# Patient Record
Sex: FEMALE | Race: WHITE | NOT HISPANIC OR LATINO | URBAN - METROPOLITAN AREA
[De-identification: names, ages, dates, MRNs, and addresses within clinical notes are randomized per-mention and may not be internally consistent; named-entity substitution may affect disease eponyms.]

---

## 2018-10-07 ENCOUNTER — INPATIENT (INPATIENT)
Facility: HOSPITAL | Age: 43
LOS: 3 days | Discharge: HOME | End: 2018-10-11
Attending: INTERNAL MEDICINE | Admitting: INTERNAL MEDICINE
Payer: COMMERCIAL

## 2018-10-08 VITALS — HEIGHT: 64 IN | WEIGHT: 207.9 LBS

## 2018-10-08 PROCEDURE — 99221 1ST HOSP IP/OBS SF/LOW 40: CPT

## 2018-10-08 RX ORDER — CEFTRIAXONE 500 MG/1
1 INJECTION, POWDER, FOR SOLUTION INTRAMUSCULAR; INTRAVENOUS EVERY 24 HOURS
Qty: 0 | Refills: 0 | Status: DISCONTINUED | OUTPATIENT
Start: 2018-10-08 | End: 2018-10-10

## 2018-10-08 RX ORDER — HYDROMORPHONE HYDROCHLORIDE 2 MG/ML
1 INJECTION INTRAMUSCULAR; INTRAVENOUS; SUBCUTANEOUS EVERY 4 HOURS
Qty: 0 | Refills: 0 | Status: DISCONTINUED | OUTPATIENT
Start: 2018-10-08 | End: 2018-10-09

## 2018-10-08 RX ORDER — ACETAMINOPHEN 500 MG
650 TABLET ORAL EVERY 6 HOURS
Qty: 0 | Refills: 0 | Status: DISCONTINUED | OUTPATIENT
Start: 2018-10-08 | End: 2018-10-09

## 2018-10-08 RX ORDER — SIMVASTATIN 20 MG/1
40 TABLET, FILM COATED ORAL AT BEDTIME
Qty: 0 | Refills: 0 | Status: DISCONTINUED | OUTPATIENT
Start: 2018-10-08 | End: 2018-10-10

## 2018-10-08 RX ORDER — SODIUM CHLORIDE 9 MG/ML
1000 INJECTION INTRAMUSCULAR; INTRAVENOUS; SUBCUTANEOUS
Qty: 0 | Refills: 0 | Status: DISCONTINUED | OUTPATIENT
Start: 2018-10-08 | End: 2018-10-10

## 2018-10-08 RX ORDER — ALPRAZOLAM 0.25 MG
0.5 TABLET ORAL EVERY 6 HOURS
Qty: 0 | Refills: 0 | Status: DISCONTINUED | OUTPATIENT
Start: 2018-10-08 | End: 2018-10-10

## 2018-10-08 RX ORDER — OXYCODONE AND ACETAMINOPHEN 5; 325 MG/1; MG/1
1 TABLET ORAL EVERY 6 HOURS
Qty: 0 | Refills: 0 | Status: DISCONTINUED | OUTPATIENT
Start: 2018-10-08 | End: 2018-10-09

## 2018-10-08 RX ORDER — ONDANSETRON 8 MG/1
4 TABLET, FILM COATED ORAL EVERY 6 HOURS
Qty: 0 | Refills: 0 | Status: DISCONTINUED | OUTPATIENT
Start: 2018-10-08 | End: 2018-10-09

## 2018-10-08 RX ORDER — HEPARIN SODIUM 5000 [USP'U]/ML
5000 INJECTION INTRAVENOUS; SUBCUTANEOUS EVERY 12 HOURS
Qty: 0 | Refills: 0 | Status: DISCONTINUED | OUTPATIENT
Start: 2018-10-08 | End: 2018-10-10

## 2018-10-08 RX ORDER — CITALOPRAM 10 MG/1
40 TABLET, FILM COATED ORAL DAILY
Qty: 0 | Refills: 0 | Status: DISCONTINUED | OUTPATIENT
Start: 2018-10-08 | End: 2018-10-10

## 2018-10-08 RX ADMIN — HYDROMORPHONE HYDROCHLORIDE 1 MILLIGRAM(S): 2 INJECTION INTRAMUSCULAR; INTRAVENOUS; SUBCUTANEOUS at 21:41

## 2018-10-08 RX ADMIN — SIMVASTATIN 40 MILLIGRAM(S): 20 TABLET, FILM COATED ORAL at 22:27

## 2018-10-09 DIAGNOSIS — N39.0 URINARY TRACT INFECTION, SITE NOT SPECIFIED: ICD-10-CM

## 2018-10-09 LAB
ALBUMIN SERPL ELPH-MCNC: 4.5 G/DL — SIGNIFICANT CHANGE UP (ref 3.5–5.2)
ALLERGY+IMMUNOLOGY DIAG STUDY NOTE: SIGNIFICANT CHANGE UP
ALP SERPL-CCNC: 79 U/L — SIGNIFICANT CHANGE UP (ref 30–115)
ALT FLD-CCNC: 20 U/L — SIGNIFICANT CHANGE UP (ref 0–41)
ANION GAP SERPL CALC-SCNC: 16 MMOL/L — HIGH (ref 7–14)
APPEARANCE UR: ABNORMAL
AST SERPL-CCNC: 14 U/L — SIGNIFICANT CHANGE UP (ref 0–41)
BACTERIA # UR AUTO: ABNORMAL /HPF
BILIRUB SERPL-MCNC: 0.2 MG/DL — SIGNIFICANT CHANGE UP (ref 0.2–1.2)
BILIRUB UR-MCNC: ABNORMAL
BUN SERPL-MCNC: 16 MG/DL — SIGNIFICANT CHANGE UP (ref 10–20)
CALCIUM SERPL-MCNC: 9.6 MG/DL — SIGNIFICANT CHANGE UP (ref 8.5–10.1)
CHLORIDE SERPL-SCNC: 102 MMOL/L — SIGNIFICANT CHANGE UP (ref 98–110)
CO2 SERPL-SCNC: 23 MMOL/L — SIGNIFICANT CHANGE UP (ref 17–32)
COLOR SPEC: ABNORMAL
CREAT SERPL-MCNC: 0.5 MG/DL — LOW (ref 0.7–1.5)
CULTURE RESULTS: NO GROWTH — SIGNIFICANT CHANGE UP
DIFF PNL FLD: ABNORMAL
GLUCOSE SERPL-MCNC: 91 MG/DL — SIGNIFICANT CHANGE UP (ref 70–99)
GLUCOSE UR QL: 100 MG/DL
HCT VFR BLD CALC: 38.1 % — SIGNIFICANT CHANGE UP (ref 37–47)
HCT VFR BLD CALC: 39.5 % — SIGNIFICANT CHANGE UP (ref 37–47)
HGB BLD-MCNC: 12.3 G/DL — SIGNIFICANT CHANGE UP (ref 12–16)
HGB BLD-MCNC: 13.2 G/DL — SIGNIFICANT CHANGE UP (ref 12–16)
KETONES UR-MCNC: 15
LACTATE SERPL-SCNC: 0.9 MMOL/L — SIGNIFICANT CHANGE UP (ref 0.5–2.2)
LEUKOCYTE ESTERASE UR-ACNC: SIGNIFICANT CHANGE UP
MCHC RBC-ENTMCNC: 30.8 PG — SIGNIFICANT CHANGE UP (ref 27–31)
MCHC RBC-ENTMCNC: 31.2 PG — HIGH (ref 27–31)
MCHC RBC-ENTMCNC: 32.3 G/DL — SIGNIFICANT CHANGE UP (ref 32–37)
MCHC RBC-ENTMCNC: 33.4 G/DL — SIGNIFICANT CHANGE UP (ref 32–37)
MCV RBC AUTO: 93.4 FL — SIGNIFICANT CHANGE UP (ref 81–99)
MCV RBC AUTO: 95.5 FL — SIGNIFICANT CHANGE UP (ref 81–99)
NITRITE UR-MCNC: POSITIVE
NRBC # BLD: 0 /100 WBCS — SIGNIFICANT CHANGE UP (ref 0–0)
NRBC # BLD: 0 /100 WBCS — SIGNIFICANT CHANGE UP (ref 0–0)
PH UR: 5 — SIGNIFICANT CHANGE UP (ref 5–8)
PLATELET # BLD AUTO: 210 K/UL — SIGNIFICANT CHANGE UP (ref 130–400)
PLATELET # BLD AUTO: 273 K/UL — SIGNIFICANT CHANGE UP (ref 130–400)
POTASSIUM SERPL-MCNC: 4.2 MMOL/L — SIGNIFICANT CHANGE UP (ref 3.5–5)
POTASSIUM SERPL-SCNC: 4.2 MMOL/L — SIGNIFICANT CHANGE UP (ref 3.5–5)
PROT SERPL-MCNC: 7.5 G/DL — SIGNIFICANT CHANGE UP (ref 6–8)
PROT UR-MCNC: >=300
RBC # BLD: 3.99 M/UL — LOW (ref 4.2–5.4)
RBC # BLD: 4.23 M/UL — SIGNIFICANT CHANGE UP (ref 4.2–5.4)
RBC # FLD: 12.1 % — SIGNIFICANT CHANGE UP (ref 11.5–14.5)
RBC # FLD: 12.2 % — SIGNIFICANT CHANGE UP (ref 11.5–14.5)
RBC CASTS # UR COMP ASSIST: SIGNIFICANT CHANGE UP /HPF
SODIUM SERPL-SCNC: 141 MMOL/L — SIGNIFICANT CHANGE UP (ref 135–146)
SP GR SPEC: 1.02 — SIGNIFICANT CHANGE UP (ref 1.01–1.03)
SPECIMEN SOURCE: SIGNIFICANT CHANGE UP
TYPE + AB SCN PNL BLD: SIGNIFICANT CHANGE UP
UROBILINOGEN FLD QL: 2 (ref 0.2–0.2)
WBC # BLD: 7.45 K/UL — SIGNIFICANT CHANGE UP (ref 4.8–10.8)
WBC # BLD: 8.77 K/UL — SIGNIFICANT CHANGE UP (ref 4.8–10.8)
WBC # FLD AUTO: 7.45 K/UL — SIGNIFICANT CHANGE UP (ref 4.8–10.8)
WBC # FLD AUTO: 8.77 K/UL — SIGNIFICANT CHANGE UP (ref 4.8–10.8)
WBC UR QL: >50 /HPF

## 2018-10-09 RX ORDER — OXYCODONE HYDROCHLORIDE 5 MG/1
10 TABLET ORAL EVERY 6 HOURS
Qty: 0 | Refills: 0 | Status: DISCONTINUED | OUTPATIENT
Start: 2018-10-09 | End: 2018-10-10

## 2018-10-09 RX ORDER — LIDOCAINE HCL 20 MG/ML
5 VIAL (ML) INJECTION ONCE
Qty: 0 | Refills: 0 | Status: COMPLETED | OUTPATIENT
Start: 2018-10-09 | End: 2018-10-10

## 2018-10-09 RX ORDER — HYDROMORPHONE HYDROCHLORIDE 2 MG/ML
1 INJECTION INTRAMUSCULAR; INTRAVENOUS; SUBCUTANEOUS EVERY 4 HOURS
Qty: 0 | Refills: 0 | Status: DISCONTINUED | OUTPATIENT
Start: 2018-10-09 | End: 2018-10-10

## 2018-10-09 RX ORDER — HYDROMORPHONE HYDROCHLORIDE 2 MG/ML
1 INJECTION INTRAMUSCULAR; INTRAVENOUS; SUBCUTANEOUS ONCE
Qty: 0 | Refills: 0 | Status: DISCONTINUED | OUTPATIENT
Start: 2018-10-09 | End: 2018-10-09

## 2018-10-09 RX ORDER — PHENAZOPYRIDINE HCL 100 MG
200 TABLET ORAL THREE TIMES A DAY
Qty: 0 | Refills: 0 | Status: DISCONTINUED | OUTPATIENT
Start: 2018-10-09 | End: 2018-10-10

## 2018-10-09 RX ORDER — LIDOCAINE HCL 20 MG/ML
10 VIAL (ML) INJECTION ONCE
Qty: 0 | Refills: 0 | Status: DISCONTINUED | OUTPATIENT
Start: 2018-10-09 | End: 2018-10-09

## 2018-10-09 RX ADMIN — HYDROMORPHONE HYDROCHLORIDE 1 MILLIGRAM(S): 2 INJECTION INTRAMUSCULAR; INTRAVENOUS; SUBCUTANEOUS at 22:05

## 2018-10-09 RX ADMIN — CEFTRIAXONE 100 GRAM(S): 500 INJECTION, POWDER, FOR SOLUTION INTRAMUSCULAR; INTRAVENOUS at 11:50

## 2018-10-09 RX ADMIN — HYDROMORPHONE HYDROCHLORIDE 1 MILLIGRAM(S): 2 INJECTION INTRAMUSCULAR; INTRAVENOUS; SUBCUTANEOUS at 09:43

## 2018-10-09 RX ADMIN — SODIUM CHLORIDE 75 MILLILITER(S): 9 INJECTION INTRAMUSCULAR; INTRAVENOUS; SUBCUTANEOUS at 23:59

## 2018-10-09 RX ADMIN — SIMVASTATIN 40 MILLIGRAM(S): 20 TABLET, FILM COATED ORAL at 21:40

## 2018-10-09 RX ADMIN — OXYCODONE HYDROCHLORIDE 10 MILLIGRAM(S): 5 TABLET ORAL at 17:26

## 2018-10-09 RX ADMIN — HYDROMORPHONE HYDROCHLORIDE 1 MILLIGRAM(S): 2 INJECTION INTRAMUSCULAR; INTRAVENOUS; SUBCUTANEOUS at 05:30

## 2018-10-09 RX ADMIN — OXYCODONE HYDROCHLORIDE 10 MILLIGRAM(S): 5 TABLET ORAL at 23:26

## 2018-10-09 RX ADMIN — HYDROMORPHONE HYDROCHLORIDE 1 MILLIGRAM(S): 2 INJECTION INTRAMUSCULAR; INTRAVENOUS; SUBCUTANEOUS at 13:26

## 2018-10-09 RX ADMIN — HYDROMORPHONE HYDROCHLORIDE 1 MILLIGRAM(S): 2 INJECTION INTRAMUSCULAR; INTRAVENOUS; SUBCUTANEOUS at 05:33

## 2018-10-09 RX ADMIN — HYDROMORPHONE HYDROCHLORIDE 1 MILLIGRAM(S): 2 INJECTION INTRAMUSCULAR; INTRAVENOUS; SUBCUTANEOUS at 22:06

## 2018-10-09 RX ADMIN — HYDROMORPHONE HYDROCHLORIDE 1 MILLIGRAM(S): 2 INJECTION INTRAMUSCULAR; INTRAVENOUS; SUBCUTANEOUS at 14:47

## 2018-10-09 RX ADMIN — OXYCODONE HYDROCHLORIDE 10 MILLIGRAM(S): 5 TABLET ORAL at 17:56

## 2018-10-09 RX ADMIN — HYDROMORPHONE HYDROCHLORIDE 1 MILLIGRAM(S): 2 INJECTION INTRAMUSCULAR; INTRAVENOUS; SUBCUTANEOUS at 01:51

## 2018-10-09 RX ADMIN — HYDROMORPHONE HYDROCHLORIDE 1 MILLIGRAM(S): 2 INJECTION INTRAMUSCULAR; INTRAVENOUS; SUBCUTANEOUS at 09:54

## 2018-10-09 RX ADMIN — OXYCODONE AND ACETAMINOPHEN 1 TABLET(S): 5; 325 TABLET ORAL at 12:48

## 2018-10-09 RX ADMIN — HYDROMORPHONE HYDROCHLORIDE 1 MILLIGRAM(S): 2 INJECTION INTRAMUSCULAR; INTRAVENOUS; SUBCUTANEOUS at 23:18

## 2018-10-09 RX ADMIN — HYDROMORPHONE HYDROCHLORIDE 1 MILLIGRAM(S): 2 INJECTION INTRAMUSCULAR; INTRAVENOUS; SUBCUTANEOUS at 20:42

## 2018-10-09 RX ADMIN — Medication 0.5 MILLIGRAM(S): at 21:42

## 2018-10-09 RX ADMIN — Medication 200 MILLIGRAM(S): at 21:39

## 2018-10-09 RX ADMIN — OXYCODONE AND ACETAMINOPHEN 1 TABLET(S): 5; 325 TABLET ORAL at 11:49

## 2018-10-09 RX ADMIN — HYDROMORPHONE HYDROCHLORIDE 1 MILLIGRAM(S): 2 INJECTION INTRAMUSCULAR; INTRAVENOUS; SUBCUTANEOUS at 01:54

## 2018-10-09 RX ADMIN — CITALOPRAM 40 MILLIGRAM(S): 10 TABLET, FILM COATED ORAL at 11:50

## 2018-10-09 NOTE — PROGRESS NOTE ADULT - SUBJECTIVE AND OBJECTIVE BOX
CC: f/u pyelonephritis      INTERVAL EVENTS INCLUDE:  earlier today call placed to [facility] care physicians who confirmed patient was seen at urgent care center and they will fax over the urine culture data if available  no fax as of yet  seen by ID who recs to keep IV abx pending culture data  discussed with urology who recommneded cystoscopy once uti resolved and urine culture negative  RN tells me hemodynamicly stable but high pain med need, reportedly due to patient beginning her menses today      ROS:      Vital Signs Last 24 Hrs  T(C): 36.2 (09 Oct 2018 14:24), Max: 37.3 (08 Oct 2018 22:00)  T(F): 97.1 (09 Oct 2018 14:24), Max: 99.1 (08 Oct 2018 22:00)  HR: 94 (09 Oct 2018 14:24) (65 - 94)  BP: 118/56 (09 Oct 2018 14:24) (118/56 - 138/81)  BP(mean): --  RR: 16 (09 Oct 2018 14:24) (16 - 16)  SpO2: --      PHYSICAL EXAM:  GENERAL: NAD, well-groomed, well-developed  HEAD = atraumatic, normoocehalic  EYES: conjunctiva and sclera clear  NECK: Supple, No JVD  NERVOUS SYSTEM:  Alert & Oriented X3, Good concentration  CHEST/LUNG: Clear to ausculatation bilaterally; No rales, rhonchi, wheezing, or rubs  HEART: Regular rate and rhythm; No murmurs, rubs, or gallops  ABDOMEN: Soft, Nontender, Nondistended; Bowel sounds present  EXTREMITIES:  BL No clubbing, cyanosis, or edema      DATA:    urine culture data n.a  called lab to see if any cultures are available from EMR downtime. they have uroine culture received from last night and is pending  they also have blood culture received from last night, results pending                          11.8   6.47  )-----------( 248      ( 09 Oct 2018 09:25 )             37.4       10-09    140  |  103  |  9<L>  ----------------------------<  91  4.5   |  21  |  0.5<L>    Ca    9.0      09 Oct 2018 09:25  Mg     2.2     10-09    TPro  6.9  /  Alb  4.3  /  TBili  <0.2  /  DBili  x   /  AST  84<H>  /  ALT  140<H>  /  AlkPhos  96  10-09 CC: f/u pyelonephritis      INTERVAL EVENTS INCLUDE:  earlier today call placed to [facility] physicians who confirmed patient was seen at urgent care center and they will fax over the urine culture data if available  no fax as of yet  seen by ID who recs to keep IV abx pending culture data  discussed with urology who recommneded cystoscopy once uti resolved and urine culture negative  RN tells me hemodynamicly stable but high pain med need, reportedly due to patient beginning her menses today      ROS:  fevers / chills / right cva pain resolved  urinary streat is better and less burning on urination  now with new pelvic pain, consistent with cramping from her usual menses  showed me a bottle of percocet 10/325, 1 tab po q8 prn, which she takes for chonic back pain and for menses related pain  reports she is allergic to nsaids  no diarrhea and no contipation  no futhr headaches    Vital Signs Last 24 Hrs  T(C): 36.2 (09 Oct 2018 14:24), Max: 37.3 (08 Oct 2018 22:00)  T(F): 97.1 (09 Oct 2018 14:24), Max: 99.1 (08 Oct 2018 22:00)  HR: 94 (09 Oct 2018 14:24) (65 - 94)  BP: 118/56 (09 Oct 2018 14:24) (118/56 - 138/81)  BP(mean): --  RR: 16 (09 Oct 2018 14:24) (16 - 16)  SpO2: --      PHYSICAL EXAM:  GENERAL: NAD, well-groomed, well-developed  HEAD = atraumatic, normoocehalic  EYES: conjunctiva and sclera clear  NERVOUS SYSTEM:  Alert & Oriented X3, Good concentration  CHEST/LUNG: Clear to ausculatation bilaterally; No rales, rhonchi, wheezing, or rubs  HEART: Regular rate and rhythm; No murmurs, rubs, or gallops  BACK: no spinal or CVA pain BL  ABDOMEN: Soft, mild tenderness in suprapibic area wiyth no gaurding or rebound. no distention and bowel sounds are present 4/4  EXTREMITIES:  BL No clubbing, cyanosis, or edema of the BLE  GAIT: stable and independent      DATA:    urine culture data n.a  called lab to see if any cultures are available from EMR downtime. they have uroine culture received from last night and is pending  they also have blood culture received from last night, results pending                          11.8   6.47  )-----------( 248      ( 09 Oct 2018 09:25 )             37.4       10-09    140  |  103  |  9<L>  ----------------------------<  91  4.5   |  21  |  0.5<L>    Ca    9.0      09 Oct 2018 09:25  Mg     2.2     10-09    TPro  6.9  /  Alb  4.3  /  TBili  <0.2  /  DBili  x   /  AST  84<H>  /  ALT  140<H>  /  AlkPhos  96  10-09

## 2018-10-09 NOTE — PROGRESS NOTE ADULT - ASSESSMENT
42 yo female with PMH that includes anxiety, hx of kidney stones, DLD, migraine headache, who presented with dysuria + suprabubic pain + CVA pain and diagnosed with cytitis and possible pyelonephritis. CT additionally revealed asymmetric bladder wall thickening at left posterolateral wall with bladder tumor being a consideration, possibly superinfected. CT additionally shows a right inferior breast 1.2 cm nodule. She is being followed by  and ID.     CT report is available in paper chart and in PACS. not in sunrise as CT was done during EMR downtime      # Pyelonephrisits: with poss bladder tumer ? superinfected  - cont IV abx ceftriaxone and followup culture data (confirmed BCx and UCx received by lab last night)  - call placed to [facility] physicians and awaiting fax with their recent urine culture data as well  - discussed with urology who recommneded cystoscopy once uti resolved and urine culture negative      # Pain managment:   - Headache is resolved after imitrex yesterday  ---- still with suprapubic pain and dysuria      # Anemia: Hgb was 13.2 on 10/7 labs and is 11.8 today  ----- ROS  --- plan      #### mild transaminitis: with elev AST and ALT  ---- RUQ  --- hx  - check viral hep panel  - check RUQ sono  - trend  - ? abx related, if rised may need to switch off cephalosporin      # right breast nodule: patient aware and will call her gynecologist to arrange for manual exam, mammogram, and sonogram  ---- ASAP    # Coordination of care:  - VTE ppx with HSQ  - plan of care reviewed with patient, RN, and  44 yo female with PMH that includes anxiety, hx of kidney stones, DLD, migraine headache, who presented with dysuria + suprabubic pain + CVA pain and diagnosed with cytitis and possible pyelonephritis. CT additionally revealed asymmetric bladder wall thickening at left posterolateral wall with bladder tumor being a consideration, possibly superinfected. CT additionally shows a right inferior breast 1.2 cm nodule. She is being followed by  and ID.     CT report is available in paper chart and in PACS. not in sunrise as CT was done during EMR downtime      # Pyelonephrisits: with poss bladder tumer ? superinfected. infection markers are improving  - cont IV abx ceftriaxone and followup culture data (confirmed BCx and UCx received by lab last night)  - call placed to [facility] physicians and awaiting fax with their recent urine culture data as well  - discussed with urology who recommneded cystoscopy once uti resolved and urine culture negative      # Pain managment: likely worsend pelvic pain today due to her menses. chroniucally needs opiates to manages menses related pain  - DC tylenol / percocet  - Rx Oxy IR 10mg po q6 prn pain  - dc dilaudid, and reserve use for pain unreleived with the above      # Anemia: Hgb was 13.2 on 10/7 labs and is 11.8 today. ROS does not suggest bleeding  - repeat hgb this evening      # mild transaminitis: with elev AST and ALT. asymptomatic  - check viral hep panel  - check RUQ sono  - trend  - ? abx related, if rised may need to switch off cephalosporin      # right breast nodule: patient aware and will call her gynecologist to arrange for manual exam, mammogram, and sonogram  ---- ASAP    # Coordination of care:  - VTE ppx with HSQ  - plan of care reviewed with patient, RN, and  42 yo female with PMH that includes anxiety, hx of kidney stones, DLD, migraine headache, who presented with dysuria + suprabubic pain + CVA pain and diagnosed with cytitis and possible pyelonephritis. CT additionally revealed asymmetric bladder wall thickening at left posterolateral wall with bladder tumor being a consideration, possibly superinfected. CT additionally shows a right inferior breast 1.2 cm nodule. She is being followed by  and ID.     CT report is available in paper chart and in PACS. not in sunrise as CT was done during EMR downtime      # Pyelonephrisits: with poss bladder tumer ? superinfected. infection markers are improving  - cont IV abx ceftriaxone and followup culture data (confirmed BCx and UCx received by lab last night)  - call placed to [facility] physicians and awaiting fax with their recent urine culture data as well  - discussed with urology who recommneded cystoscopy once uti resolved and urine culture negative      # Pain managment: likely worsend pelvic pain today due to her menses. chroniucally needs opiates to manages menses related pain  - DC tylenol / percocet  - Rx Oxy IR 10mg po q6 prn pain  - dc dilaudid, and reserve use for pain unreleived with the above      # Anemia: Hgb was 13.2 on 10/7 labs and is 11.8 today. ROS does not suggest bleeding otyher than menses, which she reprots is lighter than her usual  - repeat hgb this evening      # mild transaminitis: with elev AST and ALT. asymptomatic  - check viral hep panel  - check RUQ sono  - trend  - ? abx related, if rised may need to switch off cephalosporin      # right breast nodule: patient aware and will call her gynecologist to arrange for manual exam, mammogram, and sonogram. restressed this is to be done asap to patient and edmund      # Coordination of care:  - VTE ppx with HSQ  - plan of care reviewed with patient, RN, and

## 2018-10-09 NOTE — CONSULT NOTE ADULT - SUBJECTIVE AND OBJECTIVE BOX
RAMON WOODS 43yFemalePatient is a 43y old  Female who presents with a chief complaint of     Patient has history of:  Cipro (Unknown)  eggs (Unknown)  NSAIDs (Unknown)      PMH - hx of renal stones    FSH - not relevant     ROS - not relevant     Patient treated with:  cefTRIAXone   IVPB 1 Gram(s) IV Intermittent every 24 hours    PHYSICAL EXAM  T(F): 96.9 (10-09-18 @ 05:00), Max: 99.1 (10-08-18 @ 22:00)  HR: 65 (10-09-18 @ 05:00) (65 - 69)  BP: 121/74 (10-09-18 @ 05:00) (121/74 - 138/81)  RR: 16 (10-09-18 @ 05:00) (16 - 16)  SpO2: --  Daily Height in cm: 162.56 (08 Oct 2018 18:44)    Daily     HEENT: normal, no nuchal rigidity  Cor: RSR Nl S1 S2  Lungs: clear  Abdomen: tender Right flank to mid abdomen , Nl BS,   Ext: No phlebitis     LAB & RADIOLOGIC RESULTS:

## 2018-10-10 ENCOUNTER — RESULT REVIEW (OUTPATIENT)
Age: 43
End: 2018-10-10

## 2018-10-10 LAB
ALBUMIN SERPL ELPH-MCNC: 4.4 G/DL — SIGNIFICANT CHANGE UP (ref 3.5–5.2)
ALP SERPL-CCNC: 96 U/L — SIGNIFICANT CHANGE UP (ref 30–115)
ALT FLD-CCNC: 93 U/L — HIGH (ref 0–41)
ANION GAP SERPL CALC-SCNC: 15 MMOL/L — HIGH (ref 7–14)
AST SERPL-CCNC: 34 U/L — SIGNIFICANT CHANGE UP (ref 0–41)
BILIRUB SERPL-MCNC: <0.2 MG/DL — SIGNIFICANT CHANGE UP (ref 0.2–1.2)
BUN SERPL-MCNC: 9 MG/DL — LOW (ref 10–20)
CALCIUM SERPL-MCNC: 9.2 MG/DL — SIGNIFICANT CHANGE UP (ref 8.5–10.1)
CHLORIDE SERPL-SCNC: 103 MMOL/L — SIGNIFICANT CHANGE UP (ref 98–110)
CO2 SERPL-SCNC: 23 MMOL/L — SIGNIFICANT CHANGE UP (ref 17–32)
CREAT SERPL-MCNC: 0.5 MG/DL — LOW (ref 0.7–1.5)
GLUCOSE SERPL-MCNC: 136 MG/DL — HIGH (ref 70–99)
HAV IGM SER-ACNC: SIGNIFICANT CHANGE UP
HBV CORE IGM SER-ACNC: SIGNIFICANT CHANGE UP
HBV SURFACE AG SER-ACNC: SIGNIFICANT CHANGE UP
HCT VFR BLD CALC: 38.2 % — SIGNIFICANT CHANGE UP (ref 37–47)
HCV AB S/CO SERPL IA: 1.62 S/CO — SIGNIFICANT CHANGE UP
HCV AB SERPL-IMP: ABNORMAL
HCV RNA FLD QL NAA+PROBE: SIGNIFICANT CHANGE UP
HCV RNA SPEC QL PROBE+SIG AMP: SIGNIFICANT CHANGE UP
HGB BLD-MCNC: 12.5 G/DL — SIGNIFICANT CHANGE UP (ref 12–16)
MAGNESIUM SERPL-MCNC: 2 MG/DL — SIGNIFICANT CHANGE UP (ref 1.8–2.4)
MCHC RBC-ENTMCNC: 30.9 PG — SIGNIFICANT CHANGE UP (ref 27–31)
MCHC RBC-ENTMCNC: 32.7 G/DL — SIGNIFICANT CHANGE UP (ref 32–37)
MCV RBC AUTO: 94.6 FL — SIGNIFICANT CHANGE UP (ref 81–99)
NRBC # BLD: 0 /100 WBCS — SIGNIFICANT CHANGE UP (ref 0–0)
PLATELET # BLD AUTO: 268 K/UL — SIGNIFICANT CHANGE UP (ref 130–400)
POTASSIUM SERPL-MCNC: 4.5 MMOL/L — SIGNIFICANT CHANGE UP (ref 3.5–5)
POTASSIUM SERPL-SCNC: 4.5 MMOL/L — SIGNIFICANT CHANGE UP (ref 3.5–5)
PROT SERPL-MCNC: 7.2 G/DL — SIGNIFICANT CHANGE UP (ref 6–8)
RBC # BLD: 4.04 M/UL — LOW (ref 4.2–5.4)
RBC # FLD: 11.9 % — SIGNIFICANT CHANGE UP (ref 11.5–14.5)
SODIUM SERPL-SCNC: 141 MMOL/L — SIGNIFICANT CHANGE UP (ref 135–146)
WBC # BLD: 14.69 K/UL — HIGH (ref 4.8–10.8)
WBC # FLD AUTO: 14.69 K/UL — HIGH (ref 4.8–10.8)

## 2018-10-10 PROCEDURE — 52204 CYSTOSCOPY W/BIOPSY(S): CPT | Mod: 59

## 2018-10-10 PROCEDURE — 52214 CYSTOSCOPY AND TREATMENT: CPT

## 2018-10-10 PROCEDURE — 52318 REMOVE BLADDER STONE: CPT

## 2018-10-10 RX ORDER — SIMVASTATIN 20 MG/1
40 TABLET, FILM COATED ORAL AT BEDTIME
Qty: 0 | Refills: 0 | Status: DISCONTINUED | OUTPATIENT
Start: 2018-10-10 | End: 2018-10-11

## 2018-10-10 RX ORDER — HYDROMORPHONE HYDROCHLORIDE 2 MG/ML
0.5 INJECTION INTRAMUSCULAR; INTRAVENOUS; SUBCUTANEOUS EVERY 4 HOURS
Qty: 0 | Refills: 0 | Status: DISCONTINUED | OUTPATIENT
Start: 2018-10-10 | End: 2018-10-11

## 2018-10-10 RX ORDER — CEFTRIAXONE 500 MG/1
1 INJECTION, POWDER, FOR SOLUTION INTRAMUSCULAR; INTRAVENOUS EVERY 24 HOURS
Qty: 0 | Refills: 0 | Status: DISCONTINUED | OUTPATIENT
Start: 2018-10-10 | End: 2018-10-11

## 2018-10-10 RX ORDER — PHENAZOPYRIDINE HCL 100 MG
200 TABLET ORAL THREE TIMES A DAY
Qty: 0 | Refills: 0 | Status: DISCONTINUED | OUTPATIENT
Start: 2018-10-10 | End: 2018-10-11

## 2018-10-10 RX ORDER — OXYCODONE AND ACETAMINOPHEN 5; 325 MG/1; MG/1
2 TABLET ORAL EVERY 6 HOURS
Qty: 0 | Refills: 0 | Status: DISCONTINUED | OUTPATIENT
Start: 2018-10-10 | End: 2018-10-11

## 2018-10-10 RX ORDER — ONDANSETRON 8 MG/1
4 TABLET, FILM COATED ORAL ONCE
Qty: 0 | Refills: 0 | Status: DISCONTINUED | OUTPATIENT
Start: 2018-10-10 | End: 2018-10-10

## 2018-10-10 RX ORDER — HYDROMORPHONE HYDROCHLORIDE 2 MG/ML
1 INJECTION INTRAMUSCULAR; INTRAVENOUS; SUBCUTANEOUS ONCE
Qty: 0 | Refills: 0 | Status: DISCONTINUED | OUTPATIENT
Start: 2018-10-10 | End: 2018-10-10

## 2018-10-10 RX ORDER — HYDROMORPHONE HYDROCHLORIDE 2 MG/ML
1 INJECTION INTRAMUSCULAR; INTRAVENOUS; SUBCUTANEOUS EVERY 6 HOURS
Qty: 0 | Refills: 0 | Status: DISCONTINUED | OUTPATIENT
Start: 2018-10-10 | End: 2018-10-11

## 2018-10-10 RX ORDER — MORPHINE SULFATE 50 MG/1
2 CAPSULE, EXTENDED RELEASE ORAL
Qty: 0 | Refills: 0 | Status: DISCONTINUED | OUTPATIENT
Start: 2018-10-10 | End: 2018-10-10

## 2018-10-10 RX ORDER — HYDROMORPHONE HYDROCHLORIDE 2 MG/ML
0.5 INJECTION INTRAMUSCULAR; INTRAVENOUS; SUBCUTANEOUS
Qty: 0 | Refills: 0 | Status: DISCONTINUED | OUTPATIENT
Start: 2018-10-10 | End: 2018-10-10

## 2018-10-10 RX ORDER — HEPARIN SODIUM 5000 [USP'U]/ML
5000 INJECTION INTRAVENOUS; SUBCUTANEOUS EVERY 12 HOURS
Qty: 0 | Refills: 0 | Status: DISCONTINUED | OUTPATIENT
Start: 2018-10-10 | End: 2018-10-11

## 2018-10-10 RX ORDER — ALPRAZOLAM 0.25 MG
0.5 TABLET ORAL ONCE
Qty: 0 | Refills: 0 | Status: DISCONTINUED | OUTPATIENT
Start: 2018-10-10 | End: 2018-10-10

## 2018-10-10 RX ORDER — OXYCODONE AND ACETAMINOPHEN 5; 325 MG/1; MG/1
2 TABLET ORAL ONCE
Qty: 0 | Refills: 0 | Status: DISCONTINUED | OUTPATIENT
Start: 2018-10-10 | End: 2018-10-10

## 2018-10-10 RX ORDER — ACETAMINOPHEN 500 MG
650 TABLET ORAL EVERY 6 HOURS
Qty: 0 | Refills: 0 | Status: DISCONTINUED | OUTPATIENT
Start: 2018-10-10 | End: 2018-10-11

## 2018-10-10 RX ORDER — ONDANSETRON 8 MG/1
4 TABLET, FILM COATED ORAL EVERY 6 HOURS
Qty: 0 | Refills: 0 | Status: DISCONTINUED | OUTPATIENT
Start: 2018-10-10 | End: 2018-10-10

## 2018-10-10 RX ORDER — SODIUM CHLORIDE 9 MG/ML
1000 INJECTION INTRAMUSCULAR; INTRAVENOUS; SUBCUTANEOUS
Qty: 0 | Refills: 0 | Status: DISCONTINUED | OUTPATIENT
Start: 2018-10-10 | End: 2018-10-11

## 2018-10-10 RX ORDER — SODIUM CHLORIDE 9 MG/ML
1000 INJECTION, SOLUTION INTRAVENOUS
Qty: 0 | Refills: 0 | Status: DISCONTINUED | OUTPATIENT
Start: 2018-10-10 | End: 2018-10-10

## 2018-10-10 RX ADMIN — Medication 5 MILLILITER(S): at 00:00

## 2018-10-10 RX ADMIN — HYDROMORPHONE HYDROCHLORIDE 1 MILLIGRAM(S): 2 INJECTION INTRAMUSCULAR; INTRAVENOUS; SUBCUTANEOUS at 18:35

## 2018-10-10 RX ADMIN — HYDROMORPHONE HYDROCHLORIDE 0.5 MILLIGRAM(S): 2 INJECTION INTRAMUSCULAR; INTRAVENOUS; SUBCUTANEOUS at 10:15

## 2018-10-10 RX ADMIN — Medication 200 MILLIGRAM(S): at 22:23

## 2018-10-10 RX ADMIN — Medication 650 MILLIGRAM(S): at 12:37

## 2018-10-10 RX ADMIN — SODIUM CHLORIDE 100 MILLILITER(S): 9 INJECTION, SOLUTION INTRAVENOUS at 05:34

## 2018-10-10 RX ADMIN — Medication 1 MILLIGRAM(S): at 01:16

## 2018-10-10 RX ADMIN — SODIUM CHLORIDE 75 MILLILITER(S): 9 INJECTION INTRAMUSCULAR; INTRAVENOUS; SUBCUTANEOUS at 22:36

## 2018-10-10 RX ADMIN — HEPARIN SODIUM 5000 UNIT(S): 5000 INJECTION INTRAVENOUS; SUBCUTANEOUS at 18:35

## 2018-10-10 RX ADMIN — OXYCODONE HYDROCHLORIDE 10 MILLIGRAM(S): 5 TABLET ORAL at 00:00

## 2018-10-10 RX ADMIN — Medication 0.5 MILLIGRAM(S): at 23:41

## 2018-10-10 RX ADMIN — HYDROMORPHONE HYDROCHLORIDE 0.5 MILLIGRAM(S): 2 INJECTION INTRAMUSCULAR; INTRAVENOUS; SUBCUTANEOUS at 06:32

## 2018-10-10 RX ADMIN — HYDROMORPHONE HYDROCHLORIDE 1 MILLIGRAM(S): 2 INJECTION INTRAMUSCULAR; INTRAVENOUS; SUBCUTANEOUS at 06:08

## 2018-10-10 RX ADMIN — HYDROMORPHONE HYDROCHLORIDE 0.5 MILLIGRAM(S): 2 INJECTION INTRAMUSCULAR; INTRAVENOUS; SUBCUTANEOUS at 09:43

## 2018-10-10 RX ADMIN — HYDROMORPHONE HYDROCHLORIDE 1 MILLIGRAM(S): 2 INJECTION INTRAMUSCULAR; INTRAVENOUS; SUBCUTANEOUS at 13:10

## 2018-10-10 RX ADMIN — CEFTRIAXONE 100 GRAM(S): 500 INJECTION, POWDER, FOR SOLUTION INTRAMUSCULAR; INTRAVENOUS at 12:17

## 2018-10-10 RX ADMIN — HYDROMORPHONE HYDROCHLORIDE 1 MILLIGRAM(S): 2 INJECTION INTRAMUSCULAR; INTRAVENOUS; SUBCUTANEOUS at 02:00

## 2018-10-10 RX ADMIN — Medication 200 MILLIGRAM(S): at 06:04

## 2018-10-10 RX ADMIN — HYDROMORPHONE HYDROCHLORIDE 1 MILLIGRAM(S): 2 INJECTION INTRAMUSCULAR; INTRAVENOUS; SUBCUTANEOUS at 23:24

## 2018-10-10 RX ADMIN — HEPARIN SODIUM 5000 UNIT(S): 5000 INJECTION INTRAVENOUS; SUBCUTANEOUS at 06:04

## 2018-10-10 RX ADMIN — HYDROMORPHONE HYDROCHLORIDE 1 MILLIGRAM(S): 2 INJECTION INTRAMUSCULAR; INTRAVENOUS; SUBCUTANEOUS at 14:49

## 2018-10-10 RX ADMIN — HYDROMORPHONE HYDROCHLORIDE 1 MILLIGRAM(S): 2 INJECTION INTRAMUSCULAR; INTRAVENOUS; SUBCUTANEOUS at 22:23

## 2018-10-10 RX ADMIN — SIMVASTATIN 40 MILLIGRAM(S): 20 TABLET, FILM COATED ORAL at 22:23

## 2018-10-10 RX ADMIN — SODIUM CHLORIDE 100 MILLILITER(S): 9 INJECTION, SOLUTION INTRAVENOUS at 05:35

## 2018-10-10 RX ADMIN — Medication 650 MILLIGRAM(S): at 12:17

## 2018-10-10 RX ADMIN — Medication 200 MILLIGRAM(S): at 13:10

## 2018-10-10 RX ADMIN — HYDROMORPHONE HYDROCHLORIDE 1 MILLIGRAM(S): 2 INJECTION INTRAMUSCULAR; INTRAVENOUS; SUBCUTANEOUS at 19:15

## 2018-10-10 RX ADMIN — OXYCODONE AND ACETAMINOPHEN 2 TABLET(S): 5; 325 TABLET ORAL at 16:40

## 2018-10-10 RX ADMIN — HYDROMORPHONE HYDROCHLORIDE 0.5 MILLIGRAM(S): 2 INJECTION INTRAMUSCULAR; INTRAVENOUS; SUBCUTANEOUS at 10:52

## 2018-10-10 RX ADMIN — OXYCODONE AND ACETAMINOPHEN 2 TABLET(S): 5; 325 TABLET ORAL at 17:46

## 2018-10-10 NOTE — PROGRESS NOTE ADULT - SUBJECTIVE AND OBJECTIVE BOX
RAMON WOODS  43y  Female    Patient is a 43y old  Female who presents with a chief complaint of f/u pyelonephritis (09 Oct 2018 16:19)      INTERVAL HPI/OVERNIGHT EVENTS:      REVIEW OF SYSTEMS:  CONSTITUTIONAL: No fever, weight loss, or fatigue  EYES: No eye pain, visual disturbances, or discharge  ENMT:  No difficulty hearing, tinnitus, vertigo; No sinus or throat pain  NECK: No pain or stiffness  BREASTS: No pain, masses, or nipple discharge  RESPIRATORY: No cough, wheezing, chills or hemoptysis; No shortness of breath  CARDIOVASCULAR: No chest pain, palpitations, dizziness, or leg swelling  GASTROINTESTINAL: No abdominal or epigastric pain. No nausea, vomiting, or hematemesis; No diarrhea or constipation. No melena or hematochezia.  GENITOURINARY: No dysuria, frequency, hematuria, or incontinence  NEUROLOGICAL: No headaches, memory loss, loss of strength, numbness, or tremors  SKIN: No itching, burning, rashes, or lesions   LYMPH NODES: No enlarged glands  ENDOCRINE: No heat or cold intolerance; No hair loss  MUSCULOSKELETAL: No joint pain or swelling; No muscle, back, or extremity pain  PSYCHIATRIC: No depression, anxiety, mood swings, or difficulty sleeping  HEME/LYMPH: No easy bruising, or bleeding gums  ALLERY AND IMMUNOLOGIC: No hives or eczema    VITALS:  T(F): 98.9 (10-10-18 @ 11:40), Max: 98.9 (10-10-18 @ 11:40)  HR: 92 (10-10-18 @ 11:40) (82 - 111)  BP: 127/82 (10-10-18 @ 11:40) (127/82 - 162/94)  RR: 16 (10-10-18 @ 11:40) (15 - 24)  SpO2: 94% (10-10-18 @ 06:11) (94% - 99%)    PHYSICAL EXAM:  GENERAL: NAD, well-groomed, well-developed  HEAD:  Atraumatic, Normocephalic  EYES: EOMI, PERRLA, conjunctiva and sclera clear  ENMT: No tonsillar erythema, exudates, or enlargement; Moist mucous membranes, Good dentition, No lesions  NECK: Supple, No JVD, Normal thyroid  NERVOUS SYSTEM:  Alert & Oriented X3, Good concentration; Motor Strength 5/5 B/L upper and lower extremities; DTRs 2+ intact and symmetric  CHEST/LUNG: Clear to percussion bilaterally; No rales, rhonchi, wheezing, or rubs  HEART: Regular rate and rhythm; No murmurs, rubs, or gallops  ABDOMEN: Soft, Nontender, Nondistended; Bowel sounds present  EXTREMITIES:  2+ Peripheral Pulses, No clubbing, cyanosis, or edema  LYMPH: No lymphadenopathy noted  SKIN: No rashes or lesions    Consultant(s) Notes Reviewed:  [x ] YES  [ ] NO  Care Discussed with Consultants/Other Providers [ x] YES  [ ] NO    LABS:                        12.5   14.69 )-----------( 268      ( 10 Oct 2018 07:12 )             38.2     10-10    141  |  103  |  9<L>  ----------------------------<  136<H>  4.5   |  23  |  0.5<L>    Ca    9.2      10 Oct 2018 07:12  Mg     2.0     10-10    TPro  7.2  /  Alb  4.4  /  TBili  <0.2  /  DBili  x   /  AST  34  /  ALT  93<H>  /  AlkPhos  96  10-10    MICROBIOLOGY:      RADIOLOGY & ADDITIONAL TESTS:    Imaging Personally Reviewed:  [ ] YES  [ ] NO    MEDICATIONS  (STANDING):  cefTRIAXone   IVPB 1 Gram(s) IV Intermittent every 24 hours  heparin  Injectable 5000 Unit(s) SubCutaneous every 12 hours  phenazopyridine 200 milliGRAM(s) Oral three times a day  simvastatin 40 milliGRAM(s) Oral at bedtime  sodium chloride 0.9%. 1000 milliLiter(s) (75 mL/Hr) IV Continuous <Continuous>    MEDICATIONS  (PRN):  acetaminophen   Tablet .. 650 milliGRAM(s) Oral every 6 hours PRN Mild Pain (1 - 3)  HYDROmorphone  Injectable 0.5 milliGRAM(s) IV Push every 4 hours PRN Moderate Pain (4 - 6)    HEALTH ISSUES - PROBLEM Dx:  Urinary tract infection without hematuria, site unspecified: Urinary tract infection without hematuria, site unspecified RAMON WOODS  43y  Female    Patient is a 43y old  Female who presents with a chief complaint of abdominal pain and dysuria (09 Oct 2018 16:19)      INTERVAL HPI/OVERNIGHT EVENTS:  s/p Cystoscopy with biopsy and fulguration of bladder 10/10/2018  laser lithotripsy of urethral and bladder stones  Patient complaining of severe lower abdominal pain.      REVIEW OF SYSTEMS:  CONSTITUTIONAL: No fever, weight loss, or fatigue  EYES: No eye pain, visual disturbances, or discharge  ENMT:  No difficulty hearing, tinnitus, vertigo; No sinus or throat pain  NECK: No pain or stiffness  BREASTS: No pain, masses, or nipple discharge  RESPIRATORY: No cough, wheezing, chills or hemoptysis; No shortness of breath  CARDIOVASCULAR: No chest pain, palpitations, dizziness, or leg swelling  GASTROINTESTINAL: + lower abdominal pain. No nausea, vomiting, or hematemesis; No diarrhea or constipation. No melena or hematochezia.  GENITOURINARY: No dysuria, frequency, hematuria, or incontinence  NEUROLOGICAL: No headaches, memory loss, loss of strength, numbness, or tremors  SKIN: No itching, burning, rashes, or lesions   LYMPH NODES: No enlarged glands  ENDOCRINE: No heat or cold intolerance; No hair loss  MUSCULOSKELETAL: No joint pain or swelling; + back pain  PSYCHIATRIC: No depression, anxiety, mood swings, or difficulty sleeping  HEME/LYMPH: No easy bruising, or bleeding gums  ALLERGY AND IMMUNOLOGIC: No hives or eczema      VITALS:  T(F): 98.9 (10-10-18 @ 11:40), Max: 98.9 (10-10-18 @ 11:40)  HR: 92 (10-10-18 @ 11:40) (82 - 111)  BP: 127/82 (10-10-18 @ 11:40) (127/82 - 162/94)  RR: 16 (10-10-18 @ 11:40) (15 - 24)  SpO2: 94% (10-10-18 @ 06:11) (94% - 99%)      PHYSICAL EXAM:  GENERAL: NAD, well-groomed, well-developed  HEAD:  Atraumatic, Normocephalic  EYES: EOMI, PERRLA, conjunctiva and sclera clear  ENMT: Moist mucous membranes  NECK: Supple, Normal thyroid  NERVOUS SYSTEM:  Alert & Oriented X 3, Good concentration; Motor Strength 5/5 B/L upper and lower extremities  CHEST/LUNG: Clear to auscultation bilaterally; No rales, rhonchi, wheezing, or rubs  HEART: Regular rate and rhythm; No murmurs, rubs, or gallops  ABDOMEN: Soft, Nontender, Nondistended; Bowel sounds present  EXTREMITIES:  2+ Peripheral Pulses, No clubbing, cyanosis, or edema  LYMPH: No lymphadenopathy noted  SKIN: No rashes or lesions    Marshall+     Consultant(s) Notes Reviewed:  [x ] YES  [ ] NO  Care Discussed with Consultants/Other Providers [ x] YES  [ ] NO    LABS:                        12.5   14.69 )-----------( 268      ( 10 Oct 2018 07:12 )             38.2     10-10    141  |  103  |  9<L>  ----------------------------<  136<H>  4.5   |  23  |  0.5<L>    Ca    9.2      10 Oct 2018 07:12  Mg     2.0     10-10    TPro  7.2  /  Alb  4.4  /  TBili  <0.2  /  DBili  x   /  AST  34  /  ALT  93<H>  /  AlkPhos  96  10-10    Urine Microscopic-Add On (NC) (10.07.18 @ 19:00)    White Blood Cell - Urine: >50 /HPF    Red Blood Cell - Urine: TNTC /HPF    Bacteria: TNTC /HPF      MICROBIOLOGY:  Culture - Urine (10.08.18 @ 19:00)    Specimen Source: .Urine Clean Catch (Midstream)    Culture Results: No growth      Culture - Blood (10.07.18 @ 19:02)    Specimen Source: .Blood None    Culture Results: No growth to date.      RADIOLOGY & ADDITIONAL TESTS:  CT Abdomen and Pelvis w/ IV Cont (10.07.18 @ 23:15)   Pericystic inflammatory change around urinary bladder, with asymmetric   wall thickening at left posterolateral wall; bladder tumor is a   consideration, which may be superinfected, and direct visualization   recommended. Apparent tumor abuts the uterus.    Right inferior breast 1.2 cm nodule. Correlation with outside mammography   suggested.      US Abdomen Limited (10.09.18 @ 19:26)   LIVER: Liver measures 17 cm x 13 cm. It is normal in size and in   echogenicity. There is no mass or ductal dilatation.    GALLBLADDER/BILIARY TREE: The patient is status post cholecystectomy. The   common duct measures 4 mm.     PANCREAS:  Visualized the pancreas is within normal limits.    KIDNEY:  Right kidney measures 12 cm in length. No hydronephrosis,   calculi or solid mass.    AORTA/IVC:  Visualized proximal portions unremarkable.    ASCITES:  None.    IMPRESSION:    Status post cholecystectomy. Normal right upper quadrant ultrasound   otherwise.      Imaging Personally Reviewed:  [x] YES  [ ] NO    MEDICATIONS  (STANDING):  cefTRIAXone   IVPB 1 Gram(s) IV Intermittent every 24 hours  heparin  Injectable 5000 Unit(s) SubCutaneous every 12 hours  phenazopyridine 200 milliGRAM(s) Oral three times a day  simvastatin 40 milliGRAM(s) Oral at bedtime  sodium chloride 0.9%. 1000 milliLiter(s) (75 mL/Hr) IV Continuous <Continuous>    MEDICATIONS  (PRN):  acetaminophen   Tablet .. 650 milliGRAM(s) Oral every 6 hours PRN Mild Pain (1 - 3)  HYDROmorphone  Injectable 0.5 milliGRAM(s) IV Push every 4 hours PRN Moderate Pain (4 - 6)        HEALTH ISSUES - PROBLEM Dx:  Urinary tract infection without hematuria, site unspecified  Nephrolithiasis

## 2018-10-10 NOTE — PROGRESS NOTE ADULT - ASSESSMENT
42 yo female with PMH that includes Anxiety, hx of kidney stones, DLD, migraine headache, who presented with dysuria, suprapubic pain  & back pain. Work up done showed UTI  with CT findings of asymmetric bladder wall thickening at left posterolateral wall with bladder tumor being a consideration. Patient went into Acute urinary Retention with inability to pass a Marshall catheter. Bladder scan then showed >600cc of Urine. Urology was consulted to rule a urethral mass. She underwent a cystoscopy on 10/10/18 with findings of large obstructing urethral stone, large stone in bladder diverticulum, irregular bladder mucosa left posterior bladder. She had Lithotripsy done unevenly and is currently stable.         Assessment and Plan:    1. UTI:  Leukocytosis noted. Follow up repeat in AM.  ID following: Continue Rocephin empirically. Follow up Urine and blood cultures.          2. Nephrolithiasis: with urethral obstruction and Acute urinary retention  s/p Cystoscopy with biopsy and fulguration of bladder 10/10/2018 with laser lithotripsy of urethral and bladder stones  Pain control. Continue Marshall for today per Urology.      3. Chronic Back Pain:  Resume home medications; Percocet.   Follow up with Pain management out patient.         4. Mild transaminitis: with elevated AST and ALT.   Asymptomatic, Resolved.  Viral hep panel: negative.  RUQ sonogram unremarkable.       5. Right breast nodule:   CT showed a right inferior breast 1.2 cm nodule.   Patient aware and will schedule an appointment with her gynecologist after discharge.        DVT prophylaxis: Heparin  Disposition: Home when stable.

## 2018-10-10 NOTE — BRIEF OPERATIVE NOTE - OPERATION/FINDINGS
large obstructing urethral stone, large stone in bladder diverticulum, irregular bladder mucosa left posterior bladder

## 2018-10-10 NOTE — BRIEF OPERATIVE NOTE - PROCEDURE
<<-----Click on this checkbox to enter Procedure Cystoscopy with biopsy and fulguration of bladder  10/10/2018  laser lithotripsy of urethral and bladder stones  Active  [facility]

## 2018-10-10 NOTE — PROGRESS NOTE ADULT - ASSESSMENT
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  Repeat Urine analysis and culture in the setting of retention  2. Continue Ceftriaxone for now  3. Monitor WBC count  4. Pain management as needed    - d/w patient

## 2018-10-10 NOTE — CHART NOTE - NSCHARTNOTEFT_GEN_A_CORE
Called by nurse regarding pt in urinary retention but unable to pass byrne cath. Pt seen and examined. Attempts made to place byrne but failed. Dr. Hightower Notified and pt will be taken to OR.

## 2018-10-10 NOTE — CHART NOTE - NSCHARTNOTEFT_GEN_A_CORE
PACU ANESTHESIA ADMISSION NOTE      Procedure: Cystoscopy with biopsy and fulguration of bladder: laser lithotripsy of urethral and bladder stones    Post op diagnosis:  Urinary retention      ____  Intubated  TV:______       Rate: ______      FiO2: ______    _x___  Patent Airway    _x___  Full return of protective reflexes    _x___  Full recovery from anesthesia / back to baseline     Vitals:   T:  97.7         R:16                  BP:  145/92                Sat:96                   P: 111      Mental Status:  _x___ Awake   x_____ Alert   _____ Drowsy   _____ Sedated    Nausea/Vomiting:  _x___ NO  ______Yes, x  See Post - Op Orders          Pain Scale (0-10):  __0___    Treatment: ____ None   x ____ See Post - Op/PCA Orders    Post - Operative Fluids:   ____ Oral   ____ See Post - Op Orders    Plan: Discharge:   ____Home       _____Floor    x _____Critical Care    _____  Other:_________________    Comments:

## 2018-10-10 NOTE — PROGRESS NOTE ADULT - SUBJECTIVE AND OBJECTIVE BOX
Patient is a 43y old  Female who presents with a chief complaint of f/u pyelonephritis (09 Oct 2018 16:19)      INTERVAL HPI/OVERNIGHT EVENTS:    MEDICATIONS  (STANDING):  cefTRIAXone   IVPB 1 Gram(s) IV Intermittent every 24 hours  heparin  Injectable 5000 Unit(s) SubCutaneous every 12 hours  phenazopyridine 200 milliGRAM(s) Oral three times a day  simvastatin 40 milliGRAM(s) Oral at bedtime  sodium chloride 0.9%. 1000 milliLiter(s) (75 mL/Hr) IV Continuous <Continuous>    MEDICATIONS  (PRN):  acetaminophen   Tablet .. 650 milliGRAM(s) Oral every 6 hours PRN Mild Pain (1 - 3)  HYDROmorphone  Injectable 1 milliGRAM(s) IV Push every 6 hours PRN Breakthrough pain  HYDROmorphone  Injectable 0.5 milliGRAM(s) IV Push every 4 hours PRN Moderate Pain (4 - 6)  oxyCODONE    5 mG/acetaminophen 325 mG 2 Tablet(s) Oral every 6 hours PRN Severe Pain (7 - 10)      Allergies    Cipro (Unknown)  eggs (Unknown)  NSAIDs (Unknown)    Intolerances        REVIEW OF SYSTEMS:  CONSTITUTIONAL: No fever, weight loss, or fatigue  EYES: No eye pain, visual disturbances, or discharge  ENMT:  No difficulty hearing, tinnitus, vertigo; No sinus or throat pain  NECK: No pain or stiffness  BREASTS: No pain, masses, or nipple discharge  RESPIRATORY: No cough, wheezing, chills or hemoptysis; No shortness of breath  CARDIOVASCULAR: No chest pain, palpitations, dizziness, or leg swelling  GASTROINTESTINAL: No abdominal or epigastric pain. No nausea, vomiting, or hematemesis; No diarrhea or constipation. No melena or hematochezia.  GENITOURINARY: No dysuria, frequency, hematuria, or incontinence  NEUROLOGICAL: No headaches, memory loss, loss of strength, numbness, or tremors  SKIN: No itching, burning, rashes, or lesions   LYMPH NODES: No enlarged glands  ENDOCRINE: No heat or cold intolerance; No hair loss  MUSCULOSKELETAL: No joint pain or swelling; No muscle, back, or extremity pain  PSYCHIATRIC: No depression, anxiety, mood swings, or difficulty sleeping  HEME/LYMPH: No easy bruising, or bleeding gums  ALLERGY AND IMMUNOLOGIC: No hives or eczema    Vital Signs Last 24 Hrs  T(C): 36.4 (10 Oct 2018 21:26), Max: 37.2 (10 Oct 2018 11:40)  T(F): 97.6 (10 Oct 2018 21:26), Max: 98.9 (10 Oct 2018 11:40)  HR: 92 (10 Oct 2018 21:26) (90 - 111)  BP: 130/80 (10 Oct 2018 21:26) (127/82 - 162/94)  BP(mean): --  RR: 16 (10 Oct 2018 21:26) (15 - 24)  SpO2: 94% (10 Oct 2018 06:11) (94% - 99%)    PHYSICAL EXAM:  GENERAL: NAD, well-groomed, well-developed  HEAD:  Atraumatic, Normocephalic  EYES: EOMI, PERRLA, conjunctiva and sclera clear  ENMT: No tonsillar erythema, exudates, or enlargement; Moist mucous membranes, Good dentition, No lesions  NECK: Supple, No JVD, Normal thyroid  NERVOUS SYSTEM:  Alert & Oriented X3, Good concentration; Motor Strength 5/5 B/L upper and lower extremities; DTRs 2+ intact and symmetric  CHEST/LUNG: Clear to percussion bilaterally; No rales, rhonchi, wheezing, or rubs  HEART: Regular rate and rhythm; No murmurs, rubs, or gallops  ABDOMEN: Soft, Nontender, Nondistended; Bowel sounds present  EXTREMITIES:  2+ Peripheral Pulses, No clubbing, cyanosis, or edema  LYMPH: No lymphadenopathy noted  SKIN: No rashes or lesions    LABS:                        12.5   14.69 )-----------( 268      ( 10 Oct 2018 07:12 )             38.2     10-10    141  |  103  |  9<L>  ----------------------------<  136<H>  4.5   |  23  |  0.5<L>    Ca    9.2      10 Oct 2018 07:12  Mg     2.0     10-10    TPro  7.2  /  Alb  4.4  /  TBili  <0.2  /  DBili  x   /  AST  34  /  ALT  93<H>  /  AlkPhos  96  10-10        CAPILLARY BLOOD GLUCOSE          RADIOLOGY & ADDITIONAL TESTS:    Imaging Personally Reviewed:  [ ] YES  [ ] NO    Consultant(s) Notes Reviewed:  [ ] YES  [ ] NO    Care Discussed with Consultants/Other Providers [ ] YES  [ ] NO Patient is seen and examined at the bed side, is afebrile. She is c/o discomfort due to byrne catheter. The cultures remain negative. The WBC count is elevated today.        REVIEW OF SYSTEMS: All other review systems are negative        Vital Signs Last 24 Hrs  T(C): 36.4 (10 Oct 2018 21:26), Max: 37.2 (10 Oct 2018 11:40)  T(F): 97.6 (10 Oct 2018 21:26), Max: 98.9 (10 Oct 2018 11:40)  HR: 92 (10 Oct 2018 21:26) (90 - 111)  BP: 130/80 (10 Oct 2018 21:26) (127/82 - 162/94)  BP(mean): --  RR: 16 (10 Oct 2018 21:26) (15 - 24)  SpO2: 94% (10 Oct 2018 06:11) (94% - 99%)        PHYSICAL EXAM:  GENERAL: Not in distress  CHEST/LUNG: Air entry  bilaterally  HEART: s1 an ds2 present   ABDOMEN: Nontender and Nondistended  : Byrne catheter in placed  EXTREMITIES:  No pedal edema  CNS: AAOX3        MEDICATIONS  (STANDING):  cefTRIAXone   IVPB 1 Gram(s) IV Intermittent every 24 hours  heparin  Injectable 5000 Unit(s) SubCutaneous every 12 hours  phenazopyridine 200 milliGRAM(s) Oral three times a day  simvastatin 40 milliGRAM(s) Oral at bedtime  sodium chloride 0.9%. 1000 milliLiter(s) (75 mL/Hr) IV Continuous <Continuous>    MEDICATIONS  (PRN):  acetaminophen   Tablet .. 650 milliGRAM(s) Oral every 6 hours PRN Mild Pain (1 - 3)  HYDROmorphone  Injectable 1 milliGRAM(s) IV Push every 6 hours PRN Breakthrough pain  HYDROmorphone  Injectable 0.5 milliGRAM(s) IV Push every 4 hours PRN Moderate Pain (4 - 6)  oxyCODONE    5 mG/acetaminophen 325 mG 2 Tablet(s) Oral every 6 hours PRN Severe Pain (7 - 10)      Allergies    Cipro (Unknown)  eggs (Unknown)  NSAIDs (Unknown)        LABS:                        12.5   14.69 )-----------( 268      ( 10 Oct 2018 07:12 )             38.2       10-10    141  |  103  |  9<L>  ----------------------------<  136<H>  4.5   |  23  |  0.5<L>    Ca    9.2      10 Oct 2018 07:12  Mg     2.0     10-10    TPro  7.2  /  Alb  4.4  /  TBili  <0.2  /  DBili  x   /  AST  34  /  ALT  93<H>  /  AlkPhos  96  10-10        RADIOLOGY & ADDITIONAL TESTS:    < from: US Abdomen Limited (10.09.18 @ 19:26) >  Status post cholecystectomy. Normal right upper quadrant ultrasound   otherwise.    < end of copied text >      < from: CT Abdomen and Pelvis w/ IV Cont (10.07.18 @ 23:15) >  Pericystic inflammatory change around urinary bladder, with asymmetric   wall thickening at left posterolateral wall; bladder tumor is a   consideration, which may be superinfected, and direct visualization   recommended. Apparent tumor abuts the uterus.    Right inferior breast 1.2 cm nodule. Correlation with outside mammography   suggested.    < end of copied text >

## 2018-10-11 ENCOUNTER — TRANSCRIPTION ENCOUNTER (OUTPATIENT)
Age: 43
End: 2018-10-11

## 2018-10-11 VITALS
SYSTOLIC BLOOD PRESSURE: 135 MMHG | DIASTOLIC BLOOD PRESSURE: 96 MMHG | HEART RATE: 86 BPM | RESPIRATION RATE: 16 BRPM | TEMPERATURE: 97 F

## 2018-10-11 LAB
ALBUMIN SERPL ELPH-MCNC: 4.3 G/DL — SIGNIFICANT CHANGE UP (ref 3.5–5.2)
ALP SERPL-CCNC: 91 U/L — SIGNIFICANT CHANGE UP (ref 30–115)
ALT FLD-CCNC: 63 U/L — HIGH (ref 0–41)
ANION GAP SERPL CALC-SCNC: 17 MMOL/L — HIGH (ref 7–14)
AST SERPL-CCNC: 20 U/L — SIGNIFICANT CHANGE UP (ref 0–41)
BASOPHILS # BLD AUTO: 0.03 K/UL — SIGNIFICANT CHANGE UP (ref 0–0.2)
BASOPHILS NFR BLD AUTO: 0.3 % — SIGNIFICANT CHANGE UP (ref 0–1)
BILIRUB SERPL-MCNC: 0.2 MG/DL — SIGNIFICANT CHANGE UP (ref 0.2–1.2)
BUN SERPL-MCNC: 9 MG/DL — LOW (ref 10–20)
CALCIUM SERPL-MCNC: 8.8 MG/DL — SIGNIFICANT CHANGE UP (ref 8.5–10.1)
CHLORIDE SERPL-SCNC: 103 MMOL/L — SIGNIFICANT CHANGE UP (ref 98–110)
CO2 SERPL-SCNC: 22 MMOL/L — SIGNIFICANT CHANGE UP (ref 17–32)
CREAT SERPL-MCNC: 0.6 MG/DL — LOW (ref 0.7–1.5)
EOSINOPHIL # BLD AUTO: 0.01 K/UL — SIGNIFICANT CHANGE UP (ref 0–0.7)
EOSINOPHIL NFR BLD AUTO: 0.1 % — SIGNIFICANT CHANGE UP (ref 0–8)
GLUCOSE SERPL-MCNC: 90 MG/DL — SIGNIFICANT CHANGE UP (ref 70–99)
HCT VFR BLD CALC: 36.9 % — LOW (ref 37–47)
HGB BLD-MCNC: 11.8 G/DL — LOW (ref 12–16)
IMM GRANULOCYTES NFR BLD AUTO: 0.5 % — HIGH (ref 0.1–0.3)
LYMPHOCYTES # BLD AUTO: 2.75 K/UL — SIGNIFICANT CHANGE UP (ref 1.2–3.4)
LYMPHOCYTES # BLD AUTO: 28.5 % — SIGNIFICANT CHANGE UP (ref 20.5–51.1)
MAGNESIUM SERPL-MCNC: 2.2 MG/DL — SIGNIFICANT CHANGE UP (ref 1.8–2.4)
MCHC RBC-ENTMCNC: 31.1 PG — HIGH (ref 27–31)
MCHC RBC-ENTMCNC: 32 G/DL — SIGNIFICANT CHANGE UP (ref 32–37)
MCV RBC AUTO: 97.1 FL — SIGNIFICANT CHANGE UP (ref 81–99)
MONOCYTES # BLD AUTO: 0.73 K/UL — HIGH (ref 0.1–0.6)
MONOCYTES NFR BLD AUTO: 7.6 % — SIGNIFICANT CHANGE UP (ref 1.7–9.3)
NEUTROPHILS # BLD AUTO: 6.07 K/UL — SIGNIFICANT CHANGE UP (ref 1.4–6.5)
NEUTROPHILS NFR BLD AUTO: 63 % — SIGNIFICANT CHANGE UP (ref 42.2–75.2)
NRBC # BLD: 0 /100 WBCS — SIGNIFICANT CHANGE UP (ref 0–0)
PHOSPHATE SERPL-MCNC: 2.7 MG/DL — SIGNIFICANT CHANGE UP (ref 2.1–4.9)
PLATELET # BLD AUTO: 264 K/UL — SIGNIFICANT CHANGE UP (ref 130–400)
POTASSIUM SERPL-MCNC: 4.2 MMOL/L — SIGNIFICANT CHANGE UP (ref 3.5–5)
POTASSIUM SERPL-SCNC: 4.2 MMOL/L — SIGNIFICANT CHANGE UP (ref 3.5–5)
PROT SERPL-MCNC: 7.1 G/DL — SIGNIFICANT CHANGE UP (ref 6–8)
RBC # BLD: 3.8 M/UL — LOW (ref 4.2–5.4)
RBC # FLD: 12.2 % — SIGNIFICANT CHANGE UP (ref 11.5–14.5)
SODIUM SERPL-SCNC: 142 MMOL/L — SIGNIFICANT CHANGE UP (ref 135–146)
SURGICAL PATHOLOGY STUDY: SIGNIFICANT CHANGE UP
WBC # BLD: 9.64 K/UL — SIGNIFICANT CHANGE UP (ref 4.8–10.8)
WBC # FLD AUTO: 9.64 K/UL — SIGNIFICANT CHANGE UP (ref 4.8–10.8)

## 2018-10-11 PROCEDURE — 99232 SBSQ HOSP IP/OBS MODERATE 35: CPT

## 2018-10-11 RX ORDER — PHENAZOPYRIDINE HCL 100 MG
1 TABLET ORAL
Qty: 6 | Refills: 0 | OUTPATIENT
Start: 2018-10-11 | End: 2018-10-12

## 2018-10-11 RX ORDER — CEFUROXIME AXETIL 250 MG
1 TABLET ORAL
Qty: 14 | Refills: 0
Start: 2018-10-11 | End: 2018-10-17

## 2018-10-11 RX ORDER — ACETAMINOPHEN 500 MG
2 TABLET ORAL
Qty: 0 | Refills: 0 | DISCHARGE
Start: 2018-10-11

## 2018-10-11 RX ADMIN — OXYCODONE AND ACETAMINOPHEN 2 TABLET(S): 5; 325 TABLET ORAL at 06:56

## 2018-10-11 RX ADMIN — Medication 200 MILLIGRAM(S): at 06:07

## 2018-10-11 RX ADMIN — HYDROMORPHONE HYDROCHLORIDE 0.5 MILLIGRAM(S): 2 INJECTION INTRAMUSCULAR; INTRAVENOUS; SUBCUTANEOUS at 08:00

## 2018-10-11 RX ADMIN — OXYCODONE AND ACETAMINOPHEN 2 TABLET(S): 5; 325 TABLET ORAL at 06:39

## 2018-10-11 RX ADMIN — HYDROMORPHONE HYDROCHLORIDE 0.5 MILLIGRAM(S): 2 INJECTION INTRAMUSCULAR; INTRAVENOUS; SUBCUTANEOUS at 07:35

## 2018-10-11 RX ADMIN — HYDROMORPHONE HYDROCHLORIDE 1 MILLIGRAM(S): 2 INJECTION INTRAMUSCULAR; INTRAVENOUS; SUBCUTANEOUS at 03:42

## 2018-10-11 RX ADMIN — HEPARIN SODIUM 5000 UNIT(S): 5000 INJECTION INTRAVENOUS; SUBCUTANEOUS at 06:07

## 2018-10-11 RX ADMIN — HYDROMORPHONE HYDROCHLORIDE 1 MILLIGRAM(S): 2 INJECTION INTRAMUSCULAR; INTRAVENOUS; SUBCUTANEOUS at 02:52

## 2018-10-11 NOTE — PROGRESS NOTE ADULT - SUBJECTIVE AND OBJECTIVE BOX
RAMON WOODS  43y  Female    Patient is a 43y old  Female who presents with a chief complaint of abdominal pain and dysuria (09 Oct 2018 16:19)      INTERVAL HPI/OVERNIGHT EVENTS:  s/p Cystoscopy with biopsy and fulguration of bladder 10/10/2018  laser lithotripsy of urethral and bladder stones  Patient has no new complaints. Pain improved.   Marshall discontinued voiding freely.      REVIEW OF SYSTEMS:  CONSTITUTIONAL: No fever, weight loss, or fatigue  EYES: No eye pain, visual disturbances, or discharge  ENMT:  No difficulty hearing, tinnitus, vertigo; No sinus or throat pain  NECK: No pain or stiffness  BREASTS: No pain, masses, or nipple discharge  RESPIRATORY: No cough, wheezing, chills or hemoptysis; No shortness of breath  CARDIOVASCULAR: No chest pain, palpitations, dizziness, or leg swelling  GASTROINTESTINAL: Mild lower abdominal pain. No nausea, vomiting, or hematemesis; No diarrhea or constipation. No melena or hematochezia.  GENITOURINARY: No dysuria, frequency, hematuria, or incontinence  NEUROLOGICAL: No headaches, memory loss, loss of strength, numbness, or tremors  SKIN: No itching, burning, rashes, or lesions   LYMPH NODES: No enlarged glands  ENDOCRINE: No heat or cold intolerance; No hair loss  MUSCULOSKELETAL: No joint pain or swelling; + back pain  PSYCHIATRIC: No depression, anxiety, mood swings, or difficulty sleeping  HEME/LYMPH: No easy bruising, or bleeding gums  ALLERGY AND IMMUNOLOGIC: No hives or eczema      VITALS:  T(C): 36.2 (11 Oct 2018 05:17), Max: 37.2 (10 Oct 2018 11:40)  T(F): 97.2 (11 Oct 2018 05:17), Max: 98.9 (10 Oct 2018 11:40)  HR: 86 (11 Oct 2018 05:17) (86 - 92)  BP: 135/96 (11 Oct 2018 05:17) (127/82 - 135/96)  BP(mean): --  RR: 16 (11 Oct 2018 05:17) (16 - 16)  SpO2: --      PHYSICAL EXAM:  GENERAL: NAD, well-groomed, well-developed  HEAD:  Atraumatic, Normocephalic  EYES: EOMI, PERRLA, conjunctiva and sclera clear  ENMT: Moist mucous membranes  NECK: Supple, Normal thyroid  NERVOUS SYSTEM:  Alert & Oriented X 3, Good concentration; Motor Strength 5/5 B/L upper and lower extremities  CHEST/LUNG: Clear to auscultation bilaterally; No rales, rhonchi, wheezing, or rubs  HEART: Regular rate and rhythm; No murmurs, rubs, or gallops  ABDOMEN: Soft, Nontender, Nondistended; Bowel sounds present  EXTREMITIES:  2+ Peripheral Pulses, No clubbing, cyanosis, or edema  LYMPH: No lymphadenopathy noted  SKIN: No rashes or lesions    Marshall discontinued.     Consultant(s) Notes Reviewed:  [x ] YES  [ ] NO  Care Discussed with Consultants/Other Providers [ x] YES  [ ] NO    LABS:                           11.8   9.64  )-----------( 264      ( 11 Oct 2018 08:48 )             36.9     10-11    142  |  103  |  9<L>  ----------------------------<  90  4.2   |  22  |  0.6<L>    Ca    8.8      11 Oct 2018 08:48  Phos  2.7     10-11  Mg     2.2     10-11    TPro  7.1  /  Alb  4.3  /  TBili  0.2  /  DBili  x   /  AST  20  /  ALT  63<H>  /  AlkPhos  91  10-11            Urine Microscopic-Add On (NC) (10.07.18 @ 19:00)    White Blood Cell - Urine: >50 /HPF    Red Blood Cell - Urine: TNTC /HPF    Bacteria: TNTC /HPF      MICROBIOLOGY:  Culture - Urine (10.08.18 @ 19:00)    Specimen Source: .Urine Clean Catch (Midstream)    Culture Results: No growth      Culture - Blood (10.07.18 @ 19:02)    Specimen Source: .Blood None    Culture Results: No growth to date.        RADIOLOGY & ADDITIONAL TESTS:  CT Abdomen and Pelvis w/ IV Cont (10.07.18 @ 23:15)   Pericystic inflammatory change around urinary bladder, with asymmetric   wall thickening at left posterolateral wall; bladder tumor is a   consideration, which may be superinfected, and direct visualization   recommended. Apparent tumor abuts the uterus.    Right inferior breast 1.2 cm nodule. Correlation with outside mammography   suggested.      US Abdomen Limited (10.09.18 @ 19:26)   LIVER: Liver measures 17 cm x 13 cm. It is normal in size and in   echogenicity. There is no mass or ductal dilatation.    GALLBLADDER/BILIARY TREE: The patient is status post cholecystectomy. The   common duct measures 4 mm.     PANCREAS:  Visualized the pancreas is within normal limits.    KIDNEY:  Right kidney measures 12 cm in length. No hydronephrosis,   calculi or solid mass.    AORTA/IVC:  Visualized proximal portions unremarkable.    ASCITES:  None.    IMPRESSION:    Status post cholecystectomy. Normal right upper quadrant ultrasound   otherwise.      Imaging Personally Reviewed:  [x] YES  [ ] NO      MEDICATIONS  (STANDING):  cefTRIAXone   IVPB 1 Gram(s) IV Intermittent every 24 hours  heparin  Injectable 5000 Unit(s) SubCutaneous every 12 hours  phenazopyridine 200 milliGRAM(s) Oral three times a day  simvastatin 40 milliGRAM(s) Oral at bedtime  sodium chloride 0.9%. 1000 milliLiter(s) (75 mL/Hr) IV Continuous <Continuous>    MEDICATIONS  (PRN):  acetaminophen   Tablet .. 650 milliGRAM(s) Oral every 6 hours PRN Mild Pain (1 - 3)  HYDROmorphone  Injectable 1 milliGRAM(s) IV Push every 6 hours PRN Breakthrough pain  HYDROmorphone  Injectable 0.5 milliGRAM(s) IV Push every 4 hours PRN Moderate Pain (4 - 6)  oxyCODONE    5 mG/acetaminophen 325 mG 2 Tablet(s) Oral every 6 hours PRN Severe Pain (7 - 10)        HEALTH ISSUES - PROBLEM Dx:  Urinary tract infection without hematuria, site unspecified  Nephrolithiasis  Anxiety  Chronic Back pain

## 2018-10-11 NOTE — PROGRESS NOTE ADULT - ASSESSMENT
42 yo female with PMH that includes Anxiety, hx of kidney stones, DLD, migraine headache, who presented with dysuria, suprapubic pain  & back pain. Work up done showed UTI  with CT findings of asymmetric bladder wall thickening at left posterolateral wall with bladder tumor being a consideration. Patient went into Acute urinary Retention with inability to pass a Marshall catheter. Bladder scan then showed >600cc of Urine. Urology was consulted to rule a urethral mass. She underwent a cystoscopy on 10/10/18 with findings of large obstructing urethral stone, large stone in bladder diverticulum, irregular bladder mucosa left posterior bladder. She had Lithotripsy done unevenly and is currently stable.         Assessment and Plan:    1. UTI:  Leukocytosis now resolved.   ID following: recommended PO Ceftin 500 mg Q12 - at least 7 days.  Urine and blood cultures showed no growth. Will continue to follow.      2. Nephrolithiasis: with urethral obstruction and Acute urinary retention  s/p Cystoscopy with biopsy and fulguration of bladder 10/10/2018 with laser lithotripsy of urethral and bladder stones  Pain well controlled. Discontinued Marshall and patient is voiding.   Follow up with Campbell Ceballos in 2 weeks.      3. Chronic Back Pain:  Resume home medications; Percocet.   Follow up with Pain management out patient.         4. Mild transaminitis: with elevated AST and ALT.   Asymptomatic, Resolved.  Viral hep panel: negative.  RUQ sonogram unremarkable.       5. Right breast nodule:   CT showed a right inferior breast 1.2 cm nodule.   Patient aware and will schedule an appointment with her gynecologist after discharge.        DVT prophylaxis: Heparin  Disposition: Home Today.

## 2018-10-11 NOTE — PROGRESS NOTE ADULT - SUBJECTIVE AND OBJECTIVE BOX
infectious diseases progress note:  RAMON WOODS is a 43yFemale patient    BREAST NODULE    Urinary tract infection without hematuria, site unspecified      ROS:  not relevant     Allergies    Cipro (Unknown)  eggs (Unknown)  NSAIDs (Unknown)    Intolerances        ANTIBIOTICS/RELEVANT:  antimicrobials  cefTRIAXone   IVPB 1 Gram(s) IV Intermittent every 24 hours    immunologic:    OTHER:  acetaminophen   Tablet .. 650 milliGRAM(s) Oral every 6 hours PRN  heparin  Injectable 5000 Unit(s) SubCutaneous every 12 hours  HYDROmorphone  Injectable 1 milliGRAM(s) IV Push every 6 hours PRN  HYDROmorphone  Injectable 0.5 milliGRAM(s) IV Push every 4 hours PRN  oxyCODONE    5 mG/acetaminophen 325 mG 2 Tablet(s) Oral every 6 hours PRN  phenazopyridine 200 milliGRAM(s) Oral three times a day  simvastatin 40 milliGRAM(s) Oral at bedtime  sodium chloride 0.9%. 1000 milliLiter(s) IV Continuous <Continuous>      Objective:  T(F): 97.2 (10-11-18 @ 05:17), Max: 98.9 (10-10-18 @ 11:40)  HR: 86 (10-11-18 @ 05:17) (86 - 92)  BP: 135/96 (10-11-18 @ 05:17) (127/82 - 135/96)  RR: 16 (10-11-18 @ 05:17) (16 - 16)  SpO2: --    PHYSICAL EXAM:  Constitutional:Well-developed, well nourished  Eyes:SEVERINO, EOMI  Ear/Nose/Throat: no oral lesion, no sinus tenderness on percussion	  Neck:no JVD, no lymphadenopathy, supple  Respiratory: CTA paige  Cardiovascular: S1S2 RRR, no murmurs  Gastrointestinal:soft, (+) BS,non tender.  no HSM. byrne in situ   Extremities:no phlebitis       LABS:                        12.5   14.69 )-----------( 268      ( 10 Oct 2018 07:12 )             38.2     10-10    141  |  103  |  9<L>  ----------------------------<  136<H>  4.5   |  23  |  0.5<L>    Ca    9.2      10 Oct 2018 07:12  Mg     2.0     10-10    TPro  7.2  /  Alb  4.4  /  TBili  <0.2  /  DBili  x   /  AST  34  /  ALT  93<H>  /  AlkPhos  96  10-10            MICROBIOLOGY:    Culture - Urine (collected 10-08-18 @ 19:00)  Source: .Urine Clean Catch (Midstream)  Final Report (10-09-18 @ 22:27):    No growth    Culture - Blood (collected 10-07-18 @ 19:02)  Source: .Blood None  Preliminary Report (10-10-18 @ 02:09):    No growth to date.    Culture - Blood (collected 10-07-18 @ 19:00)  Source: .Blood None  Preliminary Report (10-10-18 @ 02:09):    No growth to date.        Culture - Urine (collected 08 Oct 2018 19:00)  Source: .Urine Clean Catch (Midstream)  Final Report (09 Oct 2018 22:27):    No growth      Culture Results:   No growth (10-08 @ 19:00)  Culture Results:   No growth to date. (10-07 @ 19:02)  Culture Results:   No growth to date. (10-07 @ 19:00)        RADIOLOGY & ADDITIONAL STUDIES:

## 2018-10-11 NOTE — PROGRESS NOTE ADULT - PROVIDER SPECIALTY LIST ADULT
Hospitalist
Infectious Disease
Infectious Disease
Urology
Urology

## 2018-10-11 NOTE — DISCHARGE NOTE ADULT - PLAN OF CARE
Resolution Complete Antibiotics as recommended. Prevent recurrence with Urinary Retention. s/p Cystoscopy with biopsy and fulguration of bladder 10/10/2018 with laser lithotripsy of urethral and bladder stones.  Follow up with Dr. Hightower in  2 weeks. Further evaluation CT showed a right inferior breast 1.2 cm nodule.   Please schedule an appointment with gynecologist ASAP after discharge.

## 2018-10-11 NOTE — DISCHARGE NOTE ADULT - PATIENT PORTAL LINK FT
You can access the [facility] Patient Portal, offered by [facility], by registering with the following website: [email or web address]

## 2018-10-11 NOTE — DISCHARGE NOTE ADULT - HOSPITAL COURSE
42 yo female with PMH that includes Anxiety, hx of kidney stones, DLD, migraine headache, who presented with dysuria, suprapubic pain  & back pain. Work up done showed UTI  with CT findings of asymmetric bladder wall thickening at left posterolateral wall with bladder tumor being a consideration. Patient went into Acute urinary Retention with inability to pass a Marshall catheter. Bladder scan then showed >600cc of Urine. Urology was consulted to rule a urethral mass. She underwent a cystoscopy on 10/10/18 with findings of large obstructing urethral stone, large stone in bladder diverticulum, irregular bladder mucosa left posterior bladder. She had Lithotripsy done unevenly and is currently stable.         Assessment and Plan:    1. UTI:  Leukocytosis now resolved.   ID following: recommended PO Ceftin 500 mg Q12 - at least 7 days.  Urine and blood cultures showed no growth. Will continue to follow.      2. Nephrolithiasis: with urethral obstruction and Acute urinary retention  s/p Cystoscopy with biopsy and fulguration of bladder 10/10/2018 with laser lithotripsy of urethral and bladder stones  Pain well controlled. Discontinued Marshall and patient is voiding.   Follow up with Campbell Ceballos in 2 weeks.      3. Chronic Back Pain:  Resume home medications; Percocet.   Follow up with Pain management out patient.         4. Mild transaminitis: with elevated AST and ALT.   Asymptomatic, Resolved.  Viral hep panel: negative.  RUQ sonogram unremarkable.       5. Right breast nodule:   CT showed a right inferior breast 1.2 cm nodule.   Patient aware and will schedule an appointment with her gynecologist after discharge.

## 2018-10-11 NOTE — PROGRESS NOTE ADULT - ASSESSMENT
POD #1; s/p cysto/laser lithotripsy of bladder & urethral stones; bladder fulgaration     - D/C byrne today; OOB & ambulate   - no further  intervention   - may F/u as outpt   - will D/W attending POD #1; s/p cysto/laser lithotripsy of bladder & urethral stones; bladder fulgaration     - D/C byrne today; OOB & ambulate   - no further  intervention   - may F/u as outpt; D/C home on pyridium and oral antibiotics   -  D/W  Dr. Hightower

## 2018-10-11 NOTE — PROGRESS NOTE ADULT - PROBLEM SELECTOR PLAN 1
s/p surgery   await repeat WBC  if reduced - dc planning   PO CEftin 500 mg Q12 - at least 7 days   recall if needed  ? dc byrne - urology

## 2018-10-11 NOTE — PROGRESS NOTE ADULT - SUBJECTIVE AND OBJECTIVE BOX
S; Pt denies abdominal pain; c/o B/L UE stiffness and pain  O: Vital Signs Last 24 Hrs  T(C): 36.2 (11 Oct 2018 05:17), Max: 37.2 (10 Oct 2018 11:40)  T(F): 97.2 (11 Oct 2018 05:17), Max: 98.9 (10 Oct 2018 11:40)  HR: 86 (11 Oct 2018 05:17) (86 - 92)  BP: 135/96 (11 Oct 2018 05:17) (127/82 - 135/96)  RR: 16 (11 Oct 2018 05:17) (16 - 16)    I&O's Detail    10 Oct 2018 07:01  -  11 Oct 2018 07:00  --------------------------------------------------------  IN:    Oral Fluid: 600 mL    sodium chloride 0.9%.: 1000 mL  Total IN: 1600 mL    OUT:    Indwelling Catheter - Urethral: 3750 mL (clear, orange tinted)  Total OUT: 3750 mL    Total NET: -2150 mL    MEDICATIONS  (STANDING):  cefTRIAXone   IVPB 1 Gram(s) IV Intermittent every 24 hours  heparin  Injectable 5000 Unit(s) SubCutaneous every 12 hours  phenazopyridine 200 milliGRAM(s) Oral three times a day  simvastatin 40 milliGRAM(s) Oral at bedtime  sodium chloride 0.9%. 1000 milliLiter(s) (75 mL/Hr) IV Continuous <Continuous>    MEDICATIONS  (PRN):  acetaminophen   Tablet .. 650 milliGRAM(s) Oral every 6 hours PRN Mild Pain (1 - 3)  HYDROmorphone  Injectable 1 milliGRAM(s) IV Push every 6 hours PRN Breakthrough pain  HYDROmorphone  Injectable 0.5 milliGRAM(s) IV Push every 4 hours PRN Moderate Pain (4 - 6)  oxyCODONE    5 mG/acetaminophen 325 mG 2 Tablet(s) Oral every 6 hours PRN Severe Pain (7 - 10)      EXAM:  lungs; cta  cvs: s1s2  abd; soft NT/ND; byrne in place - urine clear, orange tinted  ext: no edema      Labs:                          11.8   9.64  )-----------( 264      ( 11 Oct 2018 08:48 )             36.9       Auto Neutrophil %: 63.0 % (10-11-18 @ 08:48)  Auto Immature Granulocyte %: 0.5 % (10-11-18 @ 08:48)    chem: pending    Culture - Urine (collected 08 Oct 2018 19:00)  Source: .Urine Clean Catch (Midstream)  Final Report (09 Oct 2018 22:27):    No growth

## 2018-10-11 NOTE — DISCHARGE NOTE ADULT - CARE PROVIDER_API CALL
Clementina Hightower), Urology  [address] [place] [address]  Phone: [phone]  Fax: [phone]    PMD,   Phone: (   )    -  Fax: (   )    -

## 2018-10-11 NOTE — DISCHARGE NOTE ADULT - MEDICATION SUMMARY - MEDICATIONS TO TAKE
I will START or STAY ON the medications listed below when I get home from the hospital:    rizatriptan 10 mg oral tablet, disintegrating  -- 1 tab(s) by mouth once a day  -- Indication: For Migraine    Fioricet oral capsule  -- 1 cap(s) by mouth 3 times a day, As Needed  -- Indication: For Migraine    Percocet 10/325 oral tablet  -- 1 tab(s) by mouth every 8 hours, As Needed  -- Indication: For Pain    acetaminophen 325 mg oral tablet  -- 2 tab(s) by mouth every 6 hours, As needed, Mild Pain (1 - 3)  -- Indication: For Pain/fever    simvastatin 40 mg oral tablet  -- 1 tab(s) by mouth once a day (at bedtime)  -- Indication: For Hyperlipidemia    Xanax 0.5 mg oral tablet  -- 1 tab(s) by mouth 3 times a day, As Needed  -- Indication: For Anxiety    cefuroxime 500 mg oral tablet  -- 1 tab(s) by mouth 2 times a day   -- Finish all this medication unless otherwise directed by prescriber.  Medication should be taken with plenty of water.  Take with food or milk.    -- Indication: For UTI    phenazopyridine 200 mg oral tablet  -- 1 tab(s) by mouth 3 times a day  -- Indication: For Dysuria    tiZANidine 4 mg oral tablet  -- 1 tab(s) by mouth 2 times a day  -- Indication: For Back Pain

## 2018-10-11 NOTE — DISCHARGE NOTE ADULT - OTHER SIGNIFICANT FINDINGS
LABS:                           11.8   9.64  )-----------( 264      ( 11 Oct 2018 08:48 )             36.9     10-11    142  |  103  |  9<L>  ----------------------------<  90  4.2   |  22  |  0.6<L>    Ca    8.8      11 Oct 2018 08:48  Phos  2.7     10-11  Mg     2.2     10-11    TPro  7.1  /  Alb  4.3  /  TBili  0.2  /  DBili  x   /  AST  20  /  ALT  63<H>  /  AlkPhos  91  10-11            Urine Microscopic-Add On (NC) (10.07.18 @ 19:00)    White Blood Cell - Urine: >50 /HPF    Red Blood Cell - Urine: TNTC /HPF    Bacteria: TNTC /HPF      MICROBIOLOGY:  Culture - Urine (10.08.18 @ 19:00)    Specimen Source: .Urine Clean Catch (Midstream)    Culture Results: No growth      Culture - Blood (10.07.18 @ 19:02)    Specimen Source: .Blood None    Culture Results: No growth to date.        RADIOLOGY & ADDITIONAL TESTS:  CT Abdomen and Pelvis w/ IV Cont (10.07.18 @ 23:15)   Pericystic inflammatory change around urinary bladder, with asymmetric   wall thickening at left posterolateral wall; bladder tumor is a   consideration, which may be superinfected, and direct visualization   recommended. Apparent tumor abuts the uterus.    Right inferior breast 1.2 cm nodule. Correlation with outside mammography   suggested.      US Abdomen Limited (10.09.18 @ 19:26)   LIVER: Liver measures 17 cm x 13 cm. It is normal in size and in   echogenicity. There is no mass or ductal dilatation.    GALLBLADDER/BILIARY TREE: The patient is status post cholecystectomy. The   common duct measures 4 mm.     PANCREAS:  Visualized the pancreas is within normal limits.    KIDNEY:  Right kidney measures 12 cm in length. No hydronephrosis,   calculi or solid mass.    AORTA/IVC:  Visualized proximal portions unremarkable.    ASCITES:  None.    IMPRESSION:    Status post cholecystectomy. Normal right upper quadrant ultrasound   otherwise.

## 2018-10-11 NOTE — DISCHARGE NOTE ADULT - CARE PLAN
Principal Discharge DX:	Urinary tract infection without hematuria, site unspecified  Goal:	Resolution  Assessment and plan of treatment:	Complete Antibiotics as recommended.  Secondary Diagnosis:	Urethral stone  Goal:	Prevent recurrence  Assessment and plan of treatment:	with Urinary Retention. s/p Cystoscopy with biopsy and fulguration of bladder 10/10/2018 with laser lithotripsy of urethral and bladder stones.  Follow up with Dr. Hightower in  2 weeks.  Secondary Diagnosis:	Breast nodule  Goal:	Further evaluation  Assessment and plan of treatment:	CT showed a right inferior breast 1.2 cm nodule.   Please schedule an appointment with gynecologist ASAP after discharge.

## 2018-10-12 NOTE — CHART NOTE - NSCHARTNOTEFT_GEN_A_CORE
spoke with Ms Peck  tells me she feels well  advised re HCV false positive vs prior exposure   HCV RNA negative  she will f/u outpatient

## 2018-10-13 LAB — COMPN STONE: SIGNIFICANT CHANGE UP

## 2018-10-14 LAB
CULTURE RESULTS: SIGNIFICANT CHANGE UP
CULTURE RESULTS: SIGNIFICANT CHANGE UP
SPECIMEN SOURCE: SIGNIFICANT CHANGE UP
SPECIMEN SOURCE: SIGNIFICANT CHANGE UP

## 2018-10-18 DIAGNOSIS — N21.1 CALCULUS IN URETHRA: ICD-10-CM

## 2018-10-18 DIAGNOSIS — N21.0 CALCULUS IN BLADDER: ICD-10-CM

## 2018-10-18 DIAGNOSIS — E78.5 HYPERLIPIDEMIA, UNSPECIFIED: ICD-10-CM

## 2018-10-18 DIAGNOSIS — G89.29 OTHER CHRONIC PAIN: ICD-10-CM

## 2018-10-18 DIAGNOSIS — F41.9 ANXIETY DISORDER, UNSPECIFIED: ICD-10-CM

## 2018-10-18 DIAGNOSIS — M54.9 DORSALGIA, UNSPECIFIED: ICD-10-CM

## 2018-10-18 DIAGNOSIS — N63.0 UNSPECIFIED LUMP IN UNSPECIFIED BREAST: ICD-10-CM

## 2018-10-18 DIAGNOSIS — D64.9 ANEMIA, UNSPECIFIED: ICD-10-CM

## 2018-10-18 DIAGNOSIS — Z88.5 ALLERGY STATUS TO NARCOTIC AGENT: ICD-10-CM

## 2018-10-18 DIAGNOSIS — R74.0 NONSPECIFIC ELEVATION OF LEVELS OF TRANSAMINASE AND LACTIC ACID DEHYDROGENASE [LDH]: ICD-10-CM

## 2018-10-18 DIAGNOSIS — N20.0 CALCULUS OF KIDNEY: ICD-10-CM

## 2018-10-18 DIAGNOSIS — Z87.442 PERSONAL HISTORY OF URINARY CALCULI: ICD-10-CM

## 2018-10-18 DIAGNOSIS — Z90.49 ACQUIRED ABSENCE OF OTHER SPECIFIED PARTS OF DIGESTIVE TRACT: ICD-10-CM

## 2018-10-18 DIAGNOSIS — R31.9 HEMATURIA, UNSPECIFIED: ICD-10-CM

## 2018-10-18 DIAGNOSIS — Z28.21 IMMUNIZATION NOT CARRIED OUT BECAUSE OF PATIENT REFUSAL: ICD-10-CM

## 2018-10-18 DIAGNOSIS — R10.9 UNSPECIFIED ABDOMINAL PAIN: ICD-10-CM

## 2018-10-18 DIAGNOSIS — Z91.012 ALLERGY TO EGGS: ICD-10-CM

## 2018-10-18 DIAGNOSIS — R51 HEADACHE: ICD-10-CM

## 2018-10-18 DIAGNOSIS — N30.90 CYSTITIS, UNSPECIFIED WITHOUT HEMATURIA: ICD-10-CM

## 2018-10-18 DIAGNOSIS — N32.3 DIVERTICULUM OF BLADDER: ICD-10-CM

## 2018-10-25 DIAGNOSIS — R33.8 OTHER RETENTION OF URINE: ICD-10-CM

## 2018-10-25 DIAGNOSIS — G43.909 MIGRAINE, UNSPECIFIED, NOT INTRACTABLE, WITHOUT STATUS MIGRAINOSUS: ICD-10-CM

## 2018-10-25 DIAGNOSIS — N94.6 DYSMENORRHEA, UNSPECIFIED: ICD-10-CM

## 2018-10-25 DIAGNOSIS — N13.8 OTHER OBSTRUCTIVE AND REFLUX UROPATHY: ICD-10-CM

## 2018-11-06 ENCOUNTER — APPOINTMENT (OUTPATIENT)
Dept: UROLOGY | Facility: CLINIC | Age: 43
End: 2018-11-06
Payer: COMMERCIAL

## 2018-11-06 VITALS
HEIGHT: 63 IN | DIASTOLIC BLOOD PRESSURE: 91 MMHG | WEIGHT: 184 LBS | HEART RATE: 133 BPM | BODY MASS INDEX: 32.6 KG/M2 | SYSTOLIC BLOOD PRESSURE: 129 MMHG

## 2018-11-06 DIAGNOSIS — N20.0 CALCULUS OF KIDNEY: ICD-10-CM

## 2018-11-06 DIAGNOSIS — F41.9 ANXIETY DISORDER, UNSPECIFIED: ICD-10-CM

## 2018-11-06 DIAGNOSIS — G43.909 MIGRAINE, UNSPECIFIED, NOT INTRACTABLE, W/OUT STATUS MIGRAINOSUS: ICD-10-CM

## 2018-11-06 DIAGNOSIS — Z78.9 OTHER SPECIFIED HEALTH STATUS: ICD-10-CM

## 2018-11-06 DIAGNOSIS — K46.9 UNSPECIFIED ABDOMINAL HERNIA W/OUT OBSTRUCTION OR GANGRENE: ICD-10-CM

## 2018-11-06 DIAGNOSIS — Z83.3 FAMILY HISTORY OF DIABETES MELLITUS: ICD-10-CM

## 2018-11-06 DIAGNOSIS — F41.0 PANIC DISORDER [EPISODIC PAROXYSMAL ANXIETY]: ICD-10-CM

## 2018-11-06 LAB
BILIRUB UR QL STRIP: NORMAL
CLARITY UR: CLEAR
COLLECTION METHOD: NORMAL
GLUCOSE UR-MCNC: NORMAL
HCG UR QL: NORMAL EU/DL
HGB UR QL STRIP.AUTO: NORMAL
KETONES UR-MCNC: NORMAL
LEUKOCYTE ESTERASE UR QL STRIP: 25
NITRITE UR QL STRIP: NORMAL
PH UR STRIP: 5
PROT UR STRIP-MCNC: 30
SP GR UR STRIP: 1.02

## 2018-11-06 PROCEDURE — 99213 OFFICE O/P EST LOW 20 MIN: CPT

## 2018-11-06 PROCEDURE — 81003 URINALYSIS AUTO W/O SCOPE: CPT | Mod: QW

## 2018-11-06 RX ORDER — OXYCODONE AND ACETAMINOPHEN 10; 325 MG/1; MG/1
10-325 TABLET ORAL
Qty: 90 | Refills: 0 | Status: ACTIVE | COMMUNITY
Start: 2018-09-20

## 2018-11-09 ENCOUNTER — APPOINTMENT (OUTPATIENT)
Dept: UROLOGY | Facility: CLINIC | Age: 43
End: 2018-11-09
Payer: COMMERCIAL

## 2018-11-09 PROCEDURE — 51784 ANAL/URINARY MUSCLE STUDY: CPT

## 2018-11-09 PROCEDURE — 51798 US URINE CAPACITY MEASURE: CPT

## 2018-11-09 PROCEDURE — 51741 ELECTRO-UROFLOWMETRY FIRST: CPT

## 2018-11-09 PROCEDURE — 51728 CYSTOMETROGRAM W/VP: CPT

## 2018-11-09 PROCEDURE — 51797 INTRAABDOMINAL PRESSURE TEST: CPT

## 2018-11-19 ENCOUNTER — OTHER (OUTPATIENT)
Age: 43
End: 2018-11-19

## 2018-11-23 ENCOUNTER — OUTPATIENT (OUTPATIENT)
Dept: OUTPATIENT SERVICES | Facility: HOSPITAL | Age: 43
LOS: 1 days | Discharge: HOME | End: 2018-11-23

## 2018-11-23 ENCOUNTER — TRANSCRIPTION ENCOUNTER (OUTPATIENT)
Age: 43
End: 2018-11-23

## 2018-11-23 DIAGNOSIS — R39.15 URGENCY OF URINATION: ICD-10-CM

## 2018-11-23 DIAGNOSIS — N21.0 CALCULUS IN BLADDER: ICD-10-CM

## 2018-11-23 DIAGNOSIS — R35.0 FREQUENCY OF MICTURITION: ICD-10-CM

## 2018-11-23 DIAGNOSIS — R31.9 HEMATURIA, UNSPECIFIED: ICD-10-CM

## 2018-11-30 ENCOUNTER — APPOINTMENT (OUTPATIENT)
Dept: UROLOGY | Facility: CLINIC | Age: 43
End: 2018-11-30

## 2018-12-19 ENCOUNTER — APPOINTMENT (OUTPATIENT)
Dept: NEUROSURGERY | Facility: CLINIC | Age: 43
End: 2018-12-19
Payer: COMMERCIAL

## 2018-12-19 ENCOUNTER — APPOINTMENT (OUTPATIENT)
Dept: UROLOGY | Facility: CLINIC | Age: 43
End: 2018-12-19

## 2018-12-19 VITALS — WEIGHT: 178 LBS | HEIGHT: 64 IN | BODY MASS INDEX: 30.39 KG/M2

## 2018-12-19 DIAGNOSIS — Z87.39 PERSONAL HISTORY OF OTHER DISEASES OF THE MUSCULOSKELETAL SYSTEM AND CONNECTIVE TISSUE: ICD-10-CM

## 2018-12-19 DIAGNOSIS — E78.5 HYPERLIPIDEMIA, UNSPECIFIED: ICD-10-CM

## 2018-12-19 PROCEDURE — 99205 OFFICE O/P NEW HI 60 MIN: CPT

## 2018-12-20 ENCOUNTER — APPOINTMENT (OUTPATIENT)
Dept: NEUROSURGERY | Facility: CLINIC | Age: 43
End: 2018-12-20
Payer: COMMERCIAL

## 2018-12-20 PROBLEM — E78.5 HYPERLIPIDEMIA: Status: RESOLVED | Noted: 2018-12-20 | Resolved: 2018-12-20

## 2018-12-20 PROBLEM — Z87.39 HISTORY OF CHRONIC BACK PAIN: Status: RESOLVED | Noted: 2018-12-20 | Resolved: 2018-12-20

## 2018-12-20 PROCEDURE — 99212 OFFICE O/P EST SF 10 MIN: CPT

## 2018-12-20 RX ORDER — BUTALBITAL, ASPIRIN, AND CAFFEINE 325; 50; 40 MG/1; MG/1; MG/1
50-325-40 CAPSULE ORAL
Qty: 60 | Refills: 0 | Status: DISCONTINUED | COMMUNITY
Start: 2018-10-17 | End: 2018-12-20

## 2018-12-20 RX ORDER — SIMVASTATIN 40 MG/1
40 TABLET, FILM COATED ORAL
Qty: 30 | Refills: 0 | Status: DISCONTINUED | COMMUNITY
Start: 2018-02-12 | End: 2018-12-20

## 2018-12-20 RX ORDER — ONABOTULINUMTOXINA 100 [USP'U]/1
100 INJECTION, POWDER, LYOPHILIZED, FOR SOLUTION INTRADERMAL; INTRAMUSCULAR
Qty: 2 | Refills: 0 | Status: DISCONTINUED | COMMUNITY
Start: 2017-12-28 | End: 2018-12-20

## 2018-12-20 RX ORDER — CITALOPRAM 40 MG/1
40 TABLET, FILM COATED ORAL
Refills: 0 | Status: ACTIVE | COMMUNITY

## 2018-12-20 RX ORDER — CITALOPRAM HYDROBROMIDE 40 MG/1
40 TABLET, FILM COATED ORAL
Qty: 30 | Refills: 0 | Status: DISCONTINUED | COMMUNITY
Start: 2018-07-02 | End: 2018-12-20

## 2018-12-20 RX ORDER — CEFUROXIME AXETIL 250 MG/1
250 TABLET ORAL
Qty: 20 | Refills: 0 | Status: DISCONTINUED | COMMUNITY
Start: 2018-10-06 | End: 2018-12-20

## 2018-12-20 RX ORDER — BUTALBITAL, ACETAMINOPHEN AND CAFFEINE 300; 50; 40 MG/1; MG/1; MG/1
50-300-40 CAPSULE ORAL
Qty: 60 | Refills: 0 | Status: DISCONTINUED | COMMUNITY
Start: 2018-06-20 | End: 2018-12-20

## 2018-12-20 RX ORDER — TIZANIDINE 4 MG/1
4 TABLET ORAL
Qty: 60 | Refills: 0 | Status: DISCONTINUED | COMMUNITY
Start: 2018-09-20 | End: 2018-12-20

## 2018-12-20 RX ORDER — OXYBUTYNIN CHLORIDE 2.5 MG/1
TABLET ORAL
Refills: 0 | Status: ACTIVE | COMMUNITY

## 2018-12-20 RX ORDER — BUTALBITAL, ACETAMINOPHEN AND CAFFEINE 325; 50; 40 MG/1; MG/1; MG/1
50-325-40 TABLET ORAL
Qty: 90 | Refills: 0 | Status: DISCONTINUED | COMMUNITY
Start: 2018-07-02 | End: 2018-12-20

## 2018-12-20 RX ORDER — AMOXICILLIN AND CLAVULANATE POTASSIUM 875; 125 MG/1; MG/1
875-125 TABLET, COATED ORAL
Qty: 20 | Refills: 0 | Status: COMPLETED | COMMUNITY
Start: 2018-11-19

## 2018-12-20 RX ORDER — CEFUROXIME AXETIL 500 MG/1
500 TABLET ORAL
Qty: 14 | Refills: 0 | Status: DISCONTINUED | COMMUNITY
Start: 2018-10-11 | End: 2018-12-20

## 2018-12-20 RX ORDER — PHENAZOPYRIDINE HYDROCHLORIDE 200 MG/1
200 TABLET ORAL
Qty: 6 | Refills: 0 | Status: DISCONTINUED | COMMUNITY
Start: 2018-10-11 | End: 2018-12-20

## 2018-12-28 NOTE — PRE-OP CHECKLIST - SITE MARKED BY ANESTHESIOLOGIST
CRNA.                   Tj Medellin MD   12/28/2018
n/a

## 2019-01-09 ENCOUNTER — APPOINTMENT (OUTPATIENT)
Dept: UROLOGY | Facility: CLINIC | Age: 44
End: 2019-01-09

## 2019-01-14 ENCOUNTER — APPOINTMENT (OUTPATIENT)
Dept: NEUROSURGERY | Facility: CLINIC | Age: 44
End: 2019-01-14
Payer: COMMERCIAL

## 2019-01-14 DIAGNOSIS — Z86.39 PERSONAL HISTORY OF OTHER ENDOCRINE, NUTRITIONAL AND METABOLIC DISEASE: ICD-10-CM

## 2019-01-14 DIAGNOSIS — M54.16 RADICULOPATHY, LUMBAR REGION: ICD-10-CM

## 2019-01-14 DIAGNOSIS — Z87.448 PERSONAL HISTORY OF OTHER DISEASES OF URINARY SYSTEM: ICD-10-CM

## 2019-01-14 DIAGNOSIS — M51.26 OTHER INTERVERTEBRAL DISC DISPLACEMENT, LUMBAR REGION: ICD-10-CM

## 2019-01-14 DIAGNOSIS — M21.371 FOOT DROP, RIGHT FOOT: ICD-10-CM

## 2019-01-14 PROCEDURE — 99212 OFFICE O/P EST SF 10 MIN: CPT

## 2019-01-25 ENCOUNTER — MESSAGE (OUTPATIENT)
Age: 44
End: 2019-01-25

## 2019-01-30 ENCOUNTER — RX RENEWAL (OUTPATIENT)
Age: 44
End: 2019-01-30

## 2019-01-30 DIAGNOSIS — R35.0 FREQUENCY OF MICTURITION: ICD-10-CM

## 2019-02-08 ENCOUNTER — APPOINTMENT (OUTPATIENT)
Dept: UROLOGY | Facility: CLINIC | Age: 44
End: 2019-02-08
Payer: COMMERCIAL

## 2019-02-08 DIAGNOSIS — N30.90 CYSTITIS, UNSPECIFIED W/OUT HEMATURIA: ICD-10-CM

## 2019-02-08 PROCEDURE — 52000 CYSTOURETHROSCOPY: CPT

## 2019-02-11 ENCOUNTER — APPOINTMENT (OUTPATIENT)
Dept: NEUROSURGERY | Facility: CLINIC | Age: 44
End: 2019-02-11
Payer: COMMERCIAL

## 2019-02-11 DIAGNOSIS — M54.5 LOW BACK PAIN: ICD-10-CM

## 2019-02-11 PROCEDURE — 99212 OFFICE O/P EST SF 10 MIN: CPT

## 2019-02-11 NOTE — HISTORY OF PRESENT ILLNESS
[FreeTextEntry1] : Ms. Peck has a longstanding history of chronic right sided back pain dating back to her first pregnancy in 2003. She has been under the care of Dr. Wood, her pain specialist for quite some time now for chronic pain. She is medically managed on Percocet as needed. \par \par Unfortunately, on 12/17/18 she woke up around 3/4am to use the restroom. When she got up her entire right leg and foot was numb and tingling. When she started to walk she fell due to the lack of sensation in the leg. At that time she was also having weakness in the right leg and foot. Once the numbness and tingling in the leg subsided she noticed she was having continued weakness in the right foot in dorsiflexion. She also noted persistent numbness in the plantar and lateral aspect of the foot. Her left leg was intact. She denied axial back pain. She denies bowel / bladder dysfunction. She was seen by Dr. Wood and was given a lumbar injection. She was then referred to me for consultation due to the weakness in her foot, at that time an urgent MRI of the lumbar spine was ordered. \par \par This study was performed on 12/19/18. She was found to have scoliosis with a small right L4-5 extraforaminal disc herniation which is causing mild stenosis. Given these findings she was referred for physical therapy and second injection with pain management was recommended. \par \par

## 2019-02-11 NOTE — REASON FOR VISIT
[Follow-Up: _____] : a [unfilled] follow-up visit [FreeTextEntry1] : The weakness in the right foot has improved. Since her last visit she has been undergoing physical therapy. She also had a second lumbar epidural injection with pain management. She continues to describe some pain and discomfort in the right hamstring. She is scheduled for a third lumbar injection in 3-4 weeks. She denies any acute complaints today. \par \par

## 2019-02-11 NOTE — HISTORY OF PRESENT ILLNESS
[FreeTextEntry1] : Ms. Peck has a longstanding history of chronic right sided back pain dating back to her first pregnancy in 2003. She has been under the care of Dr. Wood, her pain specialist for quite some time now for chronic pain. She is medically managed on Percocet as needed. \par \par Unfortunately, on 12/17/18 she woke up around 3/4am to use the restroom. When she got up her entire right leg and foot was numb and tingling. When she started to walk she fell due to the lack of sensation in the leg. At that time she was also having weakness in the right leg and foot. Once the numbness and tingling in the leg subsided she noticed she was having continued weakness in the right foot in dorsiflexion. She also noted persistent numbness in the plantar and lateral aspect of the foot. Her left leg was intact. She denied axial back pain. She denies bowel / bladder dysfunction. She was seen by Dr. Wood and was given a lumbar injection. She was then referred to me for consultation due to the weakness in her foot, at that time an urgent MRI of the lumbar spine was ordered. \par \par This study was performed on 12/19/18. She was found to have scoliosis with a small right L4-5 extraforaminal disc herniation which is causing mild stenosis. Given these findings she was referred for physical therapy and second injection with pain management was recommended. \par \par \par

## 2019-02-11 NOTE — REASON FOR VISIT
[Follow-Up: _____] : a [unfilled] follow-up visit [FreeTextEntry1] : The weakness in her right foot has improved. She also denies radicular pain into the legs. The prior discomfort in her right hamstring has resolved. She continues to have chronic lower back pain. She has been under the care of Dr. Patton for this for many years. She continues to do physical therapy. She is taking muscle relaxants as needed. She had three lumbar epidural injections recently which has helped her right leg symptoms. \par \par \par

## 2019-02-11 NOTE — DATA REVIEWED
[de-identified] : \par - MRI of the lumbar spine from 12/19/18: Scoliosis with a small right L4-5 extraforaminal disc herniation which is causing mild stenosis.

## 2019-02-11 NOTE — ASSESSMENT
[FreeTextEntry1] : We have had a thorough discussion regarding her current condition, findings, and treatment options. The weakness in her right foot has improved with conservative treatments. She continues to have lower back pain, however denies radiculopathy. She will continue with physical therapy and will follow up with her pain specialist for additional injection treatments for her chronic lower back pain. I will see her back in the office as needed. She will call barring any issues.  \par

## 2019-02-11 NOTE — PHYSICAL EXAM
[General Appearance - Alert] : alert [General Appearance - In No Acute Distress] : in no acute distress [General Appearance - Well Nourished] : well nourished [General Appearance - Well Developed] : well developed [General Appearance - Well-Appearing] : healthy appearing [Abnormal Walk] : normal gait [Restricted] : was restricted [Intact] : all reflexes within normal limits bilaterally [Straight-Leg Raise Test - Left] : straight leg raise of the left leg was negative [Straight-Leg Raise Test - Right] : straight leg raise  of the right leg was negative

## 2019-02-11 NOTE — DATA REVIEWED
[de-identified] : \par - MRI of the lumbar spine from 12/19/18: Scoliosis with a small right L4-5 extraforaminal disc herniation which is causing mild stenosis.

## 2019-02-11 NOTE — ASSESSMENT
[FreeTextEntry1] : Overall, her condition is improving with conservative treatments. The weakness in the right foot has improved. She continues to have some radicular pain in the right hamstring. I have recommended that she continues with physical therapy and she will proceed with her third lumbar injection as scheduled. I will see her back in 4 weeks for reassessment. \par \par

## 2019-02-11 NOTE — SOCIAL HISTORY
[FreeTextEntry1] : She is currently on sabbatical for restoration of health. She will be returning to work on 4/4/2019.

## 2019-02-20 ENCOUNTER — OUTPATIENT (OUTPATIENT)
Dept: OUTPATIENT SERVICES | Facility: HOSPITAL | Age: 44
LOS: 1 days | Discharge: HOME | End: 2019-02-20

## 2019-02-20 VITALS
TEMPERATURE: 98 F | OXYGEN SATURATION: 97 % | DIASTOLIC BLOOD PRESSURE: 72 MMHG | RESPIRATION RATE: 16 BRPM | SYSTOLIC BLOOD PRESSURE: 106 MMHG | HEIGHT: 63 IN | WEIGHT: 179.9 LBS | HEART RATE: 86 BPM

## 2019-02-20 DIAGNOSIS — Z98.890 OTHER SPECIFIED POSTPROCEDURAL STATES: Chronic | ICD-10-CM

## 2019-02-20 DIAGNOSIS — N30.90 CYSTITIS, UNSPECIFIED WITHOUT HEMATURIA: ICD-10-CM

## 2019-02-20 DIAGNOSIS — Z01.818 ENCOUNTER FOR OTHER PREPROCEDURAL EXAMINATION: ICD-10-CM

## 2019-02-20 LAB
ALBUMIN SERPL ELPH-MCNC: 4.9 G/DL — SIGNIFICANT CHANGE UP (ref 3.5–5.2)
ALP SERPL-CCNC: 85 U/L — SIGNIFICANT CHANGE UP (ref 30–115)
ALT FLD-CCNC: 22 U/L — SIGNIFICANT CHANGE UP (ref 0–41)
ANION GAP SERPL CALC-SCNC: 16 MMOL/L — HIGH (ref 7–14)
APPEARANCE UR: CLEAR — SIGNIFICANT CHANGE UP
APTT BLD: 33.7 SEC — SIGNIFICANT CHANGE UP (ref 27–39.2)
AST SERPL-CCNC: 19 U/L — SIGNIFICANT CHANGE UP (ref 0–41)
BACTERIA # UR AUTO: ABNORMAL /HPF
BASOPHILS # BLD AUTO: 0.01 K/UL — SIGNIFICANT CHANGE UP (ref 0–0.2)
BASOPHILS NFR BLD AUTO: 0.2 % — SIGNIFICANT CHANGE UP (ref 0–1)
BILIRUB SERPL-MCNC: 0.2 MG/DL — SIGNIFICANT CHANGE UP (ref 0.2–1.2)
BILIRUB UR-MCNC: NEGATIVE — SIGNIFICANT CHANGE UP
BUN SERPL-MCNC: 9 MG/DL — LOW (ref 10–20)
CALCIUM SERPL-MCNC: 9.8 MG/DL — SIGNIFICANT CHANGE UP (ref 8.5–10.1)
CHLORIDE SERPL-SCNC: 96 MMOL/L — LOW (ref 98–110)
CO2 SERPL-SCNC: 24 MMOL/L — SIGNIFICANT CHANGE UP (ref 17–32)
COLOR SPEC: YELLOW — SIGNIFICANT CHANGE UP
CREAT SERPL-MCNC: 0.6 MG/DL — LOW (ref 0.7–1.5)
DIFF PNL FLD: ABNORMAL
EOSINOPHIL # BLD AUTO: 0 K/UL — SIGNIFICANT CHANGE UP (ref 0–0.7)
EOSINOPHIL NFR BLD AUTO: 0 % — SIGNIFICANT CHANGE UP (ref 0–8)
EPI CELLS # UR: ABNORMAL /HPF
GLUCOSE SERPL-MCNC: 92 MG/DL — SIGNIFICANT CHANGE UP (ref 70–99)
GLUCOSE UR QL: NEGATIVE MG/DL — SIGNIFICANT CHANGE UP
HCT VFR BLD CALC: 40 % — SIGNIFICANT CHANGE UP (ref 37–47)
HGB BLD-MCNC: 13.3 G/DL — SIGNIFICANT CHANGE UP (ref 12–16)
IMM GRANULOCYTES NFR BLD AUTO: 0.2 % — SIGNIFICANT CHANGE UP (ref 0.1–0.3)
INR BLD: 1.08 RATIO — SIGNIFICANT CHANGE UP (ref 0.65–1.3)
KETONES UR-MCNC: NEGATIVE — SIGNIFICANT CHANGE UP
LEUKOCYTE ESTERASE UR-ACNC: NEGATIVE — SIGNIFICANT CHANGE UP
LYMPHOCYTES # BLD AUTO: 1.65 K/UL — SIGNIFICANT CHANGE UP (ref 1.2–3.4)
LYMPHOCYTES # BLD AUTO: 27.1 % — SIGNIFICANT CHANGE UP (ref 20.5–51.1)
MCHC RBC-ENTMCNC: 31.4 PG — HIGH (ref 27–31)
MCHC RBC-ENTMCNC: 33.3 G/DL — SIGNIFICANT CHANGE UP (ref 32–37)
MCV RBC AUTO: 94.3 FL — SIGNIFICANT CHANGE UP (ref 81–99)
MONOCYTES # BLD AUTO: 0.48 K/UL — SIGNIFICANT CHANGE UP (ref 0.1–0.6)
MONOCYTES NFR BLD AUTO: 7.9 % — SIGNIFICANT CHANGE UP (ref 1.7–9.3)
NEUTROPHILS # BLD AUTO: 3.94 K/UL — SIGNIFICANT CHANGE UP (ref 1.4–6.5)
NEUTROPHILS NFR BLD AUTO: 64.6 % — SIGNIFICANT CHANGE UP (ref 42.2–75.2)
NITRITE UR-MCNC: NEGATIVE — SIGNIFICANT CHANGE UP
NRBC # BLD: 0 /100 WBCS — SIGNIFICANT CHANGE UP (ref 0–0)
PH UR: 6 — SIGNIFICANT CHANGE UP (ref 5–8)
PLATELET # BLD AUTO: 275 K/UL — SIGNIFICANT CHANGE UP (ref 130–400)
POTASSIUM SERPL-MCNC: 4.4 MMOL/L — SIGNIFICANT CHANGE UP (ref 3.5–5)
POTASSIUM SERPL-SCNC: 4.4 MMOL/L — SIGNIFICANT CHANGE UP (ref 3.5–5)
PROT SERPL-MCNC: 7.7 G/DL — SIGNIFICANT CHANGE UP (ref 6–8)
PROT UR-MCNC: ABNORMAL MG/DL
PROTHROM AB SERPL-ACNC: 12.4 SEC — SIGNIFICANT CHANGE UP (ref 9.95–12.87)
RBC # BLD: 4.24 M/UL — SIGNIFICANT CHANGE UP (ref 4.2–5.4)
RBC # FLD: 12.1 % — SIGNIFICANT CHANGE UP (ref 11.5–14.5)
RBC CASTS # UR COMP ASSIST: SIGNIFICANT CHANGE UP /HPF
SODIUM SERPL-SCNC: 136 MMOL/L — SIGNIFICANT CHANGE UP (ref 135–146)
SP GR SPEC: 1.02 — SIGNIFICANT CHANGE UP (ref 1.01–1.03)
UROBILINOGEN FLD QL: 0.2 MG/DL — SIGNIFICANT CHANGE UP (ref 0.2–0.2)
WBC # BLD: 6.09 K/UL — SIGNIFICANT CHANGE UP (ref 4.8–10.8)
WBC # FLD AUTO: 6.09 K/UL — SIGNIFICANT CHANGE UP (ref 4.8–10.8)
WBC UR QL: SIGNIFICANT CHANGE UP /HPF

## 2019-02-20 RX ORDER — TIZANIDINE 4 MG/1
1 TABLET ORAL
Qty: 0 | Refills: 0 | COMMUNITY

## 2019-02-20 NOTE — H&P PST ADULT - PSH
History of surgery  lithotripsy  x 2  History of surgery  cysto bladder stone removed  History of surgery  hernia repair    x2

## 2019-02-20 NOTE — H&P PST ADULT - FAMILY HISTORY
Father  Still living? Unknown  DM (diabetes mellitus), Age at diagnosis: Age Unknown  Family history of heart disease, Age at diagnosis: Age Unknown

## 2019-02-20 NOTE — H&P PST ADULT - PMH
Anxiety    Back pain    GERD (gastroesophageal reflux disease)    Kidney stones    Migraine headache

## 2019-02-20 NOTE — H&P PST ADULT - HISTORY OF PRESENT ILLNESS
43 yr old female with a history of frequency on f/u cysto has abnormality in bladder. scheduled for cysto, bladder biopsy on 2/28/19 with dr nixon.  denies cp sob cough uri palp dysuria  c/o frequency.  2 fos

## 2019-02-22 LAB
CULTURE RESULTS: NO GROWTH — SIGNIFICANT CHANGE UP
SPECIMEN SOURCE: SIGNIFICANT CHANGE UP

## 2019-02-27 NOTE — ASU PATIENT PROFILE, ADULT - PMH
Anxiety    Back pain    GERD (gastroesophageal reflux disease)    Kidney stones    Migraine headache    Murmur  since age 20     6/18  Thyroid nodule

## 2019-02-28 ENCOUNTER — RESULT REVIEW (OUTPATIENT)
Age: 44
End: 2019-02-28

## 2019-02-28 ENCOUNTER — APPOINTMENT (OUTPATIENT)
Dept: UROLOGY | Facility: HOSPITAL | Age: 44
End: 2019-02-28
Payer: COMMERCIAL

## 2019-02-28 ENCOUNTER — OUTPATIENT (OUTPATIENT)
Dept: OUTPATIENT SERVICES | Facility: HOSPITAL | Age: 44
LOS: 1 days | Discharge: HOME | End: 2019-02-28

## 2019-02-28 VITALS
HEIGHT: 63 IN | HEART RATE: 87 BPM | WEIGHT: 179.9 LBS | SYSTOLIC BLOOD PRESSURE: 100 MMHG | DIASTOLIC BLOOD PRESSURE: 63 MMHG | TEMPERATURE: 98 F | RESPIRATION RATE: 17 BRPM | OXYGEN SATURATION: 97 %

## 2019-02-28 VITALS — SYSTOLIC BLOOD PRESSURE: 118 MMHG | HEART RATE: 60 BPM | DIASTOLIC BLOOD PRESSURE: 70 MMHG | RESPIRATION RATE: 18 BRPM

## 2019-02-28 DIAGNOSIS — Z98.890 OTHER SPECIFIED POSTPROCEDURAL STATES: Chronic | ICD-10-CM

## 2019-02-28 PROCEDURE — 52204 CYSTOSCOPY W/BIOPSY(S): CPT

## 2019-02-28 RX ORDER — OXYCODONE AND ACETAMINOPHEN 5; 325 MG/1; MG/1
1 TABLET ORAL ONCE
Qty: 0 | Refills: 0 | Status: DISCONTINUED | OUTPATIENT
Start: 2019-02-28 | End: 2019-02-28

## 2019-02-28 RX ORDER — HYDROMORPHONE HYDROCHLORIDE 2 MG/ML
0.5 INJECTION INTRAMUSCULAR; INTRAVENOUS; SUBCUTANEOUS
Qty: 0 | Refills: 0 | Status: DISCONTINUED | OUTPATIENT
Start: 2019-02-28 | End: 2019-02-28

## 2019-02-28 RX ORDER — CEPHALEXIN 500 MG
1 CAPSULE ORAL
Qty: 12 | Refills: 0
Start: 2019-02-28 | End: 2019-03-02

## 2019-02-28 RX ORDER — PHENAZOPYRIDINE HCL 100 MG
100 TABLET ORAL ONCE
Qty: 0 | Refills: 0 | Status: COMPLETED | OUTPATIENT
Start: 2019-02-28 | End: 2019-02-28

## 2019-02-28 RX ORDER — ONDANSETRON 8 MG/1
4 TABLET, FILM COATED ORAL ONCE
Qty: 0 | Refills: 0 | Status: DISCONTINUED | OUTPATIENT
Start: 2019-02-28 | End: 2019-03-15

## 2019-02-28 RX ORDER — PHENAZOPYRIDINE HCL 100 MG
1 TABLET ORAL
Qty: 21 | Refills: 0
Start: 2019-02-28 | End: 2019-03-06

## 2019-02-28 RX ORDER — MORPHINE SULFATE 50 MG/1
2 CAPSULE, EXTENDED RELEASE ORAL
Qty: 0 | Refills: 0 | Status: DISCONTINUED | OUTPATIENT
Start: 2019-02-28 | End: 2019-02-28

## 2019-02-28 RX ORDER — SODIUM CHLORIDE 9 MG/ML
1000 INJECTION, SOLUTION INTRAVENOUS
Qty: 0 | Refills: 0 | Status: DISCONTINUED | OUTPATIENT
Start: 2019-02-28 | End: 2019-03-15

## 2019-02-28 RX ADMIN — Medication 100 MILLIGRAM(S): at 16:00

## 2019-02-28 RX ADMIN — HYDROMORPHONE HYDROCHLORIDE 0.5 MILLIGRAM(S): 2 INJECTION INTRAMUSCULAR; INTRAVENOUS; SUBCUTANEOUS at 16:14

## 2019-02-28 RX ADMIN — SODIUM CHLORIDE 100 MILLILITER(S): 9 INJECTION, SOLUTION INTRAVENOUS at 16:43

## 2019-02-28 RX ADMIN — HYDROMORPHONE HYDROCHLORIDE 0.5 MILLIGRAM(S): 2 INJECTION INTRAMUSCULAR; INTRAVENOUS; SUBCUTANEOUS at 16:35

## 2019-02-28 RX ADMIN — HYDROMORPHONE HYDROCHLORIDE 0.5 MILLIGRAM(S): 2 INJECTION INTRAMUSCULAR; INTRAVENOUS; SUBCUTANEOUS at 16:08

## 2019-02-28 RX ADMIN — HYDROMORPHONE HYDROCHLORIDE 0.5 MILLIGRAM(S): 2 INJECTION INTRAMUSCULAR; INTRAVENOUS; SUBCUTANEOUS at 16:00

## 2019-02-28 RX ADMIN — HYDROMORPHONE HYDROCHLORIDE 0.5 MILLIGRAM(S): 2 INJECTION INTRAMUSCULAR; INTRAVENOUS; SUBCUTANEOUS at 16:19

## 2019-02-28 RX ADMIN — OXYCODONE AND ACETAMINOPHEN 1 TABLET(S): 5; 325 TABLET ORAL at 16:35

## 2019-02-28 NOTE — BRIEF OPERATIVE NOTE - PROCEDURE
<<-----Click on this checkbox to enter Procedure Cystoscopy with biopsy and fulguration of bladder  02/28/2019    Active  PETE

## 2019-03-04 LAB — SURGICAL PATHOLOGY STUDY: SIGNIFICANT CHANGE UP

## 2019-03-07 DIAGNOSIS — Z88.1 ALLERGY STATUS TO OTHER ANTIBIOTIC AGENTS STATUS: ICD-10-CM

## 2019-03-07 DIAGNOSIS — N30.20 OTHER CHRONIC CYSTITIS WITHOUT HEMATURIA: ICD-10-CM

## 2019-03-07 DIAGNOSIS — Z91.012 ALLERGY TO EGGS: ICD-10-CM

## 2019-03-07 DIAGNOSIS — F41.9 ANXIETY DISORDER, UNSPECIFIED: ICD-10-CM

## 2019-03-14 ENCOUNTER — TRANSCRIPTION ENCOUNTER (OUTPATIENT)
Age: 44
End: 2019-03-14

## 2019-03-15 PROBLEM — N20.0 CALCULUS OF KIDNEY: Chronic | Status: ACTIVE | Noted: 2019-02-20

## 2019-03-15 PROBLEM — G43.909 MIGRAINE, UNSPECIFIED, NOT INTRACTABLE, WITHOUT STATUS MIGRAINOSUS: Chronic | Status: ACTIVE | Noted: 2019-02-20

## 2019-03-15 PROBLEM — M54.9 DORSALGIA, UNSPECIFIED: Chronic | Status: ACTIVE | Noted: 2019-02-20

## 2019-03-15 PROBLEM — K21.9 GASTRO-ESOPHAGEAL REFLUX DISEASE WITHOUT ESOPHAGITIS: Chronic | Status: ACTIVE | Noted: 2019-02-20

## 2019-03-15 PROBLEM — E04.1 NONTOXIC SINGLE THYROID NODULE: Chronic | Status: ACTIVE | Noted: 2019-02-20

## 2019-03-15 PROBLEM — F41.9 ANXIETY DISORDER, UNSPECIFIED: Chronic | Status: ACTIVE | Noted: 2019-02-20

## 2019-03-15 PROBLEM — R01.1 CARDIAC MURMUR, UNSPECIFIED: Chronic | Status: ACTIVE | Noted: 2019-02-20

## 2019-03-19 ENCOUNTER — MESSAGE (OUTPATIENT)
Age: 44
End: 2019-03-19

## 2019-03-19 ENCOUNTER — APPOINTMENT (OUTPATIENT)
Dept: UROLOGY | Facility: CLINIC | Age: 44
End: 2019-03-19
Payer: COMMERCIAL

## 2019-03-19 VITALS — BODY MASS INDEX: 30.39 KG/M2 | WEIGHT: 178 LBS | HEIGHT: 64 IN

## 2019-03-19 DIAGNOSIS — R32 UNSPECIFIED URINARY INCONTINENCE: ICD-10-CM

## 2019-03-19 PROCEDURE — 99213 OFFICE O/P EST LOW 20 MIN: CPT

## 2019-03-19 NOTE — REVIEW OF SYSTEMS
[Fever] : no fever [Chest Pain] : no chest pain [Shortness Of Breath] : no shortness of breath [Dysuria] : no dysuria [Confused] : no confusion

## 2019-03-19 NOTE — PHYSICAL EXAM
[General Appearance - In No Acute Distress] : no acute distress [Abdomen Tenderness] : non-tender [Abdomen Soft] : soft [Urinary Bladder Findings] : the bladder was normal on palpation [] : no respiratory distress [Oriented To Time, Place, And Person] : oriented to person, place, and time

## 2019-03-19 NOTE — ASSESSMENT
[FreeTextEntry1] : Stable post biopsy. For her urinary incontinence she will religiously do Kegel exercises and increased oxybutynin ER to b.i.d. If she continues to have incontinence on followup discussed with her referring to Uro-gynecologist

## 2019-03-19 NOTE — HISTORY OF PRESENT ILLNESS
[FreeTextEntry1] : Patient status post bladder biopsy which shows chronic inflammation. There were no bladder lesions seen. Patient continues on oxybutynin 5 mg daily and relates now some stress urinary incontinence which he says dates back to just after she had delivered.  Recent urodynamics have shown sensory urgency at low capacity bladder. She is starting to Kegel exercises [Urinary Retention] : no urinary retention [Dysuria] : no dysuria [Hematuria - Gross] : no gross hematuria

## 2019-07-09 ENCOUNTER — APPOINTMENT (OUTPATIENT)
Dept: UROLOGY | Facility: CLINIC | Age: 44
End: 2019-07-09

## 2019-12-10 ENCOUNTER — APPOINTMENT (OUTPATIENT)
Dept: UROLOGY | Facility: CLINIC | Age: 44
End: 2019-12-10

## 2020-04-17 ENCOUNTER — RX RENEWAL (OUTPATIENT)
Age: 45
End: 2020-04-17

## 2020-09-25 ENCOUNTER — APPOINTMENT (OUTPATIENT)
Dept: UROLOGY | Facility: CLINIC | Age: 45
End: 2020-09-25

## 2020-10-23 ENCOUNTER — RX RENEWAL (OUTPATIENT)
Age: 45
End: 2020-10-23

## 2020-10-29 ENCOUNTER — RX RENEWAL (OUTPATIENT)
Age: 45
End: 2020-10-29

## 2020-11-08 ENCOUNTER — INPATIENT (INPATIENT)
Facility: HOSPITAL | Age: 45
LOS: 1 days | Discharge: HOME | End: 2020-11-10
Attending: INTERNAL MEDICINE | Admitting: INTERNAL MEDICINE
Payer: COMMERCIAL

## 2020-11-08 VITALS
HEART RATE: 124 BPM | OXYGEN SATURATION: 99 % | TEMPERATURE: 97 F | SYSTOLIC BLOOD PRESSURE: 153 MMHG | HEIGHT: 63 IN | WEIGHT: 179.9 LBS | RESPIRATION RATE: 20 BRPM | DIASTOLIC BLOOD PRESSURE: 88 MMHG

## 2020-11-08 DIAGNOSIS — Z98.890 OTHER SPECIFIED POSTPROCEDURAL STATES: Chronic | ICD-10-CM

## 2020-11-08 LAB
ALBUMIN SERPL ELPH-MCNC: 4.7 G/DL — SIGNIFICANT CHANGE UP (ref 3.5–5.2)
ALP SERPL-CCNC: 90 U/L — SIGNIFICANT CHANGE UP (ref 30–115)
ALT FLD-CCNC: 25 U/L — SIGNIFICANT CHANGE UP (ref 0–41)
ANION GAP SERPL CALC-SCNC: 13 MMOL/L — SIGNIFICANT CHANGE UP (ref 7–14)
APPEARANCE UR: CLEAR — SIGNIFICANT CHANGE UP
AST SERPL-CCNC: 39 U/L — SIGNIFICANT CHANGE UP (ref 0–41)
BACTERIA # UR AUTO: ABNORMAL
BASOPHILS # BLD AUTO: 0.06 K/UL — SIGNIFICANT CHANGE UP (ref 0–0.2)
BASOPHILS NFR BLD AUTO: 0.5 % — SIGNIFICANT CHANGE UP (ref 0–1)
BILIRUB SERPL-MCNC: <0.2 MG/DL — SIGNIFICANT CHANGE UP (ref 0.2–1.2)
BILIRUB UR-MCNC: NEGATIVE — SIGNIFICANT CHANGE UP
BUN SERPL-MCNC: 17 MG/DL — SIGNIFICANT CHANGE UP (ref 10–20)
CALCIUM SERPL-MCNC: 9.5 MG/DL — SIGNIFICANT CHANGE UP (ref 8.5–10.1)
CHLORIDE SERPL-SCNC: 105 MMOL/L — SIGNIFICANT CHANGE UP (ref 98–110)
CO2 SERPL-SCNC: 17 MMOL/L — SIGNIFICANT CHANGE UP (ref 17–32)
COD CRY URNS QL: NEGATIVE — SIGNIFICANT CHANGE UP
COLOR SPEC: YELLOW — SIGNIFICANT CHANGE UP
CREAT SERPL-MCNC: 1 MG/DL — SIGNIFICANT CHANGE UP (ref 0.7–1.5)
DIFF PNL FLD: NEGATIVE — SIGNIFICANT CHANGE UP
EOSINOPHIL # BLD AUTO: 0 K/UL — SIGNIFICANT CHANGE UP (ref 0–0.7)
EOSINOPHIL NFR BLD AUTO: 0 % — SIGNIFICANT CHANGE UP (ref 0–8)
EPI CELLS # UR: ABNORMAL /HPF
GLUCOSE SERPL-MCNC: 115 MG/DL — HIGH (ref 70–99)
GLUCOSE UR QL: NEGATIVE MG/DL — SIGNIFICANT CHANGE UP
GRAN CASTS # UR COMP ASSIST: NEGATIVE — SIGNIFICANT CHANGE UP
HCT VFR BLD CALC: 42.9 % — SIGNIFICANT CHANGE UP (ref 37–47)
HGB BLD-MCNC: 14.2 G/DL — SIGNIFICANT CHANGE UP (ref 12–16)
HYALINE CASTS # UR AUTO: NEGATIVE — SIGNIFICANT CHANGE UP
IMM GRANULOCYTES NFR BLD AUTO: 0.5 % — HIGH (ref 0.1–0.3)
KETONES UR-MCNC: NEGATIVE — SIGNIFICANT CHANGE UP
LEUKOCYTE ESTERASE UR-ACNC: NEGATIVE — SIGNIFICANT CHANGE UP
LYMPHOCYTES # BLD AUTO: 2.56 K/UL — SIGNIFICANT CHANGE UP (ref 1.2–3.4)
LYMPHOCYTES # BLD AUTO: 22 % — SIGNIFICANT CHANGE UP (ref 20.5–51.1)
MCHC RBC-ENTMCNC: 30.5 PG — SIGNIFICANT CHANGE UP (ref 27–31)
MCHC RBC-ENTMCNC: 33.1 G/DL — SIGNIFICANT CHANGE UP (ref 32–37)
MCV RBC AUTO: 92.1 FL — SIGNIFICANT CHANGE UP (ref 81–99)
MONOCYTES # BLD AUTO: 0.68 K/UL — HIGH (ref 0.1–0.6)
MONOCYTES NFR BLD AUTO: 5.9 % — SIGNIFICANT CHANGE UP (ref 1.7–9.3)
NEUTROPHILS # BLD AUTO: 8.25 K/UL — HIGH (ref 1.4–6.5)
NEUTROPHILS NFR BLD AUTO: 71.1 % — SIGNIFICANT CHANGE UP (ref 42.2–75.2)
NITRITE UR-MCNC: NEGATIVE — SIGNIFICANT CHANGE UP
NRBC # BLD: 0 /100 WBCS — SIGNIFICANT CHANGE UP (ref 0–0)
PH UR: 7 — SIGNIFICANT CHANGE UP (ref 5–8)
PLATELET # BLD AUTO: 349 K/UL — SIGNIFICANT CHANGE UP (ref 130–400)
POTASSIUM SERPL-MCNC: 5.2 MMOL/L — HIGH (ref 3.5–5)
POTASSIUM SERPL-SCNC: 5.2 MMOL/L — HIGH (ref 3.5–5)
PROT SERPL-MCNC: 8.1 G/DL — HIGH (ref 6–8)
PROT UR-MCNC: 30 MG/DL
RBC # BLD: 4.66 M/UL — SIGNIFICANT CHANGE UP (ref 4.2–5.4)
RBC # FLD: 12 % — SIGNIFICANT CHANGE UP (ref 11.5–14.5)
RBC CASTS # UR COMP ASSIST: NEGATIVE — SIGNIFICANT CHANGE UP
SODIUM SERPL-SCNC: 135 MMOL/L — SIGNIFICANT CHANGE UP (ref 135–146)
SP GR SPEC: 1.02 — SIGNIFICANT CHANGE UP (ref 1.01–1.03)
TRI-PHOS CRY UR QL COMP ASSIST: NEGATIVE — SIGNIFICANT CHANGE UP
URATE CRY FLD QL MICRO: NEGATIVE — SIGNIFICANT CHANGE UP
UROBILINOGEN FLD QL: 0.2 MG/DL — SIGNIFICANT CHANGE UP (ref 0.2–0.2)
WBC # BLD: 11.61 K/UL — HIGH (ref 4.8–10.8)
WBC # FLD AUTO: 11.61 K/UL — HIGH (ref 4.8–10.8)
WBC UR QL: SIGNIFICANT CHANGE UP /HPF

## 2020-11-08 PROCEDURE — 71275 CT ANGIOGRAPHY CHEST: CPT | Mod: 26

## 2020-11-08 PROCEDURE — 99285 EMERGENCY DEPT VISIT HI MDM: CPT

## 2020-11-08 PROCEDURE — 74177 CT ABD & PELVIS W/CONTRAST: CPT | Mod: 26

## 2020-11-08 RX ORDER — MORPHINE SULFATE 50 MG/1
4 CAPSULE, EXTENDED RELEASE ORAL ONCE
Refills: 0 | Status: DISCONTINUED | OUTPATIENT
Start: 2020-11-08 | End: 2020-11-08

## 2020-11-08 RX ORDER — MORPHINE SULFATE 50 MG/1
6 CAPSULE, EXTENDED RELEASE ORAL ONCE
Refills: 0 | Status: DISCONTINUED | OUTPATIENT
Start: 2020-11-08 | End: 2020-11-08

## 2020-11-08 RX ORDER — ONDANSETRON 8 MG/1
4 TABLET, FILM COATED ORAL ONCE
Refills: 0 | Status: COMPLETED | OUTPATIENT
Start: 2020-11-08 | End: 2020-11-08

## 2020-11-08 RX ORDER — SODIUM CHLORIDE 9 MG/ML
1000 INJECTION INTRAMUSCULAR; INTRAVENOUS; SUBCUTANEOUS ONCE
Refills: 0 | Status: COMPLETED | OUTPATIENT
Start: 2020-11-08 | End: 2020-11-08

## 2020-11-08 RX ADMIN — SODIUM CHLORIDE 1000 MILLILITER(S): 9 INJECTION INTRAMUSCULAR; INTRAVENOUS; SUBCUTANEOUS at 20:31

## 2020-11-08 RX ADMIN — MORPHINE SULFATE 6 MILLIGRAM(S): 50 CAPSULE, EXTENDED RELEASE ORAL at 23:38

## 2020-11-08 RX ADMIN — ONDANSETRON 4 MILLIGRAM(S): 8 TABLET, FILM COATED ORAL at 20:31

## 2020-11-08 RX ADMIN — MORPHINE SULFATE 4 MILLIGRAM(S): 50 CAPSULE, EXTENDED RELEASE ORAL at 20:30

## 2020-11-08 RX ADMIN — MORPHINE SULFATE 4 MILLIGRAM(S): 50 CAPSULE, EXTENDED RELEASE ORAL at 21:32

## 2020-11-08 RX ADMIN — Medication 0.5 MILLIGRAM(S): at 22:46

## 2020-11-08 NOTE — ED PROVIDER NOTE - ATTENDING CONTRIBUTION TO CARE
44 yo F PMHx noted presents with c/o right sided flank pain x 2 days.  Initially pain on and off now more constant,   Pt with h/o renal stones, spoke to urologist who advised to come to ED, + loose stool, + frequency,  no CP, no n/v. On exam pt in NAD AAo x 3, Lungs cta b/l, MMM, abd soft nt nd, no rash, + right CVA tenderness.

## 2020-11-08 NOTE — ED PROVIDER NOTE - OBJECTIVE STATEMENT
46 y/o female presents c/o intermittent right flank pain which started on friday. patient describes pain as sharp and shooting. patient denies any vomiting or fever. patient c/o loose stool. no recent antibx or sick contacts. patient denies any gross hematuria. patient c/o dysuria and frequency. patient with hx of renal colic in past with ureteral stents and ESWL

## 2020-11-08 NOTE — ED PROVIDER NOTE - PHYSICAL EXAMINATION
Vital Signs: I have reviewed the initial vital signs.  Constitutional: well-nourished, no acute distress, normocephalic  Eyes: PERRLA, EOMI, clear conjunctiva  ENT: MMM, TM b/l clear , no nasal congestion  Cardiovascular: regular rate, regular rhythm, no murmur appreciated  Respiratory: unlabored respiratory effort, clear to auscultation bilaterally  Gastrointestinal: soft, non-tender, non-distended  abdomen, +right CVA tenderness  Musculoskeletal: supple neck, no lower extremity edema, no bony tenderness  Integumentary: warm, dry, no rash  Neurologic: awake, alert, cranial nerves II-XII grossly intact, extremities’ motor and sensory functions grossly intact, no focal deficits

## 2020-11-08 NOTE — ED PROVIDER NOTE - CLINICAL SUMMARY MEDICAL DECISION MAKING FREE TEXT BOX
Chart reviewed. Labs unremarkable. EKG no acute changes. CT abdo no acute pathology possible RLL PE. CTA chest no central PE, suboptimal study. Will start Lovenox and admit.

## 2020-11-08 NOTE — ED PROVIDER NOTE - NS ED ROS FT
Review of Systems    Constitutional: (-) fever/ chills (-)loss of appetite or  weight loss  Eyes (-) visual changes  ENT: (-) epistaxis (-) sore throat (-) ear pain  Cardiovascular: (-) chest pain, (-) syncope (-) palpitations  Respiratory: (-) cough, (-) shortness of breath  Gastrointestinal: (-) vomiting, (-) diarrhea (+) abdominal pain  : (-) dysuria , hematuria   neck: (-) neck pain or stiffness  Musculoskeletal:  (+) right back pain,  Integumentary: (-) rash, (-) swelling  Neurological: (-) headache, (-) altered mental status  Psychiatric: (-) hallucinations or depression

## 2020-11-09 DIAGNOSIS — R09.89 OTHER SPECIFIED SYMPTOMS AND SIGNS INVOLVING THE CIRCULATORY AND RESPIRATORY SYSTEMS: ICD-10-CM

## 2020-11-09 LAB
ANION GAP SERPL CALC-SCNC: 10 MMOL/L — SIGNIFICANT CHANGE UP (ref 7–14)
APTT BLD: 39.8 SEC — HIGH (ref 27–39.2)
BASOPHILS # BLD AUTO: 0.03 K/UL — SIGNIFICANT CHANGE UP (ref 0–0.2)
BASOPHILS NFR BLD AUTO: 0.4 % — SIGNIFICANT CHANGE UP (ref 0–1)
BUN SERPL-MCNC: 12 MG/DL — SIGNIFICANT CHANGE UP (ref 10–20)
CALCIUM SERPL-MCNC: 8.9 MG/DL — SIGNIFICANT CHANGE UP (ref 8.5–10.1)
CHLORIDE SERPL-SCNC: 106 MMOL/L — SIGNIFICANT CHANGE UP (ref 98–110)
CO2 SERPL-SCNC: 21 MMOL/L — SIGNIFICANT CHANGE UP (ref 17–32)
CREAT SERPL-MCNC: 0.6 MG/DL — LOW (ref 0.7–1.5)
D DIMER BLD IA.RAPID-MCNC: 422 NG/ML DDU — HIGH (ref 0–230)
EOSINOPHIL # BLD AUTO: 0 K/UL — SIGNIFICANT CHANGE UP (ref 0–0.7)
EOSINOPHIL NFR BLD AUTO: 0 % — SIGNIFICANT CHANGE UP (ref 0–8)
GLUCOSE SERPL-MCNC: 96 MG/DL — SIGNIFICANT CHANGE UP (ref 70–99)
HCT VFR BLD CALC: 35.6 % — LOW (ref 37–47)
HGB BLD-MCNC: 11.5 G/DL — LOW (ref 12–16)
IMM GRANULOCYTES NFR BLD AUTO: 0.4 % — HIGH (ref 0.1–0.3)
INR BLD: 1.11 RATIO — SIGNIFICANT CHANGE UP (ref 0.65–1.3)
LYMPHOCYTES # BLD AUTO: 2.66 K/UL — SIGNIFICANT CHANGE UP (ref 1.2–3.4)
LYMPHOCYTES # BLD AUTO: 31.4 % — SIGNIFICANT CHANGE UP (ref 20.5–51.1)
MAGNESIUM SERPL-MCNC: 1.9 MG/DL — SIGNIFICANT CHANGE UP (ref 1.8–2.4)
MCHC RBC-ENTMCNC: 29.7 PG — SIGNIFICANT CHANGE UP (ref 27–31)
MCHC RBC-ENTMCNC: 32.3 G/DL — SIGNIFICANT CHANGE UP (ref 32–37)
MCV RBC AUTO: 92 FL — SIGNIFICANT CHANGE UP (ref 81–99)
MONOCYTES # BLD AUTO: 0.53 K/UL — SIGNIFICANT CHANGE UP (ref 0.1–0.6)
MONOCYTES NFR BLD AUTO: 6.3 % — SIGNIFICANT CHANGE UP (ref 1.7–9.3)
NEUTROPHILS # BLD AUTO: 5.22 K/UL — SIGNIFICANT CHANGE UP (ref 1.4–6.5)
NEUTROPHILS NFR BLD AUTO: 61.5 % — SIGNIFICANT CHANGE UP (ref 42.2–75.2)
NRBC # BLD: 0 /100 WBCS — SIGNIFICANT CHANGE UP (ref 0–0)
NT-PROBNP SERPL-SCNC: 35 PG/ML — SIGNIFICANT CHANGE UP (ref 0–300)
PLATELET # BLD AUTO: 244 K/UL — SIGNIFICANT CHANGE UP (ref 130–400)
POTASSIUM SERPL-MCNC: 4.1 MMOL/L — SIGNIFICANT CHANGE UP (ref 3.5–5)
POTASSIUM SERPL-SCNC: 4.1 MMOL/L — SIGNIFICANT CHANGE UP (ref 3.5–5)
PROTHROM AB SERPL-ACNC: 12.8 SEC — SIGNIFICANT CHANGE UP (ref 9.95–12.87)
RAPID RVP RESULT: SIGNIFICANT CHANGE UP
RBC # BLD: 3.87 M/UL — LOW (ref 4.2–5.4)
RBC # FLD: 12.2 % — SIGNIFICANT CHANGE UP (ref 11.5–14.5)
SARS-COV-2 RNA SPEC QL NAA+PROBE: SIGNIFICANT CHANGE UP
SODIUM SERPL-SCNC: 137 MMOL/L — SIGNIFICANT CHANGE UP (ref 135–146)
TROPONIN T SERPL-MCNC: <0.01 NG/ML — SIGNIFICANT CHANGE UP
WBC # BLD: 8.47 K/UL — SIGNIFICANT CHANGE UP (ref 4.8–10.8)
WBC # FLD AUTO: 8.47 K/UL — SIGNIFICANT CHANGE UP (ref 4.8–10.8)

## 2020-11-09 PROCEDURE — 93970 EXTREMITY STUDY: CPT | Mod: 26

## 2020-11-09 PROCEDURE — 78582 LUNG VENTILAT&PERFUS IMAGING: CPT | Mod: 26

## 2020-11-09 PROCEDURE — 99223 1ST HOSP IP/OBS HIGH 75: CPT

## 2020-11-09 RX ORDER — SODIUM CHLORIDE 9 MG/ML
1000 INJECTION INTRAMUSCULAR; INTRAVENOUS; SUBCUTANEOUS
Refills: 0 | Status: DISCONTINUED | OUTPATIENT
Start: 2020-11-09 | End: 2020-11-10

## 2020-11-09 RX ORDER — HEPARIN SODIUM 5000 [USP'U]/ML
5000 INJECTION INTRAVENOUS; SUBCUTANEOUS EVERY 12 HOURS
Refills: 0 | Status: DISCONTINUED | OUTPATIENT
Start: 2020-11-09 | End: 2020-11-10

## 2020-11-09 RX ORDER — OXYBUTYNIN CHLORIDE 5 MG
1 TABLET ORAL
Qty: 0 | Refills: 0 | DISCHARGE

## 2020-11-09 RX ORDER — MORPHINE SULFATE 50 MG/1
2 CAPSULE, EXTENDED RELEASE ORAL EVERY 4 HOURS
Refills: 0 | Status: DISCONTINUED | OUTPATIENT
Start: 2020-11-09 | End: 2020-11-10

## 2020-11-09 RX ORDER — ACETAMINOPHEN 500 MG
650 TABLET ORAL EVERY 8 HOURS
Refills: 0 | Status: DISCONTINUED | OUTPATIENT
Start: 2020-11-09 | End: 2020-11-10

## 2020-11-09 RX ORDER — PANTOPRAZOLE SODIUM 20 MG/1
40 TABLET, DELAYED RELEASE ORAL
Refills: 0 | Status: DISCONTINUED | OUTPATIENT
Start: 2020-11-09 | End: 2020-11-10

## 2020-11-09 RX ORDER — MORPHINE SULFATE 50 MG/1
2 CAPSULE, EXTENDED RELEASE ORAL ONCE
Refills: 0 | Status: DISCONTINUED | OUTPATIENT
Start: 2020-11-09 | End: 2020-11-09

## 2020-11-09 RX ORDER — SUMATRIPTAN SUCCINATE 4 MG/.5ML
6 INJECTION, SOLUTION SUBCUTANEOUS ONCE
Refills: 0 | Status: COMPLETED | OUTPATIENT
Start: 2020-11-09 | End: 2020-11-09

## 2020-11-09 RX ORDER — ONDANSETRON 8 MG/1
4 TABLET, FILM COATED ORAL ONCE
Refills: 0 | Status: COMPLETED | OUTPATIENT
Start: 2020-11-09 | End: 2020-11-09

## 2020-11-09 RX ORDER — ALPRAZOLAM 0.25 MG
0.5 TABLET ORAL THREE TIMES A DAY
Refills: 0 | Status: DISCONTINUED | OUTPATIENT
Start: 2020-11-09 | End: 2020-11-10

## 2020-11-09 RX ORDER — ACETAMINOPHEN 500 MG
500 TABLET ORAL EVERY 8 HOURS
Refills: 0 | Status: DISCONTINUED | OUTPATIENT
Start: 2020-11-09 | End: 2020-11-09

## 2020-11-09 RX ORDER — ALPRAZOLAM 0.25 MG
0.5 TABLET ORAL ONCE
Refills: 0 | Status: DISCONTINUED | OUTPATIENT
Start: 2020-11-09 | End: 2020-11-09

## 2020-11-09 RX ORDER — MORPHINE SULFATE 50 MG/1
4 CAPSULE, EXTENDED RELEASE ORAL ONCE
Refills: 0 | Status: DISCONTINUED | OUTPATIENT
Start: 2020-11-09 | End: 2020-11-09

## 2020-11-09 RX ORDER — CEFTRIAXONE 500 MG/1
1000 INJECTION, POWDER, FOR SOLUTION INTRAMUSCULAR; INTRAVENOUS EVERY 24 HOURS
Refills: 0 | Status: DISCONTINUED | OUTPATIENT
Start: 2020-11-09 | End: 2020-11-10

## 2020-11-09 RX ORDER — OXYBUTYNIN CHLORIDE 5 MG
5 TABLET ORAL
Refills: 0 | Status: DISCONTINUED | OUTPATIENT
Start: 2020-11-09 | End: 2020-11-10

## 2020-11-09 RX ORDER — ENOXAPARIN SODIUM 100 MG/ML
80 INJECTION SUBCUTANEOUS ONCE
Refills: 0 | Status: COMPLETED | OUTPATIENT
Start: 2020-11-09 | End: 2020-11-09

## 2020-11-09 RX ORDER — CITALOPRAM 10 MG/1
40 TABLET, FILM COATED ORAL DAILY
Refills: 0 | Status: DISCONTINUED | OUTPATIENT
Start: 2020-11-09 | End: 2020-11-10

## 2020-11-09 RX ORDER — SIMVASTATIN 20 MG/1
40 TABLET, FILM COATED ORAL AT BEDTIME
Refills: 0 | Status: DISCONTINUED | OUTPATIENT
Start: 2020-11-09 | End: 2020-11-10

## 2020-11-09 RX ORDER — SUMATRIPTAN SUCCINATE 4 MG/.5ML
25 INJECTION, SOLUTION SUBCUTANEOUS ONCE
Refills: 0 | Status: DISCONTINUED | OUTPATIENT
Start: 2020-11-09 | End: 2020-11-09

## 2020-11-09 RX ADMIN — MORPHINE SULFATE 2 MILLIGRAM(S): 50 CAPSULE, EXTENDED RELEASE ORAL at 03:53

## 2020-11-09 RX ADMIN — MORPHINE SULFATE 2 MILLIGRAM(S): 50 CAPSULE, EXTENDED RELEASE ORAL at 16:16

## 2020-11-09 RX ADMIN — MORPHINE SULFATE 2 MILLIGRAM(S): 50 CAPSULE, EXTENDED RELEASE ORAL at 08:41

## 2020-11-09 RX ADMIN — MORPHINE SULFATE 2 MILLIGRAM(S): 50 CAPSULE, EXTENDED RELEASE ORAL at 05:01

## 2020-11-09 RX ADMIN — CEFTRIAXONE 100 MILLIGRAM(S): 500 INJECTION, POWDER, FOR SOLUTION INTRAMUSCULAR; INTRAVENOUS at 06:24

## 2020-11-09 RX ADMIN — Medication 650 MILLIGRAM(S): at 22:00

## 2020-11-09 RX ADMIN — Medication 650 MILLIGRAM(S): at 21:59

## 2020-11-09 RX ADMIN — SIMVASTATIN 40 MILLIGRAM(S): 20 TABLET, FILM COATED ORAL at 21:58

## 2020-11-09 RX ADMIN — MORPHINE SULFATE 4 MILLIGRAM(S): 50 CAPSULE, EXTENDED RELEASE ORAL at 02:04

## 2020-11-09 RX ADMIN — MORPHINE SULFATE 4 MILLIGRAM(S): 50 CAPSULE, EXTENDED RELEASE ORAL at 00:43

## 2020-11-09 RX ADMIN — PANTOPRAZOLE SODIUM 40 MILLIGRAM(S): 20 TABLET, DELAYED RELEASE ORAL at 06:24

## 2020-11-09 RX ADMIN — ENOXAPARIN SODIUM 80 MILLIGRAM(S): 100 INJECTION SUBCUTANEOUS at 01:34

## 2020-11-09 RX ADMIN — Medication 0.5 MILLIGRAM(S): at 22:16

## 2020-11-09 RX ADMIN — Medication 0.5 MILLIGRAM(S): at 03:53

## 2020-11-09 RX ADMIN — SODIUM CHLORIDE 75 MILLILITER(S): 9 INJECTION INTRAMUSCULAR; INTRAVENOUS; SUBCUTANEOUS at 06:24

## 2020-11-09 RX ADMIN — Medication 0.5 MILLIGRAM(S): at 00:34

## 2020-11-09 RX ADMIN — SUMATRIPTAN SUCCINATE 6 MILLIGRAM(S): 4 INJECTION, SOLUTION SUBCUTANEOUS at 16:58

## 2020-11-09 RX ADMIN — MORPHINE SULFATE 2 MILLIGRAM(S): 50 CAPSULE, EXTENDED RELEASE ORAL at 11:50

## 2020-11-09 RX ADMIN — CITALOPRAM 40 MILLIGRAM(S): 10 TABLET, FILM COATED ORAL at 13:02

## 2020-11-09 RX ADMIN — ONDANSETRON 4 MILLIGRAM(S): 8 TABLET, FILM COATED ORAL at 17:06

## 2020-11-09 RX ADMIN — Medication 5 MILLIGRAM(S): at 06:24

## 2020-11-09 RX ADMIN — MORPHINE SULFATE 2 MILLIGRAM(S): 50 CAPSULE, EXTENDED RELEASE ORAL at 06:24

## 2020-11-09 RX ADMIN — MORPHINE SULFATE 6 MILLIGRAM(S): 50 CAPSULE, EXTENDED RELEASE ORAL at 00:43

## 2020-11-09 RX ADMIN — MORPHINE SULFATE 2 MILLIGRAM(S): 50 CAPSULE, EXTENDED RELEASE ORAL at 20:50

## 2020-11-09 RX ADMIN — Medication 650 MILLIGRAM(S): at 10:46

## 2020-11-09 RX ADMIN — Medication 650 MILLIGRAM(S): at 11:40

## 2020-11-09 NOTE — H&P ADULT - ATTENDING COMMENTS
45 yr old female with PMHx of Anxiety, MVP, hx of kidney stones, DLD, migraine headache, overactive bladder, chronic back pain, GERD presented to ER for right flank pain and diarrhoea.    # Acute Right pyelonephritis   - hemodynamically stable, no signs of sepsis   - UA: positive for bacteria   - CT Abdomen and Pelvis w/ IV Cont (11.08.20 @ 21:21): No CT evidence for acute intra-abdominal or pelvic pathology.  - start ceftriaxone 1gm q24  - f/u urine culture     # Diarrhoea  - check GI pcr  - c/w IVF  - monitor for now     # Suspected distal PE   - satting 99% on RA, no sob or chest pain  - CT Angio Chest PE Protocol w/ IV Cont (11.08.20): No evidence for central pulmonary embolus. Limited evaluation of many of the distal segmental and subsegmental branches secondary to poor opacification, specifically limited evaluation of the suspected pulmonary embolus to a right lower lobe distal segmental/subsegmental branch on recent abdominal CT.  - EKG: NSR  - start therapeutic lovenox until PE ruled out  - f/u duplex B/L LE  - f/u am trop, D dimer   - tele monitoring    # Overactive bladder  - c/w oxybutynin     # Anxiety  - c/w Xanax prn     # Depression   - c/w Celexa     # DLD  - c/w statin     # Chronic GERD  - c/w protonix    # DVT ppx  - on therapeutic Lovenox    # DASH diet    # Ambulate as tolerated     # Full code

## 2020-11-09 NOTE — H&P ADULT - NSICDXPASTSURGICALHX_GEN_ALL_CORE_FT
PAST SURGICAL HISTORY:  History of surgery hernia repair    x2    History of surgery cysto bladder stone removed    History of surgery lithotripsy  x 2

## 2020-11-09 NOTE — H&P ADULT - NSICDXFAMILYHX_GEN_ALL_CORE_FT
FAMILY HISTORY:  Father  Still living? Unknown  DM (diabetes mellitus), Age at diagnosis: Age Unknown  Family history of heart disease, Age at diagnosis: Age Unknown

## 2020-11-09 NOTE — H&P ADULT - NSHPPHYSICALEXAM_GEN_ALL_CORE
T(C): 36.4 (11-09-20 @ 00:05), Max: 36.4 (11-09-20 @ 00:05)  HR: 111 (11-09-20 @ 00:05) (111 - 124)  BP: 122/71 (11-09-20 @ 00:05) (122/71 - 153/88)  RR: 19 (11-09-20 @ 00:05) (19 - 20)  SpO2: 96% (11-09-20 @ 00:05) (96% - 99%)        GENERAL: NAD, well-developed  HEAD:  Atraumatic, Normocephalic  EYES: EOMI, PERRLA, conjunctiva and sclera clear  ENT: Normal tympanic membrane. No nasal obstruction or discharge. No tonsillar exudate, swelling or erythema.  NECK: Supple, No JVD  CHEST/LUNG: Clear to auscultation bilaterally; No wheeze  HEART: Regular rate and rhythm; No murmurs, rubs, or gallops  ABDOMEN: Soft, Nontender, Nondistended; Bowel sounds present  EXTREMITIES:  2+ Peripheral Pulses, No clubbing, cyanosis, or edema  PSYCH: AAOx3  NEUROLOGY: non-focal  SKIN: No rashes or lesions T(C): 36.4 (11-09-20 @ 00:05), Max: 36.4 (11-09-20 @ 00:05)  HR: 111 (11-09-20 @ 00:05) (111 - 124)  BP: 122/71 (11-09-20 @ 00:05) (122/71 - 153/88)  RR: 19 (11-09-20 @ 00:05) (19 - 20)  SpO2: 96% (11-09-20 @ 00:05) (96% - 99%)      GENERAL: NAD, well-developed  HEAD:  Atraumatic, Normocephalic  EYES: EOMI, PERRLA, conjunctiva and sclera clear  ENT: Normal tympanic membrane. No nasal obstruction or discharge. No tonsillar exudate, swelling or erythema.  NECK: Supple, No JVD  CHEST/LUNG: Clear to auscultation bilaterally; No wheeze  HEART: Regular rate and rhythm; No murmurs, rubs, or gallops  ABDOMEN: Soft, right flank tenderness, Nondistended; Bowel sounds present  EXTREMITIES:  2+ Peripheral Pulses, No clubbing, cyanosis, or edema  PSYCH: AAOx3  NEUROLOGY: non-focal  SKIN: No rashes or lesions

## 2020-11-09 NOTE — H&P ADULT - HISTORY OF PRESENT ILLNESS
45 yr old female with PMHx of Anxiety, MVP, hx of kidney stones, DLD, migraine headache, overactive bladder, chronic back pain, GERD presented to ER for right flank pain and diarrhoea. As per pt she developed right flank pain 4 days ago, radiating to the groin, sharp, 7/10 continuous with no aggravating or relieving factors. She took percocet with mild relief. She also has increased urinary frequency. Patient also complains of loose stools 4-5 times per day for the past 4 days associated with crampy abdominal pain. She denies any recent illness, fever, chills, wt loss, cough, chest pain, palpitation. No recent travel or sick contact. At baseline, patient ambulates well, lives home with family, social negx3.

## 2020-11-09 NOTE — H&P ADULT - NSHPLABSRESULTS_GEN_ALL_CORE
CT Angio Chest PE Protocol w/ IV Cont (11.08.20)    No evidence for central pulmonary embolus.    Limited evaluation of many of the distal segmental and subsegmental branches secondary to poor opacification, specifically limited evaluation of the suspected pulmonary embolus to a right lower lobe distal segmental/subsegmental branch on recent abdominal CT. CT Angio Chest PE Protocol w/ IV Cont (11.08.20)    No evidence for central pulmonary embolus.  Limited evaluation of many of the distal segmental and subsegmental branches secondary to poor opacification, specifically limited evaluation of the suspected pulmonary embolus to a right lower lobe distal segmental/subsegmental branch on recent abdominal CT.        CT Abdomen and Pelvis w/ IV Cont (11.08.20 @ 21:21):  No CT evidence for acute intra-abdominal or pelvic pathology.

## 2020-11-09 NOTE — H&P ADULT - NSHPSOCIALHISTORY_GEN_ALL_CORE
Marital Status:  (   )    (   ) Single    (   )    (  )   Lives with: (  ) alone  (  ) children   (  ) spouse   (  ) parents  (  ) other  Recent Travel: No recent travel  Occupation:    Substance Use (street drugs): ( x ) never used  (  ) other:  Tobacco Usage:  ( x  ) never smoked   (   ) former smoker   (   ) current smoker  (     ) pack year  Alcohol Usage: None Marital Status:  (   )    ( x  ) Single    (   )    (  )   Lives with: family  Recent Travel: No recent travel    Substance Use (street drugs): ( x ) never used  (  ) other:  Tobacco Usage:  ( x  ) never smoked   (   ) former smoker   (   ) current smoker  (     ) pack year  Alcohol Usage: None

## 2020-11-09 NOTE — CHART NOTE - NSCHARTNOTEFT_GEN_A_CORE
Patient admitted overnight by my colleague and assigned to me by hospitalist service in AM:    45 yr old female with PMHx of Anxiety, MVP, hx of kidney stones, DLD, migraine headache, overactive bladder, chronic back pain, GERD presented to ER for right flank pain and diarrhea.    # Acute Right pyelonephritis   - hemodynamically stable, no signs of sepsis   - UA: positive for bacteria   - start ceftriaxone 1gm q24  - f/u urine culture   Morphine PRN for pain    # Diarrhea  - check GI pcr  - c/w IVF  - monitor for now     # Suspected PE/DVT   -Work up was pursued because of CT ab/plv read of lower lungs (see imaging)  - satting 99% on RA, no sob, leg pain, or chest pain  - CT Angio Chest PE Protocol w/ IV Cont (11.08.20): No evidence for central pulmonary embolus. Limited evaluation of many of the distal segmental and subsegmental branches secondary to poor opacification, specifically limited evaluation of the suspected pulmonary embolus to a right lower lobe distal segmental/subsegmental branch on recent abdominal CT.  - D-dimer 422  - f/u duplex B/L LE  -Risk of full AC outweighs benefit at this time  -VQ scan- low likelihood of PE    #Migraine-  triptan SC; we do not have orals here.    # Overactive bladder  - c/w oxybutynin     # Anxiety  - c/w Xanax prn     # Depression   - c/w Celexa     # DLD  - c/w statin     # Chronic GERD  - c/w protonix    # DVT ppx  - SC heparin for now    # DASH diet    # Ambulate as tolerated     # Full code .

## 2020-11-09 NOTE — H&P ADULT - NSICDXPASTMEDICALHX_GEN_ALL_CORE_FT
PAST MEDICAL HISTORY:  Anxiety     Back pain     GERD (gastroesophageal reflux disease)     Kidney stones     Migraine headache     Murmur since age 20     6/18    Thyroid nodule

## 2020-11-10 ENCOUNTER — TRANSCRIPTION ENCOUNTER (OUTPATIENT)
Age: 45
End: 2020-11-10

## 2020-11-10 VITALS
RESPIRATION RATE: 17 BRPM | HEART RATE: 69 BPM | DIASTOLIC BLOOD PRESSURE: 49 MMHG | SYSTOLIC BLOOD PRESSURE: 125 MMHG | TEMPERATURE: 98 F

## 2020-11-10 LAB
CULTURE RESULTS: SIGNIFICANT CHANGE UP
CULTURE RESULTS: SIGNIFICANT CHANGE UP
SARS-COV-2 IGG SERPL QL IA: NEGATIVE — SIGNIFICANT CHANGE UP
SARS-COV-2 IGM SERPL IA-ACNC: 0.09 INDEX — SIGNIFICANT CHANGE UP
SPECIMEN SOURCE: SIGNIFICANT CHANGE UP
SPECIMEN SOURCE: SIGNIFICANT CHANGE UP

## 2020-11-10 PROCEDURE — 99239 HOSP IP/OBS DSCHRG MGMT >30: CPT

## 2020-11-10 RX ORDER — SUMATRIPTAN SUCCINATE 4 MG/.5ML
6 INJECTION, SOLUTION SUBCUTANEOUS DAILY
Refills: 0 | Status: DISCONTINUED | OUTPATIENT
Start: 2020-11-10 | End: 2020-11-10

## 2020-11-10 RX ORDER — SUMATRIPTAN SUCCINATE 4 MG/.5ML
6 INJECTION, SOLUTION SUBCUTANEOUS ONCE
Refills: 0 | Status: COMPLETED | OUTPATIENT
Start: 2020-11-10 | End: 2020-11-10

## 2020-11-10 RX ORDER — SUMATRIPTAN SUCCINATE 4 MG/.5ML
50 INJECTION, SOLUTION SUBCUTANEOUS ONCE
Refills: 0 | Status: DISCONTINUED | OUTPATIENT
Start: 2020-11-10 | End: 2020-11-10

## 2020-11-10 RX ORDER — PANTOPRAZOLE SODIUM 20 MG/1
40 TABLET, DELAYED RELEASE ORAL EVERY 12 HOURS
Refills: 0 | Status: DISCONTINUED | OUTPATIENT
Start: 2020-11-10 | End: 2020-11-10

## 2020-11-10 RX ORDER — OXYCODONE AND ACETAMINOPHEN 5; 325 MG/1; MG/1
1 TABLET ORAL ONCE
Refills: 0 | Status: DISCONTINUED | OUTPATIENT
Start: 2020-11-10 | End: 2020-11-10

## 2020-11-10 RX ORDER — ONDANSETRON 8 MG/1
4 TABLET, FILM COATED ORAL THREE TIMES A DAY
Refills: 0 | Status: DISCONTINUED | OUTPATIENT
Start: 2020-11-10 | End: 2020-11-10

## 2020-11-10 RX ORDER — AZTREONAM 2 G
1 VIAL (EA) INJECTION
Qty: 24 | Refills: 0
Start: 2020-11-10 | End: 2020-11-21

## 2020-11-10 RX ADMIN — MORPHINE SULFATE 2 MILLIGRAM(S): 50 CAPSULE, EXTENDED RELEASE ORAL at 02:53

## 2020-11-10 RX ADMIN — CITALOPRAM 40 MILLIGRAM(S): 10 TABLET, FILM COATED ORAL at 11:52

## 2020-11-10 RX ADMIN — SUMATRIPTAN SUCCINATE 6 MILLIGRAM(S): 4 INJECTION, SOLUTION SUBCUTANEOUS at 08:17

## 2020-11-10 RX ADMIN — Medication 650 MILLIGRAM(S): at 09:18

## 2020-11-10 RX ADMIN — Medication 650 MILLIGRAM(S): at 10:18

## 2020-11-10 RX ADMIN — OXYCODONE AND ACETAMINOPHEN 1 TABLET(S): 5; 325 TABLET ORAL at 10:41

## 2020-11-10 RX ADMIN — MORPHINE SULFATE 2 MILLIGRAM(S): 50 CAPSULE, EXTENDED RELEASE ORAL at 03:32

## 2020-11-10 RX ADMIN — OXYCODONE AND ACETAMINOPHEN 1 TABLET(S): 5; 325 TABLET ORAL at 11:40

## 2020-11-10 RX ADMIN — CEFTRIAXONE 100 MILLIGRAM(S): 500 INJECTION, POWDER, FOR SOLUTION INTRAMUSCULAR; INTRAVENOUS at 05:26

## 2020-11-10 RX ADMIN — SODIUM CHLORIDE 75 MILLILITER(S): 9 INJECTION INTRAMUSCULAR; INTRAVENOUS; SUBCUTANEOUS at 05:48

## 2020-11-10 RX ADMIN — Medication 5 MILLIGRAM(S): at 05:14

## 2020-11-10 RX ADMIN — ONDANSETRON 4 MILLIGRAM(S): 8 TABLET, FILM COATED ORAL at 05:46

## 2020-11-10 RX ADMIN — SUMATRIPTAN SUCCINATE 6 MILLIGRAM(S): 4 INJECTION, SOLUTION SUBCUTANEOUS at 07:53

## 2020-11-10 RX ADMIN — PANTOPRAZOLE SODIUM 40 MILLIGRAM(S): 20 TABLET, DELAYED RELEASE ORAL at 05:13

## 2020-11-10 NOTE — DISCHARGE NOTE PROVIDER - NSDCCPCAREPLAN_GEN_ALL_CORE_FT
PRINCIPAL DISCHARGE DIAGNOSIS  Diagnosis: Flank pain  Assessment and Plan of Treatment: -Kidney infection from UTI.  Take Bactrim for 2 weeks and see your PMD.

## 2020-11-10 NOTE — DISCHARGE NOTE PROVIDER - NSDCMRMEDTOKEN_GEN_ALL_CORE_FT
acetaminophen 325 mg oral tablet: 2 tab(s) orally every 6 hours, As needed, Mild Pain (1 - 3)  Bactrim  mg-160 mg oral tablet: 1 milligram(s) orally 2 times a day   CeleBREX 200 mg oral capsule: 1 cap(s) orally once a day  citalopram 40 mg oral tablet: 1 tab(s) orally once a day (in the morning)  oxybutynin 5 mg oral tablet: 1 tab(s) orally 2 times a day  pantoprazole 40 mg oral delayed release tablet: 1 tab(s) orally once a day  Percocet 10/325 oral tablet: 1 tab(s) orally every 8 hours, As Needed  rizatriptan 10 mg oral tablet, disintegratin tab(s) orally once a day, As Needed  simvastatin 40 mg oral tablet: 1 tab(s) orally once a day (at bedtime)  Xanax 0.5 mg oral tablet: 1 tab(s) orally 3 times a day, As Needed

## 2020-11-10 NOTE — DISCHARGE NOTE NURSING/CASE MANAGEMENT/SOCIAL WORK - PATIENT PORTAL LINK FT
You can access the FollowMyHealth Patient Portal offered by [facility] by registering at the following website: [email or web address]. By joining [employer]’s FollowMyHealth portal, you will also be able to view your health information using other applications (apps) compatible with our system.

## 2020-11-10 NOTE — DISCHARGE NOTE PROVIDER - HOSPITAL COURSE
45 yr old female with PMHx of Anxiety, MVP, hx of kidney stones, DLD, migraine headache, overactive bladder, chronic back pain, GERD presented to ER for right flank pain and diarrhea.    # Acute Right pyelonephritis   - hemodynamically stable, no signs of sepsis   - UA: positive for bacteria   - ceftriaxone 1gm q24, will switch to Bactrim on DC (cipro allergy)  - urine culture shows no growth (possibly timing of Abx?)     # Diarrhea  - check GI pcr  - c/w IVF  - monitor for now     # Suspected PE/DVT   -Work up was pursued because of CT ab/plv read of lower lungs (see imaging)  - satting 99% on RA, no sob, leg pain, or chest pain  - CT Angio Chest PE Protocol w/ IV Cont (11.08.20): No evidence for central pulmonary embolus. Limited evaluation of many of the distal segmental and subsegmental branches secondary to poor opacification, specifically limited evaluation of the suspected pulmonary embolus to a right lower lobe distal segmental/subsegmental branch on recent abdominal CT.  - D-dimer 422  - Duplex neg for DVT  -VQ scan- low likelihood of PE    -Last night was nauseated due to migraine, no Hematemesis.    Patient stable for DC to home.

## 2020-11-13 DIAGNOSIS — I34.1 NONRHEUMATIC MITRAL (VALVE) PROLAPSE: ICD-10-CM

## 2020-11-13 DIAGNOSIS — F41.9 ANXIETY DISORDER, UNSPECIFIED: ICD-10-CM

## 2020-11-13 DIAGNOSIS — R10.9 UNSPECIFIED ABDOMINAL PAIN: ICD-10-CM

## 2020-11-13 DIAGNOSIS — M54.9 DORSALGIA, UNSPECIFIED: ICD-10-CM

## 2020-11-13 DIAGNOSIS — G43.909 MIGRAINE, UNSPECIFIED, NOT INTRACTABLE, WITHOUT STATUS MIGRAINOSUS: ICD-10-CM

## 2020-11-13 DIAGNOSIS — N32.81 OVERACTIVE BLADDER: ICD-10-CM

## 2020-11-13 DIAGNOSIS — R19.7 DIARRHEA, UNSPECIFIED: ICD-10-CM

## 2020-11-13 DIAGNOSIS — N10 ACUTE PYELONEPHRITIS: ICD-10-CM

## 2020-11-13 DIAGNOSIS — F32.9 MAJOR DEPRESSIVE DISORDER, SINGLE EPISODE, UNSPECIFIED: ICD-10-CM

## 2020-11-13 DIAGNOSIS — K21.9 GASTRO-ESOPHAGEAL REFLUX DISEASE WITHOUT ESOPHAGITIS: ICD-10-CM

## 2020-11-13 DIAGNOSIS — Z87.442 PERSONAL HISTORY OF URINARY CALCULI: ICD-10-CM

## 2020-11-13 DIAGNOSIS — E04.1 NONTOXIC SINGLE THYROID NODULE: ICD-10-CM

## 2020-11-13 DIAGNOSIS — G89.29 OTHER CHRONIC PAIN: ICD-10-CM

## 2021-02-02 ENCOUNTER — RX RENEWAL (OUTPATIENT)
Age: 46
End: 2021-02-02

## 2021-06-11 ENCOUNTER — RX RENEWAL (OUTPATIENT)
Age: 46
End: 2021-06-11

## 2021-06-20 ENCOUNTER — EMERGENCY (EMERGENCY)
Facility: HOSPITAL | Age: 46
LOS: 0 days | Discharge: HOME | End: 2021-06-21
Attending: EMERGENCY MEDICINE | Admitting: EMERGENCY MEDICINE
Payer: COMMERCIAL

## 2021-06-20 VITALS
HEIGHT: 64 IN | SYSTOLIC BLOOD PRESSURE: 169 MMHG | TEMPERATURE: 97 F | OXYGEN SATURATION: 98 % | DIASTOLIC BLOOD PRESSURE: 102 MMHG | WEIGHT: 212.08 LBS | RESPIRATION RATE: 18 BRPM | HEART RATE: 117 BPM

## 2021-06-20 DIAGNOSIS — Z98.890 OTHER SPECIFIED POSTPROCEDURAL STATES: Chronic | ICD-10-CM

## 2021-06-20 DIAGNOSIS — Z90.49 ACQUIRED ABSENCE OF OTHER SPECIFIED PARTS OF DIGESTIVE TRACT: ICD-10-CM

## 2021-06-20 DIAGNOSIS — R10.31 RIGHT LOWER QUADRANT PAIN: ICD-10-CM

## 2021-06-20 DIAGNOSIS — R11.0 NAUSEA: ICD-10-CM

## 2021-06-20 DIAGNOSIS — K21.9 GASTRO-ESOPHAGEAL REFLUX DISEASE WITHOUT ESOPHAGITIS: ICD-10-CM

## 2021-06-20 DIAGNOSIS — Z87.442 PERSONAL HISTORY OF URINARY CALCULI: ICD-10-CM

## 2021-06-20 DIAGNOSIS — Z87.19 PERSONAL HISTORY OF OTHER DISEASES OF THE DIGESTIVE SYSTEM: ICD-10-CM

## 2021-06-20 DIAGNOSIS — F41.9 ANXIETY DISORDER, UNSPECIFIED: ICD-10-CM

## 2021-06-20 DIAGNOSIS — Z88.1 ALLERGY STATUS TO OTHER ANTIBIOTIC AGENTS STATUS: ICD-10-CM

## 2021-06-20 DIAGNOSIS — Z86.39 PERSONAL HISTORY OF OTHER ENDOCRINE, NUTRITIONAL AND METABOLIC DISEASE: ICD-10-CM

## 2021-06-20 DIAGNOSIS — N83.201 UNSPECIFIED OVARIAN CYST, RIGHT SIDE: ICD-10-CM

## 2021-06-20 DIAGNOSIS — Z79.899 OTHER LONG TERM (CURRENT) DRUG THERAPY: ICD-10-CM

## 2021-06-20 DIAGNOSIS — Z79.02 LONG TERM (CURRENT) USE OF ANTITHROMBOTICS/ANTIPLATELETS: ICD-10-CM

## 2021-06-20 DIAGNOSIS — Z86.69 PERSONAL HISTORY OF OTHER DISEASES OF THE NERVOUS SYSTEM AND SENSE ORGANS: ICD-10-CM

## 2021-06-20 DIAGNOSIS — Z88.8 ALLERGY STATUS TO OTHER DRUGS, MEDICAMENTS AND BIOLOGICAL SUBSTANCES: ICD-10-CM

## 2021-06-20 DIAGNOSIS — Z91.012 ALLERGY TO EGGS: ICD-10-CM

## 2021-06-20 LAB
APPEARANCE UR: CLEAR — SIGNIFICANT CHANGE UP
BASOPHILS # BLD AUTO: 0.01 K/UL — SIGNIFICANT CHANGE UP (ref 0–0.2)
BASOPHILS NFR BLD AUTO: 0.1 % — SIGNIFICANT CHANGE UP (ref 0–1)
BILIRUB UR-MCNC: NEGATIVE — SIGNIFICANT CHANGE UP
COLOR SPEC: YELLOW — SIGNIFICANT CHANGE UP
DIFF PNL FLD: NEGATIVE — SIGNIFICANT CHANGE UP
EOSINOPHIL # BLD AUTO: 0 K/UL — SIGNIFICANT CHANGE UP (ref 0–0.7)
EOSINOPHIL NFR BLD AUTO: 0 % — SIGNIFICANT CHANGE UP (ref 0–8)
GLUCOSE UR QL: NEGATIVE MG/DL — SIGNIFICANT CHANGE UP
HCG SERPL QL: NEGATIVE — SIGNIFICANT CHANGE UP
HCT VFR BLD CALC: 37.5 % — SIGNIFICANT CHANGE UP (ref 37–47)
HGB BLD-MCNC: 12.6 G/DL — SIGNIFICANT CHANGE UP (ref 12–16)
IMM GRANULOCYTES NFR BLD AUTO: 0.6 % — HIGH (ref 0.1–0.3)
KETONES UR-MCNC: NEGATIVE — SIGNIFICANT CHANGE UP
LEUKOCYTE ESTERASE UR-ACNC: NEGATIVE — SIGNIFICANT CHANGE UP
LYMPHOCYTES # BLD AUTO: 1.91 K/UL — SIGNIFICANT CHANGE UP (ref 1.2–3.4)
LYMPHOCYTES # BLD AUTO: 23.7 % — SIGNIFICANT CHANGE UP (ref 20.5–51.1)
MCHC RBC-ENTMCNC: 30.4 PG — SIGNIFICANT CHANGE UP (ref 27–31)
MCHC RBC-ENTMCNC: 33.6 G/DL — SIGNIFICANT CHANGE UP (ref 32–37)
MCV RBC AUTO: 90.6 FL — SIGNIFICANT CHANGE UP (ref 81–99)
MONOCYTES # BLD AUTO: 0.71 K/UL — HIGH (ref 0.1–0.6)
MONOCYTES NFR BLD AUTO: 8.8 % — SIGNIFICANT CHANGE UP (ref 1.7–9.3)
NEUTROPHILS # BLD AUTO: 5.37 K/UL — SIGNIFICANT CHANGE UP (ref 1.4–6.5)
NEUTROPHILS NFR BLD AUTO: 66.8 % — SIGNIFICANT CHANGE UP (ref 42.2–75.2)
NITRITE UR-MCNC: NEGATIVE — SIGNIFICANT CHANGE UP
NRBC # BLD: 0 /100 WBCS — SIGNIFICANT CHANGE UP (ref 0–0)
PH UR: 6.5 — SIGNIFICANT CHANGE UP (ref 5–8)
PLATELET # BLD AUTO: 244 K/UL — SIGNIFICANT CHANGE UP (ref 130–400)
PROT UR-MCNC: NEGATIVE MG/DL — SIGNIFICANT CHANGE UP
RBC # BLD: 4.14 M/UL — LOW (ref 4.2–5.4)
RBC # FLD: 12.8 % — SIGNIFICANT CHANGE UP (ref 11.5–14.5)
SP GR SPEC: 1.02 — SIGNIFICANT CHANGE UP (ref 1.01–1.03)
UROBILINOGEN FLD QL: 0.2 MG/DL — SIGNIFICANT CHANGE UP (ref 0.2–0.2)
WBC # BLD: 8.05 K/UL — SIGNIFICANT CHANGE UP (ref 4.8–10.8)
WBC # FLD AUTO: 8.05 K/UL — SIGNIFICANT CHANGE UP (ref 4.8–10.8)

## 2021-06-20 PROCEDURE — 99284 EMERGENCY DEPT VISIT MOD MDM: CPT

## 2021-06-20 RX ORDER — MORPHINE SULFATE 50 MG/1
8 CAPSULE, EXTENDED RELEASE ORAL ONCE
Refills: 0 | Status: DISCONTINUED | OUTPATIENT
Start: 2021-06-20 | End: 2021-06-20

## 2021-06-20 RX ORDER — MORPHINE SULFATE 50 MG/1
4 CAPSULE, EXTENDED RELEASE ORAL ONCE
Refills: 0 | Status: DISCONTINUED | OUTPATIENT
Start: 2021-06-20 | End: 2021-06-20

## 2021-06-20 RX ORDER — ONDANSETRON 8 MG/1
4 TABLET, FILM COATED ORAL ONCE
Refills: 0 | Status: COMPLETED | OUTPATIENT
Start: 2021-06-20 | End: 2021-06-20

## 2021-06-20 RX ORDER — SODIUM CHLORIDE 9 MG/ML
1000 INJECTION INTRAMUSCULAR; INTRAVENOUS; SUBCUTANEOUS ONCE
Refills: 0 | Status: COMPLETED | OUTPATIENT
Start: 2021-06-20 | End: 2021-06-20

## 2021-06-20 RX ADMIN — ONDANSETRON 4 MILLIGRAM(S): 8 TABLET, FILM COATED ORAL at 23:26

## 2021-06-20 RX ADMIN — MORPHINE SULFATE 8 MILLIGRAM(S): 50 CAPSULE, EXTENDED RELEASE ORAL at 23:52

## 2021-06-20 RX ADMIN — MORPHINE SULFATE 4 MILLIGRAM(S): 50 CAPSULE, EXTENDED RELEASE ORAL at 23:24

## 2021-06-20 RX ADMIN — SODIUM CHLORIDE 1000 MILLILITER(S): 9 INJECTION INTRAMUSCULAR; INTRAVENOUS; SUBCUTANEOUS at 23:28

## 2021-06-20 NOTE — ED PROVIDER NOTE - PATIENT PORTAL LINK FT
You can access the FollowMyHealth Patient Portal offered by [facility] by registering at the following website: [email or web address]. By joining [employer]’s FollowMyHealth portal, you will also be able to view your health information using other applications (apps) compatible with our system.

## 2021-06-20 NOTE — ED ADULT NURSE NOTE - NSSEPSISSUSPECTED_ED_A_ED
"        Janeen Ritter   2017 3:20 PM   Office Visit   MRN: [id number]    Department:  [address] [place] [address]   Dept Phone:  [phone]    Description:  Female : 1949   Provider:  Nico Garcia M.D.           Reason for Visit     Follow-Up Change meds      Allergies as of 2017     Allergen Noted Reactions    Other Misc 2010   Shortness of Breath    \"chemicals such as cleaning agents and fragrance\"    Soap 2010   Rash      You were diagnosed with     Hoarse voice quality   [2016]       Dyslipidemia   [004456]       IGT (impaired glucose tolerance)   [387807]       HTN (hypertension), benign   [109849]       Anxiety   [172407]       Need for pneumococcal vaccine   [785237]       Visit for screening mammogram   [693176]       Chronic left-sided low back pain with left-sided sciatica   [1945545]         Vital Signs     Blood Pressure Pulse Temperature Respirations Height Weight    136/78 mmHg 82 36.3 °C (97.3 °F) 16 1.549 m (5' 1\") 56.7 kg (125 lb)    Body Mass Index Oxygen Saturation Smoking Status             23.63 kg/m2 92% Never Smoker          Basic Information     Date Of Birth Sex Race Ethnicity Preferred Language    1949 Female White Non- English      Your appointments     2017  4:00 PM   Established Patient with Nico Garcia M.D.   [facility] [address]    [address] [place] [address]  [facility] [address]   [phone]           You will be receiving a confirmation call a few days before your appointment from our automated call confirmation system.              Problem List              ICD-10-CM Priority Class Noted - Resolved    GERD (gastroesophageal reflux disease) (Chronic) K21.9   2010 - Present    Dyslipidemia (Chronic) E78.5   2010 - Present    HTN (hypertension), benign (Chronic) I10   2010 - Present    [place] chorea (CMS-HCC) (Chronic) G10 High  2010 - Present    Glaucoma H40.9   2010 - Present "    IGT (impaired glucose tolerance) R73.02   2/22/2013 - Present    Chronic kidney disease, stage III (moderate) N18.3 Medium  4/16/2015 - Present    Recurrent major depressive disorder, in partial remission (CMS-[facility]) F33.41   12/30/2016 - Present    Asthma in adult J45.909   12/30/2016 - Present    Hot flashes R23.2   3/2/2017 - Present    Mixed incontinence N39.46   3/2/2017 - Present      Health Maintenance        Date Due Completion Dates    IMM ZOSTER VACCINE 7/18/2011 ---    IMM PNEUMOCOCCAL 65+ (ADULT) LOW/MEDIUM RISK SERIES (1 of 2 - PCV13) 4/6/2017 4/6/2016    MAMMOGRAM 4/15/2017 4/15/2016, 1/3/2014, 11/8/2011, 11/8/2011 (Done), 1/18/2010, 1/18/2010    Override on 11/8/2011: Done    COLONOSCOPY 10/1/2017 10/1/2012, 10/1/2012    BONE DENSITY 1/11/2022 1/11/2017, 11/8/2011    IMM DTaP/Tdap/Td Vaccine (3 - Td) 11/2/2026 11/2/2016, 8/27/2012            Current Immunizations     13-VALENT PCV PREVNAR  Incomplete    INFLUENZA VACCINE H1N1 10/27/2009    Influenza TIV (IM) 10/27/2011, 10/22/2007, 12/5/2006, 10/24/2005    Influenza Vaccine Adult HD 11/2/2016    Influenza Vaccine Quad Inj (Pf) 10/3/2014, 10/7/2013, 9/26/2011, 10/6/2010    Influenza Vaccine Quad Inj (Preserved) 10/7/2015  4:00 PM, 10/3/2014    Pneumococcal polysaccharide vaccine (PPSV-23) 4/6/2016    Tdap Vaccine 11/2/2016, 8/27/2012  1:55 PM      Below and/or attached are the medications your provider expects you to take. Review all of your home medications and newly ordered medications with your provider and/or pharmacist. Follow medication instructions as directed by your provider and/or pharmacist. Please keep your medication list with you and share with your provider. Update the information when medications are discontinued, doses are changed, or new medications (including over-the-counter products) are added; and carry medication information at all times in the event of emergency situations     Allergies:  OTHER MISC - Shortness of Breath      SOAP - Rash               Medications  Valid as of: June 02, 2017 -  3:46 PM    Generic Name Brand Name Tablet Size Instructions for use    Albuterol Sulfate (Aero Soln) PROAIR  (90 BASE) MCG/ACT Inhale 2 Puffs by mouth every 6 hours as needed for Shortness of Breath.        Aspirin (Tab) aspirin 81 MG Take 81 mg by mouth every day.        Atorvastatin Calcium (Tab) LIPITOR 20 MG TAKE ONE TABLET BY MOUTH IN THE EVENING        Atorvastatin Calcium (Tab) LIPITOR 80 MG Take 1 Tab by mouth every evening.        Calcium Carbonate-Vitamin D   Take  by mouth every day.        Cyclobenzaprine HCl (Tab) FLEXERIL 10 MG Take 1 Tab by mouth 3 times a day as needed.        Ergocalciferol (Cap) DRISDOL 92483 UNITS Take 1 Cap by mouth every 7 days.        Estradiol (Tab) ESTRACE 0.5 MG Take 1 Tab by mouth every day.        Fluticasone Propionate (Suspension) FLONASE 50 MCG/ACT Spray 1 Spray in nose every day.        Gabapentin (Cap) NEURONTIN 300 MG Take 1 Cap by mouth 3 times a day.        HydrOXYzine HCl (Tab) ATARAX 50 MG Take 1 Tab by mouth 3 times a day as needed for Itching.        Latanoprost   by Ophthalmic route.        Omega-3 Fatty Acids (Cap) OMEGA 3 FA 1000 MG Take 1,000 mg by mouth 3 times a day, with meals.        Omeprazole (CAPSULE DELAYED RELEASE) PRILOSEC 40 MG Take 1 Cap by mouth every day.        Oxybutynin Chloride (Tab) DITROPAN 5 MG Take 1 Tab by mouth 2 Times a Day. TWICE DAILY        Oxybutynin Chloride (Tab) DITROPAN 5 MG Take 1 Tab by mouth 3 times a day.        Sertraline HCl (Tab) ZOLOFT 25 MG Take 1 Tab by mouth every day.        TraZODone HCl (Tab) DESYREL 50 MG Take 1 Tab by mouth every bedtime.        .                 Medicines prescribed today were sent to:     [facility] [address] [place] [address] [place] - [address] [place] [address]    Phone: [phone] Fax: [phone]    Open 24 Hours?: No      Medication refill instructions:       If your prescription bottle indicates you  have medication refills left, it is not necessary to call your provider’s office. Please contact your pharmacy and they will refill your medication.    If your prescription bottle indicates you do not have any refills left, you may request refills at any time through one of the following ways: The online Care IT system (except Urgent Care), by calling your provider’s office, or by asking your pharmacy to contact your provider’s office with a refill request. Medication refills are processed only during regular business hours and may not be available until the next business day. Your provider may request additional information or to have a follow-up visit with you prior to refilling your medication.   *Please Note: Medication refills are assigned a new Rx number when refilled electronically. Your pharmacy may indicate that no refills were authorized even though a new prescription for the same medication is available at the pharmacy. Please request the medicine by name with the pharmacy before contacting your provider for a refill.        Your To Do List     Future Labs/Procedures Complete By Expires    COMP METABOLIC PANEL  As directed 6/3/2018    HEMOGLOBIN A1C  As directed 6/3/2018    LIPID PROFILE  As directed 6/3/2018    MA-SCREEN MAMMO W/CAD-BILAT  As directed 7/4/2018      Referral     A referral request has been sent to our patient care coordination department. Please allow 3-5 business days for us to process this request and contact you either by phone or mail. If you do not hear from us by the 5th business day, please call us at [phone].        Other Notes About Your Plan     Patient Enrolled in [facility] with Dr.John Garcia.            Care IT Access Code: [email or web address]  Expires: 7/2/2017  3:46 PM    Care IT  A secure, online tool to manage your health information     [employer]’s Care IT® is a secure, online tool that connects you to your personalized health information from the privacy  of your home -- day or night - making it very easy for you to manage your healthcare. Once the activation process is completed, you can even access your medical information using the Flowdock aidee, which is available for free in the [employer] Aidee store or Google Play store.     Flowdock provides the following levels of access (as shown below):   My Chart Features   [facility] Doctor [facility]  Specialists [facility]  Urgent  Care Non-[facility]  Primary Care  Doctor   Email your healthcare team securely and privately 24/7 X X X    Manage appointments: schedule your next appointment; view details of past/upcoming appointments X      Request prescription refills. X      View recent personal medical records, including lab and immunizations X X X X   View health record, including health history, allergies, medications X X X X   Read reports about your outpatient visits, procedures, consult and ER notes X X X X   See your discharge summary, which is a recap of your hospital and/or ER visit that includes your diagnosis, lab results, and care plan. X X       How to register for Flowdock:  1. Go to  [email or web address].  2. Click on the Sign Up Now box, which takes you to the New Member Sign Up page. You will need to provide the following information:  a. Enter your Flowdock Access Code exactly as it appears at the top of this page. (You will not need to use this code after you’ve completed the sign-up process. If you do not sign up before the expiration date, you must request a new code.)   b. Enter your date of birth.   c. Enter your home email address.   d. Click Submit, and follow the next screen’s instructions.  3. Create a Flowdock ID. This will be your Flowdock login ID and cannot be changed, so think of one that is secure and easy to remember.  4. Create a Flowdock password. You can change your password at any time.  5. Enter your Password Reset Question and Answer. This can be used at a later time if you forget your  password.   6. Enter your e-mail address. This allows you to receive e-mail notifications when new information is available in [employer].  7. Click Sign Up. You can now view your health information.    For assistance activating your [employer] account, call [phone]         No

## 2021-06-20 NOTE — ED PROVIDER NOTE - OBJECTIVE STATEMENT
46 y/o WF H/O cholecystectomy, hernia repair, kidney stones, presents with RLQ pain and nausea for 1 day.  No diarrhea or dysuria.

## 2021-06-20 NOTE — ED PROVIDER NOTE - CLINICAL SUMMARY MEDICAL DECISION MAKING FREE TEXT BOX
Labs unremarkable.  UA negative.  CT abdo 3.3 cm right ovarian cyst. Given IVF, morphine, Dilaudid.  Will D/C to follow up with pvt gyne.

## 2021-06-20 NOTE — ED PROVIDER NOTE - PHYSICAL EXAMINATION
VITAL SIGNS: I have reviewed nursing notes and confirm.  CONSTITUTIONAL: Well-developed; well-nourished; in no acute distress.  SKIN: Skin exam is warm and dry, no acute rash.  HEAD: Normocephalic; atraumatic.  EYES: PERRL, EOM intact; conjunctiva and sclera clear.  ENT: No nasal discharge; airway clear. Throat clear.  NECK: Supple; non tender.  No lymphadenopathy.  CARD: S1, S2 normal; no murmurs, gallops, or rubs. Regular rate and rhythm.  RESP: No wheezes, rales or rhonchi.  ABD: Normal bowel sounds; soft; non-distended; RLQ tenderness; no hepatosplenomegaly.  EXT: Normal ROM. No clubbing, cyanosis or edema.  NEURO: Alert, oriented. Grossly unremarkable. No focal deficits.  PSYCH: Cooperative, appropriate.

## 2021-06-20 NOTE — ED PROVIDER NOTE - NSFOLLOWUPINSTRUCTIONS_ED_ALL_ED_FT
Ovarian Cyst    An ovarian cyst is a fluid-filled sac that forms on an ovary. The ovaries are small organs that produce eggs in women. Various types of cysts can form on the ovaries. Most are not cancerous. Many do not cause problems, and they often go away on their own. Some may cause symptoms and require treatment. Common types of ovarian cysts include:     Functional cysts—These cysts may occur every month during the menstrual cycle. This is normal. The cysts usually go away with the next menstrual cycle if the woman does not get pregnant. Usually, there are no symptoms with a functional cyst.  Endometrioma cysts—These cysts form from the tissue that lines the uterus. They are also called "chocolate cysts" because they become filled with blood that turns brown. This type of cyst can cause pain in the lower abdomen during intercourse and with your menstrual period.  Cystadenoma cysts—This type develops from the cells on the outside of the ovary. These cysts can get very big and cause lower abdomen pain and pain with intercourse. This type of cyst can twist on itself, cut off its blood supply, and cause severe pain. It can also easily rupture and cause a lot of pain.  Dermoid cysts—This type of cyst is sometimes found in both ovaries. These cysts may contain different kinds of body tissue, such as skin, teeth, hair, or cartilage. They usually do not cause symptoms unless they get very big.   Theca lutein cysts—These cysts occur when too much of a certain hormone (human chorionic gonadotropin) is produced and overstimulates the ovaries to produce an egg. This is most common after procedures used to assist with the conception of a baby (in vitro fertilization).    CAUSES  Fertility drugs can cause a condition in which multiple large cysts are formed on the ovaries. This is called ovarian hyperstimulation syndrome.   A condition called polycystic ovary syndrome can cause hormonal imbalances that can lead to nonfunctional ovarian cysts.     SIGNS AND SYMPTOMS  Many ovarian cysts do not cause symptoms. If symptoms are present, they may include:    Pelvic pain or pressure.  Pain in the lower abdomen.  Pain during sexual intercourse.  Increasing girth (swelling) of the abdomen.  Abnormal menstrual periods.  Increasing pain with menstrual periods.  Stopping having menstrual periods without being pregnant.    DIAGNOSIS  These cysts are commonly found during a routine or annual pelvic exam. Tests may be ordered to find out more about the cyst. These tests may include:    Ultrasound.  X-ray of the pelvis.  CT scan.  MRI.  Blood tests.    TREATMENT  Many ovarian cysts go away on their own without treatment. Your health care provider may want to check your cyst regularly for 2–3 months to see if it changes. For women in menopause, it is particularly important to monitor a cyst closely because of the higher rate of ovarian cancer in menopausal women. When treatment is needed, it may include any of the following:    A procedure to drain the cyst (aspiration). This may be done using a long needle and ultrasound. It can also be done through a laparoscopic procedure. This involves using a thin, lighted tube with a tiny camera on the end (laparoscope) inserted through a small incision.   Surgery to remove the whole cyst. This may be done using laparoscopic surgery or an open surgery involving a larger incision in the lower abdomen.   Hormone treatment or birth control pills. These methods are sometimes used to help dissolve a cyst.    HOME CARE INSTRUCTIONS  Only take over-the-counter or prescription medicines as directed by your health care provider.   Follow up with your health care provider as directed.   Get regular pelvic exams and Pap tests.    SEEK MEDICAL CARE IF:  Your periods are late, irregular, or painful, or they stop.  Your pelvic pain or abdominal pain does not go away.  Your abdomen becomes larger or swollen.  You have pressure on your bladder or trouble emptying your bladder completely.  You have pain during sexual intercourse.  You have feelings of fullness, pressure, or discomfort in your stomach.  You lose weight for no apparent reason.  You feel generally ill.  You become constipated.  You lose your appetite.  You develop acne.  You have an increase in body and facial hair.  You are gaining weight, without changing your exercise and eating habits.  You think you are pregnant.    SEEK IMMEDIATE MEDICAL CARE IF:  You have increasing abdominal pain.  You feel sick to your stomach (nauseous), and you throw up (vomit).  You develop a fever that comes on suddenly.  You have abdominal pain during a bowel movement.  Your menstrual periods become heavier than usual.    Acute Abdominal Pain    WHAT YOU NEED TO KNOW:    The cause of your abdominal pain may not be found. If a cause is found, treatment will depend on what the cause is.     DISCHARGE INSTRUCTIONS:    Return to the emergency department if:     You vomit blood or cannot stop vomiting.      You have blood in your bowel movement or it looks like tar.       You have bleeding from your rectum.       Your abdomen is larger than usual, more painful, and hard.       You have severe pain in your abdomen.       You stop passing gas and having bowel movements.       You feel weak, dizzy, or faint.    Contact your healthcare provider if:     You have a fever.      You have new signs and symptoms.      Your symptoms do not get better with treatment.       You have questions or concerns about your condition or care.    Medicines may be given to decrease pain, treat an infection, and manage your symptoms. Take your medicine as directed. Call your healthcare provider if you think your medicine is not helping or if you have side effects. Tell him if you are allergic to any medicine. Keep a list of the medicines, vitamins, and herbs you take. Include the amounts, and when and why you take them. Bring the list or the pill bottles to follow-up visits. Carry your medicine list with you in case of an emergency.    Manage your symptoms:     Apply heat on your abdomen for 20 to 30 minutes every 2 hours for as many days as directed. Heat helps decrease pain and muscle spasms.       Manage your stress. Stress may cause abdominal pain. Your healthcare provider may recommend relaxation techniques and deep breathing exercises to help decrease your stress. Your healthcare provider may recommend you talk to someone about your stress or anxiety, such as a counselor or a trusted friend. Get plenty of sleep and exercise regularly.       Limit or do not drink alcohol. Alcohol can make your abdominal pain worse. Ask your healthcare provider if it is safe for you to drink alcohol. Also ask how much is safe for you to drink.       Do not smoke. Nicotine and other chemicals in cigarettes can damage your esophagus and stomach. Ask your healthcare provider for information if you currently smoke and need help to quit. E-cigarettes or smokeless tobacco still contain nicotine. Talk to your healthcare provider before you use these products.     Make changes to the food you eat as directed: Do not eat foods that cause abdominal pain or other symptoms. Eat small meals more often.     Eat more high-fiber foods if you are constipated. High-fiber foods include fruits, vegetables, whole-grain foods, and legumes.       Do not eat foods that cause gas if you have bloating. Examples include broccoli, cabbage, and cauliflower. Do not drink soda or carbonated drinks, because these may also cause gas.       Do not eat foods or drinks that contain sorbitol or fructose if you have diarrhea and bloating. Some examples are fruit juices, candy, jelly, and sugar-free gum.       Do not eat high-fat foods, such as fried foods, cheeseburgers, hot dogs, and desserts.      Limit or do not drink caffeine. Caffeine may make symptoms, such as heart burn or nausea, worse.       Drink plenty of liquids to prevent dehydration from diarrhea or vomiting. Ask your healthcare provider how much liquid to drink each day and which liquids are best for you.     Follow up with your healthcare provider as directed: Write down your questions so you remember to ask them during your visits.       © Copyright [employer] 2019 All illustrations and images included in CareNotes are the copyrighted property of [employer] Inc[employer] or [employer]     Follow up with your pvt gyne in 1-2 days.

## 2021-06-20 NOTE — ED PROVIDER NOTE - NS ED ROS FT
Review of Systems:  	•	CONSTITUTIONAL - no fever, no diaphoresis, no weight change  	•	SKIN - no rash  	•	HEMATOLOGIC - no bleeding, no bruising  	•	EYES - no eye pain, no blurred vision  	•	ENT - no change in hearing, no pain  	•	RESPIRATORY - no shortness of breath, no cough  	•	CARDIAC - no chest pain, no palpitations  	•	GI - + abd pain, + nausea, no vomiting, no diarrhea, no constipation, no bleeding  	•	GENITO-URINARY - no discharge, no dysuria; no hematuria, LMP-   	•	ENDO - no polydypsia, no polyurea, no heat/no cold intolerance  	•	MUSCULOSKELETAL - no joint paint, no swelling, no redness  	•	NEUROLOGIC - no weakness, no headache, no anesthesia, no paresthesias  	•	PSYCH - no anxiety, non suicidal, non homicidal, no hallucination, no depression  	•	ALLERGY - no rhinitis

## 2021-06-21 VITALS
RESPIRATION RATE: 18 BRPM | OXYGEN SATURATION: 96 % | HEART RATE: 82 BPM | DIASTOLIC BLOOD PRESSURE: 86 MMHG | SYSTOLIC BLOOD PRESSURE: 139 MMHG | TEMPERATURE: 97 F

## 2021-06-21 LAB
ALBUMIN SERPL ELPH-MCNC: 4.4 G/DL — SIGNIFICANT CHANGE UP (ref 3.5–5.2)
ALP SERPL-CCNC: 81 U/L — SIGNIFICANT CHANGE UP (ref 30–115)
ALT FLD-CCNC: 14 U/L — SIGNIFICANT CHANGE UP (ref 0–41)
ANION GAP SERPL CALC-SCNC: 12 MMOL/L — SIGNIFICANT CHANGE UP (ref 7–14)
AST SERPL-CCNC: 21 U/L — SIGNIFICANT CHANGE UP (ref 0–41)
BILIRUB SERPL-MCNC: 0.2 MG/DL — SIGNIFICANT CHANGE UP (ref 0.2–1.2)
BUN SERPL-MCNC: 10 MG/DL — SIGNIFICANT CHANGE UP (ref 10–20)
CALCIUM SERPL-MCNC: 9.7 MG/DL — SIGNIFICANT CHANGE UP (ref 8.5–10.1)
CHLORIDE SERPL-SCNC: 101 MMOL/L — SIGNIFICANT CHANGE UP (ref 98–110)
CO2 SERPL-SCNC: 23 MMOL/L — SIGNIFICANT CHANGE UP (ref 17–32)
CREAT SERPL-MCNC: 0.6 MG/DL — LOW (ref 0.7–1.5)
GLUCOSE SERPL-MCNC: 102 MG/DL — HIGH (ref 70–99)
LIDOCAIN IGE QN: 34 U/L — SIGNIFICANT CHANGE UP (ref 7–60)
POTASSIUM SERPL-MCNC: 4.6 MMOL/L — SIGNIFICANT CHANGE UP (ref 3.5–5)
POTASSIUM SERPL-SCNC: 4.6 MMOL/L — SIGNIFICANT CHANGE UP (ref 3.5–5)
PROT SERPL-MCNC: 7.5 G/DL — SIGNIFICANT CHANGE UP (ref 6–8)
SODIUM SERPL-SCNC: 136 MMOL/L — SIGNIFICANT CHANGE UP (ref 135–146)

## 2021-06-21 PROCEDURE — 74177 CT ABD & PELVIS W/CONTRAST: CPT | Mod: 26,MA

## 2021-06-21 RX ORDER — HYDROMORPHONE HYDROCHLORIDE 2 MG/ML
1 INJECTION INTRAMUSCULAR; INTRAVENOUS; SUBCUTANEOUS ONCE
Refills: 0 | Status: DISCONTINUED | OUTPATIENT
Start: 2021-06-21 | End: 2021-06-21

## 2021-06-21 RX ADMIN — HYDROMORPHONE HYDROCHLORIDE 1 MILLIGRAM(S): 2 INJECTION INTRAMUSCULAR; INTRAVENOUS; SUBCUTANEOUS at 02:27

## 2021-07-12 ENCOUNTER — RX RENEWAL (OUTPATIENT)
Age: 46
End: 2021-07-12

## 2021-08-04 NOTE — H&P PST ADULT - ANESTHESIA, PREVIOUS REACTION, PROFILE
Ace wrap on for comfort        
none

## 2021-10-07 ENCOUNTER — EMERGENCY (EMERGENCY)
Facility: HOSPITAL | Age: 46
LOS: 0 days | Discharge: HOME | End: 2021-10-07
Attending: EMERGENCY MEDICINE | Admitting: EMERGENCY MEDICINE
Payer: SELF-PAY

## 2021-10-07 VITALS
OXYGEN SATURATION: 97 % | RESPIRATION RATE: 18 BRPM | HEART RATE: 86 BPM | SYSTOLIC BLOOD PRESSURE: 135 MMHG | DIASTOLIC BLOOD PRESSURE: 90 MMHG

## 2021-10-07 VITALS
OXYGEN SATURATION: 99 % | WEIGHT: 250 LBS | RESPIRATION RATE: 20 BRPM | TEMPERATURE: 98 F | HEIGHT: 64 IN | HEART RATE: 87 BPM | DIASTOLIC BLOOD PRESSURE: 84 MMHG | SYSTOLIC BLOOD PRESSURE: 120 MMHG

## 2021-10-07 DIAGNOSIS — Z87.442 PERSONAL HISTORY OF URINARY CALCULI: ICD-10-CM

## 2021-10-07 DIAGNOSIS — Z88.8 ALLERGY STATUS TO OTHER DRUGS, MEDICAMENTS AND BIOLOGICAL SUBSTANCES: ICD-10-CM

## 2021-10-07 DIAGNOSIS — R06.00 DYSPNEA, UNSPECIFIED: ICD-10-CM

## 2021-10-07 DIAGNOSIS — T38.0X1A POISONING BY GLUCOCORTICOIDS AND SYNTHETIC ANALOGUES, ACCIDENTAL (UNINTENTIONAL), INITIAL ENCOUNTER: ICD-10-CM

## 2021-10-07 DIAGNOSIS — Z98.890 OTHER SPECIFIED POSTPROCEDURAL STATES: Chronic | ICD-10-CM
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
## 2021-10-07 DIAGNOSIS — G43.909 MIGRAINE, UNSPECIFIED, NOT INTRACTABLE, WITHOUT STATUS MIGRAINOSUS: ICD-10-CM

## 2021-10-07 DIAGNOSIS — F41.9 ANXIETY DISORDER, UNSPECIFIED: ICD-10-CM

## 2021-10-07 DIAGNOSIS — R45.1 RESTLESSNESS AND AGITATION: ICD-10-CM

## 2021-10-07 DIAGNOSIS — K21.9 GASTRO-ESOPHAGEAL REFLUX DISEASE WITHOUT ESOPHAGITIS: ICD-10-CM

## 2021-10-07 DIAGNOSIS — T78.40XA ALLERGY, UNSPECIFIED, INITIAL ENCOUNTER: ICD-10-CM

## 2021-10-07 DIAGNOSIS — Z88.1 ALLERGY STATUS TO OTHER ANTIBIOTIC AGENTS STATUS: ICD-10-CM

## 2021-10-07 DIAGNOSIS — T45.0X1A POISONING BY ANTIALLERGIC AND ANTIEMETIC DRUGS, ACCIDENTAL (UNINTENTIONAL), INITIAL ENCOUNTER: ICD-10-CM
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
## 2021-10-07 DIAGNOSIS — Z91.012 ALLERGY TO EGGS: ICD-10-CM

## 2021-10-07 LAB
ALBUMIN SERPL ELPH-MCNC: 4.5 G/DL — SIGNIFICANT CHANGE UP (ref 3.5–5.2)
ALP SERPL-CCNC: 105 U/L — SIGNIFICANT CHANGE UP (ref 30–115)
ALT FLD-CCNC: 49 U/L — HIGH (ref 0–41)
ANION GAP SERPL CALC-SCNC: 14 MMOL/L — SIGNIFICANT CHANGE UP (ref 7–14)
APAP SERPL-MCNC: <5 UG/ML — LOW (ref 10–30)
AST SERPL-CCNC: 36 U/L — SIGNIFICANT CHANGE UP (ref 0–41)
BASOPHILS # BLD AUTO: 0.02 K/UL — SIGNIFICANT CHANGE UP (ref 0–0.2)
BASOPHILS NFR BLD AUTO: 0.2 % — SIGNIFICANT CHANGE UP (ref 0–1)
BILIRUB SERPL-MCNC: 0.3 MG/DL — SIGNIFICANT CHANGE UP (ref 0.2–1.2)
BUN SERPL-MCNC: 12 MG/DL — SIGNIFICANT CHANGE UP (ref 10–20)
CALCIUM SERPL-MCNC: 9.9 MG/DL — SIGNIFICANT CHANGE UP (ref 8.5–10.1)
CHLORIDE SERPL-SCNC: 99 MMOL/L — SIGNIFICANT CHANGE UP (ref 98–110)
CO2 SERPL-SCNC: 24 MMOL/L — SIGNIFICANT CHANGE UP (ref 17–32)
CREAT SERPL-MCNC: 0.8 MG/DL — SIGNIFICANT CHANGE UP (ref 0.7–1.5)
EOSINOPHIL # BLD AUTO: 0 K/UL — SIGNIFICANT CHANGE UP (ref 0–0.7)
EOSINOPHIL NFR BLD AUTO: 0 % — SIGNIFICANT CHANGE UP (ref 0–8)
ETHANOL SERPL-MCNC: <10 MG/DL — SIGNIFICANT CHANGE UP
GLUCOSE SERPL-MCNC: 97 MG/DL — SIGNIFICANT CHANGE UP (ref 70–99)
HCT VFR BLD CALC: 40.6 % — SIGNIFICANT CHANGE UP (ref 37–47)
HGB BLD-MCNC: 13.3 G/DL — SIGNIFICANT CHANGE UP (ref 12–16)
IMM GRANULOCYTES NFR BLD AUTO: 1.5 % — HIGH (ref 0.1–0.3)
LYMPHOCYTES # BLD AUTO: 2.78 K/UL — SIGNIFICANT CHANGE UP (ref 1.2–3.4)
LYMPHOCYTES # BLD AUTO: 20.9 % — SIGNIFICANT CHANGE UP (ref 20.5–51.1)
MCHC RBC-ENTMCNC: 30.1 PG — SIGNIFICANT CHANGE UP (ref 27–31)
MCHC RBC-ENTMCNC: 32.8 G/DL — SIGNIFICANT CHANGE UP (ref 32–37)
MCV RBC AUTO: 91.9 FL — SIGNIFICANT CHANGE UP (ref 81–99)
MONOCYTES # BLD AUTO: 0.89 K/UL — HIGH (ref 0.1–0.6)
MONOCYTES NFR BLD AUTO: 6.7 % — SIGNIFICANT CHANGE UP (ref 1.7–9.3)
NEUTROPHILS # BLD AUTO: 9.39 K/UL — HIGH (ref 1.4–6.5)
NEUTROPHILS NFR BLD AUTO: 70.7 % — SIGNIFICANT CHANGE UP (ref 42.2–75.2)
NRBC # BLD: 0 /100 WBCS — SIGNIFICANT CHANGE UP (ref 0–0)
PLATELET # BLD AUTO: 285 K/UL — SIGNIFICANT CHANGE UP (ref 130–400)
POTASSIUM SERPL-MCNC: 4.1 MMOL/L — SIGNIFICANT CHANGE UP (ref 3.5–5)
POTASSIUM SERPL-SCNC: 4.1 MMOL/L — SIGNIFICANT CHANGE UP (ref 3.5–5)
PROT SERPL-MCNC: 7.1 G/DL — SIGNIFICANT CHANGE UP (ref 6–8)
RBC # BLD: 4.42 M/UL — SIGNIFICANT CHANGE UP (ref 4.2–5.4)
RBC # FLD: 12.3 % — SIGNIFICANT CHANGE UP (ref 11.5–14.5)
SALICYLATES SERPL-MCNC: <0.3 MG/DL — LOW (ref 4–30)
SODIUM SERPL-SCNC: 137 MMOL/L — SIGNIFICANT CHANGE UP (ref 135–146)
WBC # BLD: 13.28 K/UL — HIGH (ref 4.8–10.8)
WBC # FLD AUTO: 13.28 K/UL — HIGH (ref 4.8–10.8)

## 2021-10-07 PROCEDURE — 93010 ELECTROCARDIOGRAM REPORT: CPT

## 2021-10-07 PROCEDURE — 99285 EMERGENCY DEPT VISIT HI MDM: CPT

## 2021-10-07 PROCEDURE — 99451 NTRPROF PH1/NTRNET/EHR 5/>: CPT

## 2021-10-07 RX ORDER — SODIUM CHLORIDE 9 MG/ML
3 INJECTION INTRAMUSCULAR; INTRAVENOUS; SUBCUTANEOUS ONCE
Refills: 0 | Status: COMPLETED | OUTPATIENT
Start: 2021-10-07 | End: 2021-10-07

## 2021-10-07 RX ADMIN — SODIUM CHLORIDE 3 MILLILITER(S): 9 INJECTION INTRAMUSCULAR; INTRAVENOUS; SUBCUTANEOUS at 08:38

## 2021-10-07 NOTE — ED PROVIDER NOTE - PROGRESS NOTE DETAILS
pt interviewed.  he has no suicidal ideation or psych hx.  in my opinion, this behavior is not reflective of a psychiatric illness.  1:1 d/c'ed. d/w tox. ATTENDING NOTE: 45 y/o F, who has been under an allergist’s care over the past 3 weeks for persistent pruritic urticarial eruption which has been treated with 1 course of prednisone and antihistamines as well as several different treatments presents after becoming frustrated with pruritic rash and taking medications. Pt states that she felt overwhelmed with these symptoms and took x25 Benadryl (25mg) around 0500. In addition Pt has took Pepcid x2 (20mg), Allegra 2 tablets and singulair 1 tablet. Vital signs noted. Pt seems agitated but alert and oriented x3. (+) Dilated pupils. Neck supple. Chest clear, heart RR no murmur. No muscular rigidity. Discussed with toxicology. Will observe for 6 hours. Diagnostic testing reviewed. Following this observation period, Pt felt much improved and was stable for d.c. pt gave consent for tox consult via phone

## 2021-10-07 NOTE — ED PROVIDER NOTE - OBJECTIVE STATEMENT
ATTENDING NOTE: 47 y/o F, who has been under an allergist’s care over the past 3 weeks for persistent pruritic urticarial eruption which has been treated with 1 course of prednisone and antihistamines as well as several different treatments presents after becoming frustrated with pruritic rash and taking medications. Pt states that she felt overwhelmed with these symptoms and took x25 Benadryl (25mg) around 0500. In addition Pt has took Pepcid x2 (20mg), Allegra 2 tablets and singulair 1 tablet. Vital signs noted. Pt seems agitated but alert and oriented x3. (+) Dilated pupils. Neck supple. Chest clear, heart RR no murmur. No muscular rigidity. Discussed with toxicology. Will observe for 6 hours. Diagnostic testing reviewed. Following this observation period, Pt felt much improved and was stable for d.c.

## 2021-10-07 NOTE — CONSULT NOTE ADULT - ASSESSMENT
·	Monitor the patient for 4 hours in ED, If remains vitally stable, labs are within normal limits, and has no new active complaints then can be discharged.   ·	Give BZD x PO for anxiety.     d/w Fermín Reynaga (Toxicology Attending on call)     Thank you for the consult.

## 2021-10-07 NOTE — ED PROVIDER NOTE - PATIENT PORTAL LINK FT
You can access the FollowMyHealth Patient Portal offered by [facility] by registering at the following website: [email or web address]. By joining [employer]’s FollowMyHealth portal, you will also be able to view your health information using other applications (apps) compatible with our system.

## 2021-10-07 NOTE — ED PROVIDER NOTE - PHYSICAL EXAMINATION
VITAL SIGNS: I have reviewed nursing notes and confirm.  CONSTITUTIONAL: Well-developed; well-nourished; anxious.  SKIN: Skin exam is warm and dry, no acute rash.  HEAD: Normocephalic; atraumatic.  EYES: PERRL, EOM intact; conjunctiva and sclera clear.  Pupils dilated.  ENT: No nasal discharge; airway clear. Throat clear.  NECK: Supple; non tender.  No lymphadenopathy.  CARD: S1, S2 normal; no murmurs, gallops, or rubs. Regular rate and rhythm.  RESP: No wheezes, rales or rhonchi.  ABD: Normal bowel sounds; soft; non-distended; non-tender; no hepatosplenomegaly.  EXT: Normal ROM. No clubbing, cyanosis or edema.  NEURO: Alert, oriented. Grossly unremarkable. No focal deficits.  PSYCH: Cooperative,

## 2021-10-07 NOTE — ED PROVIDER NOTE - NS ED ROS FT
Constitutional: No fever, chills, malaise.  Eyes: No visual changes, eye pain or discharge. No Photophobia  ENMT: No hearing changes, pain, discharge or infections. No neck pain or stiffness.   GI: No nausea, vomiting, diarrhea or abdominal pain.  : No dysuria, frequency or burning. No Discharge  MS: No myalgia, muscle weakness, joint pain or back pain.  Neuro: No headache or weakness. No LOC.  Except as documented in the HPI, all other systems are negative.

## 2021-10-07 NOTE — CONSULT NOTE ADULT - SUBJECTIVE AND OBJECTIVE BOX
MEDICAL TOXICOLOGY CONSULT    HPI: 46 Yr female no PMH suffering from diffuse urticaria for past 4 to 5 weeks and has used multiple courses of steroids and Antihistamines  At 5 AM took 25 pills of Benadryl 25 mg each,, pepsid x 2, Allegra Allegra 2 and Singulair x 2.        ONSET / TIME of exposure(s):  25 pills of Benadryl 25 mg each,, pepsid x 2, Allegra Allegra 2 and Singulair x 2.        QUANTITY of exposure(s): 5 AM    ROUTE of exposure: Ingestion     CONTEXT of exposure: Home    ASSOCIATED symptoms: None    PAST MEDICAL & SURGICAL HISTORY:  GERD (gastroesophageal reflux disease)  Migraine headache  Back pain  Anxiety  Kidney stones  Murmur  since age 20     6/18  Thyroid nodule  History of surgery  hernia repair    x2  History of surgery  cysto bladder stone removed  History of surgery  lithotripsy  x 2    REVIEW OF SYSTEMS:    Negative except HPI above    Vital Signs Last 24 Hrs  T(C): 36.6 (07 Oct 2021 06:39), Max: 36.6 (07 Oct 2021 06:39)  T(F): 97.9 (07 Oct 2021 06:39), Max: 97.9 (07 Oct 2021 06:39)  HR: 90 (07 Oct 2021 07:24) (87 - 90)  BP: 120/84 (07 Oct 2021 06:39) (120/84 - 120/84)  RR: 20 (07 Oct 2021 07:24) (20 - 20)  SpO2: 100% (07 Oct 2021 07:24) (99% - 100%)

## 2021-10-07 NOTE — ED ADULT TRIAGE NOTE - CHIEF COMPLAINT QUOTE
nyasia from home, ems states patient indicated she took 25 separate po doses of benadryl 25mg since yesterday to get rid of her hives that she has for 5 weeks

## 2022-06-15 NOTE — ASU PREOP CHECKLIST - TO WHOM
YANDY SORENSON RN Normal vision: sees adequately in most situations; can see medication labels, newsprint

## 2022-08-11 NOTE — BRIEF OPERATIVE NOTE - NSEVIDENCEINFORABS_GEN_ALL_CORE
OLIVERIO MANSFIELD      Patient is needing: PATIENT HAS NOT HEARD BACK FROM ANYONE OR HER DOCTOR SINCE 8.8, NEEDSS TO KNOW WHAT TO DO ABOUT HER SYMPTOMS, THEY HAVE NOT SUBSIDED AND THEY ARE STILL AS BAD AS WHEN PATIENT SAW DR MANSFIELD. PASSING BLOOD STILL.   
No

## 2022-10-10 ENCOUNTER — EMERGENCY (EMERGENCY)
Facility: HOSPITAL | Age: 47
LOS: 0 days | Discharge: HOME | End: 2022-10-11
Attending: EMERGENCY MEDICINE | Admitting: EMERGENCY MEDICINE

## 2022-10-10 VITALS
DIASTOLIC BLOOD PRESSURE: 80 MMHG | SYSTOLIC BLOOD PRESSURE: 146 MMHG | HEIGHT: 64 IN | OXYGEN SATURATION: 100 % | TEMPERATURE: 98 F | WEIGHT: 179.9 LBS | HEART RATE: 85 BPM | RESPIRATION RATE: 18 BRPM

## 2022-10-10 DIAGNOSIS — R10.9 UNSPECIFIED ABDOMINAL PAIN: ICD-10-CM

## 2022-10-10 DIAGNOSIS — Z98.890 OTHER SPECIFIED POSTPROCEDURAL STATES: Chronic | ICD-10-CM

## 2022-10-10 DIAGNOSIS — K21.9 GASTRO-ESOPHAGEAL REFLUX DISEASE WITHOUT ESOPHAGITIS: ICD-10-CM

## 2022-10-10 DIAGNOSIS — Z88.1 ALLERGY STATUS TO OTHER ANTIBIOTIC AGENTS STATUS: ICD-10-CM

## 2022-10-10 DIAGNOSIS — Z20.822 CONTACT WITH AND (SUSPECTED) EXPOSURE TO COVID-19: ICD-10-CM

## 2022-10-10 DIAGNOSIS — Z88.6 ALLERGY STATUS TO ANALGESIC AGENT: ICD-10-CM

## 2022-10-10 DIAGNOSIS — G43.909 MIGRAINE, UNSPECIFIED, NOT INTRACTABLE, WITHOUT STATUS MIGRAINOSUS: ICD-10-CM

## 2022-10-10 DIAGNOSIS — Z87.442 PERSONAL HISTORY OF URINARY CALCULI: ICD-10-CM

## 2022-10-10 DIAGNOSIS — F41.9 ANXIETY DISORDER, UNSPECIFIED: ICD-10-CM

## 2022-10-10 DIAGNOSIS — Z91.012 ALLERGY TO EGGS: ICD-10-CM

## 2022-10-10 LAB
ALBUMIN SERPL ELPH-MCNC: 5.4 G/DL — HIGH (ref 3.5–5.2)
ALP SERPL-CCNC: 128 U/L — HIGH (ref 30–115)
ALT FLD-CCNC: 20 U/L — SIGNIFICANT CHANGE UP (ref 0–41)
ANION GAP SERPL CALC-SCNC: 24 MMOL/L — HIGH (ref 7–14)
APPEARANCE UR: CLEAR — SIGNIFICANT CHANGE UP
AST SERPL-CCNC: 24 U/L — SIGNIFICANT CHANGE UP (ref 0–41)
BACTERIA # UR AUTO: SIGNIFICANT CHANGE UP /HPF
BASOPHILS # BLD AUTO: 0.01 K/UL — SIGNIFICANT CHANGE UP (ref 0–0.2)
BASOPHILS NFR BLD AUTO: 0.1 % — SIGNIFICANT CHANGE UP (ref 0–1)
BILIRUB SERPL-MCNC: 0.4 MG/DL — SIGNIFICANT CHANGE UP (ref 0.2–1.2)
BILIRUB UR-MCNC: NEGATIVE — SIGNIFICANT CHANGE UP
BUN SERPL-MCNC: 13 MG/DL — SIGNIFICANT CHANGE UP (ref 10–20)
CALCIUM SERPL-MCNC: 10.7 MG/DL — HIGH (ref 8.4–10.5)
CHLORIDE SERPL-SCNC: 96 MMOL/L — LOW (ref 98–110)
CO2 SERPL-SCNC: 16 MMOL/L — LOW (ref 17–32)
COLOR SPEC: YELLOW — SIGNIFICANT CHANGE UP
CREAT SERPL-MCNC: 1.2 MG/DL — SIGNIFICANT CHANGE UP (ref 0.7–1.5)
DIFF PNL FLD: ABNORMAL
EGFR: 57 ML/MIN/1.73M2 — LOW
EOSINOPHIL # BLD AUTO: 0.03 K/UL — SIGNIFICANT CHANGE UP (ref 0–0.7)
EOSINOPHIL NFR BLD AUTO: 0.2 % — SIGNIFICANT CHANGE UP (ref 0–8)
EPI CELLS # UR: ABNORMAL /HPF
GLUCOSE SERPL-MCNC: 231 MG/DL — HIGH (ref 70–99)
GLUCOSE UR QL: NEGATIVE MG/DL — SIGNIFICANT CHANGE UP
HCG SERPL QL: NEGATIVE — SIGNIFICANT CHANGE UP
HCT VFR BLD CALC: 43.9 % — SIGNIFICANT CHANGE UP (ref 37–47)
HGB BLD-MCNC: 14.4 G/DL — SIGNIFICANT CHANGE UP (ref 12–16)
IMM GRANULOCYTES NFR BLD AUTO: 0.6 % — HIGH (ref 0.1–0.3)
INR BLD: 0.92 RATIO — SIGNIFICANT CHANGE UP (ref 0.65–1.3)
KETONES UR-MCNC: NEGATIVE — SIGNIFICANT CHANGE UP
LEUKOCYTE ESTERASE UR-ACNC: NEGATIVE — SIGNIFICANT CHANGE UP
LIDOCAIN IGE QN: 84 U/L — HIGH (ref 7–60)
LYMPHOCYTES # BLD AUTO: 1.98 K/UL — SIGNIFICANT CHANGE UP (ref 1.2–3.4)
LYMPHOCYTES # BLD AUTO: 14 % — LOW (ref 20.5–51.1)
MCHC RBC-ENTMCNC: 27.1 PG — SIGNIFICANT CHANGE UP (ref 27–31)
MCHC RBC-ENTMCNC: 32.8 G/DL — SIGNIFICANT CHANGE UP (ref 32–37)
MCV RBC AUTO: 82.7 FL — SIGNIFICANT CHANGE UP (ref 81–99)
MONOCYTES # BLD AUTO: 0.69 K/UL — HIGH (ref 0.1–0.6)
MONOCYTES NFR BLD AUTO: 4.9 % — SIGNIFICANT CHANGE UP (ref 1.7–9.3)
NEUTROPHILS # BLD AUTO: 11.32 K/UL — HIGH (ref 1.4–6.5)
NEUTROPHILS NFR BLD AUTO: 80.2 % — HIGH (ref 42.2–75.2)
NITRITE UR-MCNC: NEGATIVE — SIGNIFICANT CHANGE UP
NRBC # BLD: 0 /100 WBCS — SIGNIFICANT CHANGE UP (ref 0–0)
PH UR: 6.5 — SIGNIFICANT CHANGE UP (ref 5–8)
PLATELET # BLD AUTO: 417 K/UL — HIGH (ref 130–400)
POTASSIUM SERPL-MCNC: 4.2 MMOL/L — SIGNIFICANT CHANGE UP (ref 3.5–5)
POTASSIUM SERPL-SCNC: 4.2 MMOL/L — SIGNIFICANT CHANGE UP (ref 3.5–5)
PROT SERPL-MCNC: 8.9 G/DL — HIGH (ref 6–8)
PROT UR-MCNC: 100 MG/DL
PROTHROM AB SERPL-ACNC: 10.5 SEC — SIGNIFICANT CHANGE UP (ref 9.95–12.87)
RBC # BLD: 5.31 M/UL — SIGNIFICANT CHANGE UP (ref 4.2–5.4)
RBC # FLD: 14.6 % — HIGH (ref 11.5–14.5)
RBC CASTS # UR COMP ASSIST: SIGNIFICANT CHANGE UP /HPF
SARS-COV-2 RNA SPEC QL NAA+PROBE: SIGNIFICANT CHANGE UP
SODIUM SERPL-SCNC: 136 MMOL/L — SIGNIFICANT CHANGE UP (ref 135–146)
SP GR SPEC: 1.01 — SIGNIFICANT CHANGE UP (ref 1.01–1.03)
UROBILINOGEN FLD QL: 0.2 MG/DL — SIGNIFICANT CHANGE UP
WBC # BLD: 14.11 K/UL — HIGH (ref 4.8–10.8)
WBC # FLD AUTO: 14.11 K/UL — HIGH (ref 4.8–10.8)
WBC UR QL: SIGNIFICANT CHANGE UP /HPF

## 2022-10-10 PROCEDURE — 74177 CT ABD & PELVIS W/CONTRAST: CPT | Mod: 26,MA

## 2022-10-10 PROCEDURE — 99285 EMERGENCY DEPT VISIT HI MDM: CPT

## 2022-10-10 RX ORDER — CELECOXIB 200 MG/1
1 CAPSULE ORAL
Qty: 0 | Refills: 0 | DISCHARGE

## 2022-10-10 RX ORDER — ALPRAZOLAM 0.25 MG
1 TABLET ORAL
Qty: 0 | Refills: 0 | DISCHARGE

## 2022-10-10 RX ORDER — MORPHINE SULFATE 50 MG/1
4 CAPSULE, EXTENDED RELEASE ORAL ONCE
Refills: 0 | Status: DISCONTINUED | OUTPATIENT
Start: 2022-10-10 | End: 2022-10-10

## 2022-10-10 RX ORDER — DIPHENHYDRAMINE HCL 50 MG
25 CAPSULE ORAL ONCE
Refills: 0 | Status: COMPLETED | OUTPATIENT
Start: 2022-10-10 | End: 2022-10-10

## 2022-10-10 RX ORDER — SODIUM CHLORIDE 9 MG/ML
1000 INJECTION INTRAMUSCULAR; INTRAVENOUS; SUBCUTANEOUS ONCE
Refills: 0 | Status: COMPLETED | OUTPATIENT
Start: 2022-10-10 | End: 2022-10-10

## 2022-10-10 RX ORDER — RIZATRIPTAN BENZOATE 5 MG/1
1 TABLET ORAL
Qty: 0 | Refills: 0 | DISCHARGE

## 2022-10-10 RX ORDER — ONDANSETRON 8 MG/1
4 TABLET, FILM COATED ORAL ONCE
Refills: 0 | Status: COMPLETED | OUTPATIENT
Start: 2022-10-10 | End: 2022-10-10

## 2022-10-10 RX ADMIN — SODIUM CHLORIDE 1000 MILLILITER(S): 9 INJECTION INTRAMUSCULAR; INTRAVENOUS; SUBCUTANEOUS at 18:59

## 2022-10-10 RX ADMIN — ONDANSETRON 4 MILLIGRAM(S): 8 TABLET, FILM COATED ORAL at 20:51

## 2022-10-10 RX ADMIN — MORPHINE SULFATE 4 MILLIGRAM(S): 50 CAPSULE, EXTENDED RELEASE ORAL at 18:57

## 2022-10-10 RX ADMIN — ONDANSETRON 4 MILLIGRAM(S): 8 TABLET, FILM COATED ORAL at 18:56

## 2022-10-10 RX ADMIN — ONDANSETRON 4 MILLIGRAM(S): 8 TABLET, FILM COATED ORAL at 23:03

## 2022-10-10 RX ADMIN — MORPHINE SULFATE 4 MILLIGRAM(S): 50 CAPSULE, EXTENDED RELEASE ORAL at 23:04

## 2022-10-10 RX ADMIN — MORPHINE SULFATE 4 MILLIGRAM(S): 50 CAPSULE, EXTENDED RELEASE ORAL at 20:52

## 2022-10-10 NOTE — ED PROVIDER NOTE - PROGRESS NOTE DETAILS
Spoke to patient patient is requesting more pain medicine and states she is still nauseous.  Meds ordered.  Patient awaiting results of CT.

## 2022-10-10 NOTE — ED PROVIDER NOTE - ATTENDING APP SHARED VISIT CONTRIBUTION OF CARE
I was present for and supervised the key and critical aspects of the procedures performed during the care of the patient. Patient is a 47-year-old female presents emergency department with history of the right moderate and throbbing worse over the past several days she has a history of kidney stones and notes that this is similar patient states that she has had several episodes of vomiting as well as diarrhea with decreased p.o. intake secondary to the above patient denies any current fevers chills denies any dysuria hesitancy or frequency    On physical exam normocephalic atraumatic pupils equal round react to light commendation extraocular muscles intact oropharynx clear chest clear to auscultation bilaterally abdomen soft nontender nondistended no guarding no rebound bowel sounds are extremities full range of motion no edema noted    Assessment plan patient given IV fluids IV Zofran labs as well as CT which is negative patient's lipase is 80 urine reveals no signs of infection patient has made marked improvement here in the emergency department.  IV fluids pain medicine and antinausea medicine I informed her of her lab results patient has follow-up with her endocrinologist as well as GI in the next 2 to 3 days I think at this point the patient stable for discharge given improvement in symptoms and the fact that she is tolerating p.o. she is afebrile with a negative CT

## 2022-10-10 NOTE — ED PROVIDER NOTE - PATIENT PORTAL LINK FT
You can access the FollowMyHealth Patient Portal offered by [facility] by registering at the following website: [email or web address]. By joining [employer]’s FollowMyHealth portal, you will also be able to view your health information using other applications (apps) compatible with our system. You can access the FollowMyHealth Patient Portal offered by [facility] by registering at the following website: [email or web address]. By joining [employer]’s FollowMyHealth portal, you will also be able to view your health information using other applications (apps) compatible with our system.

## 2022-10-10 NOTE — ED PROVIDER NOTE - NSICDXFAMILYHX_GEN_ALL_CORE_FT
FAMILY HISTORY:  Father  Still living? Unknown  DM (diabetes mellitus), Age at diagnosis: Age Unknown  Family history of heart disease, Age at diagnosis: Age Unknown    
confused

## 2022-10-10 NOTE — ED PROVIDER NOTE - NS ED ATTENDING STATEMENT MOD
This was a shared visit with the DONNIE. I reviewed and verified the documentation and independently performed the documented:

## 2022-10-10 NOTE — ED PROVIDER NOTE - NS ED ROS FT
Review of Systems:  	•	CONSTITUTIONAL - no fever, no diaphoresis, no chills  	•	SKIN - no rash  	•	HEMATOLOGIC - no bleeding, no bruising  	•	EYES - no eye pain, no blurry vision  	•	ENT - no change in hearing, no sore throat, no ear pain or tinnitus  	•	RESPIRATORY - no shortness of breath, no cough  	•	CARDIAC - no chest pain, no palpitations  	•	GI - flank pain, no nausea, no vomiting, no diarrhea, no constipation  	•	GENITO-URINARY - no discharge, no dysuria; no hematuria,  increased urinary frequency  	•	MUSCULOSKELETAL - no joint paint, no swelling, no redness  	•	NEUROLOGIC - no weakness, no headache, no paresthesias, no LOC  	•	PSYCH - no anxiety, non suicidal, non homicidal, no hallucination, no depression

## 2022-10-10 NOTE — ED PROVIDER NOTE - CLINICAL SUMMARY MEDICAL DECISION MAKING FREE TEXT BOX
patient given IV fluids IV Zofran labs as well as CT which is negative patient's lipase is 80 urine reveals no signs of infection patient has made marked improvement here in the emergency department.  IV fluids pain medicine and antinausea medicine I informed her of her lab results patient has follow-up with her endocrinologist as well as GI in the next 2 to 3 days I think at this point the patient stable for discharge given improvement in symptoms and the fact that she is tolerating p.o. she is afebrile with a negative CT

## 2022-10-10 NOTE — ED PROVIDER NOTE - NSFOLLOWUPINSTRUCTIONS_ED_ALL_ED_FT
Follow up your primary care doctor in 1-2 days    Flank Pain, Adult  Flank pain is pain that is located on the side of the body between the upper abdomen and the back. This area is called the flank. The pain may occur over a short period of time (acute), or it may be long-term or recurring (chronic). It may be mild or severe. Flank pain can be caused by many things, including:    Muscle soreness or injury.  Kidney stones or kidney disease.  Stress.  A disease of the spine (vertebral disk disease).  A lung infection (pneumonia).  Fluid around the lungs (pulmonary edema).  A skin rash caused by the chickenpox virus (shingles).  Tumors that affect the back of the abdomen.  Gallbladder disease.    Follow these instructions at home:  Drink enough fluid to keep your urine clear or pale yellow.  Rest as told by your health care provider.  Take over-the-counter and prescription medicines only as told by your health care provider.  Keep a journal to track what has caused your flank pain and what has made it feel better.  ImageKeep all follow-up visits as told by your health care provider. This is important.  Contact a health care provider if:  Your pain is not controlled with medicine.  You have new symptoms.  Your pain gets worse.  You have a fever.  Your symptoms last longer than 2–3 days.  You have trouble urinating or you are urinating very frequently.  Get help right away if:  You have trouble breathing or you are short of breath.  Your abdomen hurts or it is swollen or red.  You have nausea or vomiting.  You feel faint or you pass out.  You have blood in your urine.  Summary  Flank pain is pain that is located on the side of the body between the upper abdomen and the back.  The pain may occur over a short period of time (acute), or it may be long-term or recurring (chronic). It may be mild or severe.  Flank pain can be caused by many things.  Contact your health care provider if your symptoms get worse or they last longer than 2–3 days.  This information is not intended to replace advice given to you by your health care provider. Make sure you discuss any questions you have with your health care provider.      Hyperglycemia  Hyperglycemia occurs when the level of sugar (glucose) in the blood is too high. Glucose is a type of sugar that provides the body's main source of energy. Certain hormones (insulin and glucagon) control the level of glucose in the blood. Insulin lowers blood glucose, and glucagon increases blood glucose. Hyperglycemia can result from having too little insulin in the bloodstream, or from the body not responding normally to insulin.    Hyperglycemia occurs most often in people who have diabetes (diabetes mellitus), but it can happen in people who do not have diabetes. It can develop quickly, and it can be life-threatening if it causes you to become severely dehydrated (diabetic ketoacidosis or hyperglycemic hyperosmolar state). Severe hyperglycemia is a medical emergency.    What are the causes?  If you have diabetes, hyperglycemia may be caused by:    Diabetes medicine.  Medicines that increase blood glucose or affect your diabetes control.  Not eating enough, or not eating often enough.  Changes in physical activity level.  Being sick or having an infection.    If you have prediabetes or undiagnosed diabetes:    Hyperglycemia may be caused by those conditions.    If you do not have diabetes, hyperglycemia may be caused by:    Certain medicines, including steroid medicines, beta-blockers, epinephrine, and thiazide diuretics.  Stress.  Serious illness.  Surgery.  Diseases of the pancreas.  Infection.    What increases the risk?  Hyperglycemia is more likely to develop in people who have risk factors for diabetes, such as:    Having a family member with diabetes.  Having a gene for type 1 diabetes that is passed from parent to child (inherited).  Living in an area with cold weather conditions.  Exposure to certain viruses.  Certain conditions in which the body's disease-fighting (immune) system attacks itself (autoimmune disorders).  Being overweight or obese.  Having an inactive (sedentary) lifestyle.  Having been diagnosed with insulin resistance.  Having a history of prediabetes, gestational diabetes, or polycystic ovarian syndrome (PCOS).  Being of American-, -American, /, or / descent.    What are the signs or symptoms?  Hyperglycemia may not cause any symptoms. If you do have symptoms, they may include early warning signs, such as:    Increased thirst.  Hunger.  Feeling very tired.  Needing to urinate more often than usual.  Blurry vision.    Other symptoms may develop if hyperglycemia gets worse, such as:    Dry mouth.  Loss of appetite.  Fruity-smelling breath.  Weakness.  Unexpected or rapid weight gain or weight loss.  Tingling or numbness in the hands or feet.  Headache.  Skin that does not quickly return to normal after being lightly pinched and released (poor skin turgor).  Abdominal pain.  Cuts or bruises that are slow to heal.    How is this diagnosed?  Hyperglycemia is diagnosed with a blood test to measure your blood glucose level. This blood test is usually done while you are having symptoms. Your health care provider may also do a physical exam and review your medical history.    You may have more tests to determine the cause of your hyperglycemia, such as:    A fasting blood glucose (FBG) test. You will not be allowed to eat (you will fast) for at least 8 hours before a blood sample is taken.  An A1c (hemoglobin A1c) blood test. This provides information about blood glucose control over the previous 2–3 months.  An oral glucose tolerance test (OGTT). This measures your blood glucose at two times:    After fasting. This is your baseline blood glucose level.  Two hours after drinking a beverage that contains glucose.      How is this treated?  Treatment depends on the cause of your hyperglycemia. Treatment may include:    Taking medicine to regulate your blood glucose levels. If you take insulin or other diabetes medicines, your medicine or dosage may be adjusted.  Lifestyle changes, such as exercising more, eating healthier foods, or losing weight.  Treating an illness or infection, if this caused your hyperglycemia.  Checking your blood glucose more often.  Stopping or reducing steroid medicines, if these caused your hyperglycemia.    If your hyperglycemia becomes severe and it results in hyperglycemic hyperosmolar state, you must be hospitalized and given IV fluids.    Follow these instructions at home:  General instructions     Take over-the-counter and prescription medicines only as told by your health care provider.  Do not use any products that contain nicotine or tobacco, such as cigarettes and e-cigarettes. If you need help quitting, ask your health care provider.  Limit alcohol intake to no more than 1 drink per day for nonpregnant women and 2 drinks per day for men. One drink equals 12 oz of beer, 5 oz of wine, or 1½ oz of hard liquor.  Learn to manage stress. If you need help with this, ask your health care provider.  Keep all follow-up visits as told by your health care provider. This is important.  Eating and drinking     Maintain a healthy weight.  Exercise regularly, as directed by your health care provider.  Stay hydrated, especially when you exercise, get sick, or spend time in hot temperatures.  Eat healthy foods, such as:    Lean proteins.  Complex carbohydrates.  Fresh fruits and vegetables.  Low-fat dairy products.  Healthy fats.    ImageDrink enough fluid to keep your urine clear or pale yellow.  If you have diabetes:     Image   Make sure you know the symptoms of hyperglycemia.  Follow your diabetes management plan, as told by your health care provider. Make sure you:    Take your insulin and medicines as directed.  Follow your exercise plan.  Follow your meal plan. Eat on time, and do not skip meals.  Check your blood glucose as often as directed. Make sure to check your blood glucose before and after exercise. If you exercise longer or in a different way than usual, check your blood glucose more often.  Follow your sick day plan whenever you cannot eat or drink normally. Make this plan in advance with your health care provider.    Share your diabetes management plan with people in your workplace, school, and household.  Check your urine for ketones when you are ill and as told by your health care provider.  Carry a medical alert card or wear medical alert jewelry.  Contact a health care provider if:  Your blood glucose is at or above 240 mg/dL (13.3 mmol/L) for 2 days in a row.  You have problems keeping your blood glucose in your target range.  You have frequent episodes of hyperglycemia.  Get help right away if:  You have difficulty breathing.  You have a change in how you think, feel, or act (mental status).  You have nausea or vomiting that does not go away.  These symptoms may represent a serious problem that is an emergency. Do not wait to see if the symptoms will go away. Get medical help right away. Call your local emergency services (911 in the [place] Do not drive yourself to the hospital.     Summary  Hyperglycemia occurs when the level of sugar (glucose) in the blood is too high.  Hyperglycemia is diagnosed with a blood test to measure your blood glucose level. This blood test is usually done while you are having symptoms. Your health care provider may also do a physical exam and review your medical history.  If you have diabetes, follow your diabetes management plan as told by your health care provider.  Contact your health care provider if you have problems keeping your blood glucose in your target range.  This information is not intended to replace advice given to you by your health care provider. Make sure you discuss any questions you have with your health care provider.

## 2022-10-10 NOTE — ED PROVIDER NOTE - OBJECTIVE STATEMENT
47-year-old female presenting to the ED with right flank pain.  Patient has a history of stones patient states this pain feels similar.

## 2022-10-11 VITALS — TEMPERATURE: 98 F

## 2022-10-11 LAB
ALBUMIN SERPL ELPH-MCNC: 4.9 G/DL — SIGNIFICANT CHANGE UP (ref 3.5–5.2)
ALP SERPL-CCNC: 104 U/L — SIGNIFICANT CHANGE UP (ref 30–115)
ALT FLD-CCNC: 16 U/L — SIGNIFICANT CHANGE UP (ref 0–41)
ANION GAP SERPL CALC-SCNC: 16 MMOL/L — HIGH (ref 7–14)
AST SERPL-CCNC: 17 U/L — SIGNIFICANT CHANGE UP (ref 0–41)
BASE EXCESS BLDV CALC-SCNC: -3.4 MMOL/L — LOW (ref -2–3)
BILIRUB SERPL-MCNC: 0.3 MG/DL — SIGNIFICANT CHANGE UP (ref 0.2–1.2)
BUN SERPL-MCNC: 13 MG/DL — SIGNIFICANT CHANGE UP (ref 10–20)
CA-I SERPL-SCNC: 1.13 MMOL/L — LOW (ref 1.15–1.33)
CALCIUM SERPL-MCNC: 9.8 MG/DL — SIGNIFICANT CHANGE UP (ref 8.4–10.5)
CHLORIDE SERPL-SCNC: 102 MMOL/L — SIGNIFICANT CHANGE UP (ref 98–110)
CO2 SERPL-SCNC: 20 MMOL/L — SIGNIFICANT CHANGE UP (ref 17–32)
CREAT SERPL-MCNC: 1 MG/DL — SIGNIFICANT CHANGE UP (ref 0.7–1.5)
EGFR: 71 ML/MIN/1.73M2 — SIGNIFICANT CHANGE UP
GAS PNL BLDV: 138 MMOL/L — SIGNIFICANT CHANGE UP (ref 136–145)
GAS PNL BLDV: SIGNIFICANT CHANGE UP
GLUCOSE SERPL-MCNC: 123 MG/DL — HIGH (ref 70–99)
HCO3 BLDV-SCNC: 20 MMOL/L — LOW (ref 22–29)
HCT VFR BLDA CALC: 39 % — SIGNIFICANT CHANGE UP (ref 39–51)
HGB BLD CALC-MCNC: 13.1 G/DL — SIGNIFICANT CHANGE UP (ref 12.6–17.4)
LACTATE BLDV-MCNC: 1.6 MMOL/L — SIGNIFICANT CHANGE UP (ref 0.5–2)
PCO2 BLDV: 30 MMHG — LOW (ref 39–42)
PH BLDV: 7.43 — SIGNIFICANT CHANGE UP (ref 7.32–7.43)
PO2 BLDV: 93 MMHG — SIGNIFICANT CHANGE UP
POTASSIUM BLDV-SCNC: 3.8 MMOL/L — SIGNIFICANT CHANGE UP (ref 3.5–5.1)
POTASSIUM SERPL-MCNC: 3.9 MMOL/L — SIGNIFICANT CHANGE UP (ref 3.5–5)
POTASSIUM SERPL-SCNC: 3.9 MMOL/L — SIGNIFICANT CHANGE UP (ref 3.5–5)
PROT SERPL-MCNC: 7.9 G/DL — SIGNIFICANT CHANGE UP (ref 6–8)
SAO2 % BLDV: 98.7 % — SIGNIFICANT CHANGE UP
SODIUM SERPL-SCNC: 138 MMOL/L — SIGNIFICANT CHANGE UP (ref 135–146)

## 2022-10-11 RX ORDER — ONDANSETRON 8 MG/1
4 TABLET, FILM COATED ORAL ONCE
Refills: 0 | Status: DISCONTINUED | OUTPATIENT
Start: 2022-10-11 | End: 2022-10-11

## 2022-10-11 RX ADMIN — MORPHINE SULFATE 4 MILLIGRAM(S): 50 CAPSULE, EXTENDED RELEASE ORAL at 00:30

## 2022-10-11 RX ADMIN — SODIUM CHLORIDE 1000 MILLILITER(S): 9 INJECTION INTRAMUSCULAR; INTRAVENOUS; SUBCUTANEOUS at 00:29

## 2022-10-11 RX ADMIN — Medication 25 MILLIGRAM(S): at 00:28

## 2022-10-12 LAB
CULTURE RESULTS: SIGNIFICANT CHANGE UP
SPECIMEN SOURCE: SIGNIFICANT CHANGE UP

## 2023-01-23 ENCOUNTER — INPATIENT (INPATIENT)
Facility: HOSPITAL | Age: 48
LOS: 0 days | Discharge: HOME | End: 2023-01-24
Attending: INTERNAL MEDICINE | Admitting: INTERNAL MEDICINE
Payer: MEDICAID

## 2023-01-23 ENCOUNTER — EMERGENCY (EMERGENCY)
Facility: HOSPITAL | Age: 48
LOS: 0 days | Discharge: HOME | End: 2023-01-23
Attending: EMERGENCY MEDICINE | Admitting: EMERGENCY MEDICINE
Payer: MEDICAID

## 2023-01-23 VITALS
SYSTOLIC BLOOD PRESSURE: 180 MMHG | OXYGEN SATURATION: 97 % | TEMPERATURE: 98 F | RESPIRATION RATE: 20 BRPM | HEART RATE: 129 BPM | DIASTOLIC BLOOD PRESSURE: 114 MMHG | WEIGHT: 199.96 LBS

## 2023-01-23 VITALS — SYSTOLIC BLOOD PRESSURE: 147 MMHG | DIASTOLIC BLOOD PRESSURE: 67 MMHG | HEART RATE: 77 BPM

## 2023-01-23 DIAGNOSIS — R10.13 EPIGASTRIC PAIN: ICD-10-CM

## 2023-01-23 DIAGNOSIS — R19.7 DIARRHEA, UNSPECIFIED: ICD-10-CM

## 2023-01-23 DIAGNOSIS — Z98.890 OTHER SPECIFIED POSTPROCEDURAL STATES: Chronic | ICD-10-CM

## 2023-01-23 DIAGNOSIS — Z88.1 ALLERGY STATUS TO OTHER ANTIBIOTIC AGENTS STATUS: ICD-10-CM

## 2023-01-23 DIAGNOSIS — K21.9 GASTRO-ESOPHAGEAL REFLUX DISEASE WITHOUT ESOPHAGITIS: ICD-10-CM

## 2023-01-23 DIAGNOSIS — R11.2 NAUSEA WITH VOMITING, UNSPECIFIED: ICD-10-CM

## 2023-01-23 DIAGNOSIS — Z88.8 ALLERGY STATUS TO OTHER DRUGS, MEDICAMENTS AND BIOLOGICAL SUBSTANCES: ICD-10-CM

## 2023-01-23 DIAGNOSIS — Z91.012 ALLERGY TO EGGS: ICD-10-CM

## 2023-01-23 DIAGNOSIS — Z87.442 PERSONAL HISTORY OF URINARY CALCULI: ICD-10-CM

## 2023-01-23 DIAGNOSIS — F41.9 ANXIETY DISORDER, UNSPECIFIED: ICD-10-CM

## 2023-01-23 DIAGNOSIS — G43.909 MIGRAINE, UNSPECIFIED, NOT INTRACTABLE, WITHOUT STATUS MIGRAINOSUS: ICD-10-CM

## 2023-01-23 LAB
ALBUMIN SERPL ELPH-MCNC: 4.7 G/DL — SIGNIFICANT CHANGE UP (ref 3.5–5.2)
ALP SERPL-CCNC: 112 U/L — SIGNIFICANT CHANGE UP (ref 30–115)
ALT FLD-CCNC: 17 U/L — SIGNIFICANT CHANGE UP (ref 0–41)
ANION GAP SERPL CALC-SCNC: 18 MMOL/L — HIGH (ref 7–14)
APPEARANCE UR: CLEAR — SIGNIFICANT CHANGE UP
AST SERPL-CCNC: 16 U/L — SIGNIFICANT CHANGE UP (ref 0–41)
BACTERIA # UR AUTO: ABNORMAL
BASOPHILS # BLD AUTO: 0.01 K/UL — SIGNIFICANT CHANGE UP (ref 0–0.2)
BASOPHILS NFR BLD AUTO: 0.1 % — SIGNIFICANT CHANGE UP (ref 0–1)
BILIRUB DIRECT SERPL-MCNC: <0.2 MG/DL — SIGNIFICANT CHANGE UP (ref 0–0.3)
BILIRUB INDIRECT FLD-MCNC: >0 MG/DL — LOW (ref 0.2–1.2)
BILIRUB SERPL-MCNC: 0.2 MG/DL — SIGNIFICANT CHANGE UP (ref 0.2–1.2)
BILIRUB UR-MCNC: ABNORMAL
BUN SERPL-MCNC: 8 MG/DL — LOW (ref 10–20)
CALCIUM SERPL-MCNC: 10 MG/DL — SIGNIFICANT CHANGE UP (ref 8.4–10.5)
CHLORIDE SERPL-SCNC: 99 MMOL/L — SIGNIFICANT CHANGE UP (ref 98–110)
CO2 SERPL-SCNC: 22 MMOL/L — SIGNIFICANT CHANGE UP (ref 17–32)
COLOR SPEC: YELLOW — SIGNIFICANT CHANGE UP
CREAT SERPL-MCNC: 0.9 MG/DL — SIGNIFICANT CHANGE UP (ref 0.7–1.5)
DIFF PNL FLD: ABNORMAL
EGFR: 80 ML/MIN/1.73M2 — SIGNIFICANT CHANGE UP
EOSINOPHIL # BLD AUTO: 0 K/UL — SIGNIFICANT CHANGE UP (ref 0–0.7)
EOSINOPHIL NFR BLD AUTO: 0 % — SIGNIFICANT CHANGE UP (ref 0–8)
EPI CELLS # UR: ABNORMAL /HPF
GLUCOSE SERPL-MCNC: 157 MG/DL — HIGH (ref 70–99)
GLUCOSE UR QL: NEGATIVE MG/DL — SIGNIFICANT CHANGE UP
HCT VFR BLD CALC: 38.6 % — SIGNIFICANT CHANGE UP (ref 37–47)
HGB BLD-MCNC: 12.7 G/DL — SIGNIFICANT CHANGE UP (ref 12–16)
IMM GRANULOCYTES NFR BLD AUTO: 0.4 % — HIGH (ref 0.1–0.3)
KETONES UR-MCNC: 160
LEUKOCYTE ESTERASE UR-ACNC: NEGATIVE — SIGNIFICANT CHANGE UP
LIDOCAIN IGE QN: 25 U/L — SIGNIFICANT CHANGE UP (ref 7–60)
LYMPHOCYTES # BLD AUTO: 1.21 K/UL — SIGNIFICANT CHANGE UP (ref 1.2–3.4)
LYMPHOCYTES # BLD AUTO: 10.8 % — LOW (ref 20.5–51.1)
MCHC RBC-ENTMCNC: 27.1 PG — SIGNIFICANT CHANGE UP (ref 27–31)
MCHC RBC-ENTMCNC: 32.9 G/DL — SIGNIFICANT CHANGE UP (ref 32–37)
MCV RBC AUTO: 82.5 FL — SIGNIFICANT CHANGE UP (ref 81–99)
MONOCYTES # BLD AUTO: 0.49 K/UL — SIGNIFICANT CHANGE UP (ref 0.1–0.6)
MONOCYTES NFR BLD AUTO: 4.4 % — SIGNIFICANT CHANGE UP (ref 1.7–9.3)
NEUTROPHILS # BLD AUTO: 9.44 K/UL — HIGH (ref 1.4–6.5)
NEUTROPHILS NFR BLD AUTO: 84.3 % — HIGH (ref 42.2–75.2)
NITRITE UR-MCNC: NEGATIVE — SIGNIFICANT CHANGE UP
NRBC # BLD: 0 /100 WBCS — SIGNIFICANT CHANGE UP (ref 0–0)
PH UR: 7 — SIGNIFICANT CHANGE UP (ref 5–8)
PLATELET # BLD AUTO: 428 K/UL — HIGH (ref 130–400)
POTASSIUM SERPL-MCNC: 3.8 MMOL/L — SIGNIFICANT CHANGE UP (ref 3.5–5)
POTASSIUM SERPL-SCNC: 3.8 MMOL/L — SIGNIFICANT CHANGE UP (ref 3.5–5)
PROT SERPL-MCNC: 7.7 G/DL — SIGNIFICANT CHANGE UP (ref 6–8)
PROT UR-MCNC: 100 MG/DL
RBC # BLD: 4.68 M/UL — SIGNIFICANT CHANGE UP (ref 4.2–5.4)
RBC # FLD: 13.3 % — SIGNIFICANT CHANGE UP (ref 11.5–14.5)
RBC CASTS # UR COMP ASSIST: ABNORMAL /HPF
SODIUM SERPL-SCNC: 139 MMOL/L — SIGNIFICANT CHANGE UP (ref 135–146)
SP GR SPEC: 1.02 — SIGNIFICANT CHANGE UP (ref 1.01–1.03)
UROBILINOGEN FLD QL: 0.2 MG/DL — SIGNIFICANT CHANGE UP
WBC # BLD: 11.2 K/UL — HIGH (ref 4.8–10.8)
WBC # FLD AUTO: 11.2 K/UL — HIGH (ref 4.8–10.8)
WBC UR QL: SIGNIFICANT CHANGE UP /HPF

## 2023-01-23 PROCEDURE — 99285 EMERGENCY DEPT VISIT HI MDM: CPT

## 2023-01-23 RX ORDER — ONDANSETRON 8 MG/1
4 TABLET, FILM COATED ORAL ONCE
Refills: 0 | Status: COMPLETED | OUTPATIENT
Start: 2023-01-23 | End: 2023-01-23

## 2023-01-23 RX ORDER — PANTOPRAZOLE SODIUM 20 MG/1
20 TABLET, DELAYED RELEASE ORAL ONCE
Refills: 0 | Status: DISCONTINUED | OUTPATIENT
Start: 2023-01-23 | End: 2023-01-23

## 2023-01-23 RX ORDER — SODIUM CHLORIDE 9 MG/ML
1000 INJECTION, SOLUTION INTRAVENOUS ONCE
Refills: 0 | Status: COMPLETED | OUTPATIENT
Start: 2023-01-23 | End: 2023-01-23

## 2023-01-23 RX ORDER — FAMOTIDINE 10 MG/ML
20 INJECTION INTRAVENOUS ONCE
Refills: 0 | Status: COMPLETED | OUTPATIENT
Start: 2023-01-23 | End: 2023-01-23

## 2023-01-23 RX ORDER — MORPHINE SULFATE 50 MG/1
6 CAPSULE, EXTENDED RELEASE ORAL ONCE
Refills: 0 | Status: DISCONTINUED | OUTPATIENT
Start: 2023-01-23 | End: 2023-01-23

## 2023-01-23 RX ADMIN — ONDANSETRON 4 MILLIGRAM(S): 8 TABLET, FILM COATED ORAL at 12:17

## 2023-01-23 RX ADMIN — SODIUM CHLORIDE 1000 MILLILITER(S): 9 INJECTION, SOLUTION INTRAVENOUS at 10:04

## 2023-01-23 RX ADMIN — FAMOTIDINE 20 MILLIGRAM(S): 10 INJECTION INTRAVENOUS at 10:04

## 2023-01-23 RX ADMIN — ONDANSETRON 4 MILLIGRAM(S): 8 TABLET, FILM COATED ORAL at 10:05

## 2023-01-23 RX ADMIN — Medication 20 MILLIGRAM(S): at 12:01

## 2023-01-23 RX ADMIN — MORPHINE SULFATE 6 MILLIGRAM(S): 50 CAPSULE, EXTENDED RELEASE ORAL at 12:04

## 2023-01-23 NOTE — ED PROVIDER NOTE - DIFFERENTIAL DIAGNOSIS
Gastroenteritis due to norovirus versus colitis versus electrolyte abnormalities versus prerenal as it prerenal acidemia. Differential Diagnosis

## 2023-01-23 NOTE — ED PROVIDER NOTE - PROGRESS NOTE DETAILS
98.2 KA: Pt's nausea has resolved. She has not been able to give urine sample yet. Pt informed we will wait for urine sample. KA: Pt's symptoms all improved. Agreeable with dc. I have personally evaluated the patient myself and agree with fellow's plan. I have personally evaluated the patient myself and agree with fellow's plan. Pt feeling much better - eager to be discharged

## 2023-01-23 NOTE — ED PROVIDER NOTE - ATTENDING APP SHARED VISIT CONTRIBUTION OF CARE
Patient complains of nausea, vomiting, diarrhea.  Vital signs noted.  Patient appears uncomfortable.  Abdomen shows epigastric tenderness likely due to vomiting.  IV morphine, IV Zofran, IV fluid ordered.  This resulted in reduction of the patient's symptoms.  Her initial tachycardia also resolved.  Diagnostic testing reviewed.  There is a mild increase in lactate likely due to dehydration.  I do not suspect a serious bacterial infection is present.  In view of the result of improved symptoms after ED treatment, in my opinion, outpatient follow-up and treatment is medically appropriate. Copies of all diagnostic testing provided to patient to aid in follow-up.

## 2023-01-23 NOTE — ED PROVIDER NOTE - NSFOLLOWUPINSTRUCTIONS_ED_ALL_ED_FT
Please follow up with your Primary Care Provider as soon as possible for follow up of your symptoms.    Acute Nausea and Vomiting    WHAT YOU NEED TO KNOW:  Acute nausea and vomiting start suddenly, worsen quickly, and last a short time.    DISCHARGE INSTRUCTIONS:    Return to the emergency department if:   You see blood in your vomit or your bowel movements.  You have sudden, severe pain in your chest and upper abdomen after hard vomiting or retching.  You have swelling in your neck and chest.   You are dizzy, cold, and thirsty and your eyes and mouth are dry.  You are urinating very little or not at all.  You have muscle weakness, leg cramps, and trouble breathing.   Your heart is beating much faster than normal.   You continue to vomit for more than 48 hours.     Contact your healthcare provider if:   You have frequent dry heaves (vomiting but nothing comes out).  Your nausea and vomiting does not get better or go away after you use medicine.  You have questions or concerns about your condition or treatment.    You may need any of the following:   Medicines may be given to calm your stomach and stop your vomiting. You may also need medicines to help you feel more relaxed or to stop nausea and vomiting caused by motion sickness.  Gastrointestinal stimulants are used to help empty your stomach and bowels. This may help decrease nausea and vomiting.  Take your medicine as directed. Contact your healthcare provider if you think your medicine is not helping or if you have side effects. Tell him or her if you are allergic to any medicine. Keep a list of the medicines, vitamins, and herbs you take. Include the amounts, and when and why you take them. Bring the list or the pill bottles to follow-up visits. Carry your medicine list with you in case of an emergency.    Prevent or manage acute nausea and vomiting:   Do not drink alcohol. Alcohol may upset or irritate your stomach. Too much alcohol can also cause acute nausea and vomiting.  Control stress. Headaches due to stress may cause nausea and vomiting. Find ways to relax and manage your stress. Get more rest and sleep  Drink more liquids as directed. Vomiting can lead to dehydration. It is important to drink more liquids to help replace lost body fluids. Ask your healthcare provider how much liquid to drink each day and which liquids are best for you. Your provider may recommend that you drink an oral rehydration solution (ORS). ORS contains water, salts, and sugar that are needed to replace the lost body fluids. Ask what kind of ORS to use, how much to drink, and where to get it.  Eat smaller meals, more often. Eat small amounts of food every 2 to 3 hours, even if you are not hungry. Food in your stomach may decrease your nausea.  Talk to your healthcare provider before you take over-the-counter (OTC) medicines. These medicines can cause serious problems if you use certain other medicines, or you have a medical condition. You may have problems if you use too much or use them for longer than the label says. Follow directions on the label carefully.     Follow up with your healthcare provider as directed: Write down your questions so you remember to ask them during your follow-up visits.

## 2023-01-23 NOTE — ED PROVIDER NOTE - PHYSICAL EXAMINATION
As Follows:  CONST: Well appearing in NAD  EYES: EOMI, Sclera and conjunctiva clear.  CARD: Normal S1 S2; Normal rate and rhythm  RESP: Equal BS B/L, No wheezes, rhonchi or rales. No distress  GI: Epigastric tenderness. Soft, non-distended.   SKIN: Warm, dry, no acute rashes. Good turgor

## 2023-01-23 NOTE — ED PROVIDER NOTE - PATIENT PORTAL LINK FT
You can access the FollowMyHealth Patient Portal offered by [facility] by registering at the following website: [email or web address]. By joining [email or web address]’s FollowMyHealth portal, you will also be able to view your health information using other applications (apps) compatible with our system.

## 2023-01-23 NOTE — ED PROVIDER NOTE - OBJECTIVE STATEMENT
Pt is a 46 y/o female with PMHx GERD, chronic migraines, and chronic back pain which she takes percocet for presenting for epigastric abdominal pain radiating to the right flank with worsening nausea, vomiting, and diarrhea x 3 days ago. Pt states she has not been able to take her PO medications due to vomiting. She admits to multiple episodes of D/V but did not count how many. She states eating food will sometimes aggravate her nausea but has had a loss of her normal appetite since symptoms began. She is able to tolerate PO fluids. She denies any other symptoms of fever, chills, cough, sore throat, Cp, SoB, or urinary complaints.

## 2023-01-24 ENCOUNTER — TRANSCRIPTION ENCOUNTER (OUTPATIENT)
Age: 48
End: 2023-01-24

## 2023-01-24 ENCOUNTER — INPATIENT (INPATIENT)
Facility: HOSPITAL | Age: 48
LOS: 1 days | Discharge: HOME | End: 2023-01-26
Attending: INTERNAL MEDICINE | Admitting: INTERNAL MEDICINE
Payer: MEDICAID

## 2023-01-24 VITALS
DIASTOLIC BLOOD PRESSURE: 99 MMHG | OXYGEN SATURATION: 100 % | TEMPERATURE: 97 F | SYSTOLIC BLOOD PRESSURE: 157 MMHG | WEIGHT: 149.91 LBS | RESPIRATION RATE: 18 BRPM | HEART RATE: 94 BPM

## 2023-01-24 VITALS
HEART RATE: 66 BPM | OXYGEN SATURATION: 98 % | WEIGHT: 197.98 LBS | HEIGHT: 65 IN | TEMPERATURE: 98 F | SYSTOLIC BLOOD PRESSURE: 185 MMHG | DIASTOLIC BLOOD PRESSURE: 107 MMHG | RESPIRATION RATE: 17 BRPM

## 2023-01-24 VITALS — HEIGHT: 65 IN

## 2023-01-24 DIAGNOSIS — Z98.890 OTHER SPECIFIED POSTPROCEDURAL STATES: Chronic | ICD-10-CM

## 2023-01-24 LAB
A1C WITH ESTIMATED AVERAGE GLUCOSE RESULT: 5.6 % — SIGNIFICANT CHANGE UP (ref 4–5.6)
ALBUMIN SERPL ELPH-MCNC: 4.7 G/DL — SIGNIFICANT CHANGE UP (ref 3.5–5.2)
ALBUMIN SERPL ELPH-MCNC: 4.7 G/DL — SIGNIFICANT CHANGE UP (ref 3.5–5.2)
ALP SERPL-CCNC: 101 U/L — SIGNIFICANT CHANGE UP (ref 30–115)
ALP SERPL-CCNC: 90 U/L — SIGNIFICANT CHANGE UP (ref 30–115)
ALT FLD-CCNC: 15 U/L — SIGNIFICANT CHANGE UP (ref 0–41)
ALT FLD-CCNC: 25 U/L — SIGNIFICANT CHANGE UP (ref 0–41)
ANION GAP SERPL CALC-SCNC: 14 MMOL/L — SIGNIFICANT CHANGE UP (ref 7–14)
ANION GAP SERPL CALC-SCNC: 15 MMOL/L — HIGH (ref 7–14)
ANION GAP SERPL CALC-SCNC: 18 MMOL/L — HIGH (ref 7–14)
AST SERPL-CCNC: 17 U/L — SIGNIFICANT CHANGE UP (ref 0–41)
AST SERPL-CCNC: 40 U/L — SIGNIFICANT CHANGE UP (ref 0–41)
BASOPHILS # BLD AUTO: 0.01 K/UL — SIGNIFICANT CHANGE UP (ref 0–0.2)
BASOPHILS # BLD AUTO: 0.01 K/UL — SIGNIFICANT CHANGE UP (ref 0–0.2)
BASOPHILS NFR BLD AUTO: 0.1 % — SIGNIFICANT CHANGE UP (ref 0–1)
BASOPHILS NFR BLD AUTO: 0.1 % — SIGNIFICANT CHANGE UP (ref 0–1)
BILIRUB DIRECT SERPL-MCNC: <0.2 MG/DL — SIGNIFICANT CHANGE UP (ref 0–0.3)
BILIRUB INDIRECT FLD-MCNC: >0.1 MG/DL — LOW (ref 0.2–1.2)
BILIRUB SERPL-MCNC: 0.3 MG/DL — SIGNIFICANT CHANGE UP (ref 0.2–1.2)
BILIRUB SERPL-MCNC: 0.4 MG/DL — SIGNIFICANT CHANGE UP (ref 0.2–1.2)
BUN SERPL-MCNC: 4 MG/DL — LOW (ref 10–20)
BUN SERPL-MCNC: 5 MG/DL — LOW (ref 10–20)
BUN SERPL-MCNC: 8 MG/DL — LOW (ref 10–20)
CALCIUM SERPL-MCNC: 8.8 MG/DL — SIGNIFICANT CHANGE UP (ref 8.4–10.5)
CALCIUM SERPL-MCNC: 9.6 MG/DL — SIGNIFICANT CHANGE UP (ref 8.4–10.5)
CALCIUM SERPL-MCNC: 9.9 MG/DL — SIGNIFICANT CHANGE UP (ref 8.4–10.5)
CHLORIDE SERPL-SCNC: 100 MMOL/L — SIGNIFICANT CHANGE UP (ref 98–110)
CHLORIDE SERPL-SCNC: 102 MMOL/L — SIGNIFICANT CHANGE UP (ref 98–110)
CHLORIDE SERPL-SCNC: 96 MMOL/L — LOW (ref 98–110)
CO2 SERPL-SCNC: 22 MMOL/L — SIGNIFICANT CHANGE UP (ref 17–32)
CO2 SERPL-SCNC: 23 MMOL/L — SIGNIFICANT CHANGE UP (ref 17–32)
CO2 SERPL-SCNC: 25 MMOL/L — SIGNIFICANT CHANGE UP (ref 17–32)
CREAT SERPL-MCNC: 0.6 MG/DL — LOW (ref 0.7–1.5)
CREAT SERPL-MCNC: 0.7 MG/DL — SIGNIFICANT CHANGE UP (ref 0.7–1.5)
CREAT SERPL-MCNC: 0.8 MG/DL — SIGNIFICANT CHANGE UP (ref 0.7–1.5)
EGFR: 109 ML/MIN/1.73M2 — SIGNIFICANT CHANGE UP
EGFR: 113 ML/MIN/1.73M2 — SIGNIFICANT CHANGE UP
EGFR: 93 ML/MIN/1.73M2 — SIGNIFICANT CHANGE UP
EOSINOPHIL # BLD AUTO: 0 K/UL — SIGNIFICANT CHANGE UP (ref 0–0.7)
EOSINOPHIL # BLD AUTO: 0 K/UL — SIGNIFICANT CHANGE UP (ref 0–0.7)
EOSINOPHIL NFR BLD AUTO: 0 % — SIGNIFICANT CHANGE UP (ref 0–8)
EOSINOPHIL NFR BLD AUTO: 0 % — SIGNIFICANT CHANGE UP (ref 0–8)
ESTIMATED AVERAGE GLUCOSE: 114 MG/DL — SIGNIFICANT CHANGE UP (ref 68–114)
FLUAV AG NPH QL: SIGNIFICANT CHANGE UP
FLUBV AG NPH QL: SIGNIFICANT CHANGE UP
GLUCOSE SERPL-MCNC: 112 MG/DL — HIGH (ref 70–99)
GLUCOSE SERPL-MCNC: 120 MG/DL — HIGH (ref 70–99)
GLUCOSE SERPL-MCNC: 140 MG/DL — HIGH (ref 70–99)
HCG SERPL QL: NEGATIVE — SIGNIFICANT CHANGE UP
HCG SERPL QL: NEGATIVE — SIGNIFICANT CHANGE UP
HCT VFR BLD CALC: 32.4 % — LOW (ref 37–47)
HCT VFR BLD CALC: 33.8 % — LOW (ref 37–47)
HCT VFR BLD CALC: 35.8 % — LOW (ref 37–47)
HGB BLD-MCNC: 10.6 G/DL — LOW (ref 12–16)
HGB BLD-MCNC: 10.9 G/DL — LOW (ref 12–16)
HGB BLD-MCNC: 11.7 G/DL — LOW (ref 12–16)
IMM GRANULOCYTES NFR BLD AUTO: 0.2 % — SIGNIFICANT CHANGE UP (ref 0.1–0.3)
IMM GRANULOCYTES NFR BLD AUTO: 0.4 % — HIGH (ref 0.1–0.3)
LACTATE SERPL-SCNC: 2.6 MMOL/L — HIGH (ref 0.7–2)
LIDOCAIN IGE QN: 30 U/L — SIGNIFICANT CHANGE UP (ref 7–60)
LIDOCAIN IGE QN: 41 U/L — SIGNIFICANT CHANGE UP (ref 7–60)
LYMPHOCYTES # BLD AUTO: 1.6 K/UL — SIGNIFICANT CHANGE UP (ref 1.2–3.4)
LYMPHOCYTES # BLD AUTO: 1.75 K/UL — SIGNIFICANT CHANGE UP (ref 1.2–3.4)
LYMPHOCYTES # BLD AUTO: 13.3 % — LOW (ref 20.5–51.1)
LYMPHOCYTES # BLD AUTO: 17.4 % — LOW (ref 20.5–51.1)
MAGNESIUM SERPL-MCNC: 1.7 MG/DL — LOW (ref 1.8–2.4)
MCHC RBC-ENTMCNC: 26.6 PG — LOW (ref 27–31)
MCHC RBC-ENTMCNC: 27 PG — SIGNIFICANT CHANGE UP (ref 27–31)
MCHC RBC-ENTMCNC: 27.5 PG — SIGNIFICANT CHANGE UP (ref 27–31)
MCHC RBC-ENTMCNC: 32.2 G/DL — SIGNIFICANT CHANGE UP (ref 32–37)
MCHC RBC-ENTMCNC: 32.7 G/DL — SIGNIFICANT CHANGE UP (ref 32–37)
MCHC RBC-ENTMCNC: 32.7 G/DL — SIGNIFICANT CHANGE UP (ref 32–37)
MCV RBC AUTO: 82.4 FL — SIGNIFICANT CHANGE UP (ref 81–99)
MCV RBC AUTO: 82.5 FL — SIGNIFICANT CHANGE UP (ref 81–99)
MCV RBC AUTO: 84.2 FL — SIGNIFICANT CHANGE UP (ref 81–99)
MONOCYTES # BLD AUTO: 0.64 K/UL — HIGH (ref 0.1–0.6)
MONOCYTES # BLD AUTO: 0.86 K/UL — HIGH (ref 0.1–0.6)
MONOCYTES NFR BLD AUTO: 6.6 % — SIGNIFICANT CHANGE UP (ref 1.7–9.3)
MONOCYTES NFR BLD AUTO: 7 % — SIGNIFICANT CHANGE UP (ref 1.7–9.3)
NEUTROPHILS # BLD AUTO: 10.44 K/UL — HIGH (ref 1.4–6.5)
NEUTROPHILS # BLD AUTO: 6.93 K/UL — HIGH (ref 1.4–6.5)
NEUTROPHILS NFR BLD AUTO: 75.3 % — HIGH (ref 42.2–75.2)
NEUTROPHILS NFR BLD AUTO: 79.6 % — HIGH (ref 42.2–75.2)
NRBC # BLD: 0 /100 WBCS — SIGNIFICANT CHANGE UP (ref 0–0)
PLATELET # BLD AUTO: 337 K/UL — SIGNIFICANT CHANGE UP (ref 130–400)
PLATELET # BLD AUTO: 372 K/UL — SIGNIFICANT CHANGE UP (ref 130–400)
PLATELET # BLD AUTO: 424 K/UL — HIGH (ref 130–400)
POTASSIUM SERPL-MCNC: 3.5 MMOL/L — SIGNIFICANT CHANGE UP (ref 3.5–5)
POTASSIUM SERPL-MCNC: 3.6 MMOL/L — SIGNIFICANT CHANGE UP (ref 3.5–5)
POTASSIUM SERPL-MCNC: 3.6 MMOL/L — SIGNIFICANT CHANGE UP (ref 3.5–5)
POTASSIUM SERPL-SCNC: 3.5 MMOL/L — SIGNIFICANT CHANGE UP (ref 3.5–5)
POTASSIUM SERPL-SCNC: 3.6 MMOL/L — SIGNIFICANT CHANGE UP (ref 3.5–5)
POTASSIUM SERPL-SCNC: 3.6 MMOL/L — SIGNIFICANT CHANGE UP (ref 3.5–5)
PROT SERPL-MCNC: 7.2 G/DL — SIGNIFICANT CHANGE UP (ref 6–8)
PROT SERPL-MCNC: 7.5 G/DL — SIGNIFICANT CHANGE UP (ref 6–8)
RBC # BLD: 3.85 M/UL — LOW (ref 4.2–5.4)
RBC # BLD: 4.1 M/UL — LOW (ref 4.2–5.4)
RBC # BLD: 4.34 M/UL — SIGNIFICANT CHANGE UP (ref 4.2–5.4)
RBC # FLD: 13.6 % — SIGNIFICANT CHANGE UP (ref 11.5–14.5)
RBC # FLD: 13.6 % — SIGNIFICANT CHANGE UP (ref 11.5–14.5)
RBC # FLD: 13.7 % — SIGNIFICANT CHANGE UP (ref 11.5–14.5)
RSV RNA NPH QL NAA+NON-PROBE: SIGNIFICANT CHANGE UP
SARS-COV-2 RNA SPEC QL NAA+PROBE: SIGNIFICANT CHANGE UP
SODIUM SERPL-SCNC: 135 MMOL/L — SIGNIFICANT CHANGE UP (ref 135–146)
SODIUM SERPL-SCNC: 140 MMOL/L — SIGNIFICANT CHANGE UP (ref 135–146)
SODIUM SERPL-SCNC: 140 MMOL/L — SIGNIFICANT CHANGE UP (ref 135–146)
TSH SERPL-MCNC: 3 UIU/ML — SIGNIFICANT CHANGE UP (ref 0.27–4.2)
WBC # BLD: 10.81 K/UL — HIGH (ref 4.8–10.8)
WBC # BLD: 13.11 K/UL — HIGH (ref 4.8–10.8)
WBC # BLD: 9.2 K/UL — SIGNIFICANT CHANGE UP (ref 4.8–10.8)
WBC # FLD AUTO: 10.81 K/UL — HIGH (ref 4.8–10.8)
WBC # FLD AUTO: 13.11 K/UL — HIGH (ref 4.8–10.8)
WBC # FLD AUTO: 9.2 K/UL — SIGNIFICANT CHANGE UP (ref 4.8–10.8)

## 2023-01-24 PROCEDURE — 99239 HOSP IP/OBS DSCHRG MGMT >30: CPT

## 2023-01-24 PROCEDURE — 99285 EMERGENCY DEPT VISIT HI MDM: CPT

## 2023-01-24 PROCEDURE — 93010 ELECTROCARDIOGRAM REPORT: CPT

## 2023-01-24 PROCEDURE — 74177 CT ABD & PELVIS W/CONTRAST: CPT | Mod: 26,MA

## 2023-01-24 PROCEDURE — 71045 X-RAY EXAM CHEST 1 VIEW: CPT | Mod: 26

## 2023-01-24 RX ORDER — METOCLOPRAMIDE HCL 10 MG
10 TABLET ORAL ONCE
Refills: 0 | Status: COMPLETED | OUTPATIENT
Start: 2023-01-24 | End: 2023-01-24

## 2023-01-24 RX ORDER — LEVOTHYROXINE SODIUM 125 MCG
75 TABLET ORAL DAILY
Refills: 0 | Status: DISCONTINUED | OUTPATIENT
Start: 2023-01-24 | End: 2023-01-24

## 2023-01-24 RX ORDER — ATORVASTATIN CALCIUM 80 MG/1
20 TABLET, FILM COATED ORAL DAILY
Refills: 0 | Status: DISCONTINUED | OUTPATIENT
Start: 2023-01-24 | End: 2023-01-24

## 2023-01-24 RX ORDER — MORPHINE SULFATE 50 MG/1
4 CAPSULE, EXTENDED RELEASE ORAL ONCE
Refills: 0 | Status: DISCONTINUED | OUTPATIENT
Start: 2023-01-24 | End: 2023-01-24

## 2023-01-24 RX ORDER — SODIUM CHLORIDE 9 MG/ML
2000 INJECTION INTRAMUSCULAR; INTRAVENOUS; SUBCUTANEOUS ONCE
Refills: 0 | Status: COMPLETED | OUTPATIENT
Start: 2023-01-24 | End: 2023-01-24

## 2023-01-24 RX ORDER — FEXOFENADINE HCL 30 MG
2 TABLET ORAL
Qty: 0 | Refills: 0 | DISCHARGE

## 2023-01-24 RX ORDER — SIMVASTATIN 20 MG/1
40 TABLET, FILM COATED ORAL AT BEDTIME
Refills: 0 | Status: DISCONTINUED | OUTPATIENT
Start: 2023-01-24 | End: 2023-01-24

## 2023-01-24 RX ORDER — LANOLIN ALCOHOL/MO/W.PET/CERES
3 CREAM (GRAM) TOPICAL AT BEDTIME
Refills: 0 | Status: DISCONTINUED | OUTPATIENT
Start: 2023-01-24 | End: 2023-01-24

## 2023-01-24 RX ORDER — ACETAMINOPHEN 500 MG
650 TABLET ORAL EVERY 6 HOURS
Refills: 0 | Status: DISCONTINUED | OUTPATIENT
Start: 2023-01-24 | End: 2023-01-24

## 2023-01-24 RX ORDER — SODIUM CHLORIDE 9 MG/ML
1000 INJECTION, SOLUTION INTRAVENOUS
Refills: 0 | Status: DISCONTINUED | OUTPATIENT
Start: 2023-01-24 | End: 2023-01-24

## 2023-01-24 RX ORDER — ONDANSETRON 8 MG/1
4 TABLET, FILM COATED ORAL ONCE
Refills: 0 | Status: COMPLETED | OUTPATIENT
Start: 2023-01-24 | End: 2023-01-24

## 2023-01-24 RX ORDER — ACETAMINOPHEN 500 MG
1000 TABLET ORAL ONCE
Refills: 0 | Status: COMPLETED | OUTPATIENT
Start: 2023-01-24 | End: 2023-01-24

## 2023-01-24 RX ORDER — ALPRAZOLAM 0.25 MG
0.5 TABLET ORAL
Refills: 0 | Status: DISCONTINUED | OUTPATIENT
Start: 2023-01-24 | End: 2023-01-24

## 2023-01-24 RX ORDER — METOCLOPRAMIDE HCL 10 MG
10 TABLET ORAL EVERY 8 HOURS
Refills: 0 | Status: DISCONTINUED | OUTPATIENT
Start: 2023-01-24 | End: 2023-01-24

## 2023-01-24 RX ORDER — ONDANSETRON 8 MG/1
4 TABLET, FILM COATED ORAL EVERY 8 HOURS
Refills: 0 | Status: DISCONTINUED | OUTPATIENT
Start: 2023-01-24 | End: 2023-01-24

## 2023-01-24 RX ORDER — SODIUM CHLORIDE 9 MG/ML
1000 INJECTION INTRAMUSCULAR; INTRAVENOUS; SUBCUTANEOUS ONCE
Refills: 0 | Status: COMPLETED | OUTPATIENT
Start: 2023-01-24 | End: 2023-01-24

## 2023-01-24 RX ORDER — MAGNESIUM SULFATE 500 MG/ML
2 VIAL (ML) INJECTION ONCE
Refills: 0 | Status: COMPLETED | OUTPATIENT
Start: 2023-01-24 | End: 2023-01-24

## 2023-01-24 RX ORDER — PANTOPRAZOLE SODIUM 20 MG/1
40 TABLET, DELAYED RELEASE ORAL
Refills: 0 | Status: DISCONTINUED | OUTPATIENT
Start: 2023-01-24 | End: 2023-01-24

## 2023-01-24 RX ORDER — MORPHINE SULFATE 50 MG/1
2 CAPSULE, EXTENDED RELEASE ORAL EVERY 4 HOURS
Refills: 0 | Status: DISCONTINUED | OUTPATIENT
Start: 2023-01-24 | End: 2023-01-24

## 2023-01-24 RX ORDER — OXYBUTYNIN CHLORIDE 5 MG
5 TABLET ORAL
Refills: 0 | Status: DISCONTINUED | OUTPATIENT
Start: 2023-01-24 | End: 2023-01-24

## 2023-01-24 RX ORDER — DIPHENHYDRAMINE HCL 50 MG
25 CAPSULE ORAL ONCE
Refills: 0 | Status: DISCONTINUED | OUTPATIENT
Start: 2023-01-24 | End: 2023-01-24

## 2023-01-24 RX ORDER — CITALOPRAM 10 MG/1
40 TABLET, FILM COATED ORAL DAILY
Refills: 0 | Status: DISCONTINUED | OUTPATIENT
Start: 2023-01-24 | End: 2023-01-24

## 2023-01-24 RX ORDER — FAMOTIDINE 10 MG/ML
20 INJECTION INTRAVENOUS ONCE
Refills: 0 | Status: COMPLETED | OUTPATIENT
Start: 2023-01-24 | End: 2023-01-24

## 2023-01-24 RX ORDER — OXYBUTYNIN CHLORIDE 5 MG
1 TABLET ORAL
Qty: 0 | Refills: 0 | DISCHARGE

## 2023-01-24 RX ORDER — CETIRIZINE HYDROCHLORIDE 10 MG/1
2 TABLET ORAL
Qty: 0 | Refills: 0 | DISCHARGE

## 2023-01-24 RX ORDER — METOCLOPRAMIDE HCL 10 MG
10 TABLET ORAL ONCE
Refills: 0 | Status: DISCONTINUED | OUTPATIENT
Start: 2023-01-24 | End: 2023-01-24

## 2023-01-24 RX ORDER — SIMVASTATIN 20 MG/1
1 TABLET, FILM COATED ORAL
Qty: 0 | Refills: 0 | DISCHARGE

## 2023-01-24 RX ADMIN — PANTOPRAZOLE SODIUM 40 MILLIGRAM(S): 20 TABLET, DELAYED RELEASE ORAL at 06:40

## 2023-01-24 RX ADMIN — Medication 0.5 MILLIGRAM(S): at 06:41

## 2023-01-24 RX ADMIN — MORPHINE SULFATE 2 MILLIGRAM(S): 50 CAPSULE, EXTENDED RELEASE ORAL at 06:40

## 2023-01-24 RX ADMIN — Medication 10 MILLIGRAM(S): at 22:31

## 2023-01-24 RX ADMIN — Medication 5 MILLIGRAM(S): at 11:53

## 2023-01-24 RX ADMIN — ONDANSETRON 4 MILLIGRAM(S): 8 TABLET, FILM COATED ORAL at 00:41

## 2023-01-24 RX ADMIN — Medication 75 MICROGRAM(S): at 06:41

## 2023-01-24 RX ADMIN — Medication 104 MILLIGRAM(S): at 01:50

## 2023-01-24 RX ADMIN — SODIUM CHLORIDE 1000 MILLILITER(S): 9 INJECTION INTRAMUSCULAR; INTRAVENOUS; SUBCUTANEOUS at 22:30

## 2023-01-24 RX ADMIN — MORPHINE SULFATE 4 MILLIGRAM(S): 50 CAPSULE, EXTENDED RELEASE ORAL at 00:41

## 2023-01-24 RX ADMIN — ONDANSETRON 4 MILLIGRAM(S): 8 TABLET, FILM COATED ORAL at 04:08

## 2023-01-24 RX ADMIN — MORPHINE SULFATE 2 MILLIGRAM(S): 50 CAPSULE, EXTENDED RELEASE ORAL at 06:52

## 2023-01-24 RX ADMIN — SODIUM CHLORIDE 2000 MILLILITER(S): 9 INJECTION INTRAMUSCULAR; INTRAVENOUS; SUBCUTANEOUS at 00:41

## 2023-01-24 RX ADMIN — FAMOTIDINE 100 MILLIGRAM(S): 10 INJECTION INTRAVENOUS at 00:41

## 2023-01-24 RX ADMIN — CITALOPRAM 40 MILLIGRAM(S): 10 TABLET, FILM COATED ORAL at 12:05

## 2023-01-24 RX ADMIN — MORPHINE SULFATE 4 MILLIGRAM(S): 50 CAPSULE, EXTENDED RELEASE ORAL at 04:08

## 2023-01-24 RX ADMIN — Medication 400 MILLIGRAM(S): at 22:31

## 2023-01-24 NOTE — DISCHARGE NOTE PROVIDER - NSDCMRMEDTOKEN_GEN_ALL_CORE_FT
Allegra 180 mg oral tablet: 1 tab(s) orally once a day  citalopram 40 mg oral tablet: 1 tab(s) orally once a day (in the morning)  Crestor 5 mg oral tablet: 1 tab(s) orally once a day  Fioricet oral capsule: 1 cap(s) orally every 4 hours, As Needed for migraine  levothyroxine 75 mcg (0.075 mg) oral capsule: 1 cap(s) orally once a day  morphine 15 mg oral tablet: 1 tab(s) orally once a day, As Needed for pain  oxybutynin 5 mg oral tablet: 2 tab(s) orally once a day  pantoprazole 40 mg oral delayed release tablet: 1 tab(s) orally once a day  Percocet 10/325 oral tablet: 1 tab(s) orally every 6 hours, As Needed for pain  Xanax 0.5 mg oral tablet: 1 tab(s) orally 4 times a day, As Needed

## 2023-01-24 NOTE — PATIENT PROFILE ADULT - FALL HARM RISK - FALL HARM RISK
cc: Establish care, BMI 26, history of hepatic with low, history of subclinical hypothyroidism, planning pregnancy.    Subjective:     Veronica Reyes is a 28 y.o. female presenting  Establish care, BMI 26, history of hepatic with low, history of subclinical hypothyroidism, planning pregnancy.  She reports she has history for about 3 years of getting herpetic karin in her right third digit finger and has been taking acyclovir 1 tablet 3 times a day for 7 days during her outbreaks.  She denies any lesions of around her mouth or genital region.  Her last outbreak was 5 months ago but she does report sometimes she gets more than 3 or 4 a year.  She is never been on suppression therapy with any antiviral.  She has had history of hepatitis after taking Bactrim which resolved as per patient.  She does not take too many over-the-counter pain meds Tylenol or Advil.  She is trying to get pregnant with her boyfriend.  She is never had any pregnancy or miscarriage.  She had mild abnormal thyroid test before but no symptoms and was not treated for that.  She is not currently smoking.  She is only had chlamydia once and she was treated for this and no other concern for STD.  We will do STD screening for annual testing.  For her [employer] health insurance from work there is some lab work they require as well such as cholesterol.  She did start the HPV vaccine but has not finished this series and would recommend to get this.  She did get seizure after Tdap vaccine and so cannot get this 1.  She reports she has been maintaining similar weight and has been trying to exercise more and eats healthy.  She denies any other seasonal allergies or problems breathing.  She denies any vision issues or hearing issues.  Lives with her boyfriend. Boyfriend son lives week on and week off. Planning pregnancy. Works full time . Loves her job. No social or domestic concerns and has supportive partner.  Review of systems:     Constitutional:  "Negative for fever, chills and positive fatigue.   HENT: Negative for sinus pressure, negative for ear pain or hearing loss  Eyes: Negative for blurriness, negative for double vision  Respiratory: Negative for cough and shortness of breath, negative for exertional shortness of breath  Cardiovascular: Negative for leg swelling, negative for palpitations, negative for chest pain  Gastrointestinal: Negative for nausea, vomiting, abdominal pain, constipation and diarrhea.  Genitourinary: Negative for dysuria and hematuria.   Skin: Negative for rash.   Neurological: Negative for dizziness, focal weakness and headaches.   Endo/Heme/Allergies: Denies bleeding, bruising, and recurrent allergies.  Psychiatric/Behavioral: Negative for depression and anxiety.        Current Outpatient Prescriptions:   •  acyclovir (ZOVIRAX) 400 MG tablet, TAKE 1 TAB BY MOUTH 3 TIMES A DAY FOR 7 DAYS. (Patient not taking: Reported on 1/11/2019), Disp: 21 Tab, Rfl: 0    Allergies, past medical history, past surgical history, family history, social history reviewed and updated    Objective:     Vitals: /64 (BP Location: Right arm, Patient Position: Sitting, BP Cuff Size: Adult)   Pulse 67   Temp 36.9 °C (98.5 °F) (Temporal)   Resp 12   Ht 1.657 m (5' 5.25\")   Wt 72 kg (158 lb 12.8 oz)   LMP 03/17/2019 (Exact Date)   SpO2 100%   Breastfeeding? No   BMI 26.22 kg/m²   General: Alert, pleasant, NAD  HEENT: Normocephalic.  Nontraumatic. EOMI, no icterus or pallor.  Conjunctivae and lids normal. External ears normal. Oropharynx non-erythematous, mucous membranes moist.  Neck supple.  No thyromegaly or masses palpated. No cervical or supraclavicular lymphadenopathy.  Heart: Regular rate and rhythm.  S1 and S2 normal.  No murmurs appreciated.  Respiratory: Normal respiratory effort.  Clear to auscultation bilaterally.  Abdomen: Non-distended, soft, non tender in all 4 quadrants.  Skin: Warm, dry, no rashes.  Musculoskeletal: Gait is " normal.  Moves all extremities well.  Extremities: No leg edema.  Pedal pulses 2+ symmetric.   Psych:  Affect/mood is normal, judgement is good, memory is intact, grooming is appropriate.    Assessment/Plan:     Diagnoses and all orders for this visit:    Herpetic karin    Subclinical hypothyroidism    BMI 26.0-26.9,adult    Routine screening for STI (sexually transmitted infection)  -     HEPATITIS PANEL ACUTE(4 COMPONENTS); Future  -     HIV AG/AB COMBO ASSAY SCREENING; Future  -     MISCELLANEOUS TEST; Future  -     Chlamydia/GC PCR Urine Or Swab; Future    History of hepatitis    Fatigue, unspecified type  -     CBC WITH DIFFERENTIAL; Future  -     TSH WITH REFLEX TO FT4; Future    Vitamin D deficiency  -     VITAMIN D,25 HYDROXY; Future    Vitamin B12 deficiency  -     VITAMIN B12; Future    Dyslipidemia  -     Lipid Profile; Future    Medication monitoring encounter  -     Comp Metabolic Panel; Future    -Please get fasting lab work 8 hours of no eating but drink plenty of water.  STD screening will also be done.  Will wait to refill acyclovir as patient will think about it as she is try to get pregnant and will consider either suppression daily low-dose acyclovir therapy for her many outbreaks of herpetic karin but has been 5 months since her last outbreak or will just take as needed when she has an outbreak and she has been counseled how this works during pregnancy and periods of stress.  No oral or genital regions of herpes per patient.  History of hepatitis after taking Bactrim which resolved and no ongoing liver issues and will get hepatitis screen.  She cannot take Tdap vaccine due to seizures in the past.  She has gotten the first dose of HPV but will check with her insurance and give her the other ones as indicated.  If you are pregnant then wait until after pregnancy to do your HPV series or can do before you get pregnant unless you are planning to get pregnant.  Patient instruction information  given about the HPV vaccine for her to read.  On her follow-up will fill out her [employer] insurance form with lab results and other information that she will bring at her next visit for me to fill out for her insurance wellness report.  Will be doing her Pap test next week which she scheduled and bring her menstrual calendar.  This not need a pregnancy test today and will let me know at the next visit if she does.  Discussed and will continue and healthy diet and exercise and work on continued health and wellness for weight and possible planned pregnancy.  Right third digit of the finger where hepatic with flow occurs quite often with no rash right now but some tenderness and disfiguration at the joint and may be some postherpetic neuropathy.    Return in about 6 days (around 4/16/2019), or lab FU, for Well Women Check with Pap.         No indicators present

## 2023-01-24 NOTE — ED PROVIDER NOTE - PHYSICAL EXAMINATION
Vital Signs: I have reviewed the initial vital signs.  Constitutional: well-nourished, no acute distress, normocephalic  Eyes: PERRLA, EOMI,  clear conjunctiva  ENT: MMM  Cardiovascular: regular rate, regular rhythm, no murmur appreciated  Respiratory: unlabored respiratory effort, clear to auscultation bilaterally  Gastrointestinal: soft, non-tender, non-distended  abdomen, no pulsatile mass  Musculoskeletal: supple neck, no bony tenderness  Integumentary: warm, dry, no rash  Neurologic: awake, alert, cranial nerves II-XII grossly intact, extremities’ motor and sensory functions grossly intact, no focal deficits

## 2023-01-24 NOTE — DISCHARGE NOTE PROVIDER - NSDCCPCAREPLAN_GEN_ALL_CORE_FT
PRINCIPAL DISCHARGE DIAGNOSIS  Diagnosis: Intractable vomiting with nausea  Assessment and Plan of Treatment: likely due to Ozempic  discontinue Ozempic  continue all home meds  follow up with primary care doctor      SECONDARY DISCHARGE DIAGNOSES  Diagnosis: Abdominal pain  Assessment and Plan of Treatment:

## 2023-01-24 NOTE — ED PROVIDER NOTE - PHYSICAL EXAMINATION
Physical Exam    Vital Signs: I have reviewed the initial vital signs.  Constitutional: well-nourished, appears stated age, no acute distress  Eyes: Conjunctiva pink, Sclera clear, PERRLA, EOMI.  Cardiovascular: S1 and S2, regular rate, regular rhythm, well-perfused extremities, radial pulses equal and 2+  Respiratory: unlabored respiratory effort, clear to auscultation bilaterally no wheezing, rales and rhonchi  Gastrointestinal: soft, Epigastric tendenress, no pulsatile mass, normal bowl sounds  Musculoskeletal: supple neck, no lower extremity edema, no midline tenderness  Integumentary: warm, dry, no rash  Neurologic: awake, alert, cranial nerves II-XII grossly intact, extremities’ motor and sensory functions grossly intact  Psychiatric: appropriate mood, appropriate affect

## 2023-01-24 NOTE — DISCHARGE NOTE PROVIDER - CARE PROVIDER_API CALL
Nestor Franklin  Gastroenterology  [address] [place] [id number]  Phone: [phone]  Fax: [phone]  Follow Up Time: 1 week

## 2023-01-24 NOTE — ED PROVIDER NOTE - CLINICAL SUMMARY MEDICAL DECISION MAKING FREE TEXT BOX
46 y/o female, PMHx of GERD, chronic migraines, chronic back pain which she takes percocet for presenting for nausea and vomiting which started 4 day ago. 3rd visit to ED for same. Was admitted yesterday for same but discharged earlier today. States unable to tolerate anything PO, including her meds. Denies any other symptoms of fever, chills, cough, sore throat, CP/SOB, urinary complaints. Pt states she was started on Ozempic for weight loss and was on her third injection when the N/V started. On exam, pt in NAD, AAOx3, head NC/AT, CN II-XII intact, PEERL, EOMi, neck (-) midline tenderness, lungs CTA B/L, CV S1S2 regular, abdomen soft/NT/ND/(+)BS, ext (-) edema, motor 5/5x4, sensation intact, ambulating with steady gait. Labs reviewed. Pt received antiemetics with initial improvement but when did PO trial vomited again. Will admit for inability to tolerate PO.

## 2023-01-24 NOTE — ED PROVIDER NOTE - NS ED MD TWO NIGHTS YN
"Requested Prescriptions   Pending Prescriptions Disp Refills     hydrOXYzine (VISTARIL) 50 MG capsule 60 capsule 3     Sig: Take 1-2 capsules ( mg) at bedtime as needed for sleep.    Antihistamines Protocol Passed    10/24/2018  8:01 AM       Passed - Recent (12 mo) or future (30 days) visit within the authorizing provider's specialty    Patient had office visit in the last 12 months or has a visit in the next 30 days with authorizing provider or within the authorizing provider's specialty.  See \"Patient Info\" tab in inbasket, or \"Choose Columns\" in Meds & Orders section of the refill encounter.             Passed - Patient is age 3 or older    Apply age and/or weight-based dosing for peds patients age 3 and older.    Forward request to provider for patients under the age of 3.        Last Written Prescription Date:  1/22/18  Last Fill Quantity: 60,  # refills: 3   Last office visit: 7/18/2018 with prescribing provider:  Has not ever seen Dr Guerrero who the refill request came to.      Future Office Visit:   Next 5 appointments (look out 90 days)     Oct 25, 2018  9:30 AM CDT   Nurse Only with Replaced by [facility] C[facility]   [facility]    [address] [address] [phone]                   Patient hasn't seen Dr Guerrero - he had last refilled this for another provider.   Will route to her PCP to consider the refill.     Cary Espinosa RNC    " Yes

## 2023-01-24 NOTE — H&P ADULT - HISTORY OF PRESENT ILLNESS
48 y/o female with PMHx GERD, chronic migraines, and chronic back pain which she takes percocet for presenting for epigastric abdominal pain radiating to the right flank with worsening nausea, vomiting, and diarrhea x 3 days ago. Pt states she has not been able to take her PO medications due to vomiting. She admits to multiple episodes of D/V but did not count how many. She states eating food will sometimes aggravate her nausea but has had a loss of her normal appetite since symptoms began. She is able to tolerate PO fluids. She denies any other symptoms of fever, chills, cough, sore throat, Cp, SoB, or urinary complaints.  Pt returns with continued N/V within 24 hours.   46 y/o female with PMHx GERD, chronic migraines, and chronic back pain which she takes percocet for presenting for epigastric abdominal pain radiating to the right flank with worsening nausea, vomiting, and diarrhea x 3 days ago. Pt states she has not been able to take her PO medications due to vomiting. She admits to multiple episodes of D/V but did not count how many. She states eating food will sometimes aggravate her nausea but has had a loss of her normal appetite since symptoms began. She is able to tolerate PO fluids. She denies any other symptoms of fever, chills, cough, sore throat, Cp, SoB, or urinary complaints.  Pt returns with continued N/V within 24 hours.  Pt states she was started on Ozempic for weight loss and was on her third injection when the N/V started.  Pt feels her N/V is related to the Ozempic.  Per Pt will not continue with the medication.      Reviewed home meds with pt.

## 2023-01-24 NOTE — ED PROVIDER NOTE - OBJECTIVE STATEMENT
47 year old female hx of GERD, chronic back pain which she takes percocet for presenting for epigastric abdominal pain with nausea, vomiting today. patient discharged from inpatient this am where she was able to tolerate PO.  Pt states she has not been able to take her PO medications due to vomiting. She admits to multiple episodes of nausea and vomiting. patient without any relief of symptoms with po zofran

## 2023-01-24 NOTE — DISCHARGE NOTE PROVIDER - HOSPITAL COURSE
HPI:  48 y/o female with PMHx GERD, chronic migraines, and chronic back pain which she takes percocet for presenting for epigastric abdominal pain radiating to the right flank with worsening nausea, vomiting, and diarrhea x 3 days ago. Pt states she has not been able to take her PO medications due to vomiting. She admits to multiple episodes of D/V but did not count how many. She states eating food will sometimes aggravate her nausea but has had a loss of her normal appetite since symptoms began. She is able to tolerate PO fluids. She denies any other symptoms of fever, chills, cough, sore throat, Cp, SoB, or urinary complaints.  Pt returns with continued N/V within 24 hours.  Pt states she was started on Ozempic for weight loss and was on her third injection when the N/V started.  Pt feels her N/V is related to the Ozempic.  Per Pt will not continue with the medication.      Reviewed home meds with pt.  (24 Jan 2023 04:24)    Patient admitted with nausea and vomiting after starting new medication Ozempic for weight loss. Patient did not require antiemetics and is medically stable for dc. Can stop Ozempic and follow up with PMD.

## 2023-01-24 NOTE — DISCHARGE NOTE NURSING/CASE MANAGEMENT/SOCIAL WORK - NSDCPEFALRISK_GEN_ALL_CORE
For information on Fall & Injury Prevention, visit: [email or web address] OR  [email or web address] OR  https://www.cdc.gov/steadi/patient.html

## 2023-01-24 NOTE — ED ADULT TRIAGE NOTE - BP NONINVASIVE SYSTOLIC (MM HG)
Arrived to the [facility]. Lio completed. Patient tolerated well. Any issues or concerns during appointment: no.  Patient aware of next lab and [facility] office visit on 10/11/2021 (date) at 10:40 am (time). Discharged ambulatory with self.
185

## 2023-01-24 NOTE — DISCHARGE NOTE NURSING/CASE MANAGEMENT/SOCIAL WORK - NSDCPEPTCAREGIVEDUMATLIST _GEN_ALL_CORE
Orthopedics   Warner Vallecillo 70 y o  female MRN: [id number]  Unit/Bed#: 2 [facility] 215-01      Subjective:  70 y  o female post operative day #1 right total hip arthroplasty  Pt doing well  Pain controlled  She is lying comfortably in bed  Denies chest pain, SOB, nausea, vomiting, fever or chills  She has had 2 liters bolus so far since surgery yesterday due to hypotension      Labs:  0   Lab Value Date/Time    HCT 24 5 (L) 07/09/2019 0543    HCT 25 0 (L) 07/08/2019 2314    HCT 36 3 06/21/2019 1213    HCT 35 3 05/20/2019 1046    HCT 37 0 05/06/2019 0845    HCT 37 1 03/23/2017 1016    HCT 37 1 02/12/2016 1349    HCT 36 4 02/01/2014 1605    HGB 7 9 (L) 07/09/2019 0543    HGB 8 4 (L) 07/08/2019 2314    HGB 12 1 06/21/2019 1213    HGB 11 9 05/20/2019 1046    HGB 12 2 05/06/2019 0845    HGB 12 3 03/23/2017 1016    HGB 12 2 02/12/2016 1349    HGB 12 6 02/01/2014 1605    INR 1 1 06/21/2019 1213    INR 1 24 (H) 05/06/2019 1010    WBC 7 90 07/09/2019 0543    WBC 9 97 07/08/2019 2314    WBC 5 4 06/21/2019 1213    WBC 5 6 05/20/2019 1046    WBC 5 57 05/06/2019 0845    WBC 6 6 03/23/2017 1016    WBC 5 9 02/12/2016 1349    WBC 5 62 02/01/2014 1605    ESR 33 (H) 06/21/2019 1213    CRP 2 6 06/21/2019 1213       Meds:    Current Facility-Administered Medications:     acetaminophen (TYLENOL) tablet 650 mg, 650 mg, Oral, Q6H PRN, Dennie Charnley, PA-C, 650 mg at 07/08/19 1546    aluminum-magnesium hydroxide-simethicone (MYLANTA) 200-200-20 mg/5 mL oral suspension 30 mL, 30 mL, Oral, Q6H PRN, Dennie Charnley, PA-C    ascorbic acid (VITAMIN C) tablet 500 mg, 500 mg, Oral, BID, Dennie Charnley, PA-C, 500 mg at 07/08/19 1452    bisacodyl (DULCOLAX) rectal suppository 10 mg, 10 mg, Rectal, Daily PRN, Dennie Charnley, PA-C    calcium carbonate (TUMS) chewable tablet 1,000 mg, 1,000 mg, Oral, Daily PRN, Dennie Charnley, PA-C    citalopram (CeleXA) tablet 20 mg, 20 mg, Oral, Daily, Dennie Charnley, PA-C    docusate sodium (COLACE) capsule 100 mg, 100 mg, Oral, BID, Sinda Brine, PA-C    ferrous sulfate tablet 325 mg, 325 mg, Oral, BID With Meals, Sinda Brine, PA-C, 325 mg at 30/71/26 6059    folic acid (FOLVITE) tablet 1 mg, 1 mg, Oral, Daily, Sinda Brine, PA-C, 1 mg at 07/08/19 1452    gabapentin (NEURONTIN) capsule 100 mg, 100 mg, Oral, Q8H [address], Sinda Brine, PA-C, 100 mg at 07/09/19 0534    heparin (porcine) subcutaneous injection 5,000 Units, 5,000 Units, Subcutaneous, Q8H [address], 5,000 Units at 07/09/19 0536 **AND** [CANCELED] Platelet count, , , Once, Sinda Brine, PA-C    lactated ringers bolus 1,000 mL, 1,000 mL, Intravenous, Once PRN **AND** lactated ringers bolus 1,000 mL, 1,000 mL, Intravenous, Once PRN, Sinda Brine, PA-C    lactated ringers infusion, 75 mL/hr, Intravenous, Continuous, Sinda Brine, PA-C, Last Rate: 75 mL/hr at 07/08/19 1520, 75 mL/hr at 07/08/19 1520    morphine injection 2 mg, 2 mg, Intravenous, Q3H PRN, Sinda Brine, PA-C    nystatin (MYCOSTATIN) powder, , Topical, BID, Sinda Brine, PA-C    ondansetron [facility]) injection 4 mg, 4 mg, Intravenous, Q6H PRN, Sinda Brine, PA-C, 4 mg at 07/08/19 1304    oxyCODONE (ROXICODONE) IR tablet 2 5 mg, 2 5 mg, Oral, Q4H PRN, Sinda Brine, PA-C    oxyCODONE (ROXICODONE) IR tablet 5 mg, 5 mg, Oral, Q4H PRN, Sinda Brine, PA-C    pantoprazole (PROTONIX) EC tablet 40 mg, 40 mg, Oral, Daily, Sinda Brine, PA-C    senna (SENOKOT) tablet 8 6 mg, 1 tablet, Oral, Daily, Sinda Brine, PA-C    simethicone (MYLICON) chewable tablet 80 mg, 80 mg, Oral, 4x Daily PRN, Sinda Brine, PA-C    sodium chloride 0 9 % bolus 1,000 mL, 1,000 mL, Intravenous, Once PRN **AND** sodium chloride 0 9 % bolus 1,000 mL, 1,000 mL, Intravenous, Once PRN, Sinda Brine, PA-C    Blood Culture:   No results found for: BLOODCX    Wound Culture:   No results found for: WOUNDCULT    Ins and Outs:  I/O last 24 hours:   In: 2800 [I V :1800; IV Piggyback:1000]  Out: 450 [Urine:150; Blood:300]          Physical Exam:  Vitals:    07/09/19 0300   BP: 107/53   Pulse: 63 Resp: 16   Temp: 97 5 °F (36 4 °C)   SpO2: 99%     right lower extremity:  · Dressings C/D/I  · Sensation intact to light touch  · Moving ankle and toes  · 2+ dorsalis pedis   · Hip abduction pillow in place  · Thigh and calf compartments soft, nontender    X-ray right hip:  Hip prosthesis intact with no evidence of loosening    _*_*_*_*_*_*_*_*_*_*_*_*_*_*_*_*_*_*_*_*_*_*_*_*_*_*_*_*_*_*_*_*_*_*_*_*_*_*_*_*_*    Assessment: 70 y  o female post operative day #1 right total hip arthroplasty  Plan:  · Weight Bearing as tolerated to RLE  · Up and out of bed  · Posterior total hip precautions  · Abduction pillow while in bed  · DVT prophylaxis- Heparin and mechanical  · Analgesics  · PT/OT  · ABLA- Patient has had 2 liters bolus already   Hbg 7 9 this AM  Will transfuse one unit PRBC this AM   · DC planning to rehab    Lauren Nichols PA-C Influenza Vaccination

## 2023-01-24 NOTE — ED PROVIDER NOTE - CLINICAL SUMMARY MEDICAL DECISION MAKING FREE TEXT BOX
47Yf History of GERD, chronic back pain and migraines presents presents  Since yesterday vomiting abdominal pain.  Patient states that on Friday with no p.o. tolerance worse yesterday morning came to ED labs at that time WNL symptomatic treatment given patient felt better went home but continued to vomit have abdominal pain patient is been evaluated by GI as an outpatient for pancreatitis in the past due to possible sphincter of Oddi dysfunction plans for outpatient MRI location of mycotic diarrhea in ED patient retching abdomen soft epigastric tenderness labs repeated WNL elevated lactate 2.6 AG 18 IV fluids and symptomatic treatment given CT abdomen pelvis no acute pathology patient reassessed still complaining of abdominal pain and unable to tolerate p.o.  Patient admitted

## 2023-01-24 NOTE — ED PROVIDER NOTE - OBJECTIVE STATEMENT
47 year old female with past medical history GERD, chronic migraines, and chronic back pain which she takes percocet for presenting for epigastric abdominal pain with nausea, vomiting, and diarrhea x 3 days ago. Pt states she has not been able to take her PO medications due to vomiting. She admits to multiple episodes of nausea and vomiting but did not count how many. Pt seen in emergency room earlier today and had normal labs and sent home. patient since being home states ODT zofran not helping still having pain and cannot stop vomiting.

## 2023-01-24 NOTE — ED PROVIDER NOTE - NS ED ROS FT
Review of Systems    Constitutional: (-) fever/ chills (-)loss of appetite or  weight loss  Eyes (-) visual changes  ENT: (-) epistaxis (-) sore throat (-) ear pain  Cardiovascular: (-) chest pain, (-) syncope (-) palpitations  Respiratory: (-) cough, (-) shortness of breath  Gastrointestinal: (+) vomiting, (-) diarrhea (-) abdominal pain  : (-) dysuria , hematuria   neck: (-) neck pain or stiffness  Musculoskeletal:  (-) back pain, (-) joint pain   Integumentary: (-) rash, (-) swelling  Neurological: (-) headache, (-) altered mental status

## 2023-01-24 NOTE — ED PROVIDER NOTE - ATTENDING APP SHARED VISIT CONTRIBUTION OF CARE
46 y/o female, PMHx of GERD, chronic migraines, chronic back pain which she takes percocet for presenting for nausea and vomiting which started 4 day ago. 3rd visit to ED for same. Was admitted yesterday for same but discharged earlier today. States unable to tolerate anything PO, including her meds. Denies any other symptoms of fever, chills, cough, sore throat, CP/SOB, urinary complaints. Pt states she was started on Ozempic for weight loss and was on her third injection when the N/V started. On exam, pt in NAD, AAOx3, head NC/AT, CN II-XII intact, PEERL, EOMi, neck (-) midline tenderness, lungs CTA B/L, CV S1S2 regular, abdomen soft/NT/ND/(+)BS, ext (-) edema, motor 5/5x4, sensation intact, ambulating with steady gait. Labs reviewed. Pt received antiemetics with initial improvement but when did PO trial vomited again. Will admit.

## 2023-01-24 NOTE — DISCHARGE NOTE NURSING/CASE MANAGEMENT/SOCIAL WORK - PATIENT PORTAL LINK FT
You can access the FollowMyHealth Patient Portal offered by [facility] by registering at the following website: [email or web address]. By joining [employer]’s FollowMyHealth portal, you will also be able to view your health information using other applications (apps) compatible with our system.

## 2023-01-24 NOTE — PATIENT PROFILE ADULT - FALL HARM RISK - UNIVERSAL INTERVENTIONS
Bed in lowest position, wheels locked, appropriate side rails in place/Call bell, personal items and telephone in reach/Instruct patient to call for assistance before getting out of bed or chair/Non-slip footwear when patient is out of bed/[place] to call system/Physically safe environment - no spills, clutter or unnecessary equipment/Purposeful Proactive Rounding/Room/bathroom lighting operational, light cord in reach

## 2023-01-24 NOTE — H&P ADULT - ASSESSMENT
46 y/o female with PMHx GERD, chronic migraines, and chronic back pain which she takes percocet for presenting for epigastric abdominal pain radiating to the right flank with worsening nausea, vomiting, and diarrhea x 3 days ago. Pt states she has not been able to take her PO medications due to vomiting. She admits to multiple episodes of D/V but did not count how many. She states eating food will sometimes aggravate her nausea but has had a loss of her normal appetite since symptoms began. She is able to tolerate PO fluids. She denies any other symptoms of fever, chills, cough, sore throat, Cp, SoB, or urinary complaints.  Pt returns with continued N/V within 24 hours 48 y/o female with PMHx GERD, chronic migraines, and chronic back pain which she takes percocet for presenting for epigastric abdominal pain radiating to the right flank with worsening nausea, vomiting, and diarrhea x 3 days ago. Pt states she has not been able to take her PO medications due to vomiting. She admits to multiple episodes of D/V but did not count how many. She states eating food will sometimes aggravate her nausea but has had a loss of her normal appetite since symptoms began. She is able to tolerate PO fluids. She denies any other symptoms of fever, chills, cough, sore throat, Cp, SoB, or urinary complaints.  Pt returns with continued N/V within 24 hours.      # N/V  - zofran prn  - reglan prn  - IVF    # GERD  - c/w home med    # Hypothyroid  - c/w home med    # HLD  - c/w home med 48 y/o female with PMHx GERD, chronic migraines, and chronic back pain which she takes percocet for presenting for epigastric abdominal pain radiating to the right flank with worsening nausea, vomiting, and diarrhea x 3 days ago. Pt states she has not been able to take her PO medications due to vomiting. She admits to multiple episodes of D/V but did not count how many. She states eating food will sometimes aggravate her nausea but has had a loss of her normal appetite since symptoms began. She is able to tolerate PO fluids. She denies any other symptoms of fever, chills, cough, sore throat, Cp, SoB, or urinary complaints.  Pt returns with continued N/V within 24 hours.  Pt states she was started on Ozempic for weight loss and was on her third injection when the N/V started.  Pt feels her N/V is related to the Ozempic.  Per Pt will not continue with the medication.      # N/V  - zofran prn  - reglan prn  - IVF  - no GI consult per pt.    - advance diet as tolerated.    # Anxiety/Depression  - c/w home meds    # GERD  - c/w home med    # Hypothyroid  - c/w home med    # HLD  - c/w home med

## 2023-01-25 LAB
ALBUMIN SERPL ELPH-MCNC: 4.1 G/DL — SIGNIFICANT CHANGE UP (ref 3.5–5.2)
ALP SERPL-CCNC: 86 U/L — SIGNIFICANT CHANGE UP (ref 30–115)
ALT FLD-CCNC: 30 U/L — SIGNIFICANT CHANGE UP (ref 0–41)
ANION GAP SERPL CALC-SCNC: 12 MMOL/L — SIGNIFICANT CHANGE UP (ref 7–14)
AST SERPL-CCNC: 44 U/L — HIGH (ref 0–41)
BILIRUB SERPL-MCNC: 0.3 MG/DL — SIGNIFICANT CHANGE UP (ref 0.2–1.2)
BUN SERPL-MCNC: 3 MG/DL — LOW (ref 10–20)
CALCIUM SERPL-MCNC: 8.8 MG/DL — SIGNIFICANT CHANGE UP (ref 8.4–10.5)
CHLORIDE SERPL-SCNC: 102 MMOL/L — SIGNIFICANT CHANGE UP (ref 98–110)
CO2 SERPL-SCNC: 26 MMOL/L — SIGNIFICANT CHANGE UP (ref 17–32)
CREAT SERPL-MCNC: 0.6 MG/DL — LOW (ref 0.7–1.5)
CULTURE RESULTS: SIGNIFICANT CHANGE UP
EGFR: 113 ML/MIN/1.73M2 — SIGNIFICANT CHANGE UP
GLUCOSE SERPL-MCNC: 93 MG/DL — SIGNIFICANT CHANGE UP (ref 70–99)
HCT VFR BLD CALC: 31.3 % — LOW (ref 37–47)
HGB BLD-MCNC: 10.2 G/DL — LOW (ref 12–16)
MAGNESIUM SERPL-MCNC: 2.3 MG/DL — SIGNIFICANT CHANGE UP (ref 1.8–2.4)
MCHC RBC-ENTMCNC: 27.1 PG — SIGNIFICANT CHANGE UP (ref 27–31)
MCHC RBC-ENTMCNC: 32.6 G/DL — SIGNIFICANT CHANGE UP (ref 32–37)
MCV RBC AUTO: 83 FL — SIGNIFICANT CHANGE UP (ref 81–99)
NRBC # BLD: 0 /100 WBCS — SIGNIFICANT CHANGE UP (ref 0–0)
PLATELET # BLD AUTO: 314 K/UL — SIGNIFICANT CHANGE UP (ref 130–400)
POTASSIUM SERPL-MCNC: 3.1 MMOL/L — LOW (ref 3.5–5)
POTASSIUM SERPL-SCNC: 3.1 MMOL/L — LOW (ref 3.5–5)
PROT SERPL-MCNC: 6.4 G/DL — SIGNIFICANT CHANGE UP (ref 6–8)
RBC # BLD: 3.77 M/UL — LOW (ref 4.2–5.4)
RBC # FLD: 13.4 % — SIGNIFICANT CHANGE UP (ref 11.5–14.5)
SODIUM SERPL-SCNC: 140 MMOL/L — SIGNIFICANT CHANGE UP (ref 135–146)
SPECIMEN SOURCE: SIGNIFICANT CHANGE UP
WBC # BLD: 8.9 K/UL — SIGNIFICANT CHANGE UP (ref 4.8–10.8)
WBC # FLD AUTO: 8.9 K/UL — SIGNIFICANT CHANGE UP (ref 4.8–10.8)

## 2023-01-25 PROCEDURE — 99223 1ST HOSP IP/OBS HIGH 75: CPT

## 2023-01-25 PROCEDURE — 76700 US EXAM ABDOM COMPLETE: CPT | Mod: 26

## 2023-01-25 PROCEDURE — 99222 1ST HOSP IP/OBS MODERATE 55: CPT

## 2023-01-25 RX ORDER — ATORVASTATIN CALCIUM 80 MG/1
20 TABLET, FILM COATED ORAL AT BEDTIME
Refills: 0 | Status: DISCONTINUED | OUTPATIENT
Start: 2023-01-25 | End: 2023-01-26

## 2023-01-25 RX ORDER — MORPHINE SULFATE 50 MG/1
2 CAPSULE, EXTENDED RELEASE ORAL ONCE
Refills: 0 | Status: DISCONTINUED | OUTPATIENT
Start: 2023-01-25 | End: 2023-01-25

## 2023-01-25 RX ORDER — LANOLIN ALCOHOL/MO/W.PET/CERES
5 CREAM (GRAM) TOPICAL AT BEDTIME
Refills: 0 | Status: DISCONTINUED | OUTPATIENT
Start: 2023-01-25 | End: 2023-01-26

## 2023-01-25 RX ORDER — ONDANSETRON 8 MG/1
8 TABLET, FILM COATED ORAL EVERY 8 HOURS
Refills: 0 | Status: DISCONTINUED | OUTPATIENT
Start: 2023-01-25 | End: 2023-01-26

## 2023-01-25 RX ORDER — MORPHINE SULFATE 50 MG/1
2 CAPSULE, EXTENDED RELEASE ORAL EVERY 4 HOURS
Refills: 0 | Status: DISCONTINUED | OUTPATIENT
Start: 2023-01-25 | End: 2023-01-26

## 2023-01-25 RX ORDER — PANTOPRAZOLE SODIUM 20 MG/1
40 TABLET, DELAYED RELEASE ORAL
Refills: 0 | Status: DISCONTINUED | OUTPATIENT
Start: 2023-01-25 | End: 2023-01-26

## 2023-01-25 RX ORDER — PANTOPRAZOLE SODIUM 20 MG/1
40 TABLET, DELAYED RELEASE ORAL
Refills: 0 | Status: DISCONTINUED | OUTPATIENT
Start: 2023-01-25 | End: 2023-01-25

## 2023-01-25 RX ORDER — CITALOPRAM 10 MG/1
40 TABLET, FILM COATED ORAL DAILY
Refills: 0 | Status: DISCONTINUED | OUTPATIENT
Start: 2023-01-25 | End: 2023-01-26

## 2023-01-25 RX ORDER — MORPHINE SULFATE 50 MG/1
4 CAPSULE, EXTENDED RELEASE ORAL ONCE
Refills: 0 | Status: DISCONTINUED | OUTPATIENT
Start: 2023-01-25 | End: 2023-01-25

## 2023-01-25 RX ORDER — POTASSIUM CHLORIDE 20 MEQ
20 PACKET (EA) ORAL EVERY 4 HOURS
Refills: 0 | Status: COMPLETED | OUTPATIENT
Start: 2023-01-25 | End: 2023-01-25

## 2023-01-25 RX ORDER — SODIUM CHLORIDE 9 MG/ML
1000 INJECTION, SOLUTION INTRAVENOUS
Refills: 0 | Status: DISCONTINUED | OUTPATIENT
Start: 2023-01-25 | End: 2023-01-26

## 2023-01-25 RX ORDER — LEVOTHYROXINE SODIUM 125 MCG
75 TABLET ORAL DAILY
Refills: 0 | Status: DISCONTINUED | OUTPATIENT
Start: 2023-01-25 | End: 2023-01-26

## 2023-01-25 RX ORDER — ONDANSETRON 8 MG/1
4 TABLET, FILM COATED ORAL EVERY 8 HOURS
Refills: 0 | Status: DISCONTINUED | OUTPATIENT
Start: 2023-01-25 | End: 2023-01-25

## 2023-01-25 RX ORDER — ALPRAZOLAM 0.25 MG
0.5 TABLET ORAL
Refills: 0 | Status: DISCONTINUED | OUTPATIENT
Start: 2023-01-25 | End: 2023-01-26

## 2023-01-25 RX ORDER — OXYBUTYNIN CHLORIDE 5 MG
10 TABLET ORAL DAILY
Refills: 0 | Status: DISCONTINUED | OUTPATIENT
Start: 2023-01-25 | End: 2023-01-26

## 2023-01-25 RX ORDER — METOCLOPRAMIDE HCL 10 MG
10 TABLET ORAL EVERY 6 HOURS
Refills: 0 | Status: DISCONTINUED | OUTPATIENT
Start: 2023-01-25 | End: 2023-01-25

## 2023-01-25 RX ORDER — ACETAMINOPHEN 500 MG
650 TABLET ORAL EVERY 6 HOURS
Refills: 0 | Status: DISCONTINUED | OUTPATIENT
Start: 2023-01-25 | End: 2023-01-26

## 2023-01-25 RX ADMIN — Medication 0.5 MILLIGRAM(S): at 14:52

## 2023-01-25 RX ADMIN — SODIUM CHLORIDE 100 MILLILITER(S): 9 INJECTION, SOLUTION INTRAVENOUS at 14:52

## 2023-01-25 RX ADMIN — MORPHINE SULFATE 2 MILLIGRAM(S): 50 CAPSULE, EXTENDED RELEASE ORAL at 12:43

## 2023-01-25 RX ADMIN — PANTOPRAZOLE SODIUM 40 MILLIGRAM(S): 20 TABLET, DELAYED RELEASE ORAL at 05:23

## 2023-01-25 RX ADMIN — MORPHINE SULFATE 4 MILLIGRAM(S): 50 CAPSULE, EXTENDED RELEASE ORAL at 01:07

## 2023-01-25 RX ADMIN — Medication 10 MILLIGRAM(S): at 10:41

## 2023-01-25 RX ADMIN — ONDANSETRON 4 MILLIGRAM(S): 8 TABLET, FILM COATED ORAL at 12:42

## 2023-01-25 RX ADMIN — MORPHINE SULFATE 2 MILLIGRAM(S): 50 CAPSULE, EXTENDED RELEASE ORAL at 01:55

## 2023-01-25 RX ADMIN — Medication 50 MILLIEQUIVALENT(S): at 14:35

## 2023-01-25 RX ADMIN — Medication 10 MILLIGRAM(S): at 05:52

## 2023-01-25 RX ADMIN — PANTOPRAZOLE SODIUM 40 MILLIGRAM(S): 20 TABLET, DELAYED RELEASE ORAL at 17:35

## 2023-01-25 RX ADMIN — Medication 25 GRAM(S): at 00:21

## 2023-01-25 RX ADMIN — ATORVASTATIN CALCIUM 20 MILLIGRAM(S): 80 TABLET, FILM COATED ORAL at 21:15

## 2023-01-25 RX ADMIN — MORPHINE SULFATE 2 MILLIGRAM(S): 50 CAPSULE, EXTENDED RELEASE ORAL at 01:14

## 2023-01-25 RX ADMIN — MORPHINE SULFATE 2 MILLIGRAM(S): 50 CAPSULE, EXTENDED RELEASE ORAL at 17:51

## 2023-01-25 RX ADMIN — Medication 0.5 MILLIGRAM(S): at 22:02

## 2023-01-25 RX ADMIN — ONDANSETRON 8 MILLIGRAM(S): 8 TABLET, FILM COATED ORAL at 21:15

## 2023-01-25 RX ADMIN — MORPHINE SULFATE 2 MILLIGRAM(S): 50 CAPSULE, EXTENDED RELEASE ORAL at 13:00

## 2023-01-25 RX ADMIN — MORPHINE SULFATE 2 MILLIGRAM(S): 50 CAPSULE, EXTENDED RELEASE ORAL at 08:56

## 2023-01-25 RX ADMIN — ONDANSETRON 4 MILLIGRAM(S): 8 TABLET, FILM COATED ORAL at 01:58

## 2023-01-25 RX ADMIN — Medication 50 MILLIEQUIVALENT(S): at 10:46

## 2023-01-25 RX ADMIN — MORPHINE SULFATE 2 MILLIGRAM(S): 50 CAPSULE, EXTENDED RELEASE ORAL at 09:05

## 2023-01-25 RX ADMIN — MORPHINE SULFATE 2 MILLIGRAM(S): 50 CAPSULE, EXTENDED RELEASE ORAL at 22:02

## 2023-01-25 RX ADMIN — MORPHINE SULFATE 4 MILLIGRAM(S): 50 CAPSULE, EXTENDED RELEASE ORAL at 00:22

## 2023-01-25 RX ADMIN — CITALOPRAM 40 MILLIGRAM(S): 10 TABLET, FILM COATED ORAL at 10:42

## 2023-01-25 NOTE — H&P ADULT - ASSESSMENT
Pt has now returned for the 3rd time for same S/S.  Pt was admitted last night and was d/c home this morning .  46 y/o female with PMHx GERD, chronic migraines, and chronic back pain which she takes percocet for presenting for epigastric abdominal pain radiating to the right flank with worsening nausea, vomiting, and diarrhea x 3 days ago. Pt states she has not been able to take her PO medications due to vomiting. She admits to multiple episodes of D/V but did not count how many. She states eating food will sometimes aggravate her nausea but has had a loss of her normal appetite since symptoms began. She is able to tolerate PO fluids. She denies any other symptoms of fever, chills, cough, sore throat, Cp, SoB, or urinary complaints.  Pt returns with continued N/V within 24 hours.  Pt states she was started on Ozempic for weight loss and was on her third injection when the N/V started.  Pt feels her N/V is related to the Ozempic.  Per Pt will not continue with the medication.  Pt has now returned for the 3rd time for same S/S.  Pt was admitted last night and was d/c home this morning .  48 y/o female with PMHx GERD, chronic migraines, and chronic back pain which she takes percocet for presenting for epigastric abdominal pain radiating to the right flank with worsening nausea, vomiting, and diarrhea x 3 days ago. Pt states she has not been able to take her PO medications due to vomiting. She admits to multiple episodes of D/V but did not count how many. She states eating food will sometimes aggravate her nausea but has had a loss of her normal appetite since symptoms began. She is able to tolerate PO fluids. She denies any other symptoms of fever, chills, cough, sore throat, Cp, SoB, or urinary complaints.  Pt returns with continued N/V within 24 hours.  Pt states she was started on Ozempic for weight loss and was on her third injection when the N/V started.  Pt feels her N/V is related to the Ozempic.  Per Pt will not continue with the medication.         # N/V  - GI consult  - zofran prn  - reglan prn  - IVF  - diet clears     # Anxiety/Depression  - c/w home meds    # GERD  - c/w home med    # Hypothyroid  - c/w home med    # HLD  - c/w home med   Pt has now returned for the 3rd time for same S/S.  Pt was admitted last night and was d/c home this morning .  48 y/o female with PMHx GERD, chronic migraines, and chronic back pain which she takes percocet for presenting for epigastric abdominal pain radiating to the right flank with worsening nausea, vomiting, and diarrhea x 3 days ago. Pt states she has not been able to take her PO medications due to vomiting. She admits to multiple episodes of D/V but did not count how many. She states eating food will sometimes aggravate her nausea but has had a loss of her normal appetite since symptoms began. She is able to tolerate PO fluids. She denies any other symptoms of fever, chills, cough, sore throat, Cp, SoB, or urinary complaints.  Pt returns with continued N/V within 24 hours.  Pt states she was started on Ozempic for weight loss and was on her third injection when the N/V started.  Pt feels her N/V is related to the Ozempic.  Per Pt will not continue with the medication.       #Abdominal / epigastric pain - possibly related to GERD, r/o PUD  # N/V  - GI consult  - zofran prn  - reglan prn  - IVF  - diet clears   - PPI    #hypomagnesemia  s/p mag in ED  f/u AM mag    #normocytic anemia - no overt source of bleeding  monitor hb    # Anxiety/Depression  - c/w home meds    # GERD  - c/w home med    # Hypothyroid  - c/w home med    # HLD  - c/w home med   Pt has now returned for the 3rd time for same S/S.  Pt was admitted last night and was d/c home this morning .  46 y/o female with PMHx GERD, chronic migraines, and chronic back pain which she takes percocet for presenting for epigastric abdominal pain radiating to the right flank with worsening nausea, vomiting, and diarrhea x 3 days ago. Pt states she has not been able to take her PO medications due to vomiting. She admits to multiple episodes of D/V but did not count how many. She states eating food will sometimes aggravate her nausea but has had a loss of her normal appetite since symptoms began. She is able to tolerate PO fluids. She denies any other symptoms of fever, chills, cough, sore throat, Cp, SoB, or urinary complaints.  Pt returns with continued N/V within 24 hours. Pt states she was not on ozempic and it had not been prescribed to her.     #Abdominal / epigastric pain - possibly related to GERD, r/o PUD  # N/V  - GI consult  - zofran prn  - reglan prn  - IVF  - diet clears   - PPI    #hypomagnesemia  s/p mag in ED  f/u AM mag    #normocytic anemia - no overt source of bleeding  monitor hb    # Anxiety/Depression  - c/w home meds    # GERD  - c/w home med    # Hypothyroid  - c/w home med    # HLD  - c/w home med

## 2023-01-25 NOTE — H&P ADULT - HISTORY OF PRESENT ILLNESS
Pt has now returned for the 3rd time for same S/S.  Pt was admitted last night and was d/c home this morning .  48 y/o female with PMHx GERD, chronic migraines, and chronic back pain which she takes percocet for presenting for epigastric abdominal pain radiating to the right flank with worsening nausea, vomiting, and diarrhea x 3 days ago. Pt states she has not been able to take her PO medications due to vomiting. She admits to multiple episodes of D/V but did not count how many. She states eating food will sometimes aggravate her nausea but has had a loss of her normal appetite since symptoms began. She is able to tolerate PO fluids. She denies any other symptoms of fever, chills, cough, sore throat, Cp, SoB, or urinary complaints.  Pt returns with continued N/V within 24 hours.  Pt states she was started on Ozempic for weight loss and was on her third injection when the N/V started.  Pt feels her N/V is related to the Ozempic.  Per Pt will not continue with the medication.    Pt has now returned for the 3rd time for same S/S.  Pt was admitted last night and was d/c home this morning .  48 y/o female with PMHx GERD, chronic migraines, and chronic back pain which she takes percocet for presenting for epigastric abdominal pain radiating to the right flank with worsening nausea, vomiting, and diarrhea x 3 days ago. Pt states she has not been able to take her PO medications due to vomiting. She admits to multiple episodes of D/V but did not count how many. She states eating food will sometimes aggravate her nausea but has had a loss of her normal appetite since symptoms began. She is able to tolerate PO fluids. She denies any other symptoms of fever, chills, cough, sore throat, Cp, SoB, or urinary complaints.  Pt returns with continued N/V within 24 hours. Pt now denies taking ozempic or having it prescribed to her as out patient.

## 2023-01-25 NOTE — CHART NOTE - NSCHARTNOTEFT_GEN_A_CORE
Pt has now returned for the 3rd time for same S/S.  Pt was admitted last night and was d/c home.  48 y/o female with PMHx GERD, chronic migraines, and chronic back pain which she takes percocet for presenting for epigastric abdominal pain radiating to the right flank with worsening nausea, vomiting, and diarrhea x 3 days ago. Pt states she has not been able to take her PO medications due to vomiting. She admits to multiple episodes of D/V but did not count how many. She states eating food will sometimes aggravate her nausea but has had a loss of her normal appetite since symptoms began. She is able to tolerate PO fluids. She denies any other symptoms of fever, chills, cough, sore throat, Cp, SoB, or urinary complaints.  Pt returns with continued N/V within 24 hours.  Of note, patient claims she was NEVER on Ozempic though this has been documented previously      #Abdominal / epigastric pain - possibly related to GERD, r/o PUD  # N/V  - GI consult appreciated- EGD tomorrow  - zofran standing  - IVF  - diet clears   - PPI  -Patient states she was never on Ozempic    #hypomagnesemia  s/p mag  Monitor    #normocytic anemia - no overt source of bleeding  monitor hb    # Anxiety/Depression  - c/w home meds    # GERD  - c/w home med    # Hypothyroid  - c/w home med    # HLD  - c/w home med

## 2023-01-25 NOTE — CONSULT NOTE ADULT - SUBJECTIVE AND OBJECTIVE BOX
Chief complaint/Reason for consult: n/v    HPI:  Pt has now returned for the 3rd time for same S/S.  Pt was admitted last night and was d/c home this morning .  46 y/o female with PMHx GERD, chronic migraines, and chronic back pain which she takes percocet for presenting for epigastric abdominal pain radiating to the right flank with worsening nausea, vomiting, and diarrhea x 3 days ago. Pt states she has not been able to take her PO medications due to vomiting. She admits to multiple episodes of D/V but did not count how many. She states eating food will sometimes aggravate her nausea but has had a loss of her normal appetite since symptoms began. She is able to tolerate PO fluids. She denies any other symptoms of fever, chills, cough, sore throat, Cp, SoB, or urinary complaints.  Pt returns with continued N/V within 24 hours. Pt now denies taking ozempic or having it prescribed to her as out patient.    (25 Jan 2023 00:23)    GI Updates: 47yFemale pmh GERD, chronic back pain, history of lap choley 2014 presents for n/v and diarrhea for 3 days. Patient reports diarrhea has resolved but she was still vomiting anything PO as of last night. Currently Patient denies nausea, vomiting, hematemesis, melena, blood in stool, diarrhea, constipation, abdominal pain. patient reports LUQ pain.      PAST MEDICAL & SURGICAL HISTORY:   GERD (gastroesophageal reflux disease)      Migraine headache      Back pain      Anxiety      Kidney stones      Murmur  since age 20     6/18      Thyroid nodule      History of surgery  hernia repair    x2      History of surgery  cysto bladder stone removed      History of surgery  lithotripsy  x 2            Family history:  FAMILY HISTORY:  DM (diabetes mellitus) (Father)    Family history of heart disease (Father)      No GI cancers in first or second degree relatives    Social History: No smoking. No alcohol. No illegal drug use.    Allergies:  Bananas (Unknown)  Cipro (Unknown)  citrus (Unknown)  eggs (Unknown)  NSAIDs (Unknown)    MEDICATIONS: Home Medications:  Allegra 180 mg oral tablet: 1 tab(s) orally once a day (24 Jan 2023 14:17)  citalopram 40 mg oral tablet: 1 tab(s) orally once a day (in the morning) (10 Oct 2022 20:27)  Crestor 5 mg oral tablet: 1 tab(s) orally once a day (24 Jan 2023 05:03)  Fioricet oral capsule: 1 cap(s) orally every 4 hours, As Needed for migraine (24 Jan 2023 14:17)  levothyroxine 75 mcg (0.075 mg) oral capsule: 1 cap(s) orally once a day (10 Oct 2022 20:27)  morphine 15 mg oral tablet: 1 tab(s) orally once a day, As Needed for pain (24 Jan 2023 14:17)  oxybutynin 5 mg oral tablet: 2 tab(s) orally once a day (24 Jan 2023 14:17)  pantoprazole 40 mg oral delayed release tablet: 1 tab(s) orally once a day (10 Oct 2022 20:27)  Percocet 10/325 oral tablet: 1 tab(s) orally every 6 hours, As Needed for pain (24 Jan 2023 14:17)  Xanax 0.5 mg oral tablet: 1 tab(s) orally 4 times a day, As Needed (24 Jan 2023 05:01)    MEDICATIONS  (STANDING):  atorvastatin 20 milliGRAM(s) Oral at bedtime  citalopram 40 milliGRAM(s) Oral daily  lactated ringers. 1000 milliLiter(s) (100 mL/Hr) IV Continuous <Continuous>  levothyroxine 75 MICROGram(s) Oral daily  oxybutynin 10 milliGRAM(s) Oral daily  pantoprazole  Injectable 40 milliGRAM(s) IV Push two times a day  potassium chloride  20 mEq/100 mL IVPB 20 milliEquivalent(s) IV Intermittent every 4 hours    MEDICATIONS  (PRN):  acetaminophen     Tablet .. 650 milliGRAM(s) Oral every 6 hours PRN Temp greater or equal to 38C (100.4F), Mild Pain (1 - 3)  ALPRAZolam 0.5 milliGRAM(s) Oral four times a day PRN anxiety  aluminum hydroxide/magnesium hydroxide/simethicone Suspension 30 milliLiter(s) Oral every 4 hours PRN Dyspepsia  melatonin 5 milliGRAM(s) Oral at bedtime PRN Insomnia  metoclopramide Injectable 10 milliGRAM(s) IV Push every 6 hours PRN nausea  morphine  - Injectable 2 milliGRAM(s) IV Push every 4 hours PRN Mild Pain (1 - 3)  ondansetron Injectable 4 milliGRAM(s) IV Push every 8 hours PRN Nausea and/or Vomiting  oxycodone    5 mG/acetaminophen 325 mG 2 Tablet(s) Oral every 4 hours PRN Moderate Pain (4 - 6)        REVIEW OF SYSTEMS  General:  No weight loss, fevers, or chills.  Eyes:  No reported pain or visual changes  ENT:  No sore throat or runny nose.  NECK: No stiffness or lymphadenopathy  CV:  No chest pain or palpitations.  Resp:  No shortness of breath, cough, wheezing or hemoptysis  GI:  +LUQ abdominal pain, +nausea, +vomiting, No dysphagia, diarrhea or constipation. No rectal bleeding, melena, or hematemesis.  Muscle:  No aches or weakness  Neuro:  No tingling, numbness       VITALS:   T(F): 96 (01-25-23 @ 04:49), Max: 97.8 (01-24-23 @ 21:30)  HR: 70 (01-25-23 @ 04:49) (66 - 82)  BP: 143/82 (01-25-23 @ 04:49) (135/88 - 185/107)  RR: 18 (01-25-23 @ 04:49) (17 - 18)  SpO2: 98% (01-24-23 @ 21:30) (97% - 98%)    PHYSICAL EXAM:  GENERAL: AAOx3, no acute distress.  HEAD:  Atraumatic, Normocephalic  EYES: conjunctiva and sclera clear  NECK: Supple, No thyromegaly   CHEST/LUNG: Clear to auscultation bilaterally; No wheeze, rhonchi, or rales  HEART: Regular rate and rhythm; normal S1, S2, No murmurs.  ABDOMEN: Soft, nontender, nondistended; Bowel sounds present  NEUROLOGY: No asterixis or tremor  SKIN: Intact, no jaundice          LABS:  01-25    140  |  102  |  3<L>  ----------------------------<  93  3.1<L>   |  26  |  0.6<L>    Ca    8.8      25 Jan 2023 08:09  Mg     2.3     01-25    TPro  6.4  /  Alb  4.1  /  TBili  0.3  /  DBili  x   /  AST  44<H>  /  ALT  30  /  AlkPhos  86  01-25                          10.2   8.90  )-----------( 314      ( 25 Jan 2023 08:09 )             31.3     LIVER FUNCTIONS - ( 25 Jan 2023 08:09 )  Alb: 4.1 g/dL / Pro: 6.4 g/dL / ALK PHOS: 86 U/L / ALT: 30 U/L / AST: 44 U/L / GGT: x               IMAGING:    < from: CT Abdomen and Pelvis w/ IV Cont (01.24.23 @ 03:10) >    ACC: [id number] EXAM:  CT ABDOMEN AND PELVIS IC   ORDERED BY: HALIMA BECKER     PROCEDURE DATE:  01/24/2023          INTERPRETATION:  CLINICAL HISTORY / REASON FOR EXAM: Abdominal pain    Pt is a 46 y/o female with PMHx GERD, chronic migraines, and chronic back   pain which she takes percocet for presenting for epigastric abdominal   pain radiating to the right flank with worsening nausea, vomiting, and   diarrhea x 3 days ago.    TECHNIQUE: Contiguous axial CT images were obtained from the lower chest   to the pubic symphysis following administration of 95 mL Omnipaque 350   intravenous contrast, 5 mL discarded. Oral contrast was not administered.   Reformatted images in the coronal and sagittal planes were acquired.    COMPARISON: 10/10/2022.      FINDINGS:    LOWER CHEST: Unremarkable.    HEPATOBILIARY: Postcholecystectomy, with stable hepatic cyst adjacent to   gallbladder fossa.    SPLEEN: Unremarkable.    PANCREAS: Unremarkable.    ADRENAL GLANDS: Unremarkable.    KIDNEYS: No obstructing calculus or hydronephrosis. Symmetric renal   enhancement. Redemonstrated subcentimeter right renal hypodensity too   small to characterize.    ABDOMINOPELVIC NODES: Unremarkable.    PELVIC ORGANS: The urinary bladder is partially distended and appears   similar to the prior exam.    PERITONEUM/MESENTERY/BOWEL: No bowel obstruction or wall thickening. No   pneumoperitoneum or ascites.    BONES/SOFT TISSUES: No acute osseous abnormality. Degenerative changes of   both hips and lower lumbar spine. Bilateral fat-containing inguinal   hernias. Small fat-containing umbilical hernia. Anterior abdominal wall   mesh is noted. Small nodule with a metallic clip is noted in the right   inferior breast, unchanged    VASCULAR: Abdominal aorta is normal in caliber.      IMPRESSION:      No acute intra-abdominal or pelvic pathology.      --- End of Report ---            KATALINA WALLACE MD; Attending Radiologist  This document has been electronically signed. Jan 24 2023  3:21AM    < end of copied text >

## 2023-01-25 NOTE — CONSULT NOTE ADULT - ASSESSMENT
47yFemale pmh GERD, chronic back pain, history of lap choley 2014 presents for n/v and diarrhea for 3 days. Patient reports diarrhea has resolved but she was still vomiting anything PO as of last night.     Problem 1-Persisent n/v  Patient being worked up for sphincter of oddi dysfunction by Dr. Franklin  last EGD prior to pandemic showed gastritis by Dr. Franklin  ddx esophagitis, gastritis, PUD, sphincter of ODDi dysfunction   Rec  -zofran 8mg IV TID  -clear liquids today  -Will try for EGD tomorrow, booked case  -The benefits of endoscopy as well as the risks, such as GI bleeding, aspiration pneumonia, perforation, damage or loss of teeth, reaction to medication, or death  were reviewed with the patient  -PPI BID  - Follow up with our GI office located on [address] [place] [address], Phone number [phone] with Dr. German     Problem 2-CRC screening   Rec  -Follow up with patient's private GI Dr. Franklin to schedule CRC screening Colonoscopy  47yFemale pmh GERD, chronic back pain, history of lap choley 2014 presents for n/v and diarrhea for 3 days. Patient reports diarrhea has resolved but she was still vomiting anything PO as of last night. Patient passing flatus.    Problem 1-Persisent n/v  Patient being worked up for sphincter of oddi dysfunction by Dr. Franklin  last EGD prior to pandemic showed gastritis by Dr. Franklin  ddx esophagitis, gastritis, PUD, sphincter of ODDi dysfunction, gastroenteritis   Rec  -can check RUQ ultrasound   -zofran 8mg IV TID  -clear liquids today  -Will try for EGD tomorrow, booked case  -The benefits of endoscopy as well as the risks, such as GI bleeding, aspiration pneumonia, perforation, damage or loss of teeth, reaction to medication, or death  were reviewed with the patient  -PPI BID  - Follow up with our GI office located on [address] [place] [address], Phone number [phone] with Dr. German     Problem 2-CRC screening   Rec  -Follow up with patient's private GI Dr. Franklin to schedule CRC screening Colonoscopy  47yFemale pmh GERD, chronic back pain, history of lap choley 2014 presents for n/v and diarrhea for 3 days. Patient reports diarrhea has resolved but she was still vomiting anything PO as of last night. Patient passing flatus.    Problem 1-Persisent n/v  Patient being worked up for sphincter of oddi dysfunction by Dr. Franklin  last EGD prior to pandemic showed gastritis by Dr. Franklin  ddx esophagitis, gastritis, PUD, sphincter of ODDi dysfunction, gastroenteritis   Rec  -can check RUQ ultrasound   -zofran 8mg IV TID  -clear liquids today  -EGD Friday, if patient unable to tolerate diet by tomorrow   -The benefits of endoscopy as well as the risks, such as GI bleeding, aspiration pneumonia, perforation, damage or loss of teeth, reaction to medication, or death  were reviewed with the patient  -PPI BID  - Follow up with our GI office located on [address] [place] [address], Phone number [phone] with Dr. German     Problem 2-CRC screening   Rec  -Follow up with patient's private GI Dr. Franklin to schedule CRC screening Colonoscopy

## 2023-01-26 ENCOUNTER — TRANSCRIPTION ENCOUNTER (OUTPATIENT)
Age: 48
End: 2023-01-26

## 2023-01-26 VITALS
HEART RATE: 80 BPM | TEMPERATURE: 97 F | RESPIRATION RATE: 16 BRPM | DIASTOLIC BLOOD PRESSURE: 92 MMHG | SYSTOLIC BLOOD PRESSURE: 142 MMHG

## 2023-01-26 DIAGNOSIS — K21.9 GASTRO-ESOPHAGEAL REFLUX DISEASE WITHOUT ESOPHAGITIS: ICD-10-CM

## 2023-01-26 DIAGNOSIS — R10.9 UNSPECIFIED ABDOMINAL PAIN: ICD-10-CM

## 2023-01-26 DIAGNOSIS — E03.9 HYPOTHYROIDISM, UNSPECIFIED: ICD-10-CM

## 2023-01-26 DIAGNOSIS — G89.29 OTHER CHRONIC PAIN: ICD-10-CM

## 2023-01-26 DIAGNOSIS — Z88.6 ALLERGY STATUS TO ANALGESIC AGENT: ICD-10-CM

## 2023-01-26 DIAGNOSIS — M54.9 DORSALGIA, UNSPECIFIED: ICD-10-CM

## 2023-01-26 DIAGNOSIS — F32.A DEPRESSION, UNSPECIFIED: ICD-10-CM

## 2023-01-26 DIAGNOSIS — G43.909 MIGRAINE, UNSPECIFIED, NOT INTRACTABLE, WITHOUT STATUS MIGRAINOSUS: ICD-10-CM

## 2023-01-26 DIAGNOSIS — E78.5 HYPERLIPIDEMIA, UNSPECIFIED: ICD-10-CM

## 2023-01-26 DIAGNOSIS — R11.2 NAUSEA WITH VOMITING, UNSPECIFIED: ICD-10-CM

## 2023-01-26 DIAGNOSIS — Z91.012 ALLERGY TO EGGS: ICD-10-CM

## 2023-01-26 DIAGNOSIS — T50.995A ADVERSE EFFECT OF OTHER DRUGS, MEDICAMENTS AND BIOLOGICAL SUBSTANCES, INITIAL ENCOUNTER: ICD-10-CM

## 2023-01-26 DIAGNOSIS — Z88.9 ALLERGY STATUS TO UNSPECIFIED DRUGS, MEDICAMENTS AND BIOLOGICAL SUBSTANCES: ICD-10-CM
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
## 2023-01-26 LAB
ALBUMIN SERPL ELPH-MCNC: 4.4 G/DL — SIGNIFICANT CHANGE UP (ref 3.5–5.2)
ALP SERPL-CCNC: 88 U/L — SIGNIFICANT CHANGE UP (ref 30–115)
ALT FLD-CCNC: 31 U/L — SIGNIFICANT CHANGE UP (ref 0–41)
ANION GAP SERPL CALC-SCNC: 11 MMOL/L — SIGNIFICANT CHANGE UP (ref 7–14)
AST SERPL-CCNC: 36 U/L — SIGNIFICANT CHANGE UP (ref 0–41)
BASOPHILS # BLD AUTO: 0 K/UL — SIGNIFICANT CHANGE UP (ref 0–0.2)
BASOPHILS NFR BLD AUTO: 0 % — SIGNIFICANT CHANGE UP (ref 0–1)
BILIRUB SERPL-MCNC: 0.5 MG/DL — SIGNIFICANT CHANGE UP (ref 0.2–1.2)
BUN SERPL-MCNC: 4 MG/DL — LOW (ref 10–20)
CALCIUM SERPL-MCNC: 9.3 MG/DL — SIGNIFICANT CHANGE UP (ref 8.4–10.5)
CHLORIDE SERPL-SCNC: 104 MMOL/L — SIGNIFICANT CHANGE UP (ref 98–110)
CO2 SERPL-SCNC: 25 MMOL/L — SIGNIFICANT CHANGE UP (ref 17–32)
CREAT SERPL-MCNC: 0.6 MG/DL — LOW (ref 0.7–1.5)
EGFR: 113 ML/MIN/1.73M2 — SIGNIFICANT CHANGE UP
EOSINOPHIL # BLD AUTO: 0 K/UL — SIGNIFICANT CHANGE UP (ref 0–0.7)
EOSINOPHIL NFR BLD AUTO: 0 % — SIGNIFICANT CHANGE UP (ref 0–8)
GLUCOSE SERPL-MCNC: 99 MG/DL — SIGNIFICANT CHANGE UP (ref 70–99)
HCT VFR BLD CALC: 35.3 % — LOW (ref 37–47)
HGB BLD-MCNC: 11.5 G/DL — LOW (ref 12–16)
IMM GRANULOCYTES NFR BLD AUTO: 0.3 % — SIGNIFICANT CHANGE UP (ref 0.1–0.3)
LYMPHOCYTES # BLD AUTO: 1.59 K/UL — SIGNIFICANT CHANGE UP (ref 1.2–3.4)
LYMPHOCYTES # BLD AUTO: 23.4 % — SIGNIFICANT CHANGE UP (ref 20.5–51.1)
MAGNESIUM SERPL-MCNC: 2.1 MG/DL — SIGNIFICANT CHANGE UP (ref 1.8–2.4)
MCHC RBC-ENTMCNC: 27.3 PG — SIGNIFICANT CHANGE UP (ref 27–31)
MCHC RBC-ENTMCNC: 32.6 G/DL — SIGNIFICANT CHANGE UP (ref 32–37)
MCV RBC AUTO: 83.8 FL — SIGNIFICANT CHANGE UP (ref 81–99)
MONOCYTES # BLD AUTO: 0.47 K/UL — SIGNIFICANT CHANGE UP (ref 0.1–0.6)
MONOCYTES NFR BLD AUTO: 6.9 % — SIGNIFICANT CHANGE UP (ref 1.7–9.3)
NEUTROPHILS # BLD AUTO: 4.72 K/UL — SIGNIFICANT CHANGE UP (ref 1.4–6.5)
NEUTROPHILS NFR BLD AUTO: 69.4 % — SIGNIFICANT CHANGE UP (ref 42.2–75.2)
NRBC # BLD: 0 /100 WBCS — SIGNIFICANT CHANGE UP (ref 0–0)
PHOSPHATE SERPL-MCNC: 4 MG/DL — SIGNIFICANT CHANGE UP (ref 2.1–4.9)
PLATELET # BLD AUTO: 267 K/UL — SIGNIFICANT CHANGE UP (ref 130–400)
POTASSIUM SERPL-MCNC: 3.4 MMOL/L — LOW (ref 3.5–5)
POTASSIUM SERPL-SCNC: 3.4 MMOL/L — LOW (ref 3.5–5)
PROT SERPL-MCNC: 6.4 G/DL — SIGNIFICANT CHANGE UP (ref 6–8)
RBC # BLD: 4.21 M/UL — SIGNIFICANT CHANGE UP (ref 4.2–5.4)
RBC # FLD: 13.2 % — SIGNIFICANT CHANGE UP (ref 11.5–14.5)
SARS-COV-2 RNA SPEC QL NAA+PROBE: SIGNIFICANT CHANGE UP
SODIUM SERPL-SCNC: 140 MMOL/L — SIGNIFICANT CHANGE UP (ref 135–146)
WBC # BLD: 6.8 K/UL — SIGNIFICANT CHANGE UP (ref 4.8–10.8)
WBC # FLD AUTO: 6.8 K/UL — SIGNIFICANT CHANGE UP (ref 4.8–10.8)

## 2023-01-26 PROCEDURE — 99232 SBSQ HOSP IP/OBS MODERATE 35: CPT

## 2023-01-26 PROCEDURE — 99238 HOSP IP/OBS DSCHRG MGMT 30/<: CPT

## 2023-01-26 RX ORDER — POTASSIUM CHLORIDE 20 MEQ
20 PACKET (EA) ORAL EVERY 4 HOURS
Refills: 0 | Status: COMPLETED | OUTPATIENT
Start: 2023-01-26 | End: 2023-01-26

## 2023-01-26 RX ORDER — DIPHENHYDRAMINE HCL 50 MG
25 CAPSULE ORAL EVERY 6 HOURS
Refills: 0 | Status: DISCONTINUED | OUTPATIENT
Start: 2023-01-26 | End: 2023-01-26

## 2023-01-26 RX ORDER — ONDANSETRON 8 MG/1
1 TABLET, FILM COATED ORAL
Qty: 0 | Refills: 0 | DISCHARGE
Start: 2023-01-26

## 2023-01-26 RX ORDER — POTASSIUM CHLORIDE 20 MEQ
20 PACKET (EA) ORAL
Refills: 0 | Status: DISCONTINUED | OUTPATIENT
Start: 2023-01-26 | End: 2023-01-26

## 2023-01-26 RX ORDER — HYDROXYZINE HCL 10 MG
25 TABLET ORAL EVERY 6 HOURS
Refills: 0 | Status: DISCONTINUED | OUTPATIENT
Start: 2023-01-26 | End: 2023-01-26

## 2023-01-26 RX ADMIN — Medication 50 MILLIEQUIVALENT(S): at 11:18

## 2023-01-26 RX ADMIN — ONDANSETRON 8 MILLIGRAM(S): 8 TABLET, FILM COATED ORAL at 06:04

## 2023-01-26 RX ADMIN — MORPHINE SULFATE 2 MILLIGRAM(S): 50 CAPSULE, EXTENDED RELEASE ORAL at 06:47

## 2023-01-26 RX ADMIN — PANTOPRAZOLE SODIUM 40 MILLIGRAM(S): 20 TABLET, DELAYED RELEASE ORAL at 05:58

## 2023-01-26 RX ADMIN — MORPHINE SULFATE 2 MILLIGRAM(S): 50 CAPSULE, EXTENDED RELEASE ORAL at 11:22

## 2023-01-26 RX ADMIN — MORPHINE SULFATE 2 MILLIGRAM(S): 50 CAPSULE, EXTENDED RELEASE ORAL at 11:46

## 2023-01-26 RX ADMIN — ONDANSETRON 8 MILLIGRAM(S): 8 TABLET, FILM COATED ORAL at 13:29

## 2023-01-26 RX ADMIN — Medication 75 MICROGRAM(S): at 05:58

## 2023-01-26 RX ADMIN — Medication 0.5 MILLIGRAM(S): at 06:02

## 2023-01-26 RX ADMIN — MORPHINE SULFATE 2 MILLIGRAM(S): 50 CAPSULE, EXTENDED RELEASE ORAL at 06:02

## 2023-01-26 RX ADMIN — CITALOPRAM 40 MILLIGRAM(S): 10 TABLET, FILM COATED ORAL at 11:19

## 2023-01-26 RX ADMIN — Medication 25 MILLIGRAM(S): at 09:06

## 2023-01-26 RX ADMIN — Medication 25 MILLIGRAM(S): at 06:42

## 2023-01-26 RX ADMIN — Medication 10 MILLIGRAM(S): at 11:18

## 2023-01-26 NOTE — DISCHARGE NOTE PROVIDER - NSDCCPCAREPLAN_GEN_ALL_CORE_FT
PRINCIPAL DISCHARGE DIAGNOSIS  Diagnosis: Nausea & vomiting  Assessment and Plan of Treatment: You have been scheduled for an EGD this Tuesday as per GI.

## 2023-01-26 NOTE — DISCHARGE NOTE PROVIDER - HOSPITAL COURSE
48 y/o female with PMHx GERD, chronic migraines, and chronic back pain which she takes percocet for presenting for epigastric abdominal pain radiating to the right flank with worsening nausea, vomiting, and diarrhea x 3 days ago. Pt states she has not been able to take her PO medications due to vomiting. She admits to multiple episodes of D/V but did not count how many. She states eating food will sometimes aggravate her nausea but has had a loss of her normal appetite since symptoms began. She is able to tolerate PO fluids. She denies any other symptoms of fever, chills, cough, sore throat, Cp, SoB, or urinary complaints.  Pt returns with continued N/V within 24 hours.  Of note, patient claims she was NEVER on Ozempic though this has been documented previously.    Problem 1-Persisent n/v  Patient being worked up for sphincter of oddi dysfunction by Dr. Franklin  last EGD prior to pandemic showed gastritis by Dr. Franklin  ddx esophagitis, gastritis, PUD, sphincter of ODDi dysfunction, gastroenteritis   GI consult appreciated.  Patient has tolerated regular diet and is ready for discharge  Plan is for outpatient EGD this Tuesday with Dr. Varner as per GI    Pt has Hx of chronic urticaria and had an episode of lip swelling this AM which responded to antihistamines    Patient stable for DC home with outpatient follow up

## 2023-01-26 NOTE — DISCHARGE NOTE PROVIDER - NSDCMRMEDTOKEN_GEN_ALL_CORE_FT
Allegra 180 mg oral tablet: 1 tab(s) orally once a day  citalopram 40 mg oral tablet: 1 tab(s) orally once a day (in the morning)  Crestor 5 mg oral tablet: 1 tab(s) orally once a day  Fioricet oral capsule: 1 cap(s) orally every 4 hours, As Needed for migraine  levothyroxine 75 mcg (0.075 mg) oral capsule: 1 cap(s) orally once a day  morphine 15 mg oral tablet: 1 tab(s) orally once a day, As Needed for pain  ondansetron 8 mg oral tablet: 1 tab(s) orally 3 times a day  oxybutynin 5 mg oral tablet: 2 tab(s) orally once a day  pantoprazole 40 mg oral delayed release tablet: 1 tab(s) orally once a day  Percocet 10/325 oral tablet: 1 tab(s) orally every 6 hours, As Needed for pain  Xanax 0.5 mg oral tablet: 1 tab(s) orally 4 times a day, As Needed

## 2023-01-26 NOTE — PROGRESS NOTE ADULT - NS ATTEND AMEND GEN_ALL_CORE FT
Pt reporting recent worsening n/v and epigastric discomfort, currently has improved with conservative measures. She notes urticaria developing after eating citrus yesterday also improving today. If continued improvement can plan for outpt EGD next week, otherwise if remains inpatient will plan for EGD next week. Pt agreeable.

## 2023-01-26 NOTE — PROGRESS NOTE ADULT - ASSESSMENT
47yFemale pmh GERD, chronic back pain, history of lap choley 2014 presents for n/v and diarrhea for 3 days. Patient reports diarrhea has resolved but she was still vomiting anything PO as of last night. Patient passing flatus.    Problem 1-Persisent n/v  Patient being worked up for sphincter of oddi dysfunction by Dr. Franklin  last EGD prior to pandemic showed gastritis by Dr. Franklin  ddx esophagitis, gastritis, PUD, sphincter of ODDi dysfunction, gastroenteritis   Rec  -RUQ ultrasound appreciated   -zofran prn  -diet as tolerated   -will see patient with Dr. Varner today and work to schedule outpatient EGD next week with Dr. Varner  -PPI BID      Problem 2-CRC screening   Rec  -Follow up with patient's private GI Dr. Franklin to schedule CRC screening Colonoscopy    47yFemale pmh GERD, chronic back pain, history of lap choley 2014 presents for n/v and diarrhea for 3 days. Patient reports diarrhea has resolved but she was still vomiting anything PO as of last night. Patient passing flatus.    Problem 1-Persisent n/v  Patient being worked up for sphincter of oddi dysfunction by Dr. Franklin  last EGD prior to pandemic showed gastritis by Dr. Franklin  ddx esophagitis, gastritis, PUD, sphincter of ODDi dysfunction, gastroenteritis   Rec  -RUQ ultrasound appreciated   -zofran prn  -diet as tolerated   -EGD Tuesday as an outpatient with Dr. Varner, case booked with office, patient swabbed for COVID-19 to avoid patient needing to go on weekend for COVID-19 swab  -I confirmed with [facility] that swabbing patient today is okay for patient's procedure Tuesday  -PPI BID    Problem 2-CRC screening   Rec  -Follow up with patient's private GI Dr. Franklin to schedule CRC screening Colonoscopy    47yFemale pmh GERD, chronic back pain, history of lap choley 2014 presents for n/v and diarrhea for 3 days. Patient reports diarrhea has resolved but she was still vomiting anything PO as of last night. Patient passing flatus.    Problem 1-Persisent n/v, epigastric discomfort  Patient being worked up for sphincter of oddi dysfunction by Dr. Franklin  last EGD prior to pandemic showed gastritis by Dr. Franklin  ddx esophagitis, gastritis, PUD, sphincter of ODDi dysfunction, gastroenteritis   Rec  -RUQ ultrasound appreciated   -zofran prn  -diet as tolerated   -EGD Tuesday as an outpatient with Dr. Varner, case booked with office, patient swabbed for COVID-19 to avoid patient needing to go on weekend for COVID-19 swab  -I confirmed with [facility] that swabbing patient today is okay for patient's procedure Tuesday  -PPI BID  -Stool studies if loose stool  -If remains inpatient will plan for EGD early next week    Problem 2-CRC screening   Rec  -Follow up with patient's private GI Dr. Franklin to schedule CRC screening Colonoscopy

## 2023-01-26 NOTE — PROGRESS NOTE ADULT - SUBJECTIVE AND OBJECTIVE BOX
47yFemale  Being followed for n/v  Interval history: Patient denies nausea, vomiting, hematemesis, melena, blood in stool, diarrhea, constipation, abdominal pain. Patient feeling much better, now developing appetite again.      PAST MEDICAL & SURGICAL HISTORY:  GERD (gastroesophageal reflux disease)      Migraine headache      Back pain      Anxiety      Kidney stones      Murmur  since age 20     6/18      Thyroid nodule      History of surgery  hernia repair    x2      History of surgery  cysto bladder stone removed      History of surgery  lithotripsy  x 2                Social History: No smoking. No alcohol. No illegal drug use.            MEDICATIONS  (STANDING):  atorvastatin 20 milliGRAM(s) Oral at bedtime  citalopram 40 milliGRAM(s) Oral daily  lactated ringers. 1000 milliLiter(s) (100 mL/Hr) IV Continuous <Continuous>  levothyroxine 75 MICROGram(s) Oral daily  ondansetron Injectable 8 milliGRAM(s) IV Push every 8 hours  oxybutynin 10 milliGRAM(s) Oral daily  pantoprazole  Injectable 40 milliGRAM(s) IV Push two times a day  potassium chloride   Powder 20 milliEquivalent(s) Oral every 4 hours    MEDICATIONS  (PRN):  acetaminophen     Tablet .. 650 milliGRAM(s) Oral every 6 hours PRN Temp greater or equal to 38C (100.4F), Mild Pain (1 - 3)  ALPRAZolam 0.5 milliGRAM(s) Oral four times a day PRN anxiety  aluminum hydroxide/magnesium hydroxide/simethicone Suspension 30 milliLiter(s) Oral every 4 hours PRN Dyspepsia  diphenhydrAMINE 25 milliGRAM(s) Oral every 6 hours PRN Rash and/or Itching  hydrOXYzine hydrochloride 25 milliGRAM(s) Oral every 6 hours PRN Itching  melatonin 5 milliGRAM(s) Oral at bedtime PRN Insomnia  morphine  - Injectable 2 milliGRAM(s) IV Push every 4 hours PRN Mild Pain (1 - 3)  oxycodone    5 mG/acetaminophen 325 mG 2 Tablet(s) Oral every 4 hours PRN Moderate Pain (4 - 6)      Allergies:   Bananas (Unknown)  Cipro (Unknown)  citrus (Unknown)  eggs (Unknown)  NSAIDs (Unknown)            REVIEW OF SYSTEMS:  General:  No weight loss, fevers, or chills.  Eyes:  No reported pain or visual changes  ENT:  No sore throat or runny nose.  NECK: No stiffness   CV:  No chest pain or palpitations.  Resp:  No shortness of breath, cough  GI:  No abdominal pain, nausea, vomiting, dysphagia, diarrhea or constipation. No rectal bleeding, melena, or hematemesis.  Muscle:  No aches or weakness  Neuro:  No tingling, numbness         VITAL SIGNS:   T(F): 97.3 (01-26-23 @ 06:05), Max: 97.3 (01-26-23 @ 06:05)  HR: 61 (01-26-23 @ 06:05) (57 - 65)  BP: 153/81 (01-26-23 @ 06:05) (153/81 - 160/85)  RR: 18 (01-25-23 @ 21:36) (18 - 18)  SpO2: --    PHYSICAL EXAM:  GENERAL: AAOx3, no acute distress.  HEAD:  Atraumatic, Normocephalic  EYES: conjunctiva and sclera clear  NECK: Supple, no JVD or thyromegaly  CHEST/LUNG: Clear to auscultation bilaterally; No wheeze, rhonchi, or rales  HEART: Regular rate and rhythm; normal S1, S2, No murmurs.  ABDOMEN: Soft, nontender, nondistended; Bowel sounds present  NEUROLOGY: No asterixis or tremor.   SKIN: Intact, no jaundice            LABS:                        11.5   6.80  )-----------( 267      ( 26 Jan 2023 07:05 )             35.3     01-26    140  |  104  |  4<L>  ----------------------------<  99  3.4<L>   |  25  |  0.6<L>    Ca    9.3      26 Jan 2023 07:05  Phos  4.0     01-26  Mg     2.1     01-26    TPro  6.4  /  Alb  4.4  /  TBili  0.5  /  DBili  x   /  AST  36  /  ALT  31  /  AlkPhos  88  01-26    LIVER FUNCTIONS - ( 26 Jan 2023 07:05 )  Alb: 4.4 g/dL / Pro: 6.4 g/dL / ALK PHOS: 88 U/L / ALT: 31 U/L / AST: 36 U/L / GGT: x               IMAGING:    < from: Xray Chest 1 View- PORTABLE-Urgent (01.24.23 @ 02:04) >  ACC: [id number] EXAM:  XR CHEST PORTABLE URGENT 1V   ORDERED BY: HALIMA BECKER     PROCEDURE DATE:  01/24/2023          INTERPRETATION:  Clinical History / Reason for exam: Pain    Comparison : Chest radiograph None.    Technique/Positioning: Single AP chest radiograph.    Findings:    Support devices: None.    Cardiac/mediastinum/hilum: Unremarkable.    Lung parenchyma/Pleura: No focal consolidation, effusion or pneumothorax.    Skeleton/soft tissues: No acute osseous abnormality    Impression:    No radiographic evidence of acute cardiopulmonary disease.        --- End of Report ---            KOBY ROLON MD; Attending Radiologist  This document has been electronically signed. Jan 24 2023  8:14AM    < end of copied text >    < from: US Abdomen Complete (US Abdomen Complete .) (01.25.23 @ 11:50) >  ACC: [id number] EXAM:  US ABDOMEN COMPLETE   ORDERED BY: ANTIONETTE MCADAMS     PROCEDURE DATE:  01/25/2023          INTERPRETATION:  CLINICAL STATEMENT: Vague abnormal pain requiring   opioids.    COMPARISON: October 9, 2018 and CT abdomen pelvis January 24, 2023.    PROCEDURE: Ultrasound of the abdomen was performed.    FINDINGS:    LIVER:  The liver is normal in contour and echogenicity measuring 14.8 cm   in length. No suspicious masses. Reidentified is a left hepatic lobe cyst   measuring 1.8 cm.    GALLBLADDER/BILIARY TREE:  Post cholecystectomy.  No biliary ductal   dilation. Common bile duct measures 8 mm, which can be normal finding   after cholecystectomy.    PANCREAS:  Visualized head and body are unremarkable. Remainder obscured   by overlying bowel gas.    SPLEEN:  Unremarkable.    KIDNEYS:  Right kidney measures 12.2 cm and left kidney measures 10.8 cm   in length.  No hydronephrosis, stone or solid mass.    AORTA/IVC:  Visualized proximal portions unremarkable.    ASCITES:None.    IMPRESSION:    Post cholecystectomy.    Left hepatic lobe simple cyst.    --- End of Report ---            RAKESH KABA MD; Attending Radiologist  This document has been electronically signed. Jan 25 2023  1:50PM    < end of copied text >         47yFemale  Being followed for n/v  Interval history: Patient denies nausea, vomiting, hematemesis, melena, blood in stool, diarrhea, constipation, abdominal pain. Patient feeling much better, now developing appetite again. Tolerating diet.       PAST MEDICAL & SURGICAL HISTORY:  GERD (gastroesophageal reflux disease)      Migraine headache      Back pain      Anxiety      Kidney stones      Murmur  since age 20     6/18      Thyroid nodule      History of surgery  hernia repair    x2      History of surgery  cysto bladder stone removed      History of surgery  lithotripsy  x 2                Social History: No smoking. No alcohol. No illegal drug use.            MEDICATIONS  (STANDING):  atorvastatin 20 milliGRAM(s) Oral at bedtime  citalopram 40 milliGRAM(s) Oral daily  lactated ringers. 1000 milliLiter(s) (100 mL/Hr) IV Continuous <Continuous>  levothyroxine 75 MICROGram(s) Oral daily  ondansetron Injectable 8 milliGRAM(s) IV Push every 8 hours  oxybutynin 10 milliGRAM(s) Oral daily  pantoprazole  Injectable 40 milliGRAM(s) IV Push two times a day  potassium chloride   Powder 20 milliEquivalent(s) Oral every 4 hours    MEDICATIONS  (PRN):  acetaminophen     Tablet .. 650 milliGRAM(s) Oral every 6 hours PRN Temp greater or equal to 38C (100.4F), Mild Pain (1 - 3)  ALPRAZolam 0.5 milliGRAM(s) Oral four times a day PRN anxiety  aluminum hydroxide/magnesium hydroxide/simethicone Suspension 30 milliLiter(s) Oral every 4 hours PRN Dyspepsia  diphenhydrAMINE 25 milliGRAM(s) Oral every 6 hours PRN Rash and/or Itching  hydrOXYzine hydrochloride 25 milliGRAM(s) Oral every 6 hours PRN Itching  melatonin 5 milliGRAM(s) Oral at bedtime PRN Insomnia  morphine  - Injectable 2 milliGRAM(s) IV Push every 4 hours PRN Mild Pain (1 - 3)  oxycodone    5 mG/acetaminophen 325 mG 2 Tablet(s) Oral every 4 hours PRN Moderate Pain (4 - 6)      Allergies:   Bananas (Unknown)  Cipro (Unknown)  citrus (Unknown)  eggs (Unknown)  NSAIDs (Unknown)            REVIEW OF SYSTEMS:  General:  No weight loss, fevers, or chills.  Eyes:  No reported pain or visual changes  ENT:  No sore throat or runny nose.  NECK: No stiffness   CV:  No chest pain or palpitations.  Resp:  No shortness of breath, cough  GI:  No abdominal pain, nausea, vomiting, dysphagia, diarrhea or constipation. No rectal bleeding, melena, or hematemesis.  Muscle:  No aches or weakness  Neuro:  No tingling, numbness         VITAL SIGNS:   T(F): 97.3 (01-26-23 @ 06:05), Max: 97.3 (01-26-23 @ 06:05)  HR: 61 (01-26-23 @ 06:05) (57 - 65)  BP: 153/81 (01-26-23 @ 06:05) (153/81 - 160/85)  RR: 18 (01-25-23 @ 21:36) (18 - 18)  SpO2: --    PHYSICAL EXAM:  GENERAL: AAOx3, no acute distress.  HEAD:  Atraumatic, Normocephalic  EYES: conjunctiva and sclera clear  NECK: Supple, no JVD or thyromegaly  CHEST/LUNG: Clear to auscultation bilaterally; No wheeze, rhonchi, or rales  HEART: Regular rate and rhythm; normal S1, S2, No murmurs.  ABDOMEN: Soft, nontender, nondistended; Bowel sounds present  NEUROLOGY: No asterixis or tremor.   SKIN: Intact, no jaundice            LABS:                        11.5   6.80  )-----------( 267      ( 26 Jan 2023 07:05 )             35.3     01-26    140  |  104  |  4<L>  ----------------------------<  99  3.4<L>   |  25  |  0.6<L>    Ca    9.3      26 Jan 2023 07:05  Phos  4.0     01-26  Mg     2.1     01-26    TPro  6.4  /  Alb  4.4  /  TBili  0.5  /  DBili  x   /  AST  36  /  ALT  31  /  AlkPhos  88  01-26    LIVER FUNCTIONS - ( 26 Jan 2023 07:05 )  Alb: 4.4 g/dL / Pro: 6.4 g/dL / ALK PHOS: 88 U/L / ALT: 31 U/L / AST: 36 U/L / GGT: x               IMAGING:    < from: Xray Chest 1 View- PORTABLE-Urgent (01.24.23 @ 02:04) >  ACC: [id number] EXAM:  XR CHEST PORTABLE URGENT 1V   ORDERED BY: HALIMA BECKER     PROCEDURE DATE:  01/24/2023          INTERPRETATION:  Clinical History / Reason for exam: Pain    Comparison : Chest radiograph None.    Technique/Positioning: Single AP chest radiograph.    Findings:    Support devices: None.    Cardiac/mediastinum/hilum: Unremarkable.    Lung parenchyma/Pleura: No focal consolidation, effusion or pneumothorax.    Skeleton/soft tissues: No acute osseous abnormality    Impression:    No radiographic evidence of acute cardiopulmonary disease.        --- End of Report ---            KOBY ROLON MD; Attending Radiologist  This document has been electronically signed. Jan 24 2023  8:14AM    < end of copied text >    < from: US Abdomen Complete (US Abdomen Complete .) (01.25.23 @ 11:50) >  ACC: [id number] EXAM:  US ABDOMEN COMPLETE   ORDERED BY: ANTIONETTE MCADAMS     PROCEDURE DATE:  01/25/2023          INTERPRETATION:  CLINICAL STATEMENT: Vague abnormal pain requiring   opioids.    COMPARISON: October 9, 2018 and CT abdomen pelvis January 24, 2023.    PROCEDURE: Ultrasound of the abdomen was performed.    FINDINGS:    LIVER:  The liver is normal in contour and echogenicity measuring 14.8 cm   in length. No suspicious masses. Reidentified is a left hepatic lobe cyst   measuring 1.8 cm.    GALLBLADDER/BILIARY TREE:  Post cholecystectomy.  No biliary ductal   dilation. Common bile duct measures 8 mm, which can be normal finding   after cholecystectomy.    PANCREAS:  Visualized head and body are unremarkable. Remainder obscured   by overlying bowel gas.    SPLEEN:  Unremarkable.    KIDNEYS:  Right kidney measures 12.2 cm and left kidney measures 10.8 cm   in length.  No hydronephrosis, stone or solid mass.    AORTA/IVC:  Visualized proximal portions unremarkable.    ASCITES:None.    IMPRESSION:    Post cholecystectomy.    Left hepatic lobe simple cyst.    --- End of Report ---            RAKESH KABA MD; Attending Radiologist  This document has been electronically signed. Jan 25 2023  1:50PM    < end of copied text >

## 2023-01-26 NOTE — DISCHARGE NOTE NURSING/CASE MANAGEMENT/SOCIAL WORK - PATIENT PORTAL LINK FT
You can access the FollowMyHealth Patient Portal offered by [facility] by registering at the following website: [email or web address]. By joining [employer]’s FollowMyHealth portal, you will also be able to view your health information using other applications (apps) compatible with our system.

## 2023-01-26 NOTE — DISCHARGE NOTE PROVIDER - CARE PROVIDER_API CALL
Sarah Varner)  Gastroenterology; Internal Medicine  [address] [place] [address]  Phone: [phone]  Fax: [phone]  Follow Up Time:

## 2023-01-31 ENCOUNTER — TRANSCRIPTION ENCOUNTER (OUTPATIENT)
Age: 48
End: 2023-01-31

## 2023-01-31 ENCOUNTER — RESULT REVIEW (OUTPATIENT)
Age: 48
End: 2023-01-31

## 2023-01-31 ENCOUNTER — OUTPATIENT (OUTPATIENT)
Dept: OUTPATIENT SERVICES | Facility: HOSPITAL | Age: 48
LOS: 1 days | Discharge: HOME | End: 2023-01-31
Payer: MEDICAID

## 2023-01-31 VITALS
RESPIRATION RATE: 20 BRPM | WEIGHT: 210.1 LBS | DIASTOLIC BLOOD PRESSURE: 102 MMHG | TEMPERATURE: 98 F | HEART RATE: 91 BPM | SYSTOLIC BLOOD PRESSURE: 136 MMHG | HEIGHT: 64 IN

## 2023-01-31 VITALS
SYSTOLIC BLOOD PRESSURE: 109 MMHG | HEART RATE: 73 BPM | RESPIRATION RATE: 14 BRPM | DIASTOLIC BLOOD PRESSURE: 68 MMHG | OXYGEN SATURATION: 98 %

## 2023-01-31 DIAGNOSIS — Z88.1 ALLERGY STATUS TO OTHER ANTIBIOTIC AGENTS STATUS: ICD-10-CM

## 2023-01-31 DIAGNOSIS — E78.5 HYPERLIPIDEMIA, UNSPECIFIED: ICD-10-CM

## 2023-01-31 DIAGNOSIS — E03.9 HYPOTHYROIDISM, UNSPECIFIED: ICD-10-CM

## 2023-01-31 DIAGNOSIS — Z79.890 HORMONE REPLACEMENT THERAPY: ICD-10-CM

## 2023-01-31 DIAGNOSIS — F32.A DEPRESSION, UNSPECIFIED: ICD-10-CM

## 2023-01-31 DIAGNOSIS — R10.13 EPIGASTRIC PAIN: ICD-10-CM

## 2023-01-31 DIAGNOSIS — R11.2 NAUSEA WITH VOMITING, UNSPECIFIED: ICD-10-CM

## 2023-01-31 DIAGNOSIS — Z88.6 ALLERGY STATUS TO ANALGESIC AGENT: ICD-10-CM

## 2023-01-31 DIAGNOSIS — Z98.890 OTHER SPECIFIED POSTPROCEDURAL STATES: Chronic | ICD-10-CM

## 2023-01-31 DIAGNOSIS — E83.42 HYPOMAGNESEMIA: ICD-10-CM

## 2023-01-31 DIAGNOSIS — K21.9 GASTRO-ESOPHAGEAL REFLUX DISEASE WITHOUT ESOPHAGITIS: ICD-10-CM

## 2023-01-31 DIAGNOSIS — G43.709 CHRONIC MIGRAINE WITHOUT AURA, NOT INTRACTABLE, WITHOUT STATUS MIGRAINOSUS: ICD-10-CM

## 2023-01-31 DIAGNOSIS — F41.9 ANXIETY DISORDER, UNSPECIFIED: ICD-10-CM

## 2023-01-31 DIAGNOSIS — D64.9 ANEMIA, UNSPECIFIED: ICD-10-CM

## 2023-01-31 DIAGNOSIS — Z91.012 ALLERGY TO EGGS: ICD-10-CM

## 2023-01-31 PROCEDURE — 88305 TISSUE EXAM BY PATHOLOGIST: CPT | Mod: 26

## 2023-01-31 PROCEDURE — 88312 SPECIAL STAINS GROUP 1: CPT | Mod: 26

## 2023-01-31 PROCEDURE — 43239 EGD BIOPSY SINGLE/MULTIPLE: CPT

## 2023-01-31 NOTE — ASU PATIENT PROFILE, ADULT - FALL HARM RISK - UNIVERSAL INTERVENTIONS
Bed in lowest position, wheels locked, appropriate side rails in place/Call bell, personal items and telephone in reach/Instruct patient to call for assistance before getting out of bed or chair/Non-slip footwear when patient is out of bed/[place] to call system/Physically safe environment - no spills, clutter or unnecessary equipment/Purposeful Proactive Rounding/Room/bathroom lighting operational, light cord in reach

## 2023-01-31 NOTE — H&P PST ADULT - HISTORY OF PRESENT ILLNESS
47yFemale pmh GERD, chronic back pain, history of lap nataly 2014 recently admitted to the hospital for n/v and diarrhea for 3 days here for EGD for persistent nausea, vomiting and epigastric discomfort

## 2023-01-31 NOTE — ASU DISCHARGE PLAN (ADULT/PEDIATRIC) - "IF YOU OR YOUR GUARDIAN/FAMILY IS A SMOKER, IT IS IMPORTANT FOR YOUR HEALTH TO STOP SMOKING. PLEASE BE AWARE THAT SECOND HAND SMOKE IS ALSO HARMFUL."
Lipids look good. CV risk of 1.4%. No indication for statin therapy at this time. Continue low cholesterol diet and regular exercise.
Statement Selected

## 2023-01-31 NOTE — CHART NOTE - NSCHARTNOTEFT_GEN_A_CORE
PACU ANESTHESIA ADMISSION NOTE      Procedure: EGD with Bx  Post op diagnosis:      ____  Intubated  TV:______       Rate: ______      FiO2: ______    _x___  Patent Airway    _x___  Full return of protective reflexes    ____  Full recovery from anesthesia / back to baseline status    Vitals:            T:   97.7             BP : 104/74               R:  16            Sat:  97%             P: 84      Mental Status:  _x___ Awake   _____ Alert   _____ Drowsy   _____ Sedated    Nausea/Vomiting:  _x___  NO       ______Yes,   See Post - Op Orders         Pain Scale (0-10):  __0___    Treatment: _x___ None    ____ See Post - Op/PCA Orders    Post - Operative Fluids:   __x__ Oral   ____ See Post - Op Orders    Plan: Discharge:   _x___Home       _____Floor     _____Critical Care    _____  Other:_________________    Comments:  No anesthesia issues or complications noted.  Discharge when criteria met.

## 2023-01-31 NOTE — ASU DISCHARGE PLAN (ADULT/PEDIATRIC) - ASU DC SPECIAL INSTRUCTIONSFT
- Follow up with our [facility] located at [address] [place] [address]. Phone Number: [phone]

## 2023-02-02 LAB — SURGICAL PATHOLOGY STUDY: SIGNIFICANT CHANGE UP

## 2023-02-06 DIAGNOSIS — K29.80 DUODENITIS WITHOUT BLEEDING: ICD-10-CM

## 2023-02-06 DIAGNOSIS — M54.9 DORSALGIA, UNSPECIFIED: ICD-10-CM

## 2023-02-06 DIAGNOSIS — R10.13 EPIGASTRIC PAIN: ICD-10-CM

## 2023-02-06 DIAGNOSIS — F41.9 ANXIETY DISORDER, UNSPECIFIED: ICD-10-CM

## 2023-02-06 DIAGNOSIS — K29.50 UNSPECIFIED CHRONIC GASTRITIS WITHOUT BLEEDING: ICD-10-CM

## 2023-02-06 DIAGNOSIS — G43.909 MIGRAINE, UNSPECIFIED, NOT INTRACTABLE, WITHOUT STATUS MIGRAINOSUS: ICD-10-CM

## 2023-02-06 DIAGNOSIS — Z87.442 PERSONAL HISTORY OF URINARY CALCULI: ICD-10-CM

## 2023-02-06 DIAGNOSIS — K21.00 GASTRO-ESOPHAGEAL REFLUX DISEASE WITH ESOPHAGITIS, WITHOUT BLEEDING: ICD-10-CM

## 2023-02-06 DIAGNOSIS — R11.2 NAUSEA WITH VOMITING, UNSPECIFIED: ICD-10-CM

## 2023-02-06 DIAGNOSIS — R01.1 CARDIAC MURMUR, UNSPECIFIED: ICD-10-CM

## 2023-02-06 DIAGNOSIS — Z88.6 ALLERGY STATUS TO ANALGESIC AGENT: ICD-10-CM

## 2023-02-06 DIAGNOSIS — Z88.1 ALLERGY STATUS TO OTHER ANTIBIOTIC AGENTS STATUS: ICD-10-CM

## 2023-02-06 DIAGNOSIS — G89.29 OTHER CHRONIC PAIN: ICD-10-CM

## 2023-02-28 RX ORDER — OXYCODONE HYDROCHLORIDE AND ACETAMINOPHEN 10; 325 MG/1; MG/1
10-325 TABLET ORAL
Refills: 0 | Status: DISCONTINUED | COMMUNITY
End: 2023-02-28

## 2023-02-28 RX ORDER — ALPRAZOLAM 0.5 MG/1
0.5 TABLET ORAL
Qty: 120 | Refills: 0 | Status: DISCONTINUED | COMMUNITY
Start: 2018-07-02 | End: 2023-02-28

## 2023-02-28 RX ORDER — METOCLOPRAMIDE 10 MG/1
10 TABLET ORAL
Qty: 10 | Refills: 0 | Status: DISCONTINUED | COMMUNITY
Start: 2018-11-07 | End: 2023-02-28

## 2023-02-28 RX ORDER — PANTOPRAZOLE 40 MG/1
40 TABLET, DELAYED RELEASE ORAL
Qty: 30 | Refills: 0 | Status: DISCONTINUED | COMMUNITY
Start: 2018-11-08 | End: 2023-02-28

## 2023-02-28 RX ORDER — RIZATRIPTAN BENZOATE 10 MG/1
10 TABLET, ORALLY DISINTEGRATING ORAL
Qty: 12 | Refills: 0 | Status: DISCONTINUED | COMMUNITY
Start: 2017-11-29 | End: 2023-02-28

## 2023-02-28 RX ORDER — OXYBUTYNIN CHLORIDE 5 MG/1
5 TABLET, EXTENDED RELEASE ORAL
Qty: 180 | Refills: 0 | Status: DISCONTINUED | COMMUNITY
Start: 2019-03-26 | End: 2023-02-28

## 2023-02-28 RX ORDER — SIMVASTATIN 40 MG/1
40 TABLET, FILM COATED ORAL
Refills: 0 | Status: DISCONTINUED | COMMUNITY
End: 2023-02-28

## 2023-03-02 ENCOUNTER — APPOINTMENT (OUTPATIENT)
Dept: GASTROENTEROLOGY | Facility: CLINIC | Age: 48
End: 2023-03-02
Payer: MEDICAID

## 2023-03-02 PROCEDURE — 99214 OFFICE O/P EST MOD 30 MIN: CPT | Mod: 95

## 2023-03-03 ENCOUNTER — APPOINTMENT (OUTPATIENT)
Dept: GASTROENTEROLOGY | Facility: CLINIC | Age: 48
End: 2023-03-03

## 2023-03-03 NOTE — HISTORY OF PRESENT ILLNESS
[Home] : at home, [unfilled] , at the time of the visit. [Medical Office: ([facility])___] : at the medical office located in  [Verbal consent obtained from patient] : the patient, [unfilled] [FreeTextEntry4] : VICKIE FOWLER [FreeTextEntry1] : 47-year-old woman with history of GERD, chronic LBP, migraines, overactive bladder, anxiety, chronic spontaneous urticaria with angioedema, and history of symptomatic gallstones status post cholecystectomy in 2013, being evaluated for right upper quadrant abdominal pain.\par \par Patient was referred by Dr. Franklin for further evaluation for concern of SOD.\par Patient reports  having intermittent right upper quadrant pain that is similar to her prior episode of biliary colic.\par She states that the pain is sharp, radiating to her back sometimes, lasts several hours and could be disabling, exacerbated by fatty meals sometimes, yet happens independent of meals at other times. Pain is not related to BMs. She denies any prior episodes of pancreatitis.\par Otherwise, patient denies any other GI complaints and has no n/v/d, no dysphagia or odynophagia, no heartburn, no weight loss or appetite changes, no hematochezia, no melena, no coffee ground emesis or hematemesis. \par \par ROS otherwise negative.\par \par \par 1/25/2023 RUQ US: Postcholecystectomy, left hepatic lobe simple cyst measuring 1.8 cm, CBD 8 mm\par 1/24/2023 CT abdomen and pelvis with IV contrast: No acute intra-abdominal pelvic pathology.\par Labs from prior admission in January 2023 were reviewed and revealed normal LFTs.\par Hep viral serologies from 2018 showed a weakly positive HCV ab but negative RNA.\par \par PMHx: GERD, LBP, migraines, overactive bladder, anxiety, chronic spontaneous urticaria w/ angioedema\par PSHx: CCY 2013, nephrolithiasis removal\par Fam hx: no fam hx of GI diseases or malignancy\par Social hx: neg x 3\par

## 2023-03-03 NOTE — ASSESSMENT
[FreeTextEntry1] : 47-year-old woman with history of GERD, chronic LBP, migraines, overactive bladder, anxiety, chronic spontaneous urticaria with angioedema, and history of symptomatic gallstones status post cholecystectomy in 2013, being evaluated for right upper quadrant abdominal pain and concern for SOD.\par \par #RUQ abd pain\par pt has a CBD of 8 mm on imaging which is at the cut-off of what would be considered dilated for someone w/ hx of CCY. she has typical biliary type pain that fits the Angel IV criteria. she had transaminitis on prior labs but that is not evident on her most recent labs. picture suggestive of SOD type 2 but cannot r/o choledocholithiasis.\par recent labs from 1/2023 reviewed and grossly unremarkable. \par \par -EUS to r/o CBD stones and other biliary / pancreatic pathology\par -ERCP with empiric biliary sphincterotomy (risks including pancreatitis, and benefits were d.w pt who is agreeable)\par -avoid narcotics as much as possible\par -low fat diet for now\par \par #HCM\par CRC screening w/ primary GI Dr. Franklin

## 2023-03-03 NOTE — PHYSICAL EXAM
[Normal] : alert, normal voice/communication, healthy appearing, no acute distress [Sclera] : the sclera and conjunctiva were normal [Hearing Threshold Finger Rub Not [place]] : hearing was normal [Normal Lips/Gums] : the lips and gums were normal [Normal Appearance] : the appearance of the neck was normal [No Respiratory Distress] : no respiratory distress [No Acc Muscle Use] : no accessory muscle use [Normal Color / Pigmentation] : normal skin color and pigmentation [No Focal Deficits] : no focal deficits [Oriented To Time, Place, And Person] : oriented to person, place, and time

## 2023-03-03 NOTE — END OF VISIT
Social Work Note  10/12/2021      Date of referral: 10/08/2021  Referral received from: KAROLINE Clayton RN  Reason for referral: Assist the patient with the process and completion of ACP. MSW contacted the patient and introduced self, role and reason for call. The patient verified his name and  as identifiers. MSW provided the patient with education on ACP and informed the patient that an information will be mailed to his home address. Home address was confirmed. The patient informed MSW that he had to be transported back to the hospital today. MSW informed the patient that a follow up call will be done to ensure receipt of packet and to schedule a meeting to complete Advanced Medical Directive.
[Time Spent: ___ minutes] : I have spent [unfilled] minutes of time on the encounter.

## 2023-04-11 ENCOUNTER — TRANSCRIPTION ENCOUNTER (OUTPATIENT)
Age: 48
End: 2023-04-11

## 2023-04-11 ENCOUNTER — OUTPATIENT (OUTPATIENT)
Dept: INPATIENT UNIT | Facility: HOSPITAL | Age: 48
LOS: 1 days | Discharge: HOME | End: 2023-04-11
Payer: MEDICAID

## 2023-04-11 ENCOUNTER — RESULT REVIEW (OUTPATIENT)
Age: 48
End: 2023-04-11

## 2023-04-11 VITALS
RESPIRATION RATE: 16 BRPM | DIASTOLIC BLOOD PRESSURE: 63 MMHG | HEART RATE: 73 BPM | SYSTOLIC BLOOD PRESSURE: 124 MMHG | OXYGEN SATURATION: 95 %

## 2023-04-11 VITALS
WEIGHT: 214.95 LBS | SYSTOLIC BLOOD PRESSURE: 120 MMHG | HEART RATE: 94 BPM | DIASTOLIC BLOOD PRESSURE: 100 MMHG | HEIGHT: 63 IN | RESPIRATION RATE: 18 BRPM | TEMPERATURE: 98 F

## 2023-04-11 DIAGNOSIS — Z98.890 OTHER SPECIFIED POSTPROCEDURAL STATES: Chronic | ICD-10-CM

## 2023-04-11 DIAGNOSIS — Z90.49 ACQUIRED ABSENCE OF OTHER SPECIFIED PARTS OF DIGESTIVE TRACT: Chronic | ICD-10-CM

## 2023-04-11 DIAGNOSIS — R10.11 RIGHT UPPER QUADRANT PAIN: ICD-10-CM

## 2023-04-11 PROCEDURE — 88305 TISSUE EXAM BY PATHOLOGIST: CPT | Mod: 26

## 2023-04-11 PROCEDURE — 88312 SPECIAL STAINS GROUP 1: CPT

## 2023-04-11 PROCEDURE — 88305 TISSUE EXAM BY PATHOLOGIST: CPT

## 2023-04-11 PROCEDURE — 43237 ENDOSCOPIC US EXAM ESOPH: CPT

## 2023-04-11 PROCEDURE — 88312 SPECIAL STAINS GROUP 1: CPT | Mod: 26

## 2023-04-11 PROCEDURE — 43239 EGD BIOPSY SINGLE/MULTIPLE: CPT | Mod: XU

## 2023-04-11 PROCEDURE — 81025 URINE PREGNANCY TEST: CPT

## 2023-04-11 RX ORDER — OXYBUTYNIN CHLORIDE 5 MG
2 TABLET ORAL
Qty: 0 | Refills: 0 | DISCHARGE

## 2023-04-11 RX ORDER — PANTOPRAZOLE SODIUM 20 MG/1
1 TABLET, DELAYED RELEASE ORAL
Qty: 0 | Refills: 0 | DISCHARGE

## 2023-04-11 RX ORDER — LEVOTHYROXINE SODIUM 125 MCG
1 TABLET ORAL
Qty: 0 | Refills: 0 | DISCHARGE

## 2023-04-11 RX ORDER — HYDROMORPHONE HYDROCHLORIDE 2 MG/ML
0.5 INJECTION INTRAMUSCULAR; INTRAVENOUS; SUBCUTANEOUS ONCE
Refills: 0 | Status: DISCONTINUED | OUTPATIENT
Start: 2023-04-11 | End: 2023-04-11

## 2023-04-11 RX ORDER — ROSUVASTATIN CALCIUM 5 MG/1
1 TABLET ORAL
Qty: 0 | Refills: 0 | DISCHARGE

## 2023-04-11 RX ORDER — ALPRAZOLAM 0.25 MG
1 TABLET ORAL
Qty: 0 | Refills: 0 | DISCHARGE

## 2023-04-11 RX ORDER — DICYCLOMINE HYDROCHLORIDE 10 MG/1
10 CAPSULE ORAL
Qty: 270 | Refills: 0 | Status: ACTIVE | COMMUNITY
Start: 2023-04-11 | End: 1900-01-01

## 2023-04-11 RX ORDER — OXYCODONE AND ACETAMINOPHEN 5; 325 MG/1; MG/1
1 TABLET ORAL
Qty: 0 | Refills: 0 | DISCHARGE

## 2023-04-11 RX ORDER — MORPHINE SULFATE 50 MG/1
1 CAPSULE, EXTENDED RELEASE ORAL
Qty: 0 | Refills: 0 | DISCHARGE

## 2023-04-11 RX ORDER — CITALOPRAM 10 MG/1
1 TABLET, FILM COATED ORAL
Qty: 0 | Refills: 0 | DISCHARGE

## 2023-04-11 RX ORDER — SODIUM CHLORIDE 9 MG/ML
1000 INJECTION, SOLUTION INTRAVENOUS
Refills: 0 | Status: DISCONTINUED | OUTPATIENT
Start: 2023-04-11 | End: 2023-04-11

## 2023-04-11 RX ORDER — FEXOFENADINE HCL 30 MG
1 TABLET ORAL
Qty: 0 | Refills: 0 | DISCHARGE

## 2023-04-11 RX ADMIN — HYDROMORPHONE HYDROCHLORIDE 0.5 MILLIGRAM(S): 2 INJECTION INTRAMUSCULAR; INTRAVENOUS; SUBCUTANEOUS at 13:43

## 2023-04-11 NOTE — H&P PST ADULT - ASSESSMENT
47 y.o female patient is here today for endoscopic evaluation by EGD/EUS and possible ERCP for bilary colic and possible SOD    47 y.o female patient is here today for endoscopic evaluation by EGD/EUS and possible ERCP for bilary colic and possible SOD   Patient is currently have pain, RUQ, constant about 8/10, abdominal exam no guarding, mild RUQ tenderness

## 2023-04-11 NOTE — ASU DISCHARGE PLAN (ADULT/PEDIATRIC) - ***IN THE EVENT THAT YOU DEVELOP A COMPLICATION AND YOU ARE UNABLE TO REACH YOUR OWN PHYSICIAN, YOU MAY CONTACT:
"DAILY PROGRESS NOTE  [facility]    Patient Identification:  Name: Francisco Arana Sr.  Age: 80 y.o.  Sex: male  :  1936  MRN: [id number]         Primary Care Physician: Kevin Villatoro MD      Subjective  No c/o but minimal communication from pt.     Objective:  General Appearance:  In no acute distress and not in pain (Thin, frail.   Chronically ill appearing.   Pt appears older than stated age. ).    Vital signs: (most recent): Blood pressure 115/85, pulse 67, temperature 97.3 °F (36.3 °C), temperature source Oral, resp. rate 20, height 65\" (165.1 cm), weight 98 lb 11.2 oz (44.8 kg), SpO2 100 %.    HEENT: (MM dry but pt is mouth breathing. )    Lungs:  Normal respiratory rate and normal effort.  Breath sounds clear to auscultation.    Heart: Normal rate.  Regular rhythm.    Abdomen: Abdomen is soft and non-distended.  Bowel sounds are normal.   There is no abdominal tenderness.     Extremities: There is no dependent edema.    Neurological: (Lethargic.  Brief eye contact.  ).    Skin:  Warm, dry and pale.  (Buttocks, groin area w maceration. )              Vital signs in last 24 hours:  Temp:  [97.3 °F (36.3 °C)-97.6 °F (36.4 °C)] 97.3 °F (36.3 °C)  Heart Rate:  [67-92] 67  Resp:  [18-20] 20  BP: (105-144)/(62-85) 115/85    Intake/Output:    Intake/Output Summary (Last 24 hours) at 17 1140  Last data filed at 17 0745   Gross per 24 hour   Intake             1764 ml   Output              200 ml   Net             1564 ml           Results from last 7 days  Lab Units 17  0602 17  2351 17  1911 17  1550 17  1148 17  0354   WBC 10*3/mm3 4.49*  --  4.04*  --   --  4.66   HEMOGLOBIN g/dL 10.9* 10.9* 11.4* 11.2* 11.2* 12.1*   PLATELETS 10*3/mm3 162  --  157  --   --  149     Results from last 7 days  Lab Units 17  1911 17  0354   SODIUM mmol/L 143 140  141   POTASSIUM mmol/L 4.6 4.5  4.4   CHLORIDE mmol/L 104 101  102   CO2 mmol/L 28.4 " 28.6  29.8*   BUN mg/dL 28* 22  23   CREATININE mg/dL 0.54* 0.54*  0.54*   GLUCOSE mg/dL 73 98  98   Estimated Creatinine Clearance: 48.2 mL/min (by C-G formula based on Cr of 0.54).  Results from last 7 days  Lab Units 08/17/17  1911 08/17/17  0354   CALCIUM mg/dL 8.7 8.6  8.7   ALBUMIN g/dL 3.20* 3.20*     Results from last 7 days  Lab Units 08/17/17  1911 08/17/17  0354   ALBUMIN g/dL 3.20* 3.20*   BILIRUBIN mg/dL 0.7 0.7   ALK PHOS U/L 141* 143*   AST (SGOT) U/L 77* 78*   ALT (SGPT) U/L 110* 116*       Assessment:  Principal Problem:    Lower GI bleed:  Does not appear to be active at present.   Active Problems:    Chronic respiratory failure with hypoxia and hypercapnia    Bilateral deafness    COPD (chronic obstructive pulmonary disease)    Dysphagia    S/P percutaneous endoscopic gastrostomy (PEG) tube placement    Malnutrition        Plan:  Sched for C scope.      Lb Galloway MD  8/18/2017  11:40 AM     Statement Selected

## 2023-04-11 NOTE — ASU DISCHARGE PLAN (ADULT/PEDIATRIC) - NS MD DC FALL RISK RISK
For information on Fall & Injury Prevention, visit: [email or web address] OR  [email or web address] OR  https://www.cdc.gov/steadi/patient.html

## 2023-04-11 NOTE — ASU PATIENT PROFILE, ADULT - FALL HARM RISK - CONCLUSION
Activity:  Resume as tolerated.     Diet:  May resume regular diet as tolerated. Avoid spicy and greasy foods for 24 hours.  It is very important to drink lots of fluids today.    Medications:  Take medications as prescribed for pain.  Take narcotic pain medications with food to prevent nausea. It is recommended that you take an over the counter stool softener daily to help prevent constipation while you are taking narcotic pain medications.    Do not drive while taking narcotics or having significant pain.    Dressings/Incisions/Showering:  Remove packing to nose tomorrow morning. Use \"drip pad\" (gauze taped to underside of nose to catch any drainage) as needed.     Other:  May use ice as needed to help with pain and swelling.    Signs and symptoms of incisional infection:  Observe your daily for the following signs and symptoms:    1.)   Redness & Swelling: Increase in redness and swelling  (some         redness and swelling is to be expected)  2.)   Pain: Pain unrelieved by medication  3.)   Drainage:  Persistent drainage; Pus along the incision or around         sutures/staples  5.)   Fever: Take your temperature if you have chills, shivering, or feel warm.  Call your           surgeon if your temperature is above 101 F    Call your surgeon if you notice:  Any signs of infection, increasing pain, uncontrolled vomiting, difficulty breathing or for anything worrisome.  Avoid driving while taking pain medications or experiencing pain.             Universal Safety Interventions

## 2023-04-11 NOTE — H&P PST ADULT - HISTORY OF PRESENT ILLNESS
47 y.o female patient is here today for endoscopic evaluation by EGD/EUS and possible ERCP for bilary colic and possible SOD

## 2023-04-11 NOTE — ASU PATIENT PROFILE, ADULT - NSICDXPASTSURGICALHX_GEN_ALL_CORE_FT
PAST SURGICAL HISTORY:  History of cholecystectomy     History of surgery hernia repair    x2    History of surgery cysto bladder stone removed    History of surgery lithotripsy  x 2

## 2023-04-11 NOTE — CHART NOTE - NSCHARTNOTEFT_GEN_A_CORE
PACU ANESTHESIA ADMISSION NOTE      Procedure: EGD/EUS  Post op diagnosis:  Abdominal Pain    ____  Intubated  TV:______       Rate: ______      FiO2: ______    __x__  Patent Airway    _x___  Full return of protective reflexes    ___x_  Full recovery from anesthesia / back to baseline     Vitals:   T:  98         R:  16                BP: 135/97                 Sat: 98%                  P: 71      Mental Status:  __x__ Awake   _____ Alert   _____ Drowsy   _____ Sedated    Nausea/Vomiting:  __x__ NO  ______Yes,   See Post - Op Orders          Pain Scale (0-10):  _____    Treatment: ____ None    __x__ See Post - Op/PCA Orders    Post - Operative Fluids:   ____ Oral   ___x_ See Post - Op Orders    Plan: Discharge:   __x__Home       _____Floor     _____Critical Care    _____  Other:_________________    Comments:

## 2023-04-12 LAB — SURGICAL PATHOLOGY STUDY: SIGNIFICANT CHANGE UP

## 2023-04-14 DIAGNOSIS — Z91.018 ALLERGY TO OTHER FOODS: ICD-10-CM

## 2023-04-14 DIAGNOSIS — Z91.012 ALLERGY TO EGGS: ICD-10-CM

## 2023-04-14 DIAGNOSIS — E04.1 NONTOXIC SINGLE THYROID NODULE: ICD-10-CM

## 2023-04-14 DIAGNOSIS — K21.9 GASTRO-ESOPHAGEAL REFLUX DISEASE WITHOUT ESOPHAGITIS: ICD-10-CM

## 2023-04-14 DIAGNOSIS — K29.80 DUODENITIS WITHOUT BLEEDING: ICD-10-CM

## 2023-04-14 DIAGNOSIS — G43.909 MIGRAINE, UNSPECIFIED, NOT INTRACTABLE, WITHOUT STATUS MIGRAINOSUS: ICD-10-CM

## 2023-04-14 DIAGNOSIS — Z88.6 ALLERGY STATUS TO ANALGESIC AGENT: ICD-10-CM

## 2023-04-14 DIAGNOSIS — Z88.1 ALLERGY STATUS TO OTHER ANTIBIOTIC AGENTS STATUS: ICD-10-CM

## 2023-04-14 DIAGNOSIS — R10.9 UNSPECIFIED ABDOMINAL PAIN: ICD-10-CM

## 2023-04-14 DIAGNOSIS — K44.9 DIAPHRAGMATIC HERNIA WITHOUT OBSTRUCTION OR GANGRENE: ICD-10-CM

## 2023-04-14 DIAGNOSIS — K29.50 UNSPECIFIED CHRONIC GASTRITIS WITHOUT BLEEDING: ICD-10-CM

## 2023-04-14 DIAGNOSIS — F41.9 ANXIETY DISORDER, UNSPECIFIED: ICD-10-CM

## 2023-04-18 ENCOUNTER — APPOINTMENT (OUTPATIENT)
Dept: PAIN MANAGEMENT | Facility: CLINIC | Age: 48
End: 2023-04-18

## 2023-04-20 ENCOUNTER — APPOINTMENT (OUTPATIENT)
Dept: GASTROENTEROLOGY | Facility: CLINIC | Age: 48
End: 2023-04-20
Payer: MEDICAID

## 2023-04-20 DIAGNOSIS — R10.11 RIGHT UPPER QUADRANT PAIN: ICD-10-CM

## 2023-04-20 DIAGNOSIS — Z00.00 ENCOUNTER FOR GENERAL ADULT MEDICAL EXAMINATION W/OUT ABNORMAL FINDINGS: ICD-10-CM

## 2023-04-20 PROCEDURE — 99214 OFFICE O/P EST MOD 30 MIN: CPT | Mod: 95

## 2023-04-20 RX ORDER — POLYETHYLENE GLYCOL 3350 AND ELECTROLYTES WITH LEMON FLAVOR 236; 22.74; 6.74; 5.86; 2.97 G/4L; G/4L; G/4L; G/4L; G/4L
236 POWDER, FOR SOLUTION ORAL
Qty: 1 | Refills: 0 | Status: ACTIVE | COMMUNITY
Start: 2023-04-20 | End: 1900-01-01

## 2023-04-21 PROBLEM — R10.11 RIGHT UPPER QUADRANT ABDOMINAL PAIN: Status: ACTIVE | Noted: 2023-03-03

## 2023-04-21 NOTE — HISTORY OF PRESENT ILLNESS
[Home] : at home, [unfilled] , at the time of the visit. [Medical Office: ([facility])___] : at the medical office located in  [Verbal consent obtained from patient] : the patient, [unfilled] [FreeTextEntry4] : VICKIE FOWLER [FreeTextEntry1] : LAST VISIT - 3/2/23\par \par 47-year-old woman with history of GERD, chronic LBP, migraines, overactive bladder, anxiety, chronic spontaneous urticaria with angioedema, and history of symptomatic gallstones status post cholecystectomy in 2013, evaluated for right upper quadrant abdominal pain and concern for SOD, being followed after a procedure. \par \par on 4/11/23 she had EGD / EUS that did not reveal any CBD stones or abnormalities.\par EGD: Small hiatal hernia, non erosive bile reflux gastritis (biopsies of the distal esophagus, stomach and duodenum were unremarkable)\par EUS: CBD measuring 7 mm with no evidence of stones or sludge to explain the patient's abdominal pain. There is a reactive appearing 14 x 11 mm periportal lymph node. Examination of the pancreas revealed a hyperechoic pancreatic parenchyma with no other parenchymal abnormalities and normal PD throughout the pancreas. Otherwise unremarkable EUS exam.\par \par she was prescribed a trial of bentyl 10 mg q8h w/ improvement of symptoms since. \par she admits that her pain got worse after her  had a stroke and she was under increased stress.\par Patient admits that her pain is worse with stress and is relieved with bowel movements.\par \par Otherwise, patient denies any other GI complaints and has no n/v/d, no dysphagia or odynophagia, no heartburn, no weight loss or appetite changes, no hematochezia, no melena, no coffee ground emesis or hematemesis. \par \par ROS otherwise negative.\par \par \par 1/25/2023 RUQ US: Postcholecystectomy, left hepatic lobe simple cyst measuring 1.8 cm, CBD 8 mm\par 1/24/2023 CT abdomen and pelvis with IV contrast: No acute intra-abdominal pelvic pathology.\par Labs from prior admission in January 2023 were reviewed and revealed normal LFTs.\par Hep viral serologies from 2018 showed a weakly positive HCV ab but negative RNA.\par \par PMHx: GERD, LBP, migraines, overactive bladder, anxiety, chronic spontaneous urticaria w/ angioedema\par PSHx: CCY 2013, nephrolithiasis removal\par Fam hx: no fam hx of GI diseases or malignancy\par Social hx: neg x 3 [de-identified] : 4/11/23

## 2023-04-21 NOTE — ASSESSMENT
[FreeTextEntry1] : 47-year-old woman with history of GERD, chronic LBP, migraines, overactive bladder, anxiety, chronic spontaneous urticaria with angioedema, and history of symptomatic gallstones status post cholecystectomy in 2013, evaluated for right upper quadrant abdominal pain and concern for SOD, being followed after a procedure. \par \par #RUQ abd pain\par recent labs from 1/2023 reviewed and grossly unremarkable. \par No findings suggestive of choledocholithiasis or significant pancreatic pathology on her recent EUS exam. \par She was advised to have labs prior to presentation which she did not perform. \par ERCP was not performed. \par Symptoms are likely of functional etiology including functional biliary type pain vs. IBS-C vs. functional dyspepsia. \par \par -improved w/ bentyl 10 mg q8h PRN\par -avoid narcotics as much as possible\par -low fat diet recommended\par - Low FODMAP diet\par - May consider bile acid sequestrant if symptoms recur\par \par #fatty pancreas\par no further intervention\par \par #HCM\par CRC screening - pt would like to have her screening done with us\par no fam hx of CRC\par -will schedule CRC screening \par -prep sent to pharmacy

## 2023-04-21 NOTE — PHYSICAL EXAM
[Normal] : alert, normal voice/communication, healthy appearing, no acute distress [Sclera] : the sclera and conjunctiva were normal [Hearing Threshold Finger Rub Not [place]] : hearing was normal [Normal Lips/Gums] : the lips and gums were normal [Normal Appearance] : the appearance of the neck was normal [No Respiratory Distress] : no respiratory distress [No Acc Muscle Use] : no accessory muscle use [Normal Color / Pigmentation] : normal skin color and pigmentation [No Focal Deficits] : no focal deficits [Oriented To Time, Place, And Person] : oriented to person, place, and time

## 2023-04-26 RX ORDER — HYOSCYAMINE SULFATE 0.12 MG/1
0.12 TABLET ORAL 3 TIMES DAILY
Qty: 270 | Refills: 0 | Status: ACTIVE | COMMUNITY
Start: 2023-04-26 | End: 1900-01-01

## 2023-05-24 NOTE — ED PROVIDER NOTE - BIRTH SEX
HR=56 bpm, NIBP=92/52 mmhg, SpO2=94.0 %, Resp=11 B/min, EtCO2=36 mmHg, Apnea=2 Seconds, [place]=2 Female

## 2023-05-25 ENCOUNTER — APPOINTMENT (OUTPATIENT)
Dept: GASTROENTEROLOGY | Facility: CLINIC | Age: 48
End: 2023-05-25
Payer: MEDICAID

## 2023-05-25 DIAGNOSIS — R10.9 UNSPECIFIED ABDOMINAL PAIN: ICD-10-CM

## 2023-05-25 DIAGNOSIS — K58.9 IRRITABLE BOWEL SYNDROME W/OUT DIARRHEA: ICD-10-CM

## 2023-05-25 PROCEDURE — 99214 OFFICE O/P EST MOD 30 MIN: CPT | Mod: 95

## 2023-05-26 PROBLEM — K58.9 IRRITABLE BOWEL SYNDROME (IBS): Status: ACTIVE | Noted: 2023-05-26

## 2023-05-26 NOTE — PHYSICAL EXAM
[Normal] : alert, normal voice/communication, healthy appearing, no acute distress [Sclera] : the sclera and conjunctiva were normal [Hearing Threshold Finger Rub Not [place]] : hearing was normal [Normal Lips/Gums] : the lips and gums were normal [Normal Appearance] : the appearance of the neck was normal [No Respiratory Distress] : no respiratory distress [No Acc Muscle Use] : no accessory muscle use [Normal Color / Pigmentation] : normal skin color and pigmentation [No Focal Deficits] : no focal deficits [Oriented To Time, Place, And Person] : oriented to person, place, and time

## 2023-05-26 NOTE — HISTORY OF PRESENT ILLNESS
[Home] : at home, [unfilled] , at the time of the visit. [Medical Office: ([facility])___] : at the medical office located in  [Verbal consent obtained from patient] : the patient, [unfilled] [FreeTextEntry4] : VICKIE FOWLER [FreeTextEntry1] : LAST VISIT - 3/2/23\par \par 47-year-old woman with history of GERD, chronic LBP, migraines, overactive bladder, anxiety, chronic spontaneous urticaria with angioedema, and history of symptomatic gallstones status post cholecystectomy in 2013, being followed for abd pain. \par \par she is getting xolair (Omalizumab) injections for urticaria and asthma.\par she admits that her pain got worse after her  had a stroke and she was under increased stress.\par Patient admits that her pain is worse with stress and is relieved with bowel movements.\par improved w/ antispasmodics but developed rash w/ dicyclomine and angioedema w/ hyoscyamine.\par stopped both meds and is doing better on low FODMAP diet. \par \par of note, pt reports episodes of angioedema associated w/ her abd pain. she has no fam hx of angioedema. \par \par Otherwise, patient denies any other GI complaints and has no n/v/d, no dysphagia or odynophagia, no heartburn, no weight loss or appetite changes, no hematochezia, no melena, no coffee ground emesis or hematemesis. \par ROS otherwise negative.\par \par 4/11/23 EGD / EUS that did not reveal any CBD stones or abnormalities.\par EGD: Small hiatal hernia, non erosive bile reflux gastritis (biopsies of the distal esophagus, stomach and duodenum were unremarkable)\par EUS: CBD measuring 7 mm with no evidence of stones or sludge to explain the patient's abdominal pain. There is a reactive appearing 14 x 11 mm periportal lymph node. Examination of the pancreas revealed a hyperechoic pancreatic parenchyma with no other parenchymal abnormalities and normal PD throughout the pancreas. Otherwise unremarkable EUS exam.\par \par 1/25/2023 RUQ US: Postcholecystectomy, left hepatic lobe simple cyst measuring 1.8 cm, CBD 8 mm\par 1/24/2023 CT abdomen and pelvis with IV contrast: No acute intra-abdominal pelvic pathology.\par Labs from prior admission in January 2023 were reviewed and revealed normal LFTs.\par Hep viral serologies from 2018 showed a weakly positive HCV ab but negative RNA.\par \par PMHx: GERD, LBP, migraines, overactive bladder, anxiety, chronic spontaneous urticaria w/ angioedema\par PSHx: CCY 2013, nephrolithiasis removal\par Fam hx: no fam hx of GI diseases or malignancy\par Social hx: neg x 3  \par allergies: several including angioedema to hyosciamine and rash to dicyclomine

## 2023-05-26 NOTE — ASSESSMENT
[FreeTextEntry1] : 47-year-old woman with history of GERD, chronic LBP, migraines, overactive bladder, anxiety, chronic spontaneous urticaria with angioedema, and history of symptomatic gallstones status post cholecystectomy in 2013, being followed for abd pain. \par \par #RUQ abd pain\par recent labs from 1/2023 reviewed and grossly unremarkable. \par No findings suggestive of choledocholithiasis or significant pancreatic pathology on her recent EUS exam. \par She was advised to have labs prior to presentation which she did not perform. \par ERCP was not performed. \par Symptoms are likely of functional etiology including IBS-C vs. functional dyspepsia vs functional biliary type pain\par \par -improved w/ bentyl but developed rash\par -improved w/ hyoscyamine but had angioedema\par -now off antispasmodics and doing better w/ low FODMAP diet\par -onset of angioedema w/ abd pain concerning for ? acqured c1-esterase deficiency\par will check labs including c1, c4, c1est inhibitor, c1 est function, cbc, cmp, inr\par -avoid narcotics as much as possible\par -May consider bile acid sequestrant if symptoms recur\par \par #fatty pancreas\par no further intervention\par \par #HCM\par CRC screening - pt would like to have her screening done with us\par no fam hx of CRC\par -scheduled for colonoscopy \par -f/u after procedure

## 2023-06-08 LAB
ALBUMIN SERPL ELPH-MCNC: 4.9 G/DL
ALP BLD-CCNC: 92 U/L
ALT SERPL-CCNC: 22 U/L
ANION GAP SERPL CALC-SCNC: 17 MMOL/L
AST SERPL-CCNC: 21 U/L
BILIRUB SERPL-MCNC: 0.2 MG/DL
BUN SERPL-MCNC: 16 MG/DL
C4 SERPL-MCNC: 23 MG/DL
CALCIUM SERPL-MCNC: 10.2 MG/DL
CHLORIDE SERPL-SCNC: 102 MMOL/L
CO2 SERPL-SCNC: 20 MMOL/L
CREAT SERPL-MCNC: 0.7 MG/DL
EGFR: 109 ML/MIN/1.73M2
GLUCOSE SERPL-MCNC: 113 MG/DL
INR PPP: 0.97 RATIO
POTASSIUM SERPL-SCNC: 4.4 MMOL/L
PROT SERPL-MCNC: 7.7 G/DL
PT BLD: 11 SEC
SODIUM SERPL-SCNC: 139 MMOL/L

## 2023-07-05 ENCOUNTER — RX RENEWAL (OUTPATIENT)
Age: 48
End: 2023-07-05

## 2023-07-23 ENCOUNTER — RX RENEWAL (OUTPATIENT)
Age: 48
End: 2023-07-23

## 2023-09-09 LAB
C1INH FUNCTIONAL FLD-MCNC: >93
C1INH SERPL-MCNC: 29 MG/DL
C1Q SERPL-MCNC: 17.1 MG/DL

## 2023-10-05 ENCOUNTER — APPOINTMENT (OUTPATIENT)
Dept: GASTROENTEROLOGY | Facility: CLINIC | Age: 48
End: 2023-10-05

## 2023-10-19 NOTE — PRE-OP CHECKLIST - VERIFY SURGICAL SITE/SIDE WITH PATIENT
Katie calls back and wants to add their therapy department is recommending outpatient physical therapy and requesting orders be faxed to intake at [phone] or emailed at [email or web address]    done

## 2023-12-12 NOTE — ED PROVIDER NOTE - NSICDXFAMILYHX_GEN_ALL_CORE_FT
FAMILY HISTORY:  Father  Still living? Unknown  DM (diabetes mellitus), Age at diagnosis: Age Unknown  Family history of heart disease, Age at diagnosis: Age Unknown    
No

## 2024-01-09 NOTE — H&P PST ADULT - NSICDXFAMILYHX_GEN_ALL_CORE_FT
Detail Level: Zone FAMILY HISTORY:  Father  Still living? Unknown  DM (diabetes mellitus), Age at diagnosis: Age Unknown  Family history of heart disease, Age at diagnosis: Age Unknown

## 2024-04-02 NOTE — PRE-ANESTHESIA EVALUATION ADULT - BP NONINVASIVE SYSTOLIC (MM HG)
Physical Therapy    Visit Type: treatment    Relevant History/Co-morbidities: pt presented following fall (3/28) with Lt femoral neck fracture, history of prior fall (3/23) with C5, T2 compression fractures. now s/p Lt hip hemiarthroplasty. LLE WBAT, posterior precautions,  per neurosurgery. additional history of seizures.    lives at home with her spouse, mod I with use of 4ww ( manages all meds).    SUBJECTIVE  Patient agreed to participate in therapy this date.  Patient / Family Goal: return to previous functional status, return home and maximize function    Pain     Location: occasionally shouting out in pain, does not rate or specify location     OBJECTIVE     Cognitive Status   Level of Consciousness   - alert  Arousal Alertness   - inconsistent responses to stimuli  Affect/Behavior    - impulsive, restless and anxious  Orientation    - Oriented to: person, place, time and situation  Functional Communication   - Overall Status: impaired  Attention Span    - Attention: - attends with cues to redirect   - Attention impairment: distractibility, impulsivity, perseveration and internal factors  Following Direction   - follows one step commands with increased time and follows one step commands with repetition  Transition Between Tasks   - transitions with cues  Memory   - - decreased recall of precautions  Safety Awareness/Insight   - decreased awareness of need for safety and decreased awareness of need for assistance  Awareness of Deficits   - assistance required to compensate for deficits    Patient Activity Tolerance: 1 to 2 activity to rest       Sitting Balance  (STEFAN = base of support)  Static      - Trial 1 details: with back unsupported, maximal assist and contact guard  Initial maxA required for balance d/t unwillingness to sit on Lt hip (shifting weight away from repaired side), progressed to CGA     Standing Balance  (STEFAN = base of support)  Firm Surface: Double Leg      - Static, Eyes Open        - Trial 1 details: moderate assist and with double UE support       Bed Mobility  - Rolling left: maximal assist, 2 person  - Rolling right: maximal assist, 2 person  - Supine to sit: maximal assist, 2 person  - Sit to supine: maximal assist, 2 person  MaxA +2 required to logroll with a-frame in place to maintain posterior precautions,  fit and adjusted during logroll. MaxA +2 required to manage trunk and bilat LE to transition supine <-> sit. Pt distractible and tangential, limited receptivity to safety cues/instruction.   Transfers  Assistive devices: gait belt, non-mechanical sit to stand lift  - Sit to stand: moderate assist  - Stand to sit: minimal assist  - Stand pivot: minimal assist    Ambulation / Gait  - Assistive device: gait belt and 2-wheeled walker  - Distance (feet unless otherwise indicated): 3  - Assist Level: minimal assist, with tactile cues and with verbal cues  - Description: narrow base of support and unsteady  Max cues for posterior precautions with direction changes, limited receptivity to cueing      Interventions     Training provided: safety training, use of assistive device, transfer training, body mechanics, compensatory techniques, bed mobility training and balance retraining    Skilled input: Verbal instruction/cues and as detailed above         Education:   - Present and ready to learn: patient  Education provided during session:  - Results of above outlined education: Verbalizes understanding and No evidence of learning    ASSESSMENT   Patient will benefit from skilled therapy to address listed impairments and functional limitations.  Progress: no functional improvements  Interferring components: decreased activity tolerance, cognitive deficits, weight bearing status and surgical precautions    Discharge needs based on today's assessment:   - Current level of function: significantly below baseline level of function   - Therapy needs at discharge: therapy 5 or more times per  week   - Activities of daily living (ADLs) requiring support at discharge: bed mobility, transfers, ambulation and stairs   - Instrumental activities of daily living (IADLs) requiring support at discharge: home management, health/medication management, community mobility, financial management, shopping and meal preparation   - Impairments that require further therapy intervention: balance, safety awareness, strength, ROM, pain, activity tolerance and cognition      AM-PAC  - Generalized Prior Level of Function: IND/MOD I ([facility] 22-24)       Key: MOD A=moderate assistance, IND/MOD I=independent/modified independent  - Generalized Current Level of Function:  Current Mobility Score: 9       AM-PAC Scoring Key= >21 Modified Independent; 20-21 Supervision; 18-19 Minimal assist; 13-18 Moderate assist; 9-12 Max assist; <9 Total assist          PLAN (while hospitalized)  Suggestions for next session as indicated: Review/reinforce precautions and functional implications, bed mobility, standing balance/tolerance, progress transfers as appropriate.     PT Frequency: 3-5 x per week    Visit # since seen by PT:  2    PT/OT Mobility Equipment for Discharge: Owns 4WW. ? w/c  PT/OT ADL Equipment for Discharge: Needs assessment ongoing    Interventions: balance, body mechanics, compensatory technique education, gait training, strengthening, ROM, neuromuscular re-education, safety education, patient/family training, equipment eval/education, bed mobility, stairs retraining, functional transfer training and endurance training  Agreement to plan and goals: patient agrees with goals and treatment plan        GOALS  Review Date: 4/8/2024  Long Term Goals: (to be met by time of discharge from hospital)  State precautions: Patient able to state precautions independent.  Maintain precautions: Patient able to maintain precautions supervision.  Sit to supine: Patient will complete sit to supine minimal assist.  Supine to sit: Patient will  complete supine to sit minimal assist.  Sit to stand: Patient will complete sit to stand transfer with 2-wheeled walker, minimal assist.   Stand to sit: Patient will complete stand to sit transfer with 2-wheeled walker, minimal assist.   Stand pivot: Patient will complete stand pivot transfer with 2-wheeled walker, minimal assist.   Ambulation (even): Patient will ambulate on even surface for 10 feet with 2-wheeled walker, minimal assist.         Patient at End of Session:   Location: in bed  Safety measures: alarm system in place/re-engaged, call light within reach and equipment intact  Handoff to: nurse      Therapy procedure time and total treatment time can be found documented on the Time Entry flowsheet   100

## 2024-06-17 NOTE — ASU PREOP CHECKLIST - SIDE RAILS UP
Continue same medications/treatment.  Patient educated on proper medication use.  Patient educated on risk factor modification.  Please bring any lab results from other providers/physicians to your next appointment.    Please bring all medicines, vitamins, and herbal supplements with you when you come to the office.    Prescriptions will not be filled unless you are compliant with your follow up appointments or have a follow up appointment scheduled as per instruction of your physician. Refills should be requested at the time of your visit.    Follow up in 6 months  STOP Gemfibrozil and Zetia  START Crestor 10 mg daily at bedtime.     IMERLE RN, AM SCRIBING FOR AND IN THE PRESENCE OF DR. DANIELLE GAMBINO, DO, FACC     done

## 2024-08-09 NOTE — DISCHARGE NOTE PROVIDER - NSDCHC_MEDRECSTATUS_GEN_ALL_CORE
Admission Reconciliation is Completed  Discharge Reconciliation is Completed [Alert] : alert [No Acute Distress] : no acute distress [Normocephalic] : normocephalic [Conjunctivae with no discharge] : conjunctivae with no discharge [PERRL] : PERRL [EOMI Bilateral] : EOMI bilateral [Auricles Well Formed] : auricles well formed [Clear Tympanic membranes with present light reflex and bony landmarks] : clear tympanic membranes with present light reflex and bony landmarks [No Discharge] : no discharge [Nares Patent] : nares patent [Pink Nasal Mucosa] : pink nasal mucosa [Palate Intact] : palate intact [Nonerythematous Oropharynx] : nonerythematous oropharynx [Supple, full passive range of motion] : supple, full passive range of motion [No Palpable Masses] : no palpable masses [Symmetric Chest Rise] : symmetric chest rise [Clear to Auscultation Bilaterally] : clear to auscultation bilaterally [Regular Rate and Rhythm] : regular rate and rhythm [Normal S1, S2 present] : normal S1, S2 present [No Murmurs] : no murmurs [+2 Femoral Pulses] : +2 femoral pulses [Soft] : soft [NonTender] : non tender [Non Distended] : non distended [Normoactive Bowel Sounds] : normoactive bowel sounds [No Hepatomegaly] : no hepatomegaly [No Splenomegaly] : no splenomegaly [Patent] : patent [No fissures] : no fissures [No Abnormal Lymph Nodes Palpated] : no abnormal lymph nodes palpated [No Gait Asymmetry] : no gait asymmetry [No pain or deformities with palpation of bone, muscles, joints] : no pain or deformities with palpation of bone, muscles, joints [Normal Muscle Tone] : normal muscle tone [Straight] : straight [+2 Patella DTR] : +2 patella DTR [Cranial Nerves Grossly Intact] : cranial nerves grossly intact [No Rash or Lesions] : no rash or lesions

## 2024-09-12 NOTE — ASU DISCHARGE PLAN (ADULT/PEDIATRIC). - NS AS DC DISCHARGE ACCOMPANY
Detail Level: Zone Photo Preface (Leave Blank If You Do Not Want): Photographs were obtained today Family

## 2024-11-15 ENCOUNTER — INPATIENT (INPATIENT)
Facility: HOSPITAL | Age: 49
LOS: 4 days | Discharge: HOME | DRG: 284 | End: 2024-11-20
Attending: STUDENT IN AN ORGANIZED HEALTH CARE EDUCATION/TRAINING PROGRAM | Admitting: FAMILY MEDICINE
Payer: MEDICAID

## 2024-11-15 VITALS
DIASTOLIC BLOOD PRESSURE: 89 MMHG | OXYGEN SATURATION: 99 % | RESPIRATION RATE: 18 BRPM | HEART RATE: 86 BPM | HEIGHT: 64 IN | WEIGHT: 197.98 LBS | TEMPERATURE: 98 F | SYSTOLIC BLOOD PRESSURE: 149 MMHG

## 2024-11-15 DIAGNOSIS — R10.9 UNSPECIFIED ABDOMINAL PAIN: ICD-10-CM

## 2024-11-15 DIAGNOSIS — Z98.890 OTHER SPECIFIED POSTPROCEDURAL STATES: Chronic | ICD-10-CM

## 2024-11-15 DIAGNOSIS — Z90.49 ACQUIRED ABSENCE OF OTHER SPECIFIED PARTS OF DIGESTIVE TRACT: Chronic | ICD-10-CM

## 2024-11-15 LAB
ALBUMIN SERPL ELPH-MCNC: 4.9 G/DL — SIGNIFICANT CHANGE UP (ref 3.5–5.2)
ALP SERPL-CCNC: 83 U/L — SIGNIFICANT CHANGE UP (ref 30–115)
ALT FLD-CCNC: 20 U/L — SIGNIFICANT CHANGE UP (ref 0–41)
ANION GAP SERPL CALC-SCNC: 12 MMOL/L — SIGNIFICANT CHANGE UP (ref 7–14)
ANION GAP SERPL CALC-SCNC: 17 MMOL/L — HIGH (ref 7–14)
APPEARANCE UR: CLEAR — SIGNIFICANT CHANGE UP
APTT BLD: 34 SEC — SIGNIFICANT CHANGE UP (ref 27–39.2)
AST SERPL-CCNC: 66 U/L — HIGH (ref 0–41)
BACTERIA # UR AUTO: ABNORMAL /HPF
BASOPHILS # BLD AUTO: 0.02 K/UL — SIGNIFICANT CHANGE UP (ref 0–0.2)
BASOPHILS NFR BLD AUTO: 0.2 % — SIGNIFICANT CHANGE UP (ref 0–1)
BILIRUB SERPL-MCNC: 0.4 MG/DL — SIGNIFICANT CHANGE UP (ref 0.2–1.2)
BILIRUB UR-MCNC: NEGATIVE — SIGNIFICANT CHANGE UP
BUN SERPL-MCNC: 12 MG/DL — SIGNIFICANT CHANGE UP (ref 10–20)
BUN SERPL-MCNC: 13 MG/DL — SIGNIFICANT CHANGE UP (ref 10–20)
CALCIUM SERPL-MCNC: 10.1 MG/DL — SIGNIFICANT CHANGE UP (ref 8.4–10.5)
CALCIUM SERPL-MCNC: 9.2 MG/DL — SIGNIFICANT CHANGE UP (ref 8.4–10.5)
CHLORIDE SERPL-SCNC: 107 MMOL/L — SIGNIFICANT CHANGE UP (ref 98–110)
CHLORIDE SERPL-SCNC: 99 MMOL/L — SIGNIFICANT CHANGE UP (ref 98–110)
CO2 SERPL-SCNC: 19 MMOL/L — SIGNIFICANT CHANGE UP (ref 17–32)
CO2 SERPL-SCNC: 20 MMOL/L — SIGNIFICANT CHANGE UP (ref 17–32)
COLOR SPEC: YELLOW — SIGNIFICANT CHANGE UP
CREAT SERPL-MCNC: 0.7 MG/DL — SIGNIFICANT CHANGE UP (ref 0.7–1.5)
CREAT SERPL-MCNC: 0.9 MG/DL — SIGNIFICANT CHANGE UP (ref 0.7–1.5)
DIFF PNL FLD: NEGATIVE — SIGNIFICANT CHANGE UP
EGFR: 107 ML/MIN/1.73M2 — SIGNIFICANT CHANGE UP
EGFR: 79 ML/MIN/1.73M2 — SIGNIFICANT CHANGE UP
EOSINOPHIL # BLD AUTO: 0 K/UL — SIGNIFICANT CHANGE UP (ref 0–0.7)
EOSINOPHIL NFR BLD AUTO: 0 % — SIGNIFICANT CHANGE UP (ref 0–8)
EPI CELLS # UR: PRESENT
GLUCOSE SERPL-MCNC: 114 MG/DL — HIGH (ref 70–99)
GLUCOSE SERPL-MCNC: 93 MG/DL — SIGNIFICANT CHANGE UP (ref 70–99)
GLUCOSE UR QL: NEGATIVE MG/DL — SIGNIFICANT CHANGE UP
HCG SERPL QL: NEGATIVE — SIGNIFICANT CHANGE UP
HCT VFR BLD CALC: 48.6 % — HIGH (ref 37–47)
HGB BLD-MCNC: 15.5 G/DL — SIGNIFICANT CHANGE UP (ref 12–16)
IMM GRANULOCYTES NFR BLD AUTO: 0.4 % — HIGH (ref 0.1–0.3)
INR BLD: 1.02 RATIO — SIGNIFICANT CHANGE UP (ref 0.65–1.3)
KETONES UR-MCNC: NEGATIVE MG/DL — SIGNIFICANT CHANGE UP
LACTATE SERPL-SCNC: 1.1 MMOL/L — SIGNIFICANT CHANGE UP (ref 0.7–2)
LEUKOCYTE ESTERASE UR-ACNC: NEGATIVE — SIGNIFICANT CHANGE UP
LIDOCAIN IGE QN: 157 U/L — HIGH (ref 7–60)
LYMPHOCYTES # BLD AUTO: 16.8 % — LOW (ref 20.5–51.1)
LYMPHOCYTES # BLD AUTO: 2.16 K/UL — SIGNIFICANT CHANGE UP (ref 1.2–3.4)
MAGNESIUM SERPL-MCNC: 2.1 MG/DL — SIGNIFICANT CHANGE UP (ref 1.8–2.4)
MCHC RBC-ENTMCNC: 28.8 PG — SIGNIFICANT CHANGE UP (ref 27–31)
MCHC RBC-ENTMCNC: 31.9 G/DL — LOW (ref 32–37)
MCV RBC AUTO: 90.3 FL — SIGNIFICANT CHANGE UP (ref 81–99)
MONOCYTES # BLD AUTO: 1.05 K/UL — HIGH (ref 0.1–0.6)
MONOCYTES NFR BLD AUTO: 8.2 % — SIGNIFICANT CHANGE UP (ref 1.7–9.3)
NEUTROPHILS # BLD AUTO: 9.59 K/UL — HIGH (ref 1.4–6.5)
NEUTROPHILS NFR BLD AUTO: 74.4 % — SIGNIFICANT CHANGE UP (ref 42.2–75.2)
NITRITE UR-MCNC: NEGATIVE — SIGNIFICANT CHANGE UP
NRBC # BLD: 0 /100 WBCS — SIGNIFICANT CHANGE UP (ref 0–0)
PH UR: 6 — SIGNIFICANT CHANGE UP (ref 5–8)
PLATELET # BLD AUTO: 365 K/UL — SIGNIFICANT CHANGE UP (ref 130–400)
PMV BLD: 10.6 FL — HIGH (ref 7.4–10.4)
POTASSIUM SERPL-MCNC: 4.2 MMOL/L — SIGNIFICANT CHANGE UP (ref 3.5–5)
POTASSIUM SERPL-MCNC: 8.5 MMOL/L — CRITICAL HIGH (ref 3.5–5)
POTASSIUM SERPL-SCNC: 4.2 MMOL/L — SIGNIFICANT CHANGE UP (ref 3.5–5)
POTASSIUM SERPL-SCNC: 8.5 MMOL/L — CRITICAL HIGH (ref 3.5–5)
PROT SERPL-MCNC: 9.1 G/DL — HIGH (ref 6–8)
PROT UR-MCNC: 30 MG/DL
PROTHROM AB SERPL-ACNC: 12 SEC — SIGNIFICANT CHANGE UP (ref 9.95–12.87)
RBC # BLD: 5.38 M/UL — SIGNIFICANT CHANGE UP (ref 4.2–5.4)
RBC # FLD: 14.5 % — SIGNIFICANT CHANGE UP (ref 11.5–14.5)
RBC CASTS # UR COMP ASSIST: 3 /HPF — SIGNIFICANT CHANGE UP (ref 0–4)
SODIUM SERPL-SCNC: 135 MMOL/L — SIGNIFICANT CHANGE UP (ref 135–146)
SODIUM SERPL-SCNC: 139 MMOL/L — SIGNIFICANT CHANGE UP (ref 135–146)
SP GR SPEC: >1.03 — HIGH (ref 1–1.03)
SQUAMOUS # UR AUTO: >36 /HPF — HIGH (ref 0–5)
TRIGL SERPL-MCNC: 181 MG/DL — HIGH
UROBILINOGEN FLD QL: 0.2 MG/DL — SIGNIFICANT CHANGE UP (ref 0.2–1)
WBC # BLD: 12.87 K/UL — HIGH (ref 4.8–10.8)
WBC # FLD AUTO: 12.87 K/UL — HIGH (ref 4.8–10.8)
WBC UR QL: 11 /HPF — HIGH (ref 0–5)

## 2024-11-15 PROCEDURE — 86381 MITOCHONDRIAL ANTIBODY EACH: CPT

## 2024-11-15 PROCEDURE — 86255 FLUORESCENT ANTIBODY SCREEN: CPT

## 2024-11-15 PROCEDURE — 86038 ANTINUCLEAR ANTIBODIES: CPT

## 2024-11-15 PROCEDURE — 85025 COMPLETE CBC W/AUTO DIFF WBC: CPT

## 2024-11-15 PROCEDURE — 83735 ASSAY OF MAGNESIUM: CPT

## 2024-11-15 PROCEDURE — 76705 ECHO EXAM OF ABDOMEN: CPT | Mod: 26

## 2024-11-15 PROCEDURE — 80053 COMPREHEN METABOLIC PANEL: CPT

## 2024-11-15 PROCEDURE — 83690 ASSAY OF LIPASE: CPT

## 2024-11-15 PROCEDURE — 99285 EMERGENCY DEPT VISIT HI MDM: CPT

## 2024-11-15 PROCEDURE — 74177 CT ABD & PELVIS W/CONTRAST: CPT | Mod: 26,MC

## 2024-11-15 PROCEDURE — 74181 MRI ABDOMEN W/O CONTRAST: CPT | Mod: MC

## 2024-11-15 PROCEDURE — 36415 COLL VENOUS BLD VENIPUNCTURE: CPT

## 2024-11-15 PROCEDURE — 93005 ELECTROCARDIOGRAM TRACING: CPT

## 2024-11-15 PROCEDURE — 99222 1ST HOSP IP/OBS MODERATE 55: CPT

## 2024-11-15 RX ORDER — PIPERACILLIN SODIUM AND TAZOBACTAM SODIUM 4; .5 G/20ML; G/20ML
3.38 INJECTION, POWDER, LYOPHILIZED, FOR SOLUTION INTRAVENOUS ONCE
Refills: 0 | Status: COMPLETED | OUTPATIENT
Start: 2024-11-16 | End: 2024-11-16

## 2024-11-15 RX ORDER — 0.9 % SODIUM CHLORIDE 0.9 %
1000 INTRAVENOUS SOLUTION INTRAVENOUS
Refills: 0 | Status: DISCONTINUED | OUTPATIENT
Start: 2024-11-15 | End: 2024-11-20

## 2024-11-15 RX ORDER — PIPERACILLIN SODIUM AND TAZOBACTAM SODIUM 4; .5 G/20ML; G/20ML
3.38 INJECTION, POWDER, LYOPHILIZED, FOR SOLUTION INTRAVENOUS ONCE
Refills: 0 | Status: COMPLETED | OUTPATIENT
Start: 2024-11-15 | End: 2024-11-15

## 2024-11-15 RX ORDER — PANTOPRAZOLE SODIUM 40 MG/1
40 TABLET, DELAYED RELEASE ORAL DAILY
Refills: 0 | Status: DISCONTINUED | OUTPATIENT
Start: 2024-11-15 | End: 2024-11-20

## 2024-11-15 RX ORDER — PIPERACILLIN SODIUM AND TAZOBACTAM SODIUM 4; .5 G/20ML; G/20ML
3.38 INJECTION, POWDER, LYOPHILIZED, FOR SOLUTION INTRAVENOUS EVERY 8 HOURS
Refills: 0 | Status: DISCONTINUED | OUTPATIENT
Start: 2024-11-16 | End: 2024-11-16

## 2024-11-15 RX ORDER — HYDROMORPHONE HYDROCHLORIDE 2 MG/1
1 TABLET ORAL ONCE
Refills: 0 | Status: DISCONTINUED | OUTPATIENT
Start: 2024-11-15 | End: 2024-11-15

## 2024-11-15 RX ORDER — ACETAMINOPHEN, DIPHENHYDRAMINE HCL, PHENYLEPHRINE HCL 325; 25; 5 MG/1; MG/1; MG/1
3 TABLET ORAL AT BEDTIME
Refills: 0 | Status: DISCONTINUED | OUTPATIENT
Start: 2024-11-15 | End: 2024-11-20

## 2024-11-15 RX ORDER — ACETAMINOPHEN 500MG 500 MG/1
650 TABLET, COATED ORAL EVERY 6 HOURS
Refills: 0 | Status: DISCONTINUED | OUTPATIENT
Start: 2024-11-15 | End: 2024-11-19

## 2024-11-15 RX ORDER — CITALOPRAM HYDROBROMIDE 20 MG
40 TABLET ORAL DAILY
Refills: 0 | Status: DISCONTINUED | OUTPATIENT
Start: 2024-11-15 | End: 2024-11-20

## 2024-11-15 RX ORDER — LEVOTHYROXINE SODIUM 150 MCG
75 TABLET ORAL DAILY
Refills: 0 | Status: DISCONTINUED | OUTPATIENT
Start: 2024-11-15 | End: 2024-11-20

## 2024-11-15 RX ORDER — 0.9 % SODIUM CHLORIDE 0.9 %
1000 INTRAVENOUS SOLUTION INTRAVENOUS ONCE
Refills: 0 | Status: COMPLETED | OUTPATIENT
Start: 2024-11-15 | End: 2024-11-15

## 2024-11-15 RX ORDER — ONDANSETRON HYDROCHLORIDE 4 MG/1
4 TABLET, FILM COATED ORAL EVERY 8 HOURS
Refills: 0 | Status: DISCONTINUED | OUTPATIENT
Start: 2024-11-15 | End: 2024-11-15

## 2024-11-15 RX ORDER — ONDANSETRON HYDROCHLORIDE 4 MG/1
4 TABLET, FILM COATED ORAL ONCE
Refills: 0 | Status: COMPLETED | OUTPATIENT
Start: 2024-11-15 | End: 2024-11-15

## 2024-11-15 RX ORDER — OXYBUTYNIN CHLORIDE 5 MG
10 TABLET ORAL DAILY
Refills: 0 | Status: DISCONTINUED | OUTPATIENT
Start: 2024-11-15 | End: 2024-11-20

## 2024-11-15 RX ORDER — ROSUVASTATIN CALCIUM 5 MG/1
10 TABLET, FILM COATED ORAL AT BEDTIME
Refills: 0 | Status: DISCONTINUED | OUTPATIENT
Start: 2024-11-15 | End: 2024-11-20

## 2024-11-15 RX ORDER — RIZATRIPTAN BENZOATE 5 MG/1
1 TABLET ORAL
Refills: 0 | DISCHARGE

## 2024-11-15 RX ORDER — PROCHLORPERAZINE EDISYLATE 5 MG/ML
5 INJECTION INTRAMUSCULAR; INTRAVENOUS EVERY 8 HOURS
Refills: 0 | Status: DISCONTINUED | OUTPATIENT
Start: 2024-11-15 | End: 2024-11-15

## 2024-11-15 RX ORDER — MAGNESIUM, ALUMINUM HYDROXIDE 200-225/5
30 SUSPENSION, ORAL (FINAL DOSE FORM) ORAL EVERY 4 HOURS
Refills: 0 | Status: DISCONTINUED | OUTPATIENT
Start: 2024-11-15 | End: 2024-11-20

## 2024-11-15 RX ORDER — SODIUM CHLORIDE 9 MG/ML
2000 INJECTION, SOLUTION INTRAMUSCULAR; INTRAVENOUS; SUBCUTANEOUS ONCE
Refills: 0 | Status: COMPLETED | OUTPATIENT
Start: 2024-11-15 | End: 2024-11-15

## 2024-11-15 RX ORDER — ALPRAZOLAM 0.5 MG
0.5 TABLET ORAL
Refills: 0 | Status: DISCONTINUED | OUTPATIENT
Start: 2024-11-15 | End: 2024-11-20

## 2024-11-15 RX ORDER — PIPERACILLIN SODIUM AND TAZOBACTAM SODIUM 4; .5 G/20ML; G/20ML
3.38 INJECTION, POWDER, LYOPHILIZED, FOR SOLUTION INTRAVENOUS ONCE
Refills: 0 | Status: DISCONTINUED | OUTPATIENT
Start: 2024-11-16 | End: 2024-11-16

## 2024-11-15 RX ADMIN — HYDROMORPHONE HYDROCHLORIDE 1 MILLIGRAM(S): 2 TABLET ORAL at 22:53

## 2024-11-15 RX ADMIN — Medication 6 MILLIGRAM(S): at 19:26

## 2024-11-15 RX ADMIN — HYDROMORPHONE HYDROCHLORIDE 1 MILLIGRAM(S): 2 TABLET ORAL at 21:05

## 2024-11-15 RX ADMIN — SODIUM CHLORIDE 2000 MILLILITER(S): 9 INJECTION, SOLUTION INTRAMUSCULAR; INTRAVENOUS; SUBCUTANEOUS at 19:34

## 2024-11-15 RX ADMIN — Medication 2 MILLIGRAM(S): at 23:59

## 2024-11-15 RX ADMIN — HYDROMORPHONE HYDROCHLORIDE 1 MILLIGRAM(S): 2 TABLET ORAL at 19:26

## 2024-11-15 RX ADMIN — Medication 6 MILLIGRAM(S): at 16:43

## 2024-11-15 RX ADMIN — PIPERACILLIN SODIUM AND TAZOBACTAM SODIUM 200 GRAM(S): 4; .5 INJECTION, POWDER, LYOPHILIZED, FOR SOLUTION INTRAVENOUS at 23:40

## 2024-11-15 RX ADMIN — Medication 2 MILLIGRAM(S): at 22:53

## 2024-11-15 RX ADMIN — HYDROMORPHONE HYDROCHLORIDE 1 MILLIGRAM(S): 2 TABLET ORAL at 22:54

## 2024-11-15 RX ADMIN — HYDROMORPHONE HYDROCHLORIDE 1 MILLIGRAM(S): 2 TABLET ORAL at 19:34

## 2024-11-15 RX ADMIN — ONDANSETRON HYDROCHLORIDE 4 MILLIGRAM(S): 4 TABLET, FILM COATED ORAL at 17:32

## 2024-11-15 RX ADMIN — Medication 1000 MILLILITER(S): at 17:06

## 2024-11-15 RX ADMIN — HYDROMORPHONE HYDROCHLORIDE 1 MILLIGRAM(S): 2 TABLET ORAL at 17:29

## 2024-11-15 RX ADMIN — ONDANSETRON HYDROCHLORIDE 4 MILLIGRAM(S): 4 TABLET, FILM COATED ORAL at 16:40

## 2024-11-15 NOTE — ED PROVIDER NOTE - CLINICAL SUMMARY MEDICAL DECISION MAKING FREE TEXT BOX
Patient is a 49-year-old female presents for evaluation of abdominal pain located in the epigastric with worsening.  Throughout the day in addition complains of nausea but no vomiting no diarrhea patient has had episodes of pancreatitis in the past states that this feels similar denies any dysuria hesitancy or frequency patient denies any vomiting    On physical exam patient is normocephalic atraumatic pupils equal round reactive accommodation extraocular muscles intact oropharynx clear chest clear to auscultation bilaterally abdomen epigastric abd pain  nontender nondistended bowel sounds positive no guarding or rebound extremities full range of motion no focal deficits noted no edema pedal pulse 2+ radial pulse 2+    Assessment plan patient presents for evaluation of epigastric abdominal pain found to have elevated lipase patient given IV fluids multiple doses of IV pain medicine we obtained CT as well as right upper quadrant ultrasound patient requiring large amounts of pain medicine I will admit for further evaluation considering in addition we consulted GI who agrees with management and plan of care

## 2024-11-15 NOTE — H&P ADULT - NSICDXPASTSURGICALHX_GEN_ALL_CORE_FT
PAST SURGICAL HISTORY:  History of cholecystectomy     History of lithotripsy     History of surgery hernia repair    x2

## 2024-11-15 NOTE — H&P ADULT - HISTORY OF PRESENT ILLNESS
This is a 48 yo F PMHx of overactive bladder, HLD, GERD, migraines, kidney stones, hypothyroidism and PSHx of cholecystectomy presenting with epigastric pain. Patient reports she has had epigastric pain on/off over the last 3 weeks, which has been tolerable. However, since 2 am last night pain has worsened, now constant, and radiates to her back. Admits to associated nausea and vomiting, states she has not been able to take anything down since. Pain was initially 12/10 and has now improved to 6/10. Denies fever, chills, CP, changes in BM.

## 2024-11-15 NOTE — H&P ADULT - ASSESSMENT
This is a 50 yo F PMHx of overactive bladder, HLD, GERD, migraines, kidney stones, hypothyroidism and PSHx of cholecystectomy presenting with epigastric pain. Patient reports she has had epigastric pain on/off over the last 3 weeks, which has been tolerable. However, since 2 am last night pain has worsened, now constant, and radiates to her back. Admits to associated nausea and vomiting, states she has not been able to take anything down since. Pain was initially 12/10 and has now improved to 6/10. Denies fever, chills, CP, changes in BM.     #Epigastric pain - likely 2/2 acute pancreatitis   -Admit to inpatient level of care under medicine   -RUQ sono: S/p cholecystectomy. CBD dilated to 8 mm, which may be seen postcholecystectomy, however correlation with LFTs is recommended.  -CTAP: Mild intrahepatic bile duct dilatation which appears new since the previous exam. Otherwise unremarkable exam.   -Lipase 157, WBC 12.87, triglycerides 181  -IV Zosyn  -NPO/IVF  -GI consult   -IV Morphine prn for severe pain   -IV Zosyn prn for nausea (, f/u AM EKG)  -IV Protonix     #Overactive bladder  -C/w oxybutynin    #HLD  -C/w rosuvastatin     #Hypothyroidism  -C/w synthroid     #Diet: NPO  #Activity: AAT  #VTE ppx: Low risk    Above plan discussed with           This is a 50 yo F PMHx of overactive bladder, HLD, GERD, migraines, kidney stones, hypothyroidism and PSHx of cholecystectomy presenting with epigastric pain. Patient reports she has had epigastric pain on/off over the last 3 weeks, which has been tolerable. However, since 2 am last night pain has worsened, now constant, and radiates to her back. Admits to associated nausea and vomiting, states she has not been able to take anything down since. Pain was initially 12/10 and has now improved to 6/10. Denies fever, chills, CP, changes in BM.     #Epigastric pain - likely 2/2 acute pancreatitis   -Admit to inpatient level of care under medicine   -RUQ sono: S/p cholecystectomy. CBD dilated to 8 mm, which may be seen postcholecystectomy, however correlation with LFTs is recommended.  -CTAP: Mild intrahepatic bile duct dilatation which appears new since the previous exam. Otherwise unremarkable exam.   -Lipase 157, WBC 12.87, triglycerides 181  -IV Zosyn  -NPO/IVF  -GI consult   -IV Morphine prn for severe pain   -IV Zosyn prn for nausea (, f/u AM EKG)  -IV Protonix     #Overactive bladder  -C/w oxybutynin    #HLD  -C/w rosuvastatin     #Hypothyroidism  -C/w synthroid     #Anxiety   -C/w Xanax prn  -iStop Reference #[id number]    #Chronic neck/back pain  -Pt takes Oxycodone/Acetaminophen 10/325 TID prn and Morphine ER 15 BID prn - confirmed on iStop     #H/o migraines  -Holding home meds while inpatient     #Diet: NPO  #Activity: AAT  #VTE ppx: Low risk    Above plan discussed with           This is a 48 yo F PMHx of overactive bladder, HLD, GERD, migraines, kidney stones, hypothyroidism and PSHx of cholecystectomy presenting with epigastric pain. Patient reports she has had epigastric pain on/off over the last 3 weeks, which has been tolerable. However, since 2 am last night pain has worsened, now constant, and radiates to her back. Admits to associated nausea and vomiting, states she has not been able to take anything down since. Pain was initially 12/10 and has now improved to 6/10. Denies fever, chills, CP, changes in BM.     #Epigastric pain - likely 2/2 acute pancreatitis   -Admit to inpatient level of care under medicine   -RUQ sono: S/p cholecystectomy. CBD dilated to 8 mm, which may be seen postcholecystectomy, however correlation with LFTs is recommended.  -CTAP: Mild intrahepatic bile duct dilatation which appears new since the previous exam. Otherwise unremarkable exam.   -Lipase 157, WBC 12.87, triglycerides 181  -IV Zosyn  -NPO/IVF  -GI consult   -IV Morphine prn  -IV Zosyn prn for nausea (, f/u AM EKG)  -IV Protonix     #Overactive bladder  -C/w oxybutynin    #HLD  -C/w rosuvastatin     #Hypothyroidism  -C/w synthroid     #Anxiety   -C/w Xanax prn  -iStop Reference #[id number]    #Chronic neck/back pain  -Pt takes Oxycodone/Acetaminophen 10/325 TID prn and Morphine ER 15 BID prn - confirmed on iStop     #H/o migraines  -Holding home meds while inpatient     #Diet: NPO  #Activity: AAT  #VTE ppx: Low risk    Above plan discussed with           This is a 50 yo F PMHx of overactive bladder, HLD, GERD, migraines, kidney stones, hypothyroidism and PSHx of cholecystectomy presenting with epigastric pain. Patient reports she has had epigastric pain on/off over the last 3 weeks, which has been tolerable. However, since 2 am last night pain has worsened, now constant, and radiates to her back. Admits to associated nausea and vomiting, states she has not been able to take anything down since. Pain was initially 12/10 and has now improved to 6/10. Denies fever, chills, CP, changes in BM.     #Epigastric pain - likely 2/2 acute pancreatitis   -Admit to inpatient level of care under medicine   -RUQ sono: S/p cholecystectomy. CBD dilated to 8 mm, which may be seen postcholecystectomy, however correlation with LFTs is recommended.  -CTAP: Mild intrahepatic bile duct dilatation which appears new since the previous exam. Otherwise unremarkable exam.   -Lipase 157, WBC 12.87, triglycerides 181  -IV Zosyn  -NPO/IVF  -GI consult   -IV Morphine prn  -IV Compazine prn for nausea/vomiting (, f/u AM EKG)  -IV Protonix     #Overactive bladder  -C/w oxybutynin    #HLD  -C/w rosuvastatin     #Hypothyroidism  -C/w synthroid     #Anxiety   -C/w Xanax prn  -iStop Reference #[id number]    #Chronic neck/back pain  -Pt takes Oxycodone/Acetaminophen 10/325 TID prn and Morphine ER 15 BID prn - confirmed on iStop     #H/o migraines  -Holding home meds while inpatient     #Diet: NPO  #Activity: AAT  #VTE ppx: Low risk    Above plan discussed with

## 2024-11-15 NOTE — H&P ADULT - NSHPLABSRESULTS_GEN_ALL_CORE
15.5   12.87 )-----------( 365      ( 15 Nov 2024 16:15 )             48.6       11-15    139  |  107  |  12  ----------------------------<  93  4.2   |  20  |  0.7    Ca    9.2      15 Nov 2024 18:25  Mg     2.1     11-15    TPro  9.1[H]  /  Alb  4.9  /  TBili  0.4  /  DBili  x   /  AST  66[H]  /  ALT  20  /  AlkPhos  83  11-15          Urinalysis Basic - ( 15 Nov 2024 21:44 )  Color: Yellow / Appearance: Clear / SG: >1.030 / pH: x  Gluc: x / Ketone: Negative mg/dL  / Bili: Negative / Urobili: 0.2 mg/dL   Blood: x / Protein: 30 mg/dL / Nitrite: Negative   Leuk Esterase: Negative / RBC: 3 /HPF / WBC 11 /HPF   Sq Epi: x / Non Sq Epi: >36 /HPF / Bacteria: Many /HPF      PT/INR - ( 15 Nov 2024 16:15 )   PT: 12.00 sec;   INR: 1.02 ratio    PTT - ( 15 Nov 2024 16:15 )  PTT:34.0 sec    Lactate Trend  11-15 @ 18:25 Lactate:1.1         ACC: [id number] EXAM:  CT ABDOMEN AND PELVIS IC   ORDERED BY: MAR ARTEAGA     PROCEDURE DATE:  11/15/2024      INTERPRETATION:  CLINICAL INFORMATION: Epigastric pain    COMPARISON: 1/24/2023  CONTRAST/COMPLICATIONS:  IV Contrast: Omnipaque 350  95 cc administered   5 cc discarded  Oral Contrast: NONE  The liver except for mild intrahepatic bile duct dilatation which appears   new since the previous exam appears normal with no focal abnormality   seen. The spleen pancreas kidneys and adrenal glands appear normal.  The patient is status post post cholecystectomy. The common bile duct   measures 1 cm in diameter with no stone formation seen.  There is no free fluid or lymphadenopathy.  The bowel pattern appears normal. No free air or inflammatory changes are   seen.  There is anterior abdominal wall hernia containing only fat unchanged.  No pelvic mass or inflammatory process is seen.  The bony and soft tissue structures appear intact.    IMPRESSION: Mild intrahepatic bile duct dilatation which appears new   since the previous exam. Otherwise unremarkable exam  --- End of Report ---        ACC: [id number] EXAM:  US ABDOMEN RT UPR QUADRANT   ORDERED BY: MAR ARTEAGA     PROCEDURE DATE:  11/15/2024      INTERPRETATION:  CLINICAL INFORMATION: Right upper quadrant pain    COMPARISON: CT dated 11/15/2024    TECHNIQUE: Sonography of the right upper quadrant.    FINDINGS:  Liver: 1.7 cm cyst  Bile ducts: CBD dilated, 8 mm.  Gallbladder: Cholecystectomy.  Pancreas: Visualized portions are within normal limits.  Right kidney: 12.2 cm. No hydronephrosis.  Ascites: None.    IMPRESSION:  Status post cholecystectomy. CBD dilated to 8 mm, which may be seen   postcholecystectomy, however correlation with LFTs is recommended.    --- End of Report ---

## 2024-11-15 NOTE — H&P ADULT - NSICDXPASTMEDICALHX_GEN_ALL_CORE_FT
PAST MEDICAL HISTORY:  Anxiety     Back pain     GERD (gastroesophageal reflux disease)     Hypothyroidism     Kidney stones     Migraine headache     Murmur since age 20     6/18    Overactive bladder     Thyroid nodule

## 2024-11-15 NOTE — ED PROVIDER NOTE - OBJECTIVE STATEMENT
patient c/o epigastric pain worsening through out day today, Constant, + nausea, H/o cholecystectomy in the past, with pancreatitis since removal

## 2024-11-15 NOTE — H&P ADULT - NSHPPHYSICALEXAM_GEN_ALL_CORE
T(C): 36.6 (11-15-24 @ 22:55), Max: 36.9 (11-15-24 @ 20:33)  HR: 86 (11-15-24 @ 22:55) (82 - 96)  BP: 115/79 (11-15-24 @ 22:55) (115/79 - 149/89)  RR: 18 (11-15-24 @ 20:33) (18 - 18)  SpO2: 99% (11-15-24 @ 20:33) (99% - 99%)    PHYSICAL EXAM:  GENERAL: NAD, well-developed, looks stated age  HEAD:  Atraumatic, Normocephalic  EYES: EOMI, PERRLA, conjunctiva and sclera clear  NECK: Supple  ENMT: Moist mucous membranes  CHEST/LUNG: Clear to auscultation bilaterally; No rales, rhonchi or wheezing  HEART: S1,S2 Regular rate and rhythm; No murmurs, rubs, or gallops  ABDOMEN: Soft, tender to epigastric region and LUQ, nondistended; Bowel sounds present   EXTREMITIES:  2+ peripheral pulses bilaterally and symmetrically, no edema  NEUROLOGY: non-focal, motor and sensation intact b/l, strength 5/5 b/l upper and lower

## 2024-11-15 NOTE — ED PROVIDER NOTE - ATTENDING APP SHARED VISIT CONTRIBUTION OF CARE
I have personally performed a history and physical exam on this patient and personally directed the management of the patient. Patient is a 49-year-old female presents for evaluation of abdominal pain located in the epigastric with worsening.  Throughout the day in addition complains of nausea but no vomiting no diarrhea patient has had episodes of pancreatitis in the past states that this feels similar denies any dysuria hesitancy or frequency patient denies any vomiting    On physical exam patient is normocephalic atraumatic pupils equal round reactive accommodation extraocular muscles intact oropharynx clear chest clear to auscultation bilaterally abdomen epigastric abd pain  nontender nondistended bowel sounds positive no guarding or rebound extremities full range of motion no focal deficits noted no edema pedal pulse 2+ radial pulse 2+    Assessment plan patient presents for evaluation of epigastric abdominal pain found to have elevated lipase patient given IV fluids multiple doses of IV pain medicine we obtained CT as well as right upper quadrant ultrasound patient requiring large amounts of pain medicine I will admit for further evaluation considering in addition we consulted GI who agrees with management and plan of care

## 2024-11-16 LAB
ALBUMIN SERPL ELPH-MCNC: 3.9 G/DL — SIGNIFICANT CHANGE UP (ref 3.5–5.2)
ALP SERPL-CCNC: 91 U/L — SIGNIFICANT CHANGE UP (ref 30–115)
ALT FLD-CCNC: 64 U/L — HIGH (ref 0–41)
ANION GAP SERPL CALC-SCNC: 11 MMOL/L — SIGNIFICANT CHANGE UP (ref 7–14)
AST SERPL-CCNC: 89 U/L — HIGH (ref 0–41)
BASOPHILS # BLD AUTO: 0.01 K/UL — SIGNIFICANT CHANGE UP (ref 0–0.2)
BASOPHILS NFR BLD AUTO: 0.1 % — SIGNIFICANT CHANGE UP (ref 0–1)
BILIRUB SERPL-MCNC: 0.5 MG/DL — SIGNIFICANT CHANGE UP (ref 0.2–1.2)
BUN SERPL-MCNC: 9 MG/DL — LOW (ref 10–20)
CALCIUM SERPL-MCNC: 8.5 MG/DL — SIGNIFICANT CHANGE UP (ref 8.4–10.5)
CHLORIDE SERPL-SCNC: 108 MMOL/L — SIGNIFICANT CHANGE UP (ref 98–110)
CO2 SERPL-SCNC: 21 MMOL/L — SIGNIFICANT CHANGE UP (ref 17–32)
CREAT SERPL-MCNC: 0.7 MG/DL — SIGNIFICANT CHANGE UP (ref 0.7–1.5)
EGFR: 107 ML/MIN/1.73M2 — SIGNIFICANT CHANGE UP
EOSINOPHIL # BLD AUTO: 0.01 K/UL — SIGNIFICANT CHANGE UP (ref 0–0.7)
EOSINOPHIL NFR BLD AUTO: 0.1 % — SIGNIFICANT CHANGE UP (ref 0–8)
GLUCOSE SERPL-MCNC: 77 MG/DL — SIGNIFICANT CHANGE UP (ref 70–99)
HCT VFR BLD CALC: 33.4 % — LOW (ref 37–47)
HGB BLD-MCNC: 10.7 G/DL — LOW (ref 12–16)
IMM GRANULOCYTES NFR BLD AUTO: 0.5 % — HIGH (ref 0.1–0.3)
LIDOCAIN IGE QN: 171 U/L — HIGH (ref 7–60)
LYMPHOCYTES # BLD AUTO: 1.9 K/UL — SIGNIFICANT CHANGE UP (ref 1.2–3.4)
LYMPHOCYTES # BLD AUTO: 25 % — SIGNIFICANT CHANGE UP (ref 20.5–51.1)
MCHC RBC-ENTMCNC: 29.2 PG — SIGNIFICANT CHANGE UP (ref 27–31)
MCHC RBC-ENTMCNC: 32.2 G/DL — SIGNIFICANT CHANGE UP (ref 32–37)
MCV RBC AUTO: 90.9 FL — SIGNIFICANT CHANGE UP (ref 81–99)
MONOCYTES # BLD AUTO: 0.63 K/UL — HIGH (ref 0.1–0.6)
MONOCYTES NFR BLD AUTO: 8.3 % — SIGNIFICANT CHANGE UP (ref 1.7–9.3)
NEUTROPHILS # BLD AUTO: 5.01 K/UL — SIGNIFICANT CHANGE UP (ref 1.4–6.5)
NEUTROPHILS NFR BLD AUTO: 66 % — SIGNIFICANT CHANGE UP (ref 42.2–75.2)
NRBC # BLD: 0 /100 WBCS — SIGNIFICANT CHANGE UP (ref 0–0)
PLATELET # BLD AUTO: 231 K/UL — SIGNIFICANT CHANGE UP (ref 130–400)
PMV BLD: 10.8 FL — HIGH (ref 7.4–10.4)
POTASSIUM SERPL-MCNC: 4 MMOL/L — SIGNIFICANT CHANGE UP (ref 3.5–5)
POTASSIUM SERPL-SCNC: 4 MMOL/L — SIGNIFICANT CHANGE UP (ref 3.5–5)
PROT SERPL-MCNC: 5.9 G/DL — LOW (ref 6–8)
RBC # BLD: 3.73 M/UL — LOW (ref 4.2–5.4)
RBC # FLD: 14.4 % — SIGNIFICANT CHANGE UP (ref 11.5–14.5)
SODIUM SERPL-SCNC: 140 MMOL/L — SIGNIFICANT CHANGE UP (ref 135–146)
WBC # BLD: 7.6 K/UL — SIGNIFICANT CHANGE UP (ref 4.8–10.8)
WBC # FLD AUTO: 7.6 K/UL — SIGNIFICANT CHANGE UP (ref 4.8–10.8)

## 2024-11-16 PROCEDURE — 93010 ELECTROCARDIOGRAM REPORT: CPT

## 2024-11-16 PROCEDURE — 99223 1ST HOSP IP/OBS HIGH 75: CPT

## 2024-11-16 PROCEDURE — 99232 SBSQ HOSP IP/OBS MODERATE 35: CPT

## 2024-11-16 RX ORDER — POLYETHYLENE GLYCOL 3350 17 G/17G
17 POWDER, FOR SOLUTION ORAL DAILY
Refills: 0 | Status: DISCONTINUED | OUTPATIENT
Start: 2024-11-16 | End: 2024-11-20

## 2024-11-16 RX ORDER — PROCHLORPERAZINE EDISYLATE 5 MG/ML
5 INJECTION INTRAMUSCULAR; INTRAVENOUS EVERY 8 HOURS
Refills: 0 | Status: DISCONTINUED | OUTPATIENT
Start: 2024-11-16 | End: 2024-11-20

## 2024-11-16 RX ADMIN — Medication 2 MILLIGRAM(S): at 07:43

## 2024-11-16 RX ADMIN — Medication 2 MILLIGRAM(S): at 22:27

## 2024-11-16 RX ADMIN — Medication 0.5 MILLIGRAM(S): at 01:08

## 2024-11-16 RX ADMIN — Medication 4 MILLIGRAM(S): at 20:27

## 2024-11-16 RX ADMIN — Medication 2 MILLIGRAM(S): at 03:06

## 2024-11-16 RX ADMIN — PIPERACILLIN SODIUM AND TAZOBACTAM SODIUM 25 GRAM(S): 4; .5 INJECTION, POWDER, LYOPHILIZED, FOR SOLUTION INTRAVENOUS at 03:16

## 2024-11-16 RX ADMIN — Medication 0.5 MILLIGRAM(S): at 21:36

## 2024-11-16 RX ADMIN — Medication 2 MILLIGRAM(S): at 18:09

## 2024-11-16 RX ADMIN — Medication 2 MILLIGRAM(S): at 13:54

## 2024-11-16 RX ADMIN — Medication 4 MILLIGRAM(S): at 16:40

## 2024-11-16 RX ADMIN — Medication 200 MILLILITER(S): at 11:33

## 2024-11-16 RX ADMIN — PIPERACILLIN SODIUM AND TAZOBACTAM SODIUM 25 GRAM(S): 4; .5 INJECTION, POWDER, LYOPHILIZED, FOR SOLUTION INTRAVENOUS at 11:55

## 2024-11-16 RX ADMIN — Medication 75 MICROGRAM(S): at 05:16

## 2024-11-16 RX ADMIN — Medication 10 MILLIGRAM(S): at 11:55

## 2024-11-16 RX ADMIN — ROSUVASTATIN CALCIUM 10 MILLIGRAM(S): 5 TABLET, FILM COATED ORAL at 21:36

## 2024-11-16 RX ADMIN — Medication 2 MILLIGRAM(S): at 14:15

## 2024-11-16 RX ADMIN — Medication 40 MILLIGRAM(S): at 12:21

## 2024-11-16 RX ADMIN — PANTOPRAZOLE SODIUM 40 MILLIGRAM(S): 40 TABLET, DELAYED RELEASE ORAL at 11:33

## 2024-11-16 RX ADMIN — Medication 4 MILLIGRAM(S): at 16:20

## 2024-11-16 RX ADMIN — Medication 4 MILLIGRAM(S): at 11:33

## 2024-11-16 RX ADMIN — POLYETHYLENE GLYCOL 3350 17 GRAM(S): 17 POWDER, FOR SOLUTION ORAL at 17:20

## 2024-11-16 RX ADMIN — Medication 4 MILLIGRAM(S): at 01:16

## 2024-11-16 RX ADMIN — Medication 4 MILLIGRAM(S): at 01:08

## 2024-11-16 RX ADMIN — Medication 2 MILLIGRAM(S): at 18:30

## 2024-11-16 RX ADMIN — Medication 4 MILLIGRAM(S): at 13:28

## 2024-11-16 RX ADMIN — Medication 4 MILLIGRAM(S): at 05:16

## 2024-11-16 RX ADMIN — Medication 2 MILLIGRAM(S): at 08:37

## 2024-11-16 NOTE — CONSULT NOTE ADULT - ASSESSMENT
48yo female h/o migraines, kidney stones, hypothyroidism, cholecystectomy in 2014, prediabetes/obesity on ozempic since June presenting with epigastric pain x 1 day    #Epigastric pain  #Mild intrahepatic biliary dilation   s/p cholecystectomy 2014  s/p EGD/EUS in 5/2023 by Dr. German  May be multifactorial, possible etiologies include functional, retained sludge/stone, sphincter of oddi dysfunction, gastritis/pud, component of constipation in setting of narcotics  Findings not consistent with acute pancreatitis  -Clear liquid diet as tolerated  -Consider MRI/MRCP to further evaluate biliary tree r/o obstructive process, though may be secondary to narcotics, spasm  -PPI daily  -Miralax daily  -Check hepatitis panel  -Monitor LFTs daily  -Pending above will consider need for repeat EUS and possible ERCP, if improves can resume outpt follow up 50yo female h/o migraines, kidney stones, hypothyroidism, cholecystectomy in 2014, prediabetes/obesity on ozempic since June presenting with epigastric pain x 1 day    #Recurrent epigastric pain  #Mild intrahepatic biliary dilation (new)  #Mild transaminitis  s/p cholecystectomy 2014  s/p EGD 1/2023, EGD/EUS in 5/2023 by Dr. German revealing small hiatal hernia, non erosive bile reflux gastritis (biopsy), EUS: CBD measuring 7 mm with no evidence of stones or sludge, there is a reactive appearing 14 x 11 mm periportal lymph node. Examination of the pancreas revealed a hyperechoic pancreatic parenchyma with no other parenchymal abnormalities and normal PD throughout the pancreas. Otherwise unremarkable EUS exam.  Symptoms may be multifactorial, possible etiologies include functional, retained sludge/stone, sphincter of oddi dysfunction, gastritis/pud, component of constipation in setting of narcotics  Findings not consistent with acute pancreatitis  -Clear liquid diet as tolerated  -Consider MRI/MRCP to further evaluate biliary tree r/o obstructive process, though may be secondary to narcotics, spasm  -PPI daily  -Miralax daily  -Check hepatitis panel, MATT, ASMA, AMA  -Monitor LFTs daily  -Pending above will consider need for repeat EUS and possible ERCP, if improves can resume outpt follow up 50yo female h/o migraines, kidney stones, hypothyroidism, cholecystectomy in 2014, prediabetes/obesity on ozempic since June presenting with epigastric pain x 1 day    #Recurrent epigastric pain  #Mild intrahepatic biliary dilation (new)  #Mild transaminitis  s/p cholecystectomy 2014  s/p EGD 1/2023, EGD/EUS in 5/2023 by Dr. German revealing small hiatal hernia, non erosive bile reflux gastritis (biopsy), EUS: CBD measuring 7 mm with no evidence of stones or sludge, there is a reactive appearing 14 x 11 mm periportal lymph node. Examination of the pancreas revealed a hyperechoic pancreatic parenchyma with no other parenchymal abnormalities and normal PD throughout the pancreas. Otherwise unremarkable EUS exam.  Symptoms may be multifactorial, possible etiologies include functional, retained sludge/stone, sphincter of oddi dysfunction, gastritis/pud, component of constipation in setting of narcotics  Findings not consistent with acute pancreatitis  -Clear liquid diet as tolerated  -Consider MRI/MRCP to further evaluate biliary tree r/o obstructive process, though may be secondary to narcotics, papillary spasm  -PPI daily  -Miralax daily  -Check hepatitis panel, MATT, ASMA, AMA, if further LFT elevation will do further CLD workup  -Monitor LFTs daily  -Pending above will consider need for repeat EUS and possible ERCP, if improves can resume outpt GI follow up including for colonoscopy

## 2024-11-16 NOTE — PROGRESS NOTE ADULT - SUBJECTIVE AND OBJECTIVE BOX
RAMON WOODS 49y Female  MRN#: [id number]     SUBJECTIVE  Patient is a 49y old Female who presents with a chief complaint of abdominal pain (2024 11:19)    Interval/Overnight Events:    Today is hospital day 1d, and this morning she is lying in bed without distress.   No acute overnight events.     OBJECTIVE  PAST MEDICAL & SURGICAL HISTORY  GERD (gastroesophageal reflux disease)    Migraine headache    Back pain    Anxiety    Kidney stones    Murmur  since age 20     6/18    Thyroid nodule    Hypothyroidism    Overactive bladder    History of surgery  hernia repair    x2    History of cholecystectomy    History of lithotripsy      ALLERGIES:  NSAIDs (Angioedema; Anaphylaxis; Hives)  eggs (Unknown)  Bananas (Unknown)  Cipro (Rash)  citrus (Unknown)    MEDICATIONS:  STANDING MEDICATIONS  citalopram 40 milliGRAM(s) Oral daily  lactated ringers. 1000 milliLiter(s) IV Continuous <Continuous>  levothyroxine 75 MICROGram(s) Oral daily  oxybutynin 10 milliGRAM(s) Oral daily  pantoprazole  Injectable 40 milliGRAM(s) IV Push daily  piperacillin/tazobactam IVPB.- 3.375 Gram(s) IV Intermittent once  piperacillin/tazobactam IVPB.. 3.375 Gram(s) IV Intermittent every 8 hours  polyethylene glycol 3350 17 Gram(s) Oral daily  rosuvastatin 10 milliGRAM(s) Oral at bedtime    PRN MEDICATIONS  acetaminophen     Tablet .. 650 milliGRAM(s) Oral every 6 hours PRN  ALPRAZolam 0.5 milliGRAM(s) Oral two times a day PRN  aluminum hydroxide/magnesium hydroxide/simethicone Suspension 30 milliLiter(s) Oral every 4 hours PRN  melatonin 3 milliGRAM(s) Oral at bedtime PRN  morphine  - Injectable 2 milliGRAM(s) IV Push every 4 hours PRN  morphine  - Injectable 4 milliGRAM(s) IV Push every 3 hours PRN  prochlorperazine   Injectable 5 milliGRAM(s) IV Push every 8 hours PRN    HOME MEDICATIONS  Home Medications:  butalbital/acetaminophen/caffeine 50 mg-325 mg-40 mg oral tablet: 1 tab(s) orally 3 times a day (15 Nov 2024 23:00)  citalopram 40 mg oral tablet: 1 tab(s) orally once a day (in the morning) (2023 09:14)  levothyroxine 75 mcg (0.075 mg) oral capsule: 1 cap(s) orally once a day (2023 09:14)  morphine 15 mg oral tablet: 1 tab(s) orally 2 times a day as needed for  pain (15 Nov 2024 22:57)  ondansetron 8 mg oral tablet: 1 tab(s) orally 2 times a day as needed for  nausea (15 Nov 2024 23:03)  oxybutynin 5 mg oral tablet: 2 tab(s) orally once a day (2023 09:14)  pantoprazole 40 mg oral delayed release tablet: 1 tab(s) orally once a day (2023 09:14)  Percocet 10/325 oral tablet: 1 tab(s) orally 3 times a day as needed for  pain (15 Nov 2024 22:56)  rizatriptan 10 mg oral tablet, disintegratin tab(s) orally once a day as needed for  migraine (15 Nov 2024 23:01)  rosuvastatin 10 mg oral tablet: 1 tab(s) orally once a day (15 Nov 2024 22:55)  Xanax 0.5 mg oral tablet: 1 tab(s) orally 2 times a day as needed for  anxiety (15 Nov 2024 22:54)      LABS:                        10.7   7.60  )-----------( 231      ( 2024 06:33 )             33.4     -    140  |  108  |  9[L]  ----------------------------<  77  4.0   |  21  |  0.7    Ca    8.5      2024 06:33  Mg     2.1     -15    TPro  5.9[L]  /  Alb  3.9  /  TBili  0.5  /  DBili  x   /  AST  89[H]  /  ALT  64[H]  /  AlkPhos  91  11-16    LIVER FUNCTIONS - ( 2024 06:33 )  Alb: 3.9 g/dL / Pro: 5.9 g/dL / ALK PHOS: 91 U/L / ALT: 64 U/L / AST: 89 U/L / GGT: x           PT/INR - ( 15 Nov 2024 16:15 )   PT: 12.00 sec;   INR: 1.02 ratio         PTT - ( 15 Nov 2024 16:15 )  PTT:34.0 sec  Urinalysis Basic - ( 2024 06:33 )    Color: x / Appearance: x / SG: x / pH: x  Gluc: 77 mg/dL / Ketone: x  / Bili: x / Urobili: x   Blood: x / Protein: x / Nitrite: x   Leuk Esterase: x / RBC: x / WBC x   Sq Epi: x / Non Sq Epi: x / Bacteria: x        Lactate, Blood: 1.1 mmol/L (11-15-24 @ 18:25)      Urinalysis with Rflx Culture (collected 15 Nov 2024 21:44)          CAPILLARY BLOOD GLUCOSE          PHYSICAL EXAM:  T(C): 36.6 (24 @ 05:06), Max: 36.9 (11-15-24 @ 20:33)  HR: 64 (24 @ 05:06) (64 - 96)  BP: 103/71 (24 @ 05:06) (103/71 - 149/89)  RR: 18 (24 @ 05:06) (18 - 18)  SpO2: 94% (24 @ 05:06) (94% - 99%)    GENERAL: NAD, well-developed, 49y  RESPIRATORY: Clear to auscultation bilaterally  CARDIOVASCULAR: Regular rate and rhythm  GASTROINTESTINAL: Abdomen Soft, + epigastric tenderness, Nondistended, +BS  PSYCH: AAOx3, affect appropriate  NEUROLOGY: non-focal, cognition grossly intact, MAEE    ADMISSION SUMMARY  Patient is a 49y old Female who presents with a chief complaint of abdominal pain (2024 11:19)

## 2024-11-16 NOTE — PROGRESS NOTE ADULT - ASSESSMENT
50 yo F PMHx of overactive bladder, HLD, GERD, migraines, kidney stones, hypothyroidism and PSHx of cholecystectomy presenting with epigastric pain intermittently for past 3 weeks. However, 2 am last night pain has worsened, now constant, and radiates to her back. Admits to associated nausea and vomiting, states she has not been able to take anything down since. Admitted for acute pancreatitis.    #Epigastric pain - likely 2/2 acute pancreatitis   -RUQ sono: S/p cholecystectomy. CBD dilated to 8 mm, which may be seen postcholecystectomy, however correlation with LFTs is recommended.  -CTAP: Mild intrahepatic bile duct dilatation which appears new since the previous exam. Otherwise unremarkable exam.   -Lipase 157, WBC 12.87, triglycerides 181  - GI consult noted  - IVF hydration  - check MRI/MRCP  - check MATT, ASMA, AMA  - monitor off abx   - clear liquids   - IV Morphine prn  - IV Compazine prn for nausea/vomiting (, f/u AM EKG)  - IV Protonix     #Overactive bladder  -C/w oxybutynin    #HLD  -C/w rosuvastatin     #Hypothyroidism  -C/w synthroid     #Anxiety   -C/w Xanax prn  -iStop Reference #[id number]    #Chronic neck/back pain  -Pt takes Oxycodone/Acetaminophen 10/325 TID prn and Morphine ER 15 BID prn - confirmed on iStop     #H/o migraines  -Holding home meds while inpatient     Misc:  DVT ppx: N/A  GI ppx: Clears  Diet: Liquids  Activity: IAT  Code: Full Code  Dispo: Acute

## 2024-11-16 NOTE — CONSULT NOTE ADULT - SUBJECTIVE AND OBJECTIVE BOX
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    [place] Abdomen Upper Quadrant Right:   ACC: [id number] EXAM:  US ABDOMEN RT UPR QUADRANT   ORDERED BY: MAR ARTEAGA     PROCEDURE DATE:  11/15/2024          INTERPRETATION:  CLINICAL INFORMATION: Right upper quadrant pain    COMPARISON: CT dated 11/15/2024    TECHNIQUE: Sonography of the right upper quadrant.    FINDINGS:  Liver: 1.7 cm cyst  Bile ducts: CBD dilated, 8 mm.  Gallbladder: Cholecystectomy.  Pancreas: Visualized portions are within normal limits.  Right kidney: 12.2 cm. No hydronephrosis.  Ascites: None.      IMPRESSION:  Status post cholecystectomy. CBD dilated to 8 mm, which may be seen   postcholecystectomy, however correlation with LFTs is recommended.    --- End of Report ---            LINDA CARRILLO MD; Attending Radiologist  This document has been electronically signed. Nov 15 2024  9:18PM (11-15-24 @ 19:03)

## 2024-11-17 LAB
ALBUMIN SERPL ELPH-MCNC: 4.1 G/DL — SIGNIFICANT CHANGE UP (ref 3.5–5.2)
ALP SERPL-CCNC: 99 U/L — SIGNIFICANT CHANGE UP (ref 30–115)
ALT FLD-CCNC: 48 U/L — HIGH (ref 0–41)
ANION GAP SERPL CALC-SCNC: 11 MMOL/L — SIGNIFICANT CHANGE UP (ref 7–14)
AST SERPL-CCNC: 40 U/L — SIGNIFICANT CHANGE UP (ref 0–41)
BASOPHILS # BLD AUTO: 0 K/UL — SIGNIFICANT CHANGE UP (ref 0–0.2)
BASOPHILS NFR BLD AUTO: 0 % — SIGNIFICANT CHANGE UP (ref 0–1)
BILIRUB SERPL-MCNC: 0.3 MG/DL — SIGNIFICANT CHANGE UP (ref 0.2–1.2)
BUN SERPL-MCNC: 5 MG/DL — LOW (ref 10–20)
CALCIUM SERPL-MCNC: 9 MG/DL — SIGNIFICANT CHANGE UP (ref 8.4–10.5)
CHLORIDE SERPL-SCNC: 105 MMOL/L — SIGNIFICANT CHANGE UP (ref 98–110)
CO2 SERPL-SCNC: 25 MMOL/L — SIGNIFICANT CHANGE UP (ref 17–32)
CREAT SERPL-MCNC: 0.5 MG/DL — LOW (ref 0.7–1.5)
CULTURE RESULTS: SIGNIFICANT CHANGE UP
EGFR: 116 ML/MIN/1.73M2 — SIGNIFICANT CHANGE UP
EOSINOPHIL # BLD AUTO: 0 K/UL — SIGNIFICANT CHANGE UP (ref 0–0.7)
EOSINOPHIL NFR BLD AUTO: 0 % — SIGNIFICANT CHANGE UP (ref 0–8)
GLUCOSE SERPL-MCNC: 77 MG/DL — SIGNIFICANT CHANGE UP (ref 70–99)
HCT VFR BLD CALC: 35.2 % — LOW (ref 37–47)
HGB BLD-MCNC: 11.5 G/DL — LOW (ref 12–16)
IMM GRANULOCYTES NFR BLD AUTO: 0.4 % — HIGH (ref 0.1–0.3)
LYMPHOCYTES # BLD AUTO: 1.43 K/UL — SIGNIFICANT CHANGE UP (ref 1.2–3.4)
LYMPHOCYTES # BLD AUTO: 26.1 % — SIGNIFICANT CHANGE UP (ref 20.5–51.1)
MAGNESIUM SERPL-MCNC: 1.9 MG/DL — SIGNIFICANT CHANGE UP (ref 1.8–2.4)
MCHC RBC-ENTMCNC: 28.9 PG — SIGNIFICANT CHANGE UP (ref 27–31)
MCHC RBC-ENTMCNC: 32.7 G/DL — SIGNIFICANT CHANGE UP (ref 32–37)
MCV RBC AUTO: 88.4 FL — SIGNIFICANT CHANGE UP (ref 81–99)
MONOCYTES # BLD AUTO: 0.52 K/UL — SIGNIFICANT CHANGE UP (ref 0.1–0.6)
MONOCYTES NFR BLD AUTO: 9.5 % — HIGH (ref 1.7–9.3)
NEUTROPHILS # BLD AUTO: 3.51 K/UL — SIGNIFICANT CHANGE UP (ref 1.4–6.5)
NEUTROPHILS NFR BLD AUTO: 64 % — SIGNIFICANT CHANGE UP (ref 42.2–75.2)
NRBC # BLD: 0 /100 WBCS — SIGNIFICANT CHANGE UP (ref 0–0)
PLATELET # BLD AUTO: 208 K/UL — SIGNIFICANT CHANGE UP (ref 130–400)
PMV BLD: 10.5 FL — HIGH (ref 7.4–10.4)
POTASSIUM SERPL-MCNC: 3.9 MMOL/L — SIGNIFICANT CHANGE UP (ref 3.5–5)
POTASSIUM SERPL-SCNC: 3.9 MMOL/L — SIGNIFICANT CHANGE UP (ref 3.5–5)
PROT SERPL-MCNC: 6.1 G/DL — SIGNIFICANT CHANGE UP (ref 6–8)
RBC # BLD: 3.98 M/UL — LOW (ref 4.2–5.4)
RBC # FLD: 14.3 % — SIGNIFICANT CHANGE UP (ref 11.5–14.5)
SODIUM SERPL-SCNC: 141 MMOL/L — SIGNIFICANT CHANGE UP (ref 135–146)
SPECIMEN SOURCE: SIGNIFICANT CHANGE UP
WBC # BLD: 5.48 K/UL — SIGNIFICANT CHANGE UP (ref 4.8–10.8)
WBC # FLD AUTO: 5.48 K/UL — SIGNIFICANT CHANGE UP (ref 4.8–10.8)

## 2024-11-17 PROCEDURE — 99232 SBSQ HOSP IP/OBS MODERATE 35: CPT

## 2024-11-17 PROCEDURE — 36000 PLACE NEEDLE IN VEIN: CPT | Mod: 59

## 2024-11-17 RX ADMIN — Medication 40 MILLIGRAM(S): at 11:53

## 2024-11-17 RX ADMIN — Medication 4 MILLIGRAM(S): at 10:05

## 2024-11-17 RX ADMIN — Medication 10 MILLIGRAM(S): at 11:53

## 2024-11-17 RX ADMIN — Medication 2 MILLIGRAM(S): at 19:48

## 2024-11-17 RX ADMIN — PANTOPRAZOLE SODIUM 40 MILLIGRAM(S): 40 TABLET, DELAYED RELEASE ORAL at 11:55

## 2024-11-17 RX ADMIN — Medication 4 MILLIGRAM(S): at 17:39

## 2024-11-17 RX ADMIN — Medication 4 MILLIGRAM(S): at 09:18

## 2024-11-17 RX ADMIN — Medication 4 MILLIGRAM(S): at 05:22

## 2024-11-17 RX ADMIN — Medication 0.5 MILLIGRAM(S): at 22:21

## 2024-11-17 RX ADMIN — Medication 200 MILLILITER(S): at 02:36

## 2024-11-17 RX ADMIN — Medication 75 MICROGRAM(S): at 05:22

## 2024-11-17 RX ADMIN — Medication 4 MILLIGRAM(S): at 14:44

## 2024-11-17 RX ADMIN — POLYETHYLENE GLYCOL 3350 17 GRAM(S): 17 POWDER, FOR SOLUTION ORAL at 11:54

## 2024-11-17 RX ADMIN — ROSUVASTATIN CALCIUM 10 MILLIGRAM(S): 5 TABLET, FILM COATED ORAL at 21:28

## 2024-11-17 RX ADMIN — Medication 4 MILLIGRAM(S): at 11:54

## 2024-11-17 RX ADMIN — Medication 4 MILLIGRAM(S): at 21:28

## 2024-11-17 RX ADMIN — Medication 4 MILLIGRAM(S): at 12:06

## 2024-11-17 RX ADMIN — Medication 4 MILLIGRAM(S): at 15:06

## 2024-11-17 RX ADMIN — Medication 2 MILLIGRAM(S): at 02:36

## 2024-11-17 NOTE — PROGRESS NOTE ADULT - SUBJECTIVE AND OBJECTIVE BOX
RAMON WOODS 49y Female  MRN#: [id number]     SUBJECTIVE  Patient is a 49y old Female who presents with a chief complaint of abdominal pain (2024 13:26)    Interval/Overnight Events:    Today is hospital day 2d, and this morning she is lying in bed without distress.   No acute overnight events.     OBJECTIVE  PAST MEDICAL & SURGICAL HISTORY  GERD (gastroesophageal reflux disease)    Migraine headache    Back pain    Anxiety    Kidney stones    Murmur  since age 20     6/18    Thyroid nodule    Hypothyroidism    Overactive bladder    History of surgery  hernia repair    x2    History of cholecystectomy    History of lithotripsy      ALLERGIES:  NSAIDs (Angioedema; Anaphylaxis; Hives)  eggs (Unknown)  Bananas (Unknown)  Cipro (Rash)  citrus (Unknown)    MEDICATIONS:  STANDING MEDICATIONS  citalopram 40 milliGRAM(s) Oral daily  lactated ringers. 1000 milliLiter(s) IV Continuous <Continuous>  levothyroxine 75 MICROGram(s) Oral daily  oxybutynin 10 milliGRAM(s) Oral daily  pantoprazole  Injectable 40 milliGRAM(s) IV Push daily  polyethylene glycol 3350 17 Gram(s) Oral daily  rosuvastatin 10 milliGRAM(s) Oral at bedtime    PRN MEDICATIONS  acetaminophen     Tablet .. 650 milliGRAM(s) Oral every 6 hours PRN  ALPRAZolam 0.5 milliGRAM(s) Oral two times a day PRN  aluminum hydroxide/magnesium hydroxide/simethicone Suspension 30 milliLiter(s) Oral every 4 hours PRN  melatonin 3 milliGRAM(s) Oral at bedtime PRN  morphine  - Injectable 2 milliGRAM(s) IV Push every 4 hours PRN  morphine  - Injectable 4 milliGRAM(s) IV Push every 3 hours PRN  prochlorperazine   Injectable 5 milliGRAM(s) IV Push every 8 hours PRN    HOME MEDICATIONS  Home Medications:  butalbital/acetaminophen/caffeine 50 mg-325 mg-40 mg oral tablet: 1 tab(s) orally 3 times a day (15 Nov 2024 23:00)  citalopram 40 mg oral tablet: 1 tab(s) orally once a day (in the morning) (2023 09:14)  levothyroxine 75 mcg (0.075 mg) oral capsule: 1 cap(s) orally once a day (2023 09:14)  morphine 15 mg oral tablet: 1 tab(s) orally 2 times a day as needed for  pain (15 Nov 2024 22:57)  ondansetron 8 mg oral tablet: 1 tab(s) orally 2 times a day as needed for  nausea (15 Nov 2024 23:03)  oxybutynin 5 mg oral tablet: 2 tab(s) orally once a day (2023 09:14)  pantoprazole 40 mg oral delayed release tablet: 1 tab(s) orally once a day (2023 09:14)  Percocet 10/325 oral tablet: 1 tab(s) orally 3 times a day as needed for  pain (15 Nov 2024 22:56)  rizatriptan 10 mg oral tablet, disintegratin tab(s) orally once a day as needed for  migraine (15 Nov 2024 23:01)  rosuvastatin 10 mg oral tablet: 1 tab(s) orally once a day (15 Nov 2024 22:55)  Xanax 0.5 mg oral tablet: 1 tab(s) orally 2 times a day as needed for  anxiety (15 Nov 2024 22:54)      LABS:                        11.5   5.48  )-----------( 208      ( 2024 06:14 )             35.2     -    141  |  105  |  5[L]  ----------------------------<  77  3.9   |  25  |  0.5[L]    Ca    9.0      2024 06:14  Mg     1.9     11-    TPro  6.1  /  Alb  4.1  /  TBili  0.3  /  DBili  x   /  AST  40  /  ALT  48[H]  /  AlkPhos  99  11-17    LIVER FUNCTIONS - ( 2024 06:14 )  Alb: 4.1 g/dL / Pro: 6.1 g/dL / ALK PHOS: 99 U/L / ALT: 48 U/L / AST: 40 U/L / GGT: x           PT/INR - ( 15 Nov 2024 16:15 )   PT: 12.00 sec;   INR: 1.02 ratio         PTT - ( 15 Nov 2024 16:15 )  PTT:34.0 sec  Urinalysis Basic - ( 2024 06:14 )    Color: x / Appearance: x / SG: x / pH: x  Gluc: 77 mg/dL / Ketone: x  / Bili: x / Urobili: x   Blood: x / Protein: x / Nitrite: x   Leuk Esterase: x / RBC: x / WBC x   Sq Epi: x / Non Sq Epi: x / Bacteria: x            Urinalysis with Rflx Culture (collected 15 Nov 2024 21:44)          CAPILLARY BLOOD GLUCOSE          PHYSICAL EXAM:  T(C): 36.6 (24 @ 04:31), Max: 36.8 (24 @ 21:02)  HR: 64 (24 @ 04:31) (64 - 77)  BP: 117/77 (24 @ 04:31) (108/78 - 128/86)  RR: 18 (24 @ 04:31) (17 - 18)  SpO2: 95% (24 @ 04:31) (95% - 95%)    GENERAL: NAD, well-developed, 49y  RESPIRATORY: Clear to auscultation bilaterally  CARDIOVASCULAR: Regular rate and rhythm  GASTROINTESTINAL: Abdomen Soft, + epigastric tenderness, Nondistended, +BS  PSYCH: AAOx3, affect appropriate  NEUROLOGY: non-focal, cognition grossly intact, MAEE    ADMISSION SUMMARY  Patient is a 49y old Female who presents with a chief complaint of abdominal pain (2024 13:26)

## 2024-11-17 NOTE — PROGRESS NOTE ADULT - ASSESSMENT
48 yo F PMHx of overactive bladder, HLD, GERD, migraines, kidney stones, hypothyroidism and PSHx of cholecystectomy presenting with epigastric pain intermittently for past 3 weeks. However, 2 am last night pain has worsened, now constant, and radiates to her back. Admits to associated nausea and vomiting, states she has not been able to take anything down since. Admitted for acute pancreatitis.    #Epigastric pain - likely 2/2 acute pancreatitis   -RUQ sono: S/p cholecystectomy. CBD dilated to 8 mm, which may be seen postcholecystectomy, however correlation with LFTs is recommended.  -CTAP: Mild intrahepatic bile duct dilatation which appears new since the previous exam. Otherwise unremarkable exam.   -Lipase 157, WBC 12.87, triglycerides 181  - GI consult noted  - IVF hydration  - check MRI/MRCP  - check MATT, ASMA, AMA  - monitor off abx   - clear liquids   - IV Morphine prn  - IV Compazine prn for nausea/vomiting (, f/u AM EKG)  - IV Protonix     #Overactive bladder  -C/w oxybutynin    #HLD  -C/w rosuvastatin     #Hypothyroidism  -C/w synthroid     #Anxiety   -C/w Xanax prn  -[facility] Reference #[id number]    #Chronic neck/back pain  -Pt takes Oxycodone/Acetaminophen 10/325 TID prn and Morphine ER 15 BID prn - confirmed on [facility]     #H/o migraines  -Holding home meds while inpatient     Misc:  DVT ppx: N/A  GI ppx: Clears  Diet: Liquids  Activity: IAT  Code: Full Code  Dispo: Acute     48 yo F PMHx of overactive bladder, HLD, GERD, migraines, kidney stones, hypothyroidism and PSHx of cholecystectomy presenting with epigastric pain intermittently for past 3 weeks. However, 2 am last night pain has worsened, now constant, and radiates to her back. Admits to associated nausea and vomiting, states she has not been able to take anything down since. Admitted for acute pancreatitis.    #Epigastric pain - likely 2/2 acute pancreatitis   -RUQ sono: S/p cholecystectomy. CBD dilated to 8 mm, which may be seen postcholecystectomy, however correlation with LFTs is recommended.  -CTAP: Mild intrahepatic bile duct dilatation which appears new since the previous exam. Otherwise unremarkable exam.   -Lipase 157, WBC 12.87, triglycerides 181  - GI consult noted  - IVF hydration  - check MRI/MRCP  - check MATT, ASMA, AMA  - monitor off abx   - advance diet as tolerated  - IV Morphine prn  - IV Compazine prn for nausea/vomiting (, f/u AM EKG)  - IV Protonix     #Overactive bladder  -C/w oxybutynin    #HLD  -C/w rosuvastatin     #Hypothyroidism  -C/w synthroid     #Anxiety   -C/w Xanax prn  -[facility] Reference #[id number]    #Chronic neck/back pain  -Pt takes Oxycodone/Acetaminophen 10/325 TID prn and Morphine ER 15 BID prn - confirmed on [facility]     #H/o migraines  -Holding home meds while inpatient     Misc:  DVT ppx: N/A  GI ppx: Clears  Diet: Liquids  Activity: IAT  Code: Full Code  Dispo: Acute

## 2024-11-18 LAB
MITOCHONDRIA AB SER-ACNC: SIGNIFICANT CHANGE UP
SMOOTH MUSCLE AB SER-ACNC: SIGNIFICANT CHANGE UP

## 2024-11-18 PROCEDURE — 74181 MRI ABDOMEN W/O CONTRAST: CPT | Mod: 26

## 2024-11-18 PROCEDURE — 99232 SBSQ HOSP IP/OBS MODERATE 35: CPT

## 2024-11-18 PROCEDURE — 99233 SBSQ HOSP IP/OBS HIGH 50: CPT

## 2024-11-18 RX ORDER — LORAZEPAM 2 MG/1
1 TABLET ORAL ONCE
Refills: 0 | Status: DISCONTINUED | OUTPATIENT
Start: 2024-11-18 | End: 2024-11-18

## 2024-11-18 RX ADMIN — Medication 2 MILLIGRAM(S): at 21:37

## 2024-11-18 RX ADMIN — Medication 2 MILLIGRAM(S): at 00:25

## 2024-11-18 RX ADMIN — Medication 2 MILLIGRAM(S): at 13:38

## 2024-11-18 RX ADMIN — Medication 4 MILLIGRAM(S): at 21:49

## 2024-11-18 RX ADMIN — Medication 2 MILLIGRAM(S): at 09:32

## 2024-11-18 RX ADMIN — LORAZEPAM 1 MILLIGRAM(S): 2 TABLET ORAL at 17:52

## 2024-11-18 RX ADMIN — Medication 4 MILLIGRAM(S): at 19:38

## 2024-11-18 RX ADMIN — PANTOPRAZOLE SODIUM 40 MILLIGRAM(S): 40 TABLET, DELAYED RELEASE ORAL at 11:09

## 2024-11-18 RX ADMIN — Medication 4 MILLIGRAM(S): at 10:53

## 2024-11-18 RX ADMIN — Medication 10 MILLIGRAM(S): at 11:08

## 2024-11-18 RX ADMIN — Medication 250 MILLILITER(S): at 00:25

## 2024-11-18 RX ADMIN — Medication 4 MILLIGRAM(S): at 14:41

## 2024-11-18 RX ADMIN — Medication 4 MILLIGRAM(S): at 02:37

## 2024-11-18 RX ADMIN — ROSUVASTATIN CALCIUM 10 MILLIGRAM(S): 5 TABLET, FILM COATED ORAL at 21:09

## 2024-11-18 RX ADMIN — Medication 0.5 MILLIGRAM(S): at 21:10

## 2024-11-18 RX ADMIN — Medication 40 MILLIGRAM(S): at 11:09

## 2024-11-18 RX ADMIN — POLYETHYLENE GLYCOL 3350 17 GRAM(S): 17 POWDER, FOR SOLUTION ORAL at 11:08

## 2024-11-18 RX ADMIN — Medication 250 MILLILITER(S): at 05:11

## 2024-11-18 RX ADMIN — Medication 75 MICROGRAM(S): at 05:11

## 2024-11-18 RX ADMIN — Medication 2 MILLIGRAM(S): at 18:00

## 2024-11-18 RX ADMIN — Medication 4 MILLIGRAM(S): at 20:45

## 2024-11-18 RX ADMIN — Medication 4 MILLIGRAM(S): at 07:24

## 2024-11-18 RX ADMIN — Medication 2 MILLIGRAM(S): at 20:55

## 2024-11-18 NOTE — PROGRESS NOTE ADULT - ASSESSMENT
48yo female h/o migraines, kidney stones, hypothyroidism, cholecystectomy in 2014, prediabetes/obesity on ozempic since June presenting with epigastric pain x 1 day    #Recurrent epigastric pain  #Mild intrahepatic biliary dilation (new)  #Mild transaminitis  s/p cholecystectomy 2014  s/p EGD 1/2023, EGD/EUS in 5/2023 by Dr. German revealing small hiatal hernia, non erosive bile reflux gastritis (biopsy), EUS: CBD measuring 7 mm with no evidence of stones or sludge, there is a reactive appearing 14 x 11 mm periportal lymph node. Examination of the pancreas revealed a hyperechoic pancreatic parenchyma with no other parenchymal abnormalities and normal PD throughout the pancreas. Otherwise unremarkable EUS exam.  Symptoms may be multifactorial, possible etiologies include functional, retained sludge/stone, sphincter of oddi dysfunction, gastritis/pud, component of constipation in setting of narcotics  Findings not consistent with acute pancreatitis  -low fat diet as tolerated  -Consider MRI/MRCP to further evaluate biliary tree r/o obstructive process, though may be secondary to narcotics, papillary spasm  -PPI daily  -Miralax daily  -Check hepatitis panel, MATT, ASMA, AMA, if further LFT elevation will do further CLD workup  -Monitor LFTs daily  -Pending above will consider need for repeat EUS and possible ERCP, if improves can resume outpt GI follow up including for colonoscopy

## 2024-11-18 NOTE — PROGRESS NOTE ADULT - SUBJECTIVE AND OBJECTIVE BOX
49yFemale  Being followed for dilated CBD  Interval history: Patient denies nausea, vomiting, hematemesis, melena, blood in stool, diarrhea, constipation, Patient reports improving epigastric abdominal pain. Patient tolerating soft and bite sized diet.      PAST MEDICAL & SURGICAL HISTORY:   GERD (gastroesophageal reflux disease)      Migraine headache      Back pain      Anxiety      Kidney stones      Murmur  since age 20     6/18      Thyroid nodule      Hypothyroidism      Overactive bladder      History of surgery  hernia repair    x2      History of cholecystectomy      History of lithotripsy                Social History: No smoking. No alcohol. No illegal drug use.            MEDICATIONS  (STANDING):  citalopram 40 milliGRAM(s) Oral daily  lactated ringers. 1000 milliLiter(s) (250 mL/Hr) IV Continuous <Continuous>  levothyroxine 75 MICROGram(s) Oral daily  oxybutynin 10 milliGRAM(s) Oral daily  pantoprazole  Injectable 40 milliGRAM(s) IV Push daily  polyethylene glycol 3350 17 Gram(s) Oral daily  rosuvastatin 10 milliGRAM(s) Oral at bedtime    MEDICATIONS  (PRN):  acetaminophen     Tablet .. 650 milliGRAM(s) Oral every 6 hours PRN Temp greater or equal to 38C (100.4F), Mild Pain (1 - 3)  ALPRAZolam 0.5 milliGRAM(s) Oral two times a day PRN anxiety  aluminum hydroxide/magnesium hydroxide/simethicone Suspension 30 milliLiter(s) Oral every 4 hours PRN Dyspepsia  melatonin 3 milliGRAM(s) Oral at bedtime PRN Insomnia  morphine  - Injectable 2 milliGRAM(s) IV Push every 4 hours PRN Moderate Pain (4 - 6)  morphine  - Injectable 4 milliGRAM(s) IV Push every 3 hours PRN Severe Pain (7 - 10)  prochlorperazine   Injectable 5 milliGRAM(s) IV Push every 8 hours PRN nausea/vomiting      Allergies:   NSAIDs (Angioedema; Anaphylaxis; Hives)  eggs (Unknown)  Bananas (Unknown)  Cipro (Rash)  citrus (Unknown)  Intolerances          REVIEW OF SYSTEMS:  General:  No weight loss, fevers, or chills.  Eyes:  No reported pain or visual changes  ENT:  No sore throat or runny nose.  NECK: No stiffness   CV:  No chest pain or palpitations.  Resp:  No shortness of breath, cough  GI:  No abdominal pain, nausea, vomiting, dysphagia, diarrhea or constipation. No rectal bleeding, melena, or hematemesis.  Neuro:  No tingling, numbness         VITAL SIGNS:   T(F): 97.5 (11-18-24 @ 06:08), Max: 98 (11-17-24 @ 20:51)  HR: 83 (11-18-24 @ 06:08) (72 - 92)  BP: 126/85 (11-18-24 @ 06:08) (99/66 - 126/85)  RR: 16 (11-18-24 @ 06:08) (16 - 18)  SpO2: 98% (11-18-24 @ 06:08) (96% - 98%)    PHYSICAL EXAM:  GENERAL: AAOx3, no acute distress.  HEAD:  Atraumatic, Normocephalic  EYES: conjunctiva and sclera clear  NECK: Supple, no JVD or thyromegaly  CHEST/LUNG: Clear to auscultation bilaterally; No wheeze, rhonchi, or rales  HEART: Regular rate and rhythm; normal S1, S2, No murmurs.  ABDOMEN: Soft, +epigastric tenderness, nondistended; Bowel sounds present  NEUROLOGY: No asterixis or tremor.   SKIN: Intact, no jaundice            LABS:                        11.5   5.48  )-----------( 208      ( 17 Nov 2024 06:14 )             35.2     11-17    141  |  105  |  5[L]  ----------------------------<  77  3.9   |  25  |  0.5[L]    Ca    9.0      17 Nov 2024 06:14  Mg     1.9     11-17    TPro  6.1  /  Alb  4.1  /  TBili  0.3  /  DBili  x   /  AST  40  /  ALT  48[H]  /  AlkPhos  99  11-17    LIVER FUNCTIONS - ( 17 Nov 2024 06:14 )  Alb: 4.1 g/dL / Pro: 6.1 g/dL / ALK PHOS: 99 U/L / ALT: 48 U/L / AST: 40 U/L / GGT: x               IMAGING:    < from: CT Abdomen and Pelvis w/ IV Cont (11.15.24 @ 18:31) >    ACC: [id number] EXAM:  CT ABDOMEN AND PELVIS IC   ORDERED BY: MAR ARTEAGA     PROCEDURE DATE:  11/15/2024          INTERPRETATION:  CLINICAL INFORMATION: Epigastric pain    COMPARISON: 1/24/2023    CONTRAST/COMPLICATIONS:  IV Contrast: Omnipaque 350  95 cc administered   5 cc discarded  Oral Contrast: NONE  The liver except for mild intrahepatic bile duct dilatation which appears   new since the previous exam appears normal with no focal abnormality   seen. The spleen pancreas kidneys and adrenal glands appear normal.  The patient is status post post cholecystectomy. The common bile duct   measures 1 cm in diameter with no stone formation seen.  There is no free fluid or lymphadenopathy.  The bowel pattern appears normal. No free air or inflammatory changes are   seen.  There is anterior abdominal wall hernia containing only fat unchanged.  No pelvic mass or inflammatory process is seen.  The bony and soft tissue structures appear intact.    IMPRESSION: Mild intrahepatic bile duct dilatation which appears new   since the previous exam. Otherwise unremarkable exam            --- End of Report ---            SHEILA JANSEN MD; Attending Radiologist  This document has been electronically signed. Nov 15 2024  7:05PM    < end of copied text >      < from: US Abdomen Upper Quadrant Right (11.15.24 @ 19:03) >    ACC: [id number] EXAM:  US ABDOMEN RT UPR QUADRANT   ORDERED BY: MAR ARTEAGA     PROCEDURE DATE:  11/15/2024          INTERPRETATION:  CLINICAL INFORMATION: Right upper quadrant pain    COMPARISON: CT dated 11/15/2024    TECHNIQUE: Sonography of the right upper quadrant.    FINDINGS:  Liver: 1.7 cm cyst  Bile ducts: CBD dilated, 8 mm.  Gallbladder: Cholecystectomy.  Pancreas: Visualized portions are within normal limits.  Right kidney: 12.2 cm. No hydronephrosis.  Ascites: None.      IMPRESSION:  Status post cholecystectomy. CBD dilated to 8 mm, which may be seen   postcholecystectomy, however correlation with LFTs is recommended.    --- End of Report ---            LINDA CARRILLO MD; Attending Radiologist  This document has been electronically signed. Nov 15 2024  9:18PM    < end of copied text >

## 2024-11-18 NOTE — PROGRESS NOTE ADULT - ASSESSMENT
50 yo F PMHx of overactive bladder, HLD, GERD, migraines, kidney stones, hypothyroidism and PSHx of cholecystectomy presenting with epigastric pain intermittently for past 3 weeks. However, 2 am last night pain has worsened, now constant, and radiates to her back. Admits to associated nausea and vomiting, states she has not been able to take anything down since. Admitted for acute pancreatitis.    #Epigastric pain - likely 2/2 acute pancreatitis   -RUQ sono: S/p cholecystectomy. CBD dilated to 8 mm, which may be seen postcholecystectomy, however correlation with LFTs is recommended.  -CTAP: Mild intrahepatic bile duct dilatation which appears new since the previous exam. Otherwise unremarkable exam.   -Lipase 157, WBC 12.87, triglycerides 181  - GI consult noted  - IVF hydration  - MRI/MRCP ordered  - check MATT, ASMA, AMA  - monitor off abx   - advance diet as tolerated  - IV Morphine prn  - IV Compazine prn for nausea/vomiting (, f/u AM EKG)  - IV Protonix     #Overactive bladder  -C/w oxybutynin    #HLD  -C/w rosuvastatin     #Hypothyroidism  -C/w synthroid     #Anxiety   -C/w Xanax prn  -[facility] Reference #[id number]    #Chronic neck/back pain  -Pt takes Oxycodone/Acetaminophen 10/325 TID prn and Morphine ER 15 BID prn - confirmed on [facility]     #H/o migraines  -Holding home meds while inpatient     Misc:  DVT ppx: N/A  GI ppx: Clears  Diet: Liquids  Activity: IAT  Code: Full Code  Dispo: Acute, anticipate 48 hours

## 2024-11-18 NOTE — PROGRESS NOTE ADULT - SUBJECTIVE AND OBJECTIVE BOX
RAMON WOODS 49y Female  MRN#: [id number]     SUBJECTIVE  Patient is a 49y old Female who presents with a chief complaint of abdominal pain (2024 11:58)    Interval/Overnight Events:    Today is hospital day 3d, and this morning she is lying in bed without distress.   No acute overnight events.     OBJECTIVE  PAST MEDICAL & SURGICAL HISTORY  GERD (gastroesophageal reflux disease)    Migraine headache    Back pain    Anxiety    Kidney stones    Murmur  since age 20         Thyroid nodule    Hypothyroidism    Overactive bladder    History of surgery  hernia repair    x2    History of cholecystectomy    History of lithotripsy      ALLERGIES:  NSAIDs (Angioedema; Anaphylaxis; Hives)  eggs (Unknown)  Bananas (Unknown)  Cipro (Rash)  citrus (Unknown)    MEDICATIONS:  STANDING MEDICATIONS  citalopram 40 milliGRAM(s) Oral daily  lactated ringers. 1000 milliLiter(s) IV Continuous <Continuous>  levothyroxine 75 MICROGram(s) Oral daily  oxybutynin 10 milliGRAM(s) Oral daily  pantoprazole  Injectable 40 milliGRAM(s) IV Push daily  polyethylene glycol 3350 17 Gram(s) Oral daily  rosuvastatin 10 milliGRAM(s) Oral at bedtime    PRN MEDICATIONS  acetaminophen     Tablet .. 650 milliGRAM(s) Oral every 6 hours PRN  ALPRAZolam 0.5 milliGRAM(s) Oral two times a day PRN  aluminum hydroxide/magnesium hydroxide/simethicone Suspension 30 milliLiter(s) Oral every 4 hours PRN  LORazepam   Injectable 1 milliGRAM(s) IV Push once PRN  melatonin 3 milliGRAM(s) Oral at bedtime PRN  morphine  - Injectable 4 milliGRAM(s) IV Push every 3 hours PRN  morphine  - Injectable 2 milliGRAM(s) IV Push every 4 hours PRN  prochlorperazine   Injectable 5 milliGRAM(s) IV Push every 8 hours PRN    HOME MEDICATIONS  Home Medications:  butalbital/acetaminophen/caffeine 50 mg-325 mg-40 mg oral tablet: 1 tab(s) orally 3 times a day (15 Nov 2024 23:00)  citalopram 40 mg oral tablet: 1 tab(s) orally once a day (in the morning) (2023 09:14)  levothyroxine 75 mcg (0.075 mg) oral capsule: 1 cap(s) orally once a day (2023 09:14)  morphine 15 mg oral tablet: 1 tab(s) orally 2 times a day as needed for  pain (15 Nov 2024 22:57)  ondansetron 8 mg oral tablet: 1 tab(s) orally 2 times a day as needed for  nausea (15 Nov 2024 23:03)  oxybutynin 5 mg oral tablet: 2 tab(s) orally once a day (2023 09:14)  pantoprazole 40 mg oral delayed release tablet: 1 tab(s) orally once a day (2023 09:14)  Percocet 10/325 oral tablet: 1 tab(s) orally 3 times a day as needed for  pain (15 Nov 2024 22:56)  rizatriptan 10 mg oral tablet, disintegratin tab(s) orally once a day as needed for  migraine (15 Nov 2024 23:01)  rosuvastatin 10 mg oral tablet: 1 tab(s) orally once a day (15 Nov 2024 22:55)  Xanax 0.5 mg oral tablet: 1 tab(s) orally 2 times a day as needed for  anxiety (15 Nov 2024 22:54)      LABS:                        11.5   5.48  )-----------( 208      ( 2024 06:14 )             35.2     -    141  |  105  |  5[L]  ----------------------------<  77  3.9   |  25  |  0.5[L]    Ca    9.0      2024 06:14  Mg     1.9     -    TPro  6.1  /  Alb  4.1  /  TBili  0.3  /  DBili  x   /  AST  40  /  ALT  48[H]  /  AlkPhos  99  11-    LIVER FUNCTIONS - ( 2024 06:14 )  Alb: 4.1 g/dL / Pro: 6.1 g/dL / ALK PHOS: 99 U/L / ALT: 48 U/L / AST: 40 U/L / GGT: x             Urinalysis Basic - ( 2024 06:14 )    Color: x / Appearance: x / SG: x / pH: x  Gluc: 77 mg/dL / Ketone: x  / Bili: x / Urobili: x   Blood: x / Protein: x / Nitrite: x   Leuk Esterase: x / RBC: x / WBC x   Sq Epi: x / Non Sq Epi: x / Bacteria: x            Culture - Urine (collected 15 Nov 2024 21:44)  Source: Clean Catch None  Final Report (2024 16:39):    <10,000 CFU/mL Normal Urogenital Ana    Urinalysis with Rflx Culture (collected 15 Nov 2024 21:44)          CAPILLARY BLOOD GLUCOSE          PHYSICAL EXAM:  T(C): 36.3 (24 @ 13:33), Max: 36.7 (24 @ 20:51)  HR: 94 (24 @ 13:33) (72 - 94)  BP: 105/75 (24 @ 13:33) (99/66 - 126/85)  RR: 16 (24 @ 13:33) (16 - 18)  SpO2: 94% (24 @ 13:33) (94% - 98%)    GENERAL: NAD, well-developed, 49y  RESPIRATORY: Clear to auscultation bilaterally  CARDIOVASCULAR: Regular rate and rhythm  GASTROINTESTINAL: Abdomen Soft, + epigastric tenderness, Nondistended, +BS  PSYCH: AAOx3, affect appropriate  NEUROLOGY: non-focal, cognition grossly intact, MAEE      ADMISSION SUMMARY  Patient is a 49y old Female who presents with a chief complaint of abdominal pain (2024 11:58)

## 2024-11-19 LAB — ANA TITR SER: NEGATIVE — SIGNIFICANT CHANGE UP

## 2024-11-19 PROCEDURE — 99233 SBSQ HOSP IP/OBS HIGH 50: CPT

## 2024-11-19 PROCEDURE — 99222 1ST HOSP IP/OBS MODERATE 55: CPT

## 2024-11-19 RX ORDER — CHLORHEXIDINE GLUCONATE 1.2 MG/ML
1 RINSE ORAL
Refills: 0 | Status: DISCONTINUED | OUTPATIENT
Start: 2024-11-19 | End: 2024-11-20

## 2024-11-19 RX ORDER — OXYCODONE HYDROCHLORIDE 30 MG/1
5 TABLET ORAL ONCE
Refills: 0 | Status: DISCONTINUED | OUTPATIENT
Start: 2024-11-19 | End: 2024-11-19

## 2024-11-19 RX ADMIN — OXYCODONE HYDROCHLORIDE 5 MILLIGRAM(S): 30 TABLET ORAL at 22:30

## 2024-11-19 RX ADMIN — Medication 0.5 MILLIGRAM(S): at 13:23

## 2024-11-19 RX ADMIN — Medication 2 MILLIGRAM(S): at 05:00

## 2024-11-19 RX ADMIN — ROSUVASTATIN CALCIUM 10 MILLIGRAM(S): 5 TABLET, FILM COATED ORAL at 21:26

## 2024-11-19 RX ADMIN — Medication 40 MILLIGRAM(S): at 11:06

## 2024-11-19 RX ADMIN — Medication 2 MILLIGRAM(S): at 00:06

## 2024-11-19 RX ADMIN — Medication 2 MILLIGRAM(S): at 01:06

## 2024-11-19 RX ADMIN — Medication 15 MILLIGRAM(S): at 16:16

## 2024-11-19 RX ADMIN — Medication 2 MILLIGRAM(S): at 08:19

## 2024-11-19 RX ADMIN — Medication 2 MILLIGRAM(S): at 08:34

## 2024-11-19 RX ADMIN — OXYCODONE HYDROCHLORIDE 5 MILLIGRAM(S): 30 TABLET ORAL at 21:37

## 2024-11-19 RX ADMIN — PANTOPRAZOLE SODIUM 40 MILLIGRAM(S): 40 TABLET, DELAYED RELEASE ORAL at 11:06

## 2024-11-19 RX ADMIN — Medication 15 MILLIGRAM(S): at 18:11

## 2024-11-19 RX ADMIN — Medication 100 MILLILITER(S): at 21:38

## 2024-11-19 RX ADMIN — Medication 10 MILLIGRAM(S): at 11:06

## 2024-11-19 RX ADMIN — Medication 250 MILLILITER(S): at 02:07

## 2024-11-19 RX ADMIN — Medication 2 MILLIGRAM(S): at 04:15

## 2024-11-19 RX ADMIN — Medication 250 MILLILITER(S): at 08:00

## 2024-11-19 RX ADMIN — Medication 4 MILLIGRAM(S): at 11:04

## 2024-11-19 RX ADMIN — Medication 4 MILLIGRAM(S): at 10:49

## 2024-11-19 RX ADMIN — Medication 4 MILLIGRAM(S): at 06:36

## 2024-11-19 RX ADMIN — Medication 4 MILLIGRAM(S): at 02:07

## 2024-11-19 RX ADMIN — Medication 75 MICROGRAM(S): at 05:57

## 2024-11-19 RX ADMIN — Medication 15 MILLIGRAM(S): at 15:20

## 2024-11-19 RX ADMIN — Medication 250 MILLILITER(S): at 12:00

## 2024-11-19 RX ADMIN — Medication 4 MILLIGRAM(S): at 06:25

## 2024-11-19 RX ADMIN — Medication 0.5 MILLIGRAM(S): at 21:26

## 2024-11-19 RX ADMIN — POLYETHYLENE GLYCOL 3350 17 GRAM(S): 17 POWDER, FOR SOLUTION ORAL at 11:06

## 2024-11-19 NOTE — CONSULT NOTE ADULT - ASSESSMENT
A/P: Patient is a 49F with PMH of overactive bladder, HLD, GERD, migraines, kidney stones, hypothyroidism and PSHx of cholecystectomy presenting with epigastric pain intermittently for past 3 weeks. However, 2 am last night pain has worsened, now constant, and radiates to her back. Admits to associated nausea and vomiting, states she has not been able to take anything down since. Admitted for acute pancreatitis.    Pain medicine services at [facility] will review chart and make recommendations. Please teams me if any questions.    PLAN  #Abdominal Pain due to acute pancreatitis  -Patient currently taking IV morphine 4mg q3h PRN and IV morphine 2mg q4h PRN. -Please transition to home PO regimen Oxycodone/Acetaminophen 10/325 TID PRN and Morphine ER 15 BID PRN (MME=75).   - Follow up with pain management out patient.     #Opioid induced constipation  - Bowel regimen as per primary team

## 2024-11-19 NOTE — CHART NOTE - NSCHARTNOTEFT_GEN_A_CORE
10/30/2024	10/31/2024	morphine sulf er 15 mg tablet	   60	   30	Kong Wood MD	[id number]	Medicaid	[facility] #[id number]  10/30/2024	10/30/2024	oxycodone-acetaminophen  mg tab	90	30	Kong Wood MD	[id number]	Medicaid	[facility] #[id number]  10/08/2024	10/08/2024	alprazolam 0.5 mg tablet       60	    30	Sondra Santana	[id number]	Medicaid	[facility] noted to have active prescriptions for narcotics outpatient for chronic pain. ISTOP record pasted below. Reference # [id number]  10/31/2024 morphine sulf er 15 mg tablet 60tabs  30 days Rx Kong Wood MD	  10/30/2024 oxycodone-acetaminophen  mg tab 90 tabs 30 days Rx Kong Wood MD  10/08/2024 alprazolam 0.5 mg tablet 60 tabs 30 days Rx Sondra Santana

## 2024-11-19 NOTE — PROGRESS NOTE ADULT - SUBJECTIVE AND OBJECTIVE BOX
49yFemale  Being followed for acute pancreatitis   Interval history: Patient denies nausea, vomiting, hematemesis, melena, blood in stool, diarrhea, constipation. Patient reports some epigastric pain.      PAST MEDICAL & SURGICAL HISTORY:   GERD (gastroesophageal reflux disease)      Migraine headache      Back pain      Anxiety      Kidney stones      Murmur  since age 20     6/18      Thyroid nodule      Hypothyroidism      Overactive bladder      History of surgery  hernia repair    x2      History of cholecystectomy      History of lithotripsy                Social History: No smoking. No alcohol. No illegal drug use.          MEDICATIONS  (STANDING):  citalopram 40 milliGRAM(s) Oral daily  lactated ringers. 1000 milliLiter(s) (250 mL/Hr) IV Continuous <Continuous>  levothyroxine 75 MICROGram(s) Oral daily  morphine ER Tablet 15 milliGRAM(s) Oral every 12 hours  oxybutynin 10 milliGRAM(s) Oral daily  pantoprazole  Injectable 40 milliGRAM(s) IV Push daily  polyethylene glycol 3350 17 Gram(s) Oral daily  rosuvastatin 10 milliGRAM(s) Oral at bedtime    MEDICATIONS  (PRN):  ALPRAZolam 0.5 milliGRAM(s) Oral two times a day PRN anxiety  aluminum hydroxide/magnesium hydroxide/simethicone Suspension 30 milliLiter(s) Oral every 4 hours PRN Dyspepsia  melatonin 3 milliGRAM(s) Oral at bedtime PRN Insomnia  oxycodone    5 mG/acetaminophen 325 mG 2 Tablet(s) Oral every 6 hours PRN Moderate Pain (4 - 6)  prochlorperazine   Injectable 5 milliGRAM(s) IV Push every 8 hours PRN nausea/vomiting      Allergies:   NSAIDs (Angioedema; Anaphylaxis; Hives)  eggs (Unknown)  Bananas (Unknown)  Cipro (Rash)  citrus (Unknown)  Intolerances          REVIEW OF SYSTEMS:  General:  No weight loss, fevers, or chills.  Eyes:  No reported pain or visual changes  ENT:  No sore throat or runny nose.  NECK: No stiffness   CV:  No chest pain or palpitations.  Resp:  No shortness of breath, cough  GI:  +epigastric abdominal pain, no nausea, vomiting, dysphagia, diarrhea or constipation. No rectal bleeding, melena, or hematemesis.  Muscle:  No aches or weakness  Neuro:  No tingling, numbness           VITAL SIGNS:   T(F): 97.6 (11-19-24 @ 04:55), Max: 98.3 (11-18-24 @ 20:00)  HR: 75 (11-19-24 @ 04:55) (75 - 94)  BP: 113/76 (11-19-24 @ 04:55) (105/75 - 119/84)  RR: 16 (11-19-24 @ 04:55) (16 - 16)  SpO2: 98% (11-19-24 @ 04:55) (94% - 99%)    PHYSICAL EXAM:  GENERAL: AAOx3, no acute distress.  HEAD:  Atraumatic, Normocephalic  EYES: conjunctiva and sclera clear  NECK: Supple, no JVD or thyromegaly  CHEST/LUNG: Clear to auscultation bilaterally; No wheeze, rhonchi, or rales  HEART: Regular rate and rhythm; normal S1, S2, No murmurs.  ABDOMEN: Soft, +epigastric tenderness, nondistended; Bowel sounds present  NEUROLOGY: No asterixis or tremor.   SKIN: Intact, no jaundice            LABS:  none today              IMAGING:      < from: MR MRCP No Cont (11.18.24 @ 18:42) >    ACC: [id number] EXAM:  MR MRCP   ORDERED BY: JESUSITA ROWE     PROCEDURE DATE:  11/18/2024          INTERPRETATION:  CLINICAL INFORMATION: Biliary dilation on CT    COMPARISON: Correlation made with CT abdomen pelvis November 15, 2024    CONTRAST/COMPLICATIONS:  IV Contrast: None  Oral Contrast: None      PROCEDURE:  Noncontrast MRI of the abdomen was performed.  MRCP protocol was utilized.    FINDINGS:  LOWER CHEST: Within normal limits.    LIVER: Normal hepatic contour. Simple 1.3 cm hepatic cyst near the   gallbladder fossa.  BILE DUCTS: Mild CBD dilation to 0.9 cm with distal small tapering to   normal caliber. Minimal central intrahepatic biliary ductal dilation. No   filling defects seen within the common bile duct  GALLBLADDER: Post cholecystectomy.  SPLEEN: Within normal limits.  PANCREAS: Within normal limits.  ADRENALS: Within normal limits.  KIDNEYS/URETERS: Within normal limits.    VISUALIZED PORTIONS:  BOWEL: Within normal limits.  PERITONEUM: No ascites.  VESSELS: Within normal limits.  RETROPERITONEUM/LYMPH NODES: No lymphadenopathy.  ABDOMINAL WALL: Within normal limits.  BONES: Within normal limits.    IMPRESSION:  Post cholecystectomy with mild proximal CBD and minimal intrahepatic   biliary ductal dilation; the CBDtapers to normal caliber distally. No   evidence for choledocholithiasis.    --- End of Report ---            UDAY CASILLAS MD; Attending Radiologist  This document has been electronically signed. Nov 19 2024 11:28AM    < end of copied text >       49yFemale  Being followed for acute pancreatitis   Interval history: Patient denies nausea, vomiting, hematemesis, melena, blood in stool, diarrhea, constipation. no abdominal pain      PAST MEDICAL & SURGICAL HISTORY:   GERD (gastroesophageal reflux disease)      Migraine headache      Back pain      Anxiety      Kidney stones      Murmur  since age 20     6/18      Thyroid nodule      Hypothyroidism      Overactive bladder      History of surgery  hernia repair    x2      History of cholecystectomy      History of lithotripsy                Social History: No smoking. No alcohol. No illegal drug use.          MEDICATIONS  (STANDING):  citalopram 40 milliGRAM(s) Oral daily  lactated ringers. 1000 milliLiter(s) (250 mL/Hr) IV Continuous <Continuous>  levothyroxine 75 MICROGram(s) Oral daily  morphine ER Tablet 15 milliGRAM(s) Oral every 12 hours  oxybutynin 10 milliGRAM(s) Oral daily  pantoprazole  Injectable 40 milliGRAM(s) IV Push daily  polyethylene glycol 3350 17 Gram(s) Oral daily  rosuvastatin 10 milliGRAM(s) Oral at bedtime    MEDICATIONS  (PRN):  ALPRAZolam 0.5 milliGRAM(s) Oral two times a day PRN anxiety  aluminum hydroxide/magnesium hydroxide/simethicone Suspension 30 milliLiter(s) Oral every 4 hours PRN Dyspepsia  melatonin 3 milliGRAM(s) Oral at bedtime PRN Insomnia  oxycodone    5 mG/acetaminophen 325 mG 2 Tablet(s) Oral every 6 hours PRN Moderate Pain (4 - 6)  prochlorperazine   Injectable 5 milliGRAM(s) IV Push every 8 hours PRN nausea/vomiting      Allergies:   NSAIDs (Angioedema; Anaphylaxis; Hives)  eggs (Unknown)  Bananas (Unknown)  Cipro (Rash)  citrus (Unknown)  Intolerances          REVIEW OF SYSTEMS:  General:  No weight loss, fevers, or chills.  Eyes:  No reported pain or visual changes  ENT:  No sore throat or runny nose.  NECK: No stiffness   CV:  No chest pain or palpitations.  Resp:  No shortness of breath, cough  GI:  no abdominal pain, no nausea, vomiting, dysphagia, diarrhea or constipation. No rectal bleeding, melena, or hematemesis.  Neuro:  No tingling, numbness           VITAL SIGNS:   T(F): 97.6 (11-19-24 @ 04:55), Max: 98.3 (11-18-24 @ 20:00)  HR: 75 (11-19-24 @ 04:55) (75 - 94)  BP: 113/76 (11-19-24 @ 04:55) (105/75 - 119/84)  RR: 16 (11-19-24 @ 04:55) (16 - 16)  SpO2: 98% (11-19-24 @ 04:55) (94% - 99%)    PHYSICAL EXAM:  GENERAL: AAOx3, no acute distress.  HEAD:  Atraumatic, Normocephalic  EYES: conjunctiva and sclera clear  NECK: Supple, no JVD or thyromegaly  CHEST/LUNG: Clear to auscultation bilaterally; No wheeze, rhonchi, or rales  HEART: Regular rate and rhythm; normal S1, S2, No murmurs.  ABDOMEN: Soft, nontender, nondistended; Bowel sounds present  NEUROLOGY: No asterixis or tremor.   SKIN: Intact, no jaundice            LABS:  none today              IMAGING:      < from: MR MRCP No Cont (11.18.24 @ 18:42) >    ACC: [id number] EXAM:  MR MRCP   ORDERED BY: JESUSITA ROWE     PROCEDURE DATE:  11/18/2024          INTERPRETATION:  CLINICAL INFORMATION: Biliary dilation on CT    COMPARISON: Correlation made with CT abdomen pelvis November 15, 2024    CONTRAST/COMPLICATIONS:  IV Contrast: None  Oral Contrast: None      PROCEDURE:  Noncontrast MRI of the abdomen was performed.  MRCP protocol was utilized.    FINDINGS:  LOWER CHEST: Within normal limits.    LIVER: Normal hepatic contour. Simple 1.3 cm hepatic cyst near the   gallbladder fossa.  BILE DUCTS: Mild CBD dilation to 0.9 cm with distal small tapering to   normal caliber. Minimal central intrahepatic biliary ductal dilation. No   filling defects seen within the common bile duct  GALLBLADDER: Post cholecystectomy.  SPLEEN: Within normal limits.  PANCREAS: Within normal limits.  ADRENALS: Within normal limits.  KIDNEYS/URETERS: Within normal limits.    VISUALIZED PORTIONS:  BOWEL: Within normal limits.  PERITONEUM: No ascites.  VESSELS: Within normal limits.  RETROPERITONEUM/LYMPH NODES: No lymphadenopathy.  ABDOMINAL WALL: Within normal limits.  BONES: Within normal limits.    IMPRESSION:  Post cholecystectomy with mild proximal CBD and minimal intrahepatic   biliary ductal dilation; the CBDtapers to normal caliber distally. No   evidence for choledocholithiasis.    --- End of Report ---            UDAY CASILLAS MD; Attending Radiologist  This document has been electronically signed. Nov 19 2024 11:28AM    < end of copied text >       49yFemale  Being followed for abdominal pain  Interval history: Patient denies nausea, vomiting, hematemesis, melena, blood in stool, diarrhea, constipation. no abdominal pain      PAST MEDICAL & SURGICAL HISTORY:   GERD (gastroesophageal reflux disease)      Migraine headache      Back pain      Anxiety      Kidney stones      Murmur  since age 20     6/18      Thyroid nodule      Hypothyroidism      Overactive bladder      History of surgery  hernia repair    x2      History of cholecystectomy      History of lithotripsy                Social History: No smoking. No alcohol. No illegal drug use.          MEDICATIONS  (STANDING):  citalopram 40 milliGRAM(s) Oral daily  lactated ringers. 1000 milliLiter(s) (250 mL/Hr) IV Continuous <Continuous>  levothyroxine 75 MICROGram(s) Oral daily  morphine ER Tablet 15 milliGRAM(s) Oral every 12 hours  oxybutynin 10 milliGRAM(s) Oral daily  pantoprazole  Injectable 40 milliGRAM(s) IV Push daily  polyethylene glycol 3350 17 Gram(s) Oral daily  rosuvastatin 10 milliGRAM(s) Oral at bedtime    MEDICATIONS  (PRN):  ALPRAZolam 0.5 milliGRAM(s) Oral two times a day PRN anxiety  aluminum hydroxide/magnesium hydroxide/simethicone Suspension 30 milliLiter(s) Oral every 4 hours PRN Dyspepsia  melatonin 3 milliGRAM(s) Oral at bedtime PRN Insomnia  oxycodone    5 mG/acetaminophen 325 mG 2 Tablet(s) Oral every 6 hours PRN Moderate Pain (4 - 6)  prochlorperazine   Injectable 5 milliGRAM(s) IV Push every 8 hours PRN nausea/vomiting      Allergies:   NSAIDs (Angioedema; Anaphylaxis; Hives)  eggs (Unknown)  Bananas (Unknown)  Cipro (Rash)  citrus (Unknown)  Intolerances          REVIEW OF SYSTEMS:  General:  No weight loss, fevers, or chills.  Eyes:  No reported pain or visual changes  ENT:  No sore throat or runny nose.  NECK: No stiffness   CV:  No chest pain or palpitations.  Resp:  No shortness of breath, cough  GI:  no abdominal pain, no nausea, vomiting, dysphagia, diarrhea or constipation. No rectal bleeding, melena, or hematemesis.  Neuro:  No tingling, numbness           VITAL SIGNS:   T(F): 97.6 (11-19-24 @ 04:55), Max: 98.3 (11-18-24 @ 20:00)  HR: 75 (11-19-24 @ 04:55) (75 - 94)  BP: 113/76 (11-19-24 @ 04:55) (105/75 - 119/84)  RR: 16 (11-19-24 @ 04:55) (16 - 16)  SpO2: 98% (11-19-24 @ 04:55) (94% - 99%)    PHYSICAL EXAM:  GENERAL: AAOx3, no acute distress.  HEAD:  Atraumatic, Normocephalic  EYES: conjunctiva and sclera clear  NECK: Supple, no JVD or thyromegaly  CHEST/LUNG: Clear to auscultation bilaterally; No wheeze, rhonchi, or rales  HEART: Regular rate and rhythm; normal S1, S2, No murmurs.  ABDOMEN: Soft, nontender, nondistended; Bowel sounds present  NEUROLOGY: No asterixis or tremor.   SKIN: Intact, no jaundice            LABS:  none today              IMAGING:      < from: MR MRCP No Cont (11.18.24 @ 18:42) >    ACC: [id number] EXAM:  MR MRCP   ORDERED BY: JESUSITA ROWE     PROCEDURE DATE:  11/18/2024          INTERPRETATION:  CLINICAL INFORMATION: Biliary dilation on CT    COMPARISON: Correlation made with CT abdomen pelvis November 15, 2024    CONTRAST/COMPLICATIONS:  IV Contrast: None  Oral Contrast: None      PROCEDURE:  Noncontrast MRI of the abdomen was performed.  MRCP protocol was utilized.    FINDINGS:  LOWER CHEST: Within normal limits.    LIVER: Normal hepatic contour. Simple 1.3 cm hepatic cyst near the   gallbladder fossa.  BILE DUCTS: Mild CBD dilation to 0.9 cm with distal small tapering to   normal caliber. Minimal central intrahepatic biliary ductal dilation. No   filling defects seen within the common bile duct  GALLBLADDER: Post cholecystectomy.  SPLEEN: Within normal limits.  PANCREAS: Within normal limits.  ADRENALS: Within normal limits.  KIDNEYS/URETERS: Within normal limits.    VISUALIZED PORTIONS:  BOWEL: Within normal limits.  PERITONEUM: No ascites.  VESSELS: Within normal limits.  RETROPERITONEUM/LYMPH NODES: No lymphadenopathy.  ABDOMINAL WALL: Within normal limits.  BONES: Within normal limits.    IMPRESSION:  Post cholecystectomy with mild proximal CBD and minimal intrahepatic   biliary ductal dilation; the CBDtapers to normal caliber distally. No   evidence for choledocholithiasis.    --- End of Report ---            UDAY CASILLAS MD; Attending Radiologist  This document has been electronically signed. Nov 19 2024 11:28AM    < end of copied text >

## 2024-11-19 NOTE — PROGRESS NOTE ADULT - SUBJECTIVE AND OBJECTIVE BOX
RAMON WOODS 49y Female  MRN#: [id number]     SUBJECTIVE  Patient is a 49y old Female who presents with a chief complaint of abdominal pain (2024 13:47)    Interval/Overnight Events:    Today is hospital day 4d, and this morning she is lying in bed without distress.   No acute overnight events.     OBJECTIVE  PAST MEDICAL & SURGICAL HISTORY  GERD (gastroesophageal reflux disease)    Migraine headache    Back pain    Anxiety    Kidney stones    Murmur  since age 20         Thyroid nodule    Hypothyroidism    Overactive bladder    History of surgery  hernia repair    x2    History of cholecystectomy    History of lithotripsy      ALLERGIES:  NSAIDs (Angioedema; Anaphylaxis; Hives)  eggs (Unknown)  Bananas (Unknown)  Cipro (Rash)  citrus (Unknown)    MEDICATIONS:  STANDING MEDICATIONS  citalopram 40 milliGRAM(s) Oral daily  lactated ringers. 1000 milliLiter(s) IV Continuous <Continuous>  levothyroxine 75 MICROGram(s) Oral daily  oxybutynin 10 milliGRAM(s) Oral daily  pantoprazole  Injectable 40 milliGRAM(s) IV Push daily  polyethylene glycol 3350 17 Gram(s) Oral daily  rosuvastatin 10 milliGRAM(s) Oral at bedtime    PRN MEDICATIONS  acetaminophen     Tablet .. 650 milliGRAM(s) Oral every 6 hours PRN  ALPRAZolam 0.5 milliGRAM(s) Oral two times a day PRN  aluminum hydroxide/magnesium hydroxide/simethicone Suspension 30 milliLiter(s) Oral every 4 hours PRN  melatonin 3 milliGRAM(s) Oral at bedtime PRN  morphine  - Injectable 4 milliGRAM(s) IV Push every 3 hours PRN  morphine  - Injectable 2 milliGRAM(s) IV Push every 4 hours PRN  prochlorperazine   Injectable 5 milliGRAM(s) IV Push every 8 hours PRN    HOME MEDICATIONS  Home Medications:  butalbital/acetaminophen/caffeine 50 mg-325 mg-40 mg oral tablet: 1 tab(s) orally 3 times a day (15 Nov 2024 23:00)  citalopram 40 mg oral tablet: 1 tab(s) orally once a day (in the morning) (2023 09:14)  levothyroxine 75 mcg (0.075 mg) oral capsule: 1 cap(s) orally once a day (2023 09:14)  morphine 15 mg oral tablet: 1 tab(s) orally 2 times a day as needed for  pain (15 Nov 2024 22:57)  ondansetron 8 mg oral tablet: 1 tab(s) orally 2 times a day as needed for  nausea (15 Nov 2024 23:03)  oxybutynin 5 mg oral tablet: 2 tab(s) orally once a day (2023 09:14)  pantoprazole 40 mg oral delayed release tablet: 1 tab(s) orally once a day (2023 09:14)  Percocet 10/325 oral tablet: 1 tab(s) orally 3 times a day as needed for  pain (15 Nov 2024 22:56)  rizatriptan 10 mg oral tablet, disintegratin tab(s) orally once a day as needed for  migraine (15 Nov 2024 23:01)  rosuvastatin 10 mg oral tablet: 1 tab(s) orally once a day (15 Nov 2024 22:55)  Xanax 0.5 mg oral tablet: 1 tab(s) orally 2 times a day as needed for  anxiety (15 Nov 2024 22:54)      LABS:                          CAPILLARY BLOOD GLUCOSE          PHYSICAL EXAM:  T(C): 36.4 (24 @ 04:55), Max: 36.8 (24 @ 20:00)  HR: 75 (24 @ 04:55) (75 - 94)  BP: 113/76 (24 @ 04:55) (105/75 - 119/84)  RR: 16 (24 @ 04:55) (16 - 16)  SpO2: 98% (24 @ 04:55) (94% - 99%)    GENERAL: NAD, well-developed, 49y  RESPIRATORY: Clear to auscultation bilaterally  CARDIOVASCULAR: Regular rate and rhythm  GASTROINTESTINAL: Abdomen Soft, + epigastric tenderness, Nondistended, +BS  PSYCH: AAOx3, affect appropriate  NEUROLOGY: non-focal, cognition grossly intact, MAEE    ADMISSION SUMMARY  Patient is a 49y old Female who presents with a chief complaint of abdominal pain (2024 13:47)

## 2024-11-19 NOTE — PROGRESS NOTE ADULT - NS ATTEND AMEND GEN_ALL_CORE FT
Epigastric pain improved, MRI/MRCP reviewed, biliary dilation likely secondary to longstanding narcotic use. LFTs improved. Continue diet as tolerated. Pt will resume outpt GI follow up.
I edited the note

## 2024-11-19 NOTE — CONSULT NOTE ADULT - SUBJECTIVE AND OBJECTIVE BOX
HPI: Patient is a 49F with PMH of overactive bladder, HLD, GERD, migraines, kidney stones, hypothyroidism and PSHx of cholecystectomy presenting with epigastric pain intermittently for past 3 weeks. However, 2 am last night pain has worsened, now constant, and radiates to her back. Admits to associated nausea and vomiting, states she has not been able to take anything down since. Admitted for acute pancreatitis.    Pain medicine services at [facility] will review chart and make recommendations. Please teams me if any questions.    Current Inpatient Medication Regimen:  acetaminophen     Tablet .. 650 milliGRAM(s) Oral every 6 hours PRN  ALPRAZolam 0.5 milliGRAM(s) Oral two times a day PRN  aluminum hydroxide/magnesium hydroxide/simethicone Suspension 30 milliLiter(s) Oral every 4 hours PRN  citalopram 40 milliGRAM(s) Oral daily  lactated ringers. 1000 milliLiter(s) IV Continuous <Continuous>  levothyroxine 75 MICROGram(s) Oral daily  melatonin 3 milliGRAM(s) Oral at bedtime PRN  morphine  - Injectable 4 milliGRAM(s) IV Push every 3 hours PRN  morphine  - Injectable 2 milliGRAM(s) IV Push every 4 hours PRN  oxybutynin 10 milliGRAM(s) Oral daily  pantoprazole  Injectable 40 milliGRAM(s) IV Push daily  polyethylene glycol 3350 17 Gram(s) Oral daily  prochlorperazine   Injectable 5 milliGRAM(s) IV Push every 8 hours PRN  rosuvastatin 10 milliGRAM(s) Oral at bedtime      Home Analgesic Regimen:  Oxycodone/Acetaminophen 10/325 TID prn   Morphine ER 15 BID prn  (MME = 75)    Allergies:  NSAIDs (Angioedema; Anaphylaxis; Hives)  eggs (Unknown)  Bananas (Unknown)  Cipro (Rash)  citrus (Unknown)      Past Medical History:  Abdominal pain    DM (diabetes mellitus) (Father)    Family history of heart disease (Father)    GERD (gastroesophageal reflux disease)    Migraine headache    Back pain    Anxiety    Kidney stones    Murmur    Thyroid nodule    Hypothyroidism    Overactive bladder    Pancreatitis    History of surgery    History of surgery    History of surgery    History of cholecystectomy    History of lithotripsy    Abdominal pain            Physical Exam:  T(C): 36.4 (11-19-24 @ 04:55), Max: 36.8 (11-18-24 @ 20:00)  HR: 75 (11-19-24 @ 04:55) (75 - 94)  BP: 113/76 (11-19-24 @ 04:55) (105/75 - 119/84)  RR: 16 (11-19-24 @ 04:55) (16 - 16)  SpO2: 98% (11-19-24 @ 04:55) (94% - 99%)

## 2024-11-19 NOTE — PROGRESS NOTE ADULT - ASSESSMENT
50 yo F PMHx of overactive bladder, HLD, GERD, migraines, kidney stones, hypothyroidism and PSHx of cholecystectomy presenting with epigastric pain intermittently for past 3 weeks. However, 2 am last night pain has worsened, now constant, and radiates to her back. Admits to associated nausea and vomiting, states she has not been able to take anything down since. Admitted for acute pancreatitis.    #Epigastric pain - likely 2/2 acute pancreatitis   -RUQ sono: S/p cholecystectomy. CBD dilated to 8 mm, which may be seen postcholecystectomy, however correlation with LFTs is recommended.  -CTAP: Mild intrahepatic bile duct dilatation which appears new since the previous exam. Otherwise unremarkable exam.   -Lipase 157, WBC 12.87, triglycerides 181  - AMA, ASMA negative  - GI consult noted  - IVF hydration  - MRI/MRCP ordered  - check MATT  - monitor off abx   - advance diet as tolerated  - IV Morphine prn  - IV Compazine prn for nausea/vomiting (, f/u AM EKG)  - IV Protonix     #Overactive bladder  -C/w oxybutynin    #HLD  -C/w rosuvastatin     #Hypothyroidism  -C/w synthroid     #Anxiety   -C/w Xanax prn  -[facility] Reference #[id number]    #Chronic neck/back pain  -Pt takes Oxycodone/Acetaminophen 10/325 TID prn and Morphine ER 15 BID prn - confirmed on [facility]     #H/o migraines  -Holding home meds while inpatient     Misc:  DVT ppx: N/A  GI ppx: Clears  Diet: Regular  Activity: IAT  Code: Full Code  Dispo: Acute, anticipate 48 hours

## 2024-11-19 NOTE — PROGRESS NOTE ADULT - ASSESSMENT
50yo female h/o migraines, kidney stones, hypothyroidism, cholecystectomy in 2014, prediabetes/obesity on ozempic since June presenting with epigastric pain x 1 day    #Recurrent epigastric pain  #Mild intrahepatic biliary dilation (new)  #Mild transaminitis  s/p cholecystectomy 2014  s/p EGD 1/2023, EGD/EUS in 5/2023 by Dr. German revealing small hiatal hernia, non erosive bile reflux gastritis (biopsy), EUS: CBD measuring 7 mm with no evidence of stones or sludge, there is a reactive appearing 14 x 11 mm periportal lymph node. Examination of the pancreas revealed a hyperechoic pancreatic parenchyma with no other parenchymal abnormalities and normal PD throughout the pancreas. Otherwise unremarkable EUS exam.  Symptoms may be multifactorial, possible etiologies include functional, retained sludge/stone, sphincter of oddi dysfunction, gastritis/pud, component of constipation in setting of narcotics  Findings not consistent with acute pancreatitis  -low fat diet as tolerated  -MRI wnl CBD dilation likely due to narcotic use  -PPI daily  -Miralax daily  -Monitor LFTs daily  - Follow up with our GI office located on [address] [place] [address], Phone number [phone] with Dr. German     48yo female h/o migraines, kidney stones, hypothyroidism, cholecystectomy in 2014, prediabetes/obesity on ozempic since June presenting with epigastric pain x 1 day    #Recurrent epigastric pain  #Mild intrahepatic biliary dilation (new)  #Mild transaminitis improved   s/p cholecystectomy 2014  s/p EGD 1/2023, EGD/EUS in 5/2023 by Dr. German revealing small hiatal hernia, non erosive bile reflux gastritis (biopsy), EUS: CBD measuring 7 mm with no evidence of stones or sludge, there is a reactive appearing 14 x 11 mm periportal lymph node. Examination of the pancreas revealed a hyperechoic pancreatic parenchyma with no other parenchymal abnormalities and normal PD throughout the pancreas. Otherwise unremarkable EUS exam.  Symptoms may be multifactorial, possible etiologies include functional, retained sludge/stone, sphincter of oddi dysfunction, gastritis/pud, component of constipation in setting of narcotics  Findings not consistent with acute pancreatitis  -low fat diet as tolerated  -MRI wnl CBD dilation likely due to narcotic use  -PPI daily  -Miralax daily  -Monitor LFTs daily  - Follow up with our GI office located on [address] [place] [id number], Phone number [phone] with Dr. German    Recall GI if needed

## 2024-11-20 ENCOUNTER — TRANSCRIPTION ENCOUNTER (OUTPATIENT)
Age: 49
End: 2024-11-20

## 2024-11-20 VITALS
HEART RATE: 73 BPM | DIASTOLIC BLOOD PRESSURE: 81 MMHG | TEMPERATURE: 97 F | SYSTOLIC BLOOD PRESSURE: 123 MMHG | OXYGEN SATURATION: 98 % | RESPIRATION RATE: 18 BRPM

## 2024-11-20 PROCEDURE — 99239 HOSP IP/OBS DSCHRG MGMT >30: CPT

## 2024-11-20 RX ORDER — NALOXONE HCL 0.4 MG/ML
1 AMPUL (ML) INJECTION
Qty: 1 | Refills: 0
Start: 2024-11-20

## 2024-11-20 RX ORDER — SENNOSIDES 8.6 MG
2 TABLET ORAL
Qty: 60 | Refills: 0
Start: 2024-11-20

## 2024-11-20 RX ORDER — ALPRAZOLAM 0.5 MG
1 TABLET ORAL
Qty: 14 | Refills: 0
Start: 2024-11-20

## 2024-11-20 RX ADMIN — Medication 75 MICROGRAM(S): at 06:07

## 2024-11-20 RX ADMIN — POLYETHYLENE GLYCOL 3350 17 GRAM(S): 17 POWDER, FOR SOLUTION ORAL at 11:20

## 2024-11-20 RX ADMIN — Medication 100 MILLILITER(S): at 09:52

## 2024-11-20 RX ADMIN — PANTOPRAZOLE SODIUM 40 MILLIGRAM(S): 40 TABLET, DELAYED RELEASE ORAL at 11:20

## 2024-11-20 RX ADMIN — Medication 10 MILLIGRAM(S): at 11:20

## 2024-11-20 RX ADMIN — Medication 15 MILLIGRAM(S): at 07:05

## 2024-11-20 RX ADMIN — Medication 40 MILLIGRAM(S): at 11:20

## 2024-11-20 RX ADMIN — Medication 0.5 MILLIGRAM(S): at 09:52

## 2024-11-20 RX ADMIN — Medication 15 MILLIGRAM(S): at 06:05

## 2024-11-20 NOTE — DISCHARGE NOTE PROVIDER - NSDCMRMEDTOKEN_GEN_ALL_CORE_FT
butalbital/acetaminophen/caffeine 50 mg-325 mg-40 mg oral tablet: 1 tab(s) orally 3 times a day  citalopram 40 mg oral tablet: 1 tab(s) orally once a day (in the morning)  levothyroxine 75 mcg (0.075 mg) oral capsule: 1 cap(s) orally once a day  morphine 15 mg oral tablet: 1 tab(s) orally 2 times a day as needed for  pain  ondansetron 8 mg oral tablet: 1 tab(s) orally 2 times a day as needed for  nausea  oxybutynin 5 mg oral tablet: 2 tab(s) orally once a day  pantoprazole 40 mg oral delayed release tablet: 1 tab(s) orally once a day  Percocet 10/325 oral tablet: 1 tab(s) orally 3 times a day as needed for  pain  rizatriptan 10 mg oral tablet, disintegratin tab(s) orally once a day as needed for  migraine  rosuvastatin 10 mg oral tablet: 1 tab(s) orally once a day  Xanax 0.5 mg oral tablet: 1 tab(s) orally 2 times a day as needed for  anxiety   butalbital/acetaminophen/caffeine 50 mg-325 mg-40 mg oral tablet: 1 tab(s) orally 3 times a day  citalopram 40 mg oral tablet: 1 tab(s) orally once a day (in the morning)  levothyroxine 75 mcg (0.075 mg) oral capsule: 1 cap(s) orally once a day  morphine 15 mg oral tablet: 1 tab(s) orally 2 times a day as needed for  pain  Narcan 4 mg/0.1 mL nasal spray: 1 spray(s) intranasally once a day Use as needed for opioid overdose.  ondansetron 8 mg oral tablet: 1 tab(s) orally 2 times a day as needed for  nausea  oxybutynin 5 mg oral tablet: 2 tab(s) orally once a day  pantoprazole 40 mg oral delayed release tablet: 1 tab(s) orally once a day  Percocet 10/325 oral tablet: 1 tab(s) orally 3 times a day as needed for  pain  rizatriptan 10 mg oral tablet, disintegratin tab(s) orally once a day as needed for  migraine  rosuvastatin 10 mg oral tablet: 1 tab(s) orally once a day  Senna 8.6 mg oral tablet: 2 tab(s) orally once a day (at bedtime) For opioid induced constipation  Xanax 0.5 mg oral tablet: 1 tab(s) orally 2 times a day as needed for  anxiety More prescriptions to be provided by outpatient provider MDD: 2 tabs

## 2024-11-20 NOTE — PROGRESS NOTE ADULT - SUBJECTIVE AND OBJECTIVE BOX
RAMON WOODS  [facility] 4I 012 B ([facility] 4I)            Patient was evaluated and examined  by bedside,                 REVIEW OF SYSTEMS:  please see pertinent positives mentioned above, all other 12 ROS negative      T(C): , Max: 36.9 (11-19-24 @ 21:15)  HR: 73 (11-20-24 @ 05:00)  BP: 123/81 (11-20-24 @ 05:00)  RR: 18 (11-20-24 @ 05:00)  SpO2: 98% (11-20-24 @ 05:00)  CAPILLARY BLOOD GLUCOSE          PHYSICAL EXAM:  General: NAD, comfortable in bed  HEENT: NCAT  Lungs: No increased WOB  CVS: RRR  Abdomen: , non-distended  Extremities: no edema        LAB  CBC  Date: 11-17-24 @ 06:14  Mean cell Xnailfeypv29.9  Mean cell Hemoglobin Conc32.7  Mean cell Volum 88.4  Platelet count-Automate 208  RBC Count 3.98  Red Cell Distrib Width14.3  WBC Count5.48  % Albumin, Urine--  Hematocrit 35.2  Hemoglobin 11.5  CBC  Date: 11-16-24 @ 06:33  Mean cell Icdyewxbkr09.2  Mean cell Hemoglobin Conc32.2  Mean cell Volum 90.9  Platelet count-Automate 231  RBC Count 3.73  Red Cell Distrib Width14.4  WBC Count7.60  % Albumin, Urine--  Hematocrit 33.4  Hemoglobin 10.7  CBC  Date: 11-15-24 @ 16:15  Mean cell Yfmdalbsie87.8  Mean cell Hemoglobin Conc31.9  Mean cell Volum 90.3  Platelet count-Automate 365  RBC Count 5.38  Red Cell Distrib Width14.5  WBC Count12.87  % Albumin, Urine--  Hematocrit 48.6  Hemoglobin 15.5    [facility]  11-17-24 @ 06:14  Blood Gas Arterial-Calcium,Ionized--  Blood Urea Nitrogen, Serum 5 mg/dL[L] [10 - 20]  Carbon Dioxide, Serum25 mmol/L [17 - 32]  Chloride, [id number] mmol/L [98 - 110]  Creatinie, Serum0.5 mg/dL[L] [0.7 - 1.5]  Glucose, Serum77 mg/dL [70 - 99]  Potassium, Serum3.9 mmol/L [3.5 - 5.0]  Sodium, Serum 141 mmol/L [135 - 146]  [facility]  11-16-24 @ 06:33  Blood Gas Arterial-Calcium,Ionized--  Blood Urea Nitrogen, Serum 9 mg/dL[L] [10 - 20]  Carbon Dioxide, Serum21 mmol/L [17 - 32]  Chloride, Nuqgw056 mmol/L [98 - 110]  Creatinie, Serum0.7 mg/dL [0.7 - 1.5]  Glucose, Serum77 mg/dL [70 - 99]  Potassium, Serum4.0 mmol/L [3.5 - 5.0]  Sodium, Serum 140 mmol/L [135 - 146]  [facility]  11-15-24 @ 18:25  Blood Gas Arterial-Calcium,Ionized--  Blood Urea Nitrogen, Serum 12 mg/dL [10 - 20]  Carbon Dioxide, Serum20 mmol/L [17 - 32]  Chloride, Vspwz241 mmol/L [98 - 110]  Creatinie, Serum0.7 mg/dL [0.7 - 1.5]  Glucose, Serum93 mg/dL [70 - 99]  Potassium, Serum4.2 mmol/L [3.5 - 5.0]  Sodium, Serum 139 mmol/L [135 - 146]  [facility]  11-15-24 @ 16:15  Blood Gas Arterial-Calcium,Ionized--  Blood Urea Nitrogen, Serum 13 mg/dL [10 - 20]  Carbon Dioxide, Serum19 mmol/L [17 - 32] [Hemolyzed. Interpret with caution]  Chloride, Serum99 mmol/L [98 - 110]  Creatinie, Serum0.9 mg/dL [0.7 - 1.5]  Glucose, Xwkql568 mg/dL[H] [70 - 99]  Potassium, Serum8.5 mmol/L[HH] [3.5 - 5.0] [Hemolyzed. Interpret with caution  Critical value:  TYPE:(C=Critical, N=Notification, A=Abnormal) C	  TESTS: _K  DATE/TIME CALLED: _11/15/2024 17:51:54 EST  CALLED TO: ZOIE ARTEAGA  READ BACK (2 Patient Identifiers)(Y/N): Y  READ BACK VALUES (Y/N): Y  CALLED BY: SP]  Sodium, Serum 135 mmol/L [135 - 146]              Microbiology:    Culture - Urine (collected 11-15-24 @ 21:44)  Source: Clean Catch None  Final Report (11-17-24 @ 16:39):    <10,000 CFU/mL Normal Urogenital Ana    Urinalysis with Rflx Culture (collected 11-15-24 @ 21:44)        RADIOLOGY & ADDITIONAL TESTS:        Medications:  ALPRAZolam 0.5 milliGRAM(s) Oral two times a day PRN  aluminum hydroxide/magnesium hydroxide/simethicone Suspension 30 milliLiter(s) Oral every 4 hours PRN  chlorhexidine 2% Cloths 1 Application(s) Topical <User Schedule>  citalopram 40 milliGRAM(s) Oral daily  lactated ringers. 1000 milliLiter(s) IV Continuous <Continuous>  levothyroxine 75 MICROGram(s) Oral daily  melatonin 3 milliGRAM(s) Oral at bedtime PRN  morphine  IR 15 milliGRAM(s) Oral every 8 hours PRN  morphine ER Tablet 15 milliGRAM(s) Oral every 12 hours  oxybutynin 10 milliGRAM(s) Oral daily  oxycodone    5 mG/acetaminophen 325 mG 2 Tablet(s) Oral every 6 hours PRN  pantoprazole  Injectable 40 milliGRAM(s) IV Push daily  polyethylene glycol 3350 17 Gram(s) Oral daily  prochlorperazine   Injectable 5 milliGRAM(s) IV Push every 8 hours PRN  rosuvastatin 10 milliGRAM(s) Oral at bedtime        Assessment and Plan:    48 yo F PMHx of overactive bladder, HLD, GERD, migraines, kidney stones, hypothyroidism and PSHx of cholecystectomy presenting with epigastric pain intermittently for past 3 weeks. However, 2 am last night pain has worsened, now constant, and radiates to her back. Admits to associated nausea and vomiting, states she has not been able to take anything down since. Admitted for acute pancreatitis.    #Epigastric pain - likely 2/2 acute pancreatitis   -RUQ sono: S/p cholecystectomy. CBD dilated to 8 mm, which may be seen postcholecystectomy, however correlation with LFTs is recommended.  -CTAP: Mild intrahepatic bile duct dilatation which appears new since the previous exam. Otherwise unremarkable exam.   -Lipase 157, WBC 12.87, triglycerides 181  - AMA, ASMA negative  - GI consult noted  - IVF hydration  - MRI/MRCP ordered  - check MATT  - monitor off abx   - advance diet as tolerated  - IV Morphine prn  - IV Compazine prn for nausea/vomiting (, f/u AM EKG)  - IV Protonix     #Overactive bladder  -C/w oxybutynin    #HLD  -C/w rosuvastatin     #Hypothyroidism  -C/w synthroid     #Anxiety   -C/w Xanax prn  -iStop Reference #[id number]    #Chronic neck/back pain  -Pt takes Oxycodone/Acetaminophen 10/325 TID prn and Morphine ER 15 BID prn - confirmed on iStop     #H/o migraines  -Holding home meds while inpatient     Misc:  DVT ppx: N/A  GI ppx: Clears  Diet: Regular  Activity: IAT  Code: Full Code  Dispo: Acute, anticipate 48 hours       RAMON WOODS  [facility] 4I 012 B ([facility]-S 4I)            Patient was evaluated and examined  by bedside.                REVIEW OF SYSTEMS:  please see pertinent positives mentioned above, all other 12 ROS negative      T(C): , Max: 36.9 (11-19-24 @ 21:15)  HR: 73 (11-20-24 @ 05:00)  BP: 123/81 (11-20-24 @ 05:00)  RR: 18 (11-20-24 @ 05:00)  SpO2: 98% (11-20-24 @ 05:00)  CAPILLARY BLOOD GLUCOSE          PHYSICAL EXAM:  General: NAD, comfortable in bed  HEENT: NCAT  Lungs: No increased WOB  CVS: RRR  Abdomen: , non-distended  Extremities: no edema        LAB  CBC  Date: 11-17-24 @ 06:14  Mean cell Hutsjpnkaa90.9  Mean cell Hemoglobin Conc32.7  Mean cell Volum 88.4  Platelet count-Automate 208  RBC Count 3.98  Red Cell Distrib Width14.3  WBC Count5.48  % Albumin, Urine--  Hematocrit 35.2  Hemoglobin 11.5  CBC  Date: 11-16-24 @ 06:33  Mean cell Hwvxvrvmfy60.2  Mean cell Hemoglobin Conc32.2  Mean cell Volum 90.9  Platelet count-Automate 231  RBC Count 3.73  Red Cell Distrib Width14.4  WBC Count7.60  % Albumin, Urine--  Hematocrit 33.4  Hemoglobin 10.7  CBC  Date: 11-15-24 @ 16:15  Mean cell Tqecmykgxc78.8  Mean cell Hemoglobin Conc31.9  Mean cell Volum 90.3  Platelet count-Automate 365  RBC Count 5.38  Red Cell Distrib Width14.5  WBC Count12.87  % Albumin, Urine--  Hematocrit 48.6  Hemoglobin 15.5    [facility]  11-17-24 @ 06:14  Blood Gas Arterial-Calcium,Ionized--  Blood Urea Nitrogen, Serum 5 mg/dL[L] [10 - 20]  Carbon Dioxide, Serum25 mmol/L [17 - 32]  Chloride, Zszmu234 mmol/L [98 - 110]  Creatinie, Serum0.5 mg/dL[L] [0.7 - 1.5]  Glucose, Serum77 mg/dL [70 - 99]  Potassium, Serum3.9 mmol/L [3.5 - 5.0]  Sodium, Serum 141 mmol/L [135 - 146]  [facility]  11-16-24 @ 06:33  Blood Gas Arterial-Calcium,Ionized--  Blood Urea Nitrogen, Serum 9 mg/dL[L] [10 - 20]  Carbon Dioxide, Serum21 mmol/L [17 - 32]  Chloride, Ulmrv050 mmol/L [98 - 110]  Creatinie, Serum0.7 mg/dL [0.7 - 1.5]  Glucose, Serum77 mg/dL [70 - 99]  Potassium, Serum4.0 mmol/L [3.5 - 5.0]  Sodium, Serum 140 mmol/L [135 - 146]  [facility]  11-15-24 @ 18:25  Blood Gas Arterial-Calcium,Ionized--  Blood Urea Nitrogen, Serum 12 mg/dL [10 - 20]  Carbon Dioxide, Serum20 mmol/L [17 - 32]  Chloride, Zdbcl951 mmol/L [98 - 110]  Creatinie, Serum0.7 mg/dL [0.7 - 1.5]  Glucose, Serum93 mg/dL [70 - 99]  Potassium, Serum4.2 mmol/L [3.5 - 5.0]  Sodium, Serum 139 mmol/L [135 - 146]  [facility]  11-15-24 @ 16:15  Blood Gas Arterial-Calcium,Ionized--  Blood Urea Nitrogen, Serum 13 mg/dL [10 - 20]  Carbon Dioxide, Serum19 mmol/L [17 - 32] [Hemolyzed. Interpret with caution]  Chloride, Serum99 mmol/L [98 - 110]  Creatinie, Serum0.9 mg/dL [0.7 - 1.5]  Glucose, Qlcyu952 mg/dL[H] [70 - 99]  Potassium, Serum8.5 mmol/L[HH] [3.5 - 5.0] [Hemolyzed. Interpret with caution  Critical value:  TYPE:(C=Critical, N=Notification, A=Abnormal) C	  TESTS: _K  DATE/TIME CALLED: _11/15/2024 17:51:54 EST  CALLED TO: ZOIE ARTEAGA  READ BACK (2 Patient Identifiers)(Y/N): Y  READ BACK VALUES (Y/N): Y  CALLED BY: SP]  Sodium, Serum 135 mmol/L [135 - 146]              Microbiology:    Culture - Urine (collected 11-15-24 @ 21:44)  Source: Clean Catch None  Final Report (11-17-24 @ 16:39):    <10,000 CFU/mL Normal Urogenital Ana    Urinalysis with Rflx Culture (collected 11-15-24 @ 21:44)        RADIOLOGY & ADDITIONAL TESTS:        Medications:  ALPRAZolam 0.5 milliGRAM(s) Oral two times a day PRN  aluminum hydroxide/magnesium hydroxide/simethicone Suspension 30 milliLiter(s) Oral every 4 hours PRN  chlorhexidine 2% Cloths 1 Application(s) Topical <User Schedule>  citalopram 40 milliGRAM(s) Oral daily  lactated ringers. 1000 milliLiter(s) IV Continuous <Continuous>  levothyroxine 75 MICROGram(s) Oral daily  melatonin 3 milliGRAM(s) Oral at bedtime PRN  morphine  IR 15 milliGRAM(s) Oral every 8 hours PRN  morphine ER Tablet 15 milliGRAM(s) Oral every 12 hours  oxybutynin 10 milliGRAM(s) Oral daily  oxycodone    5 mG/acetaminophen 325 mG 2 Tablet(s) Oral every 6 hours PRN  pantoprazole  Injectable 40 milliGRAM(s) IV Push daily  polyethylene glycol 3350 17 Gram(s) Oral daily  prochlorperazine   Injectable 5 milliGRAM(s) IV Push every 8 hours PRN  rosuvastatin 10 milliGRAM(s) Oral at bedtime        Assessment and Plan:    48 yo F PMHx of overactive bladder, HLD, GERD, migraines, kidney stones, hypothyroidism and PSHx of cholecystectomy presenting with epigastric pain intermittently for past 3 weeks. However, 2 am last night pain has worsened, now constant, and radiates to her back. Admits to associated nausea and vomiting, states she has not been able to take anything down since. Admitted for acute pancreatitis.    #Epigastric pain - likely 2/2 acute pancreatitis  - rsolving  -RUQ sono: S/p cholecystectomy. CBD dilated to 8 mm, which may be seen postcholecystectomy, however correlation with LFTs is recommended.  -CTAP: Mild intrahepatic bile duct dilatation which appears new since the previous exam. Otherwise unremarkable exam.   -Lipase 157, WBC 12.87, triglycerides 181  - AMA, ASMA negative  - GI consult noted  - IVF hydration  - MRI/MRCP ordered, wnl CBD dilation likely due to narcotic use  - monitor off abx   - advance diet as tolerated  - Discharge with mophine 15mg ER BID (last prescribed 1 month duration on 10/31/2024 by outpatient provider), oxycodone 10/325 TID PRN (last prescribed 1 month duration on 10/30/2024 by outpatient provider). Patient has enough pain meds at home until next refill.    #Overactive bladder  -C/w oxybutynin    #HLD  -C/w rosuvastatin     #Hypothyroidism  -C/w synthroid     #Anxiety   -C/w Xanax prn-->provided 1 week of refill as most recent refill was <1 month ago, and risk of complicated benzo withdrawal. Patient to obtain additional refills from outpatient procider  -iStop Reference #[id number]    #Chronic neck/back pain  -Pt takes Oxycodone/Acetaminophen 10/325 TID prn and Morphine ER 15 BID prn - confirmed on iStop. Will continue these at home and follow with pain medicine.    #H/o migraines  -Continue home meds at discharge    Misc:  DVT ppx: N/A  GI ppx: Clears  Diet: Regular  Activity: IAT  Code: Full Code  Dispo: Acute, anticipate 48 hours

## 2024-11-20 NOTE — DISCHARGE NOTE PROVIDER - NSDCACTIVITY_GEN_ALL_CORE
Activity as tolerated Showering allowed/Walking - Indoors allowed/Walking - Outdoors allowed/Activity as tolerated

## 2024-11-20 NOTE — DISCHARGE NOTE PROVIDER - CARE PROVIDER_API CALL
Domo German  Gastroenterology  [address] [place] [address]  Phone: [phone]  Fax: [phone]  Follow Up Time:     AMARILIS MÉNDEZ  Phone: [phone]  Fax: ()-  Follow Up Time: 1 week   AMARILIS MÉNDEZ  Phone: [phone]  Fax: ()-  Follow Up Time: 1 week    Domo German  [address] [place] [address]  Phone: [phone]  Fax: (   )    -  Follow Up Time: 1 week   AMARILIS MÉNDEZ  Phone: [phone]  Fax: ()-  Follow Up Time: 1 week    Domo German  [address] [place] [address]  Phone: [phone]  Fax: (   )    -  Follow Up Time: 1 week    Kong Wood  Physical/Rehab Medicine  [address] [place] [id number]  Phone: [phone]  Fax: [phone]  Follow Up Time: 1 week

## 2024-11-20 NOTE — DISCHARGE NOTE PROVIDER - NSDCCPCAREPLAN_GEN_ALL_CORE_FT
PRINCIPAL DISCHARGE DIAGNOSIS  Diagnosis: Epigastric pain  Assessment and Plan of Treatment: Treated with IVF hydration, IV empiric antibiotics, IV Morphine for pain, IV Compazine for nausea/vomiting, IV Protonix. LFTs monitored. Ultrasound, CT abdomen/pelvis, and MRI/MRCP findings were not consistent with acute pancreatitis, but revealed dilated common bile duct likely due to narcotic use. Symptoms may be multifactorial. Continue with Protonix use daily and Miralax daily?. Continue low fat diet as tolerated. Follow up with our GI office located on [address] [place] [address], Phone number [phone] with Dr. German. Follow up with PCP in 1 week?.      SECONDARY DISCHARGE DIAGNOSES  Diagnosis: Overactive bladder  Assessment and Plan of Treatment: Continue with Oxybutynin 10mg once a day    Diagnosis: Hyperlipemia  Assessment and Plan of Treatment: Continue with Rosuvastatin 10mg once a day at bedtime    Diagnosis: Hypothyroid  Assessment and Plan of Treatment: Continue with Synthroid 75mcg once a day    Diagnosis: Anxiety  Assessment and Plan of Treatment: Can continue with Xanax 0.5mg two times a day as needed    Diagnosis: History of migraine  Assessment and Plan of Treatment: Can continue with home migraine medications.    Diagnosis: Chronic back pain  Assessment and Plan of Treatment: Continue home pain medications.     PRINCIPAL DISCHARGE DIAGNOSIS  Diagnosis: Epigastric pain  Assessment and Plan of Treatment: Ultrasound, CT abdomen/pelvis, and MRI/MRCP findings were not consistent with acute pancreatitis, but revealed dilated common bile duct likely due to narcotic use. Symptoms may be multifactorial. Continue with Protonix use daily and Miralax daily. Continue low fat diet as tolerated. Follow up with our GI office located on [address] [place] [address], Phone number [phone] with Dr. German. Follow up with PCP      SECONDARY DISCHARGE DIAGNOSES  Diagnosis: Overactive bladder  Assessment and Plan of Treatment: Continue with Oxybutynin 10mg once a day    Diagnosis: Hyperlipemia  Assessment and Plan of Treatment: Continue with Rosuvastatin 10mg once a day at bedtime    Diagnosis: Hypothyroid  Assessment and Plan of Treatment: Continue with Synthroid 75mcg once a day    Diagnosis: Anxiety  Assessment and Plan of Treatment: Can continue with Xanax 0.5mg two times a day as needed    Diagnosis: History of migraine  Assessment and Plan of Treatment: Can continue with home migraine medications.    Diagnosis: Chronic back pain  Assessment and Plan of Treatment: Continue home pain medications.

## 2024-11-20 NOTE — DISCHARGE NOTE PROVIDER - HOSPITAL COURSE
48 yo femal with PMHx of overactive bladder, HLD, GERD, migraines, kidney stones, hypothyroidism and PSHx of cholecystectomy presenting with epigastric pain intermittently for past 3 weeks. Pain later worsened to being constant and radiating to her back. Admits to associated nausea and vomiting, states she has not been able to take anything down since. Admitted for acute pancreatitis.    #Epigastric pain - likely 2/2 acute pancreatitis   -RUQ sono: S/p cholecystectomy. CBD dilated to 8 mm, which may be seen postcholecystectomy, however correlation with LFTs is recommended.  -CTAP: Mild intrahepatic bile duct dilatation which appears new since the previous exam. Otherwise unremarkable exam.   -Lipase 157, WBC 12.87, triglycerides 181  - AMA, ASMA, MATT negative  - IVF hydration given  - monitored off abx   - MRI/MRCP: Post cholecystectomy with mild proximal CBD and minimal intrahepatic biliary ductal dilation; the CBD tapers to normal caliber distally. No evidence for choledocholithiasis.  - GI consult noted: Symptoms may be multifactorial, possible etiologies include functional, retained sludge/stone, sphincter of oddi dysfunction, gastritis/pud, component of constipation in setting of narcotics. Findings not consistent with acute pancreatitis. Low fat diet as tolerated. MRI wnl CBD dilation likely due to narcotic use. PPI daily. Miralax daily. Monitor LFTs daily. Follow up with our GI office located on [address] [place] [address], Phone number [phone] with Dr. German.  - IV Morphine given prn for pain  - IV Compazine given prn for nausea/vomiting   - IV Protonix     #Overactive bladder  -C/w oxybutynin    #HLD  -C/w rosuvastatin     #Hypothyroidism  -C/w synthroid     #Anxiety   -C/w Xanax prn  -[facility] Reference #[id number]    #Chronic neck/back pain  -Pt takes Oxycodone/Acetaminophen 10/325 TID prn and Morphine ER 15 BID prn - confirmed on [facility]   -Can continue home pain meds upon d/c    #H/o migraines  -Held home meds while inpatient, can continue upon return home      Upon D/C  - Epigastric pain - likely 2/2 acute pancreatitis: Treated with IVF hydration, IV empiric antibiotics, IV Morphine for pain, IV Compazine for nausea/vomiting, IV Protonix. LFTs monitored. Right upper quadrant ultrasound, CT abdomen/pelvis, and MRI/MRCP findings were not consistent with acute pancreatitis, but revealed dilated common bile duct likely due to narcotic use. Symptoms may be multifactorial. Continue with Protonix use daily and Miralax daily?. Continue low fat diet as tolerated. Follow up with our GI office located on [address] [place] [address], Phone number [phone] with Dr. German. Follow up with PCP in 1 week?.  - Overactive bladder: Continue with Oxybutynin 10mg once a day   - HLD: Continue with Rosuvastatin 10mg once a day at bedtime   - Hypothyroidism: Continue with Synthroid 75mcg once a day   - Anxiety: Can continue with Xanax 0.5mg two times a day as needed  - Chronic neck/back pain: Continue home pain medications.   - H/o migraines: Can continue with home migraine medications.   48 yo female with PMHx of overactive bladder, HLD, GERD, migraines, kidney stones, hypothyroidism and PSHx of cholecystectomy presenting with epigastric pain intermittently for past 3 weeks. Pain later worsened to being constant and radiating to her back. Admits to associated nausea and vomiting, states she has not been able to take anything down since. Admitted for acute pancreatitis.    #Epigastric pain - likely 2/2 acute pancreatitis   -RUQ sono: S/p cholecystectomy. CBD dilated to 8 mm, which may be seen postcholecystectomy, however correlation with LFTs is recommended.  -CTAP: Mild intrahepatic bile duct dilatation which appears new since the previous exam. Otherwise unremarkable exam.   -Lipase 157, WBC 12.87, triglycerides 181  - AMA, ASMA, MATT negative  - IVF hydration given  - monitored off abx   - MRI/MRCP: Post cholecystectomy with mild proximal CBD and minimal intrahepatic biliary ductal dilation; the CBD tapers to normal caliber distally. No evidence for choledocholithiasis.  - GI consult noted: Symptoms may be multifactorial, possible etiologies include functional, retained sludge/stone, sphincter of oddi dysfunction, gastritis/pud, component of constipation in setting of narcotics. Findings not consistent with acute pancreatitis. Low fat diet as tolerated. MRI wnl CBD dilation likely due to narcotic use. PPI daily. Miralax daily. Monitor LFTs daily. Follow up with our GI office located on [address] [place] [address], Phone number [phone] with Dr. German.  - IV Morphine given prn for pain  - IV Compazine given prn for nausea/vomiting   - IV Protonix     #Overactive bladder  -C/w oxybutynin    #HLD  -C/w rosuvastatin     #Hypothyroidism  -C/w synthroid     #Anxiety   -C/w Xanax prn  -[facility] Reference #[id number]    #Chronic neck/back pain  -Pt takes Oxycodone/Acetaminophen 10/325 TID prn and Morphine ER 15 BID prn - confirmed on [facility]   -Can continue home pain meds upon d/c    #H/o migraines  -Held home meds while inpatient, can continue upon return home      Upon D/C  - Epigastric pain - likely 2/2 acute pancreatitis: Treated with IVF hydration, IV empiric antibiotics, IV Morphine for pain, IV Compazine for nausea/vomiting, IV Protonix. LFTs monitored. Right upper quadrant ultrasound, CT abdomen/pelvis, and MRI/MRCP findings were not consistent with acute pancreatitis, but revealed dilated common bile duct likely due to narcotic use. Symptoms may be multifactorial. Continue with Protonix use daily and Miralax daily. Continue low fat diet as tolerated. Follow up with our GI office located on [address] [place] [address], Phone number [phone] with Dr. German. Follow up with PCP in 1 week.  - Overactive bladder: Continue with Oxybutynin 10mg once a day   - HLD: Continue with Rosuvastatin 10mg once a day at bedtime   - Hypothyroidism: Continue with Synthroid 75mcg once a day   - Anxiety: Can continue with Xanax 0.5mg two times a day as needed  - Chronic neck/back pain: Continue home pain medications.   - H/o migraines: Can continue with home migraine medications.   48 yo female with PMHx of overactive bladder, HLD, GERD, migraines, kidney stones, hypothyroidism and PSHx of cholecystectomy presenting with epigastric pain intermittently for past 3 weeks. Pain later worsened to being constant and radiating to her back. Admits to associated nausea and vomiting, states she has not been able to take anything down since. Admitted for acute pancreatitis.    #Epigastric pain - likely 2/2 acute pancreatitis   -RUQ sono: S/p cholecystectomy. CBD dilated to 8 mm, which may be seen postcholecystectomy  -CTAP: Mild intrahepatic bile duct dilatation which appears new since the previous exam. Otherwise unremarkable exam.   -Lipase 157, WBC 12.87, triglycerides 181  - AMA, ASMA, MATT negative  - IVF hydration given  - monitored off abx   - MRI/MRCP: Post cholecystectomy with mild proximal CBD and minimal intrahepatic biliary ductal dilation; the CBD tapers to normal caliber distally. No evidence for choledocholithiasis.  - GI consult noted: Symptoms may be multifactorial, possible etiologies include functional, retained sludge/stone, sphincter of oddi dysfunction, gastritis/pud, component of constipation in setting of narcotics. Findings not consistent with acute pancreatitis. Low fat diet as tolerated. MRI wnl CBD dilation likely due to narcotic use. PPI daily. Miralax daily. Monitor LFTs daily. Follow up with our GI office located on [address] [place] [address], Phone number [phone] with Dr. German.  - IV Morphine given prn for pain  - IV Compazine given prn for nausea/vomiting   - IV Protonix     #Overactive bladder  -C/w oxybutynin    #HLD  -C/w rosuvastatin     #Hypothyroidism  -C/w synthroid     #Anxiety   -C/w Xanax prn  -[facility] Reference #[id number]    #Chronic neck/back pain  -Pt takes Oxycodone/Acetaminophen 10/325 TID prn and Morphine ER 15 BID prn - confirmed on [facility]   -Can continue home pain meds upon d/c    #H/o migraines  -Held home meds while inpatient, can continue upon return home      Upon D/C  - Epigastric pain - likely 2/2 acute pancreatitis: Treated with IVF hydration, IV empiric antibiotics, IV Morphine for pain, IV Compazine for nausea/vomiting, IV Protonix. LFTs monitored. Right upper quadrant ultrasound, CT abdomen/pelvis, and MRI/MRCP findings were not consistent with acute pancreatitis, but revealed dilated common bile duct likely due to narcotic use. Symptoms may be multifactorial. Continue with Protonix use daily and Miralax daily. Continue low fat diet as tolerated. Follow up with our GI office located on [address] [place] [address], Phone number [phone] with Dr. German. Follow up with PCP in 1 week.  - Overactive bladder: Continue with Oxybutynin 10mg once a day   - HLD: Continue with Rosuvastatin 10mg once a day at bedtime   - Hypothyroidism: Continue with Synthroid 75mcg once a day   - Anxiety: Can continue with Xanax 0.5mg two times a day as needed  - Chronic neck/back pain: Continue home pain medications.   - H/o migraines: Can continue with home migraine medications.   48 yo female with PMHx of overactive bladder, HLD, GERD, migraines, kidney stones, hypothyroidism and PSHx of cholecystectomy presenting with epigastric pain intermittently for past 3 weeks. Pain later worsened to being constant and radiating to her back. Admits to associated nausea and vomiting, states she has not been able to take anything down since. Admitted for acute pancreatitis.    #Epigastric pain - likely 2/2 acute pancreatitis   -RUQ sono: S/p cholecystectomy. CBD dilated to 8 mm, which may be seen postcholecystectomy  -CTAP: Mild intrahepatic bile duct dilatation which appears new since the previous exam. Otherwise unremarkable exam.   -Lipase 157, WBC 12.87, triglycerides 181  - AMA, ASMA, MATT negative  - IVF hydration given  - monitored off abx   - MRI/MRCP: Post cholecystectomy with mild proximal CBD and minimal intrahepatic biliary ductal dilation; the CBD tapers to normal caliber distally. No evidence for choledocholithiasis.  - GI consult noted: Symptoms may be multifactorial, possible etiologies include functional, retained sludge/stone, sphincter of oddi dysfunction, gastritis/pud, component of constipation in setting of narcotics. Findings not consistent with acute pancreatitis. Low fat diet as tolerated. MRI wnl CBD dilation likely due to narcotic use. PPI daily. Miralax daily. Monitor LFTs daily. Follow up with our GI office located on [address] [place] [address], Phone number [phone] with Dr. German.  - IV Morphine given prn for pain-->resumed home Morphine 15mg ER BID and Percocet 10/325 TID PRN. Patient to be discharged on that regimen as per pain medicine team (note that discharge med rec does not indicate morphine ER however double-checked with [facility] and patient does have morphine ER and percocet refills). Most recent refill <1 month ago, no need for additional refills from hospital. Patient to follow with pain medicine as an outpatient for additional refills (Dr Wood).      #Overactive bladder  -C/w oxybutynin    #HLD  -C/w rosuvastatin     #Hypothyroidism  -C/w synthroid     #Anxiety   -C/w Xanax prn  -[facility] Reference #[id number]    #Chronic neck/back pain  -Pt takes Oxycodone/Acetaminophen 10/325 TID prn and Morphine ER 15 BID prn - confirmed on [facility]   -Can continue home pain meds upon d/c    #H/o migraines  -Held home meds while inpatient, can continue upon return home      Upon D/C  - Epigastric pain - likely 2/2 acute pancreatitis: Treated with IVF hydration, IV empiric antibiotics, IV Morphine for pain, IV Compazine for nausea/vomiting, IV Protonix. LFTs monitored. Right upper quadrant ultrasound, CT abdomen/pelvis, and MRI/MRCP findings were not consistent with acute pancreatitis, but revealed dilated common bile duct likely due to narcotic use. Symptoms may be multifactorial. Continue with Protonix use daily and Miralax daily. Continue low fat diet as tolerated. Follow up with our GI office located on [address] [place] [address], Phone number [phone] with Dr. German. Follow up with PCP in 1 week.  - Overactive bladder: Continue with Oxybutynin 10mg once a day   - HLD: Continue with Rosuvastatin 10mg once a day at bedtime   - Hypothyroidism: Continue with Synthroid 75mcg once a day   - Anxiety: Can continue with Xanax 0.5mg two times a day as needed. Provided refill for 1 week as most recent refill 45 days ago.   - Chronic neck/back pain: Continue home pain medications.   - H/o migraines: Can continue with home migraine medications.

## 2024-11-20 NOTE — DISCHARGE NOTE NURSING/CASE MANAGEMENT/SOCIAL WORK - PATIENT PORTAL LINK FT
You can access the FollowMyHealth Patient Portal offered by [facility] by registering at the following website: [email or web address]. By joining [facility]’s FollowMyHealth portal, you will also be able to view your health information using other applications (apps) compatible with our system.

## 2024-11-20 NOTE — DISCHARGE NOTE PROVIDER - PROVIDER TOKENS
PROVIDER:[TOKEN:[[id number]]],PROVIDER:[TOKEN:[[id number]],FOLLOWUP:[1 week]] PROVIDER:[TOKEN:[[id number]],FOLLOWUP:[1 week]],FREE:[LAST:[Chalhoub],FIRST:[Domo],PHONE:[phone]],FAX:[(   )    -],ADDRESS:[[address] [place] [address]],FOLLOWUP:[1 week]] PROVIDER:[TOKEN:[[id number]],FOLLOWUP:[1 week]],FREE:[LAST:[Chalhoub],FIRST:[Domo],PHONE:[phone]],FAX:[(   )    -],ADDRESS:[[address] [place] [address]],FOLLOWUP:[1 week]],PROVIDER:[TOKEN:[[id number]],FOLLOWUP:[1 week]]

## 2024-11-20 NOTE — DISCHARGE NOTE PROVIDER - CARE PROVIDERS DIRECT ADDRESSES
,DirectAddress_[email or web address],DirectAddress_Unknown [email or web address],DirectAddress_Unknown,DirectAddress_Unknown

## 2024-11-20 NOTE — DISCHARGE NOTE NURSING/CASE MANAGEMENT/SOCIAL WORK - FINANCIAL ASSISTANCE
[facility] provides services at a reduced cost to those who are determined to be eligible through [facility]’s financial assistance program. Information regarding [facility]’s financial assistance program can be found by going to [email or web address] or by calling [phone].

## 2024-11-20 NOTE — DISCHARGE NOTE PROVIDER - ATTENDING DISCHARGE PHYSICAL EXAMINATION:
General: NAD, comfortable in bed  HEENT: NCAT  Lungs: No increased WOB  CVS: RRR  Abdomen: , non-distended  Extremities: no edema

## 2024-11-21 PROBLEM — N32.81 OVERACTIVE BLADDER: Chronic | Status: ACTIVE | Noted: 2024-11-15

## 2024-11-21 PROBLEM — E03.9 HYPOTHYROIDISM, UNSPECIFIED: Chronic | Status: ACTIVE | Noted: 2024-11-15

## 2024-11-22 NOTE — ED ADULT TRIAGE NOTE - ARRIVAL FROM
[FreeTextEntry1] : 67 yo male with pmhx as below is here for a f/up visit 1/21 admitted to [facility] with cp, s/p ccta, followed by cath and ifr guided pci of lad with seble started on gdmr and sent home with card f/up recent stress echo - low risk  no major cvs complains et is stable ros is otherwise unrem Home 04-Aug-2018 22:27 IV discontinued, cath removed intact

## 2024-11-23 ENCOUNTER — INPATIENT (INPATIENT)
Facility: HOSPITAL | Age: 49
LOS: 2 days | Discharge: HOME | DRG: 282 | End: 2024-11-26
Attending: STUDENT IN AN ORGANIZED HEALTH CARE EDUCATION/TRAINING PROGRAM | Admitting: STUDENT IN AN ORGANIZED HEALTH CARE EDUCATION/TRAINING PROGRAM
Payer: MEDICAID

## 2024-11-23 VITALS
HEIGHT: 64 IN | WEIGHT: 195.11 LBS | SYSTOLIC BLOOD PRESSURE: 133 MMHG | RESPIRATION RATE: 20 BRPM | OXYGEN SATURATION: 98 % | DIASTOLIC BLOOD PRESSURE: 102 MMHG | HEART RATE: 107 BPM | TEMPERATURE: 98 F

## 2024-11-23 DIAGNOSIS — Z90.49 ACQUIRED ABSENCE OF OTHER SPECIFIED PARTS OF DIGESTIVE TRACT: Chronic | ICD-10-CM

## 2024-11-23 DIAGNOSIS — Z98.890 OTHER SPECIFIED POSTPROCEDURAL STATES: Chronic | ICD-10-CM

## 2024-11-23 DIAGNOSIS — R10.9 UNSPECIFIED ABDOMINAL PAIN: ICD-10-CM

## 2024-11-23 LAB
ALBUMIN SERPL ELPH-MCNC: 5 G/DL — SIGNIFICANT CHANGE UP (ref 3.5–5.2)
ALP SERPL-CCNC: 103 U/L — SIGNIFICANT CHANGE UP (ref 30–115)
ALT FLD-CCNC: 24 U/L — SIGNIFICANT CHANGE UP (ref 0–41)
ANION GAP SERPL CALC-SCNC: 16 MMOL/L — HIGH (ref 7–14)
APPEARANCE UR: CLEAR — SIGNIFICANT CHANGE UP
AST SERPL-CCNC: 24 U/L — SIGNIFICANT CHANGE UP (ref 0–41)
BASOPHILS # BLD AUTO: 0.02 K/UL — SIGNIFICANT CHANGE UP (ref 0–0.2)
BASOPHILS NFR BLD AUTO: 0.2 % — SIGNIFICANT CHANGE UP (ref 0–1)
BILIRUB SERPL-MCNC: 0.3 MG/DL — SIGNIFICANT CHANGE UP (ref 0.2–1.2)
BILIRUB UR-MCNC: NEGATIVE — SIGNIFICANT CHANGE UP
BUN SERPL-MCNC: 11 MG/DL — SIGNIFICANT CHANGE UP (ref 10–20)
CALCIUM SERPL-MCNC: 10.8 MG/DL — HIGH (ref 8.4–10.5)
CHLORIDE SERPL-SCNC: 101 MMOL/L — SIGNIFICANT CHANGE UP (ref 98–110)
CO2 SERPL-SCNC: 20 MMOL/L — SIGNIFICANT CHANGE UP (ref 17–32)
COLOR SPEC: YELLOW — SIGNIFICANT CHANGE UP
CREAT SERPL-MCNC: 0.8 MG/DL — SIGNIFICANT CHANGE UP (ref 0.7–1.5)
DIFF PNL FLD: NEGATIVE — SIGNIFICANT CHANGE UP
EGFR: 91 ML/MIN/1.73M2 — SIGNIFICANT CHANGE UP
EOSINOPHIL # BLD AUTO: 0 K/UL — SIGNIFICANT CHANGE UP (ref 0–0.7)
EOSINOPHIL NFR BLD AUTO: 0 % — SIGNIFICANT CHANGE UP (ref 0–8)
GLUCOSE SERPL-MCNC: 95 MG/DL — SIGNIFICANT CHANGE UP (ref 70–99)
GLUCOSE UR QL: NEGATIVE MG/DL — SIGNIFICANT CHANGE UP
HCG SERPL QL: NEGATIVE — SIGNIFICANT CHANGE UP
HCT VFR BLD CALC: 45.5 % — SIGNIFICANT CHANGE UP (ref 37–47)
HGB BLD-MCNC: 14.9 G/DL — SIGNIFICANT CHANGE UP (ref 12–16)
IMM GRANULOCYTES NFR BLD AUTO: 0.5 % — HIGH (ref 0.1–0.3)
KETONES UR-MCNC: NEGATIVE MG/DL — SIGNIFICANT CHANGE UP
LEUKOCYTE ESTERASE UR-ACNC: NEGATIVE — SIGNIFICANT CHANGE UP
LIDOCAIN IGE QN: 177 U/L — HIGH (ref 7–60)
LYMPHOCYTES # BLD AUTO: 1.88 K/UL — SIGNIFICANT CHANGE UP (ref 1.2–3.4)
LYMPHOCYTES # BLD AUTO: 15.6 % — LOW (ref 20.5–51.1)
MCHC RBC-ENTMCNC: 29 PG — SIGNIFICANT CHANGE UP (ref 27–31)
MCHC RBC-ENTMCNC: 32.7 G/DL — SIGNIFICANT CHANGE UP (ref 32–37)
MCV RBC AUTO: 88.5 FL — SIGNIFICANT CHANGE UP (ref 81–99)
MONOCYTES # BLD AUTO: 0.87 K/UL — HIGH (ref 0.1–0.6)
MONOCYTES NFR BLD AUTO: 7.2 % — SIGNIFICANT CHANGE UP (ref 1.7–9.3)
NEUTROPHILS # BLD AUTO: 9.19 K/UL — HIGH (ref 1.4–6.5)
NEUTROPHILS NFR BLD AUTO: 76.5 % — HIGH (ref 42.2–75.2)
NITRITE UR-MCNC: NEGATIVE — SIGNIFICANT CHANGE UP
NRBC # BLD: 0 /100 WBCS — SIGNIFICANT CHANGE UP (ref 0–0)
PH UR: 5.5 — SIGNIFICANT CHANGE UP (ref 5–8)
PLATELET # BLD AUTO: 375 K/UL — SIGNIFICANT CHANGE UP (ref 130–400)
PMV BLD: 10.5 FL — HIGH (ref 7.4–10.4)
POTASSIUM SERPL-MCNC: 4.6 MMOL/L — SIGNIFICANT CHANGE UP (ref 3.5–5)
POTASSIUM SERPL-SCNC: 4.6 MMOL/L — SIGNIFICANT CHANGE UP (ref 3.5–5)
PROT SERPL-MCNC: 8 G/DL — SIGNIFICANT CHANGE UP (ref 6–8)
PROT UR-MCNC: NEGATIVE MG/DL — SIGNIFICANT CHANGE UP
RBC # BLD: 5.14 M/UL — SIGNIFICANT CHANGE UP (ref 4.2–5.4)
RBC # FLD: 14.2 % — SIGNIFICANT CHANGE UP (ref 11.5–14.5)
SODIUM SERPL-SCNC: 137 MMOL/L — SIGNIFICANT CHANGE UP (ref 135–146)
SP GR SPEC: 1.02 — SIGNIFICANT CHANGE UP (ref 1–1.03)
UROBILINOGEN FLD QL: 0.2 MG/DL — SIGNIFICANT CHANGE UP (ref 0.2–1)
WBC # BLD: 12.02 K/UL — HIGH (ref 4.8–10.8)
WBC # FLD AUTO: 12.02 K/UL — HIGH (ref 4.8–10.8)

## 2024-11-23 PROCEDURE — 80048 BASIC METABOLIC PNL TOTAL CA: CPT

## 2024-11-23 PROCEDURE — 84100 ASSAY OF PHOSPHORUS: CPT

## 2024-11-23 PROCEDURE — 74177 CT ABD & PELVIS W/CONTRAST: CPT | Mod: 26

## 2024-11-23 PROCEDURE — 87086 URINE CULTURE/COLONY COUNT: CPT

## 2024-11-23 PROCEDURE — 85730 THROMBOPLASTIN TIME PARTIAL: CPT

## 2024-11-23 PROCEDURE — 36415 COLL VENOUS BLD VENIPUNCTURE: CPT

## 2024-11-23 PROCEDURE — 99285 EMERGENCY DEPT VISIT HI MDM: CPT

## 2024-11-23 PROCEDURE — 87040 BLOOD CULTURE FOR BACTERIA: CPT

## 2024-11-23 PROCEDURE — 81003 URINALYSIS AUTO W/O SCOPE: CPT

## 2024-11-23 PROCEDURE — 85610 PROTHROMBIN TIME: CPT

## 2024-11-23 PROCEDURE — 83690 ASSAY OF LIPASE: CPT

## 2024-11-23 PROCEDURE — 83735 ASSAY OF MAGNESIUM: CPT

## 2024-11-23 PROCEDURE — 85027 COMPLETE CBC AUTOMATED: CPT

## 2024-11-23 PROCEDURE — 86304 IMMUNOASSAY TUMOR CA 125: CPT

## 2024-11-23 PROCEDURE — 80053 COMPREHEN METABOLIC PANEL: CPT

## 2024-11-23 PROCEDURE — 74177 CT ABD & PELVIS W/CONTRAST: CPT | Mod: MC

## 2024-11-23 RX ORDER — SODIUM CHLORIDE 9 MG/ML
1000 INJECTION, SOLUTION INTRAMUSCULAR; INTRAVENOUS; SUBCUTANEOUS ONCE
Refills: 0 | Status: COMPLETED | OUTPATIENT
Start: 2024-11-23 | End: 2024-11-23

## 2024-11-23 RX ORDER — PANTOPRAZOLE SODIUM 40 MG/1
40 TABLET, DELAYED RELEASE ORAL DAILY
Refills: 0 | Status: DISCONTINUED | OUTPATIENT
Start: 2024-11-24 | End: 2024-11-26

## 2024-11-23 RX ORDER — RIZATRIPTAN BENZOATE 5 MG/1
1 TABLET ORAL
Refills: 0 | DISCHARGE

## 2024-11-23 RX ORDER — ONDANSETRON HYDROCHLORIDE 4 MG/1
4 TABLET, FILM COATED ORAL EVERY 6 HOURS
Refills: 0 | Status: DISCONTINUED | OUTPATIENT
Start: 2024-11-23 | End: 2024-11-23

## 2024-11-23 RX ORDER — ONDANSETRON HYDROCHLORIDE 4 MG/1
4 TABLET, FILM COATED ORAL ONCE
Refills: 0 | Status: COMPLETED | OUTPATIENT
Start: 2024-11-23 | End: 2024-11-23

## 2024-11-23 RX ORDER — HYDROMORPHONE HYDROCHLORIDE 2 MG/1
1 TABLET ORAL ONCE
Refills: 0 | Status: DISCONTINUED | OUTPATIENT
Start: 2024-11-23 | End: 2024-11-23

## 2024-11-23 RX ORDER — ROSUVASTATIN CALCIUM 5 MG/1
10 TABLET, FILM COATED ORAL AT BEDTIME
Refills: 0 | Status: DISCONTINUED | OUTPATIENT
Start: 2024-11-23 | End: 2024-11-26

## 2024-11-23 RX ORDER — BUTALB/ACETAMINOPHEN/CAFFEINE 50-325-40
1 TABLET ORAL
Refills: 0 | DISCHARGE

## 2024-11-23 RX ORDER — LEVOTHYROXINE SODIUM 150 MCG
1 TABLET ORAL
Refills: 0 | DISCHARGE

## 2024-11-23 RX ORDER — 0.9 % SODIUM CHLORIDE 0.9 %
1000 INTRAVENOUS SOLUTION INTRAVENOUS
Refills: 0 | Status: DISCONTINUED | OUTPATIENT
Start: 2024-11-23 | End: 2024-11-26

## 2024-11-23 RX ORDER — FAMOTIDINE 20 MG/1
20 TABLET, FILM COATED ORAL ONCE
Refills: 0 | Status: COMPLETED | OUTPATIENT
Start: 2024-11-23 | End: 2024-11-23

## 2024-11-23 RX ORDER — PANTOPRAZOLE SODIUM 40 MG/1
40 TABLET, DELAYED RELEASE ORAL
Refills: 0 | Status: DISCONTINUED | OUTPATIENT
Start: 2024-11-23 | End: 2024-11-23

## 2024-11-23 RX ORDER — LEVOTHYROXINE SODIUM 150 MCG
75 TABLET ORAL DAILY
Refills: 0 | Status: DISCONTINUED | OUTPATIENT
Start: 2024-11-24 | End: 2024-11-26

## 2024-11-23 RX ORDER — ALPRAZOLAM 0.5 MG
0.5 TABLET ORAL
Refills: 0 | Status: DISCONTINUED | OUTPATIENT
Start: 2024-11-23 | End: 2024-11-26

## 2024-11-23 RX ORDER — HYDROMORPHONE HYDROCHLORIDE 2 MG/1
0.5 TABLET ORAL EVERY 4 HOURS
Refills: 0 | Status: DISCONTINUED | OUTPATIENT
Start: 2024-11-23 | End: 2024-11-24

## 2024-11-23 RX ORDER — OXYBUTYNIN CHLORIDE 5 MG
5 TABLET ORAL EVERY 12 HOURS
Refills: 0 | Status: DISCONTINUED | OUTPATIENT
Start: 2024-11-24 | End: 2024-11-26

## 2024-11-23 RX ORDER — CITALOPRAM HYDROBROMIDE 20 MG
40 TABLET ORAL DAILY
Refills: 0 | Status: DISCONTINUED | OUTPATIENT
Start: 2024-11-24 | End: 2024-11-26

## 2024-11-23 RX ORDER — ROSUVASTATIN CALCIUM 5 MG/1
1 TABLET, FILM COATED ORAL
Refills: 0 | DISCHARGE

## 2024-11-23 RX ADMIN — SODIUM CHLORIDE 1000 MILLILITER(S): 9 INJECTION, SOLUTION INTRAMUSCULAR; INTRAVENOUS; SUBCUTANEOUS at 15:23

## 2024-11-23 RX ADMIN — Medication 150 MILLILITER(S): at 21:12

## 2024-11-23 RX ADMIN — Medication 2 MILLIGRAM(S): at 21:49

## 2024-11-23 RX ADMIN — ONDANSETRON HYDROCHLORIDE 4 MILLIGRAM(S): 4 TABLET, FILM COATED ORAL at 17:46

## 2024-11-23 RX ADMIN — Medication 6 MILLIGRAM(S): at 16:34

## 2024-11-23 RX ADMIN — HYDROMORPHONE HYDROCHLORIDE 1 MILLIGRAM(S): 2 TABLET ORAL at 17:46

## 2024-11-23 RX ADMIN — Medication 6 MILLIGRAM(S): at 15:23

## 2024-11-23 RX ADMIN — ROSUVASTATIN CALCIUM 10 MILLIGRAM(S): 5 TABLET, FILM COATED ORAL at 21:07

## 2024-11-23 RX ADMIN — Medication 0.5 MILLIGRAM(S): at 22:44

## 2024-11-23 RX ADMIN — ONDANSETRON HYDROCHLORIDE 4 MILLIGRAM(S): 4 TABLET, FILM COATED ORAL at 15:36

## 2024-11-23 RX ADMIN — HYDROMORPHONE HYDROCHLORIDE 0.5 MILLIGRAM(S): 2 TABLET ORAL at 20:01

## 2024-11-23 RX ADMIN — HYDROMORPHONE HYDROCHLORIDE 0.5 MILLIGRAM(S): 2 TABLET ORAL at 20:44

## 2024-11-23 RX ADMIN — FAMOTIDINE 20 MILLIGRAM(S): 20 TABLET, FILM COATED ORAL at 15:23

## 2024-11-23 RX ADMIN — Medication 2 MILLIGRAM(S): at 21:45

## 2024-11-23 NOTE — ED ADULT NURSE NOTE - NSFALLUNIVINTERV_ED_ALL_ED
Bed/Stretcher in lowest position, wheels locked, appropriate side rails in place/Call bell, personal items and telephone in reach/Instruct patient to call for assistance before getting out of bed/chair/stretcher/Non-slip footwear applied when patient is off stretcher/[place] to call system/Physically safe environment - no spills, clutter or unnecessary equipment/Purposeful proactive rounding/Room/bathroom lighting operational, light cord in reach

## 2024-11-23 NOTE — H&P ADULT - NSHPLABSRESULTS_GEN_ALL_CORE
Vital Signs Last 24 Hrs  T(C): 36.7 (23 Nov 2024 17:42), Max: 36.7 (23 Nov 2024 14:03)  T(F): 98.1 (23 Nov 2024 17:42), Max: 98.1 (23 Nov 2024 14:03)  HR: 91 (23 Nov 2024 17:55) (91 - 107)  BP: 117/87 (23 Nov 2024 17:55) (117/87 - 165/117)  BP(mean): --  RR: 16 (23 Nov 2024 17:55) (16 - 20)  SpO2: 97% (23 Nov 2024 17:55) (95% - 98%)    Parameters below as of 23 Nov 2024 17:55  Patient On (Oxygen Delivery Method): room air    Labs:  CAPILLARY BLOOD GLUCOSE                              14.9   12.02 )-----------( 375      ( 23 Nov 2024 15:27 )             45.5       Auto Neutrophil %: 76.5 % (11-23-24 @ 15:27)  Auto Immature Granulocyte %: 0.5 % (11-23-24 @ 15:27)    11-23    137  |  101  |  11  ----------------------------<  95  4.6   |  20  |  0.8      Calcium: 10.8 mg/dL (11-23-24 @ 15:27)      LFTs:             8.0  | 0.3  | 24       ------------------[103     ( 23 Nov 2024 15:27 )  5.0  | x    | 24          Lipase:177    Amylase:x             Coags:        Urinalysis Basic - ( 23 Nov 2024 15:27 )    Color: x / Appearance: x / SG: x / pH: x  Gluc: 95 mg/dL / Ketone: x  / Bili: x / Urobili: x   Blood: x / Protein: x / Nitrite: x   Leuk Esterase: x / RBC: x / WBC x   Sq Epi: x / Non Sq Epi: x / Bacteria: x

## 2024-11-23 NOTE — PATIENT PROFILE ADULT - FUNCTIONAL ASSESSMENT - DAILY ACTIVITY 6.
Occupational Therapy Daily Treatment    Visit Count: 16  Plan of Care Dates: Initial: 9/20/2018 Through: 12/13/2018  Insurance Information:   Next Referring Provider Visit: 1/10/19     Referred by: Jean Paul Nagy MD  Medical Diagnosis (from order): Z98.890 S/P left wrist arthroscopy   Treatment Diagnosis: Wrist/Hand Symptoms with Pain, Impaired Joint Mobility, Impaired Range of Motion and Impaired Motor Function/Muscle Performance  Insurance: 1. [facility] 2. N/A      Date of Surgery: 7/20/18; Surgery performed: Left wrist arthroscopy and scapholunate reconstruction.; Physician Guidelines yes - Follow Elsy Daugherty Capsulodesis  Diagnosis Precautions:  10# lifting restriction  Chart reviewed: Relevant co-morbidities, allergies, tests and medications:        Current Outpatient Prescriptions   Medication Sig   • indomethacin (INDOCIN) 50 MG capsule Take 1 capsule by mouth 3 times daily (with meals).      No current facility-administered medications for this encounter.             Patient Active Problem List     Diagnosis Date Noted   • S/P left wrist arthroscopy 09/06/2018       Priority: Low   • Wrist pain, acute, left 02/15/2018       Priority: Low      SUBJECTIVE   It's alright.  I use biofreeze a lot.  It still pops.  I think it is just going to be what it is.  The putty and 3# are still hard.  Current Pain (0-10 scale): 2  Functional Change: decreased pain, improved motion    OBJECTIVE   OT eval 9/20  PN 10/3  PN 11/7    Range of Motion (degrees)    Norm Left Right Left Left Left Left Left   Date   Initial Initial 10/3/18 11/7/18 11/30 12/26 1/2/19    Wrist Flexion 80 21 65 31 40 (47 post) 44 47 (52 post) 54   Wrist Extension 70 54 76 56 60 56  64   Radial Deviation 20 24 20 20 21 17  19   Ulnar Deviation 45 16 38 24 23 25  21   Forearm Supination 90 90 90         Forearm Pronation 90 90 90         standard testing positions unless otherwise noted; Key: ranges are reported in active range of motion  unless noted as AA=active assistive or P=passive range of motion, * denotes pain   Comments: All motions within functional/normal limits except as noted.     /Pinch (pounds of force) Left Right Left left   Date 10/3/18 10/3/18 11/7 1/2/19    56/58 122 92/85/76 92/97/97   Lateral Pinch 22 22 24 27   3 Point Pinch 17 22 22 21   Tip Pinch 11 16 22 15   standard testing positions unless otherwise noted,* denotes pain  Comments: Strength not tested secondary to protocol; average of 3 reported  Norms:  : Age: 30-39: male: left 88.7-134.6, right 99.4-143.7; female: left 50.3-78.0, right 59.5-84.9  Key/lateral pinch: Age: 35-39: male: left 25.6+/-3.9, right 26.1+/-3.2; female: left 16.0+/-2.7, right 16.6+/-2.0  Palmar/3 point pinch: Age: 35-39: male: left 25.9+/-5.4, right 26.2+/-4.1; female: left 17.1+/-3.4, right 17.5+/-4.2  Tip pinch: Age: 35-39: male: left 17.7+/-3.8, right 18.0+/-3.6; female: left 11.9+/-2.4, right 11.6+/-2.5     Treatment   Therapeutic Exercise:   AROM/PROM wrist flexion/extension/deviation keeping fingers relaxed  AROM/PROM wrist flexion in conjunction with composite digit flexion/fist  Place and hold wrist flexion/extension  Extrinsic muscle stretch    Manual Therapy:   STM dorsal FA while stretch wrist into flexion  Gentle wrist distraction  Grade III wrist mobs over wedge - held     Ultrasound (98540):  Location: scar/point of most pain - while stretching into flexion  Position: sitting  Duration: 10 minutes Duty Cycle: 100% duty cycle  Frequency: 3 Mhz  Intensity: 1.3 W/cm2   Results: decreased pain and improved range of motion; no adverse reaction to treatment    Home Program:   AROM wrist flexion/extension/deviation keeping fingers relaxed  AROM wrist flexion in conjunction with composite digit flexion/fist  PROM wrist flexion/extension  Green putty gripping and thumb flexion  Red putty Isolated pinch strengthening  Red putty abduction strengthening   Red putty in dart thrower's  plane   3# wrist PREs  Place and hold wrist flexion and extension    Writer verbally educated the patient and received verbal consent from the patient on hand placement, positioning of patient, and techniques to be performed today including above listed exercise and how they are pertinent to the patient's plan of care.       Suggestions for next session as indicated: progress per plan of care  fluido/US  STM to FA and wrist  PROM as tolerated  Progress per MD recommendation    ASSESSMENT   Wrist flexion and extension improved per objective measurements.  He does continue to complain of achy pain 2/10 consistently. Therapy goals have been partially met at this time. Pt is currently still wearing his brace all day everyday.  Pt was encouraged to start coming out of his brace more for lighter less manual tasks to start improving UE tolerance and strength.    Pain after treatment (patient reported, 0-10 scale):   Result of above outlined education: Verbalizes understanding and Demonstrates understanding     Goals: updated 1/2  To be obtained by end of this plan of care:  1. Patient independent with modified and progressed home exercise program. MET   2. Patient will decrease involved wrist pain/symptoms to 1/10 to aid in normalization of upper extremity movements to aid activities of independent daily living. NOT MET  3. Patient will increase involved wrist active range of motion to 120 ° arc of motion to aid in age appropriate activities. Arc of motion 118*  4. Patient will increase involved wrist strength to within functional limits to aid in returning to work full duty. NOT MET - pt states he feels his wrist is functionally strong for ADLs, but he still has work restrictions      THERAPY DAILY BILLING   Insurance: [facility] 2. N/A    Evaluation Procedures:  No evaluation codes were used on this date of service    Timed Procedures:  Manual Therapy, 10 minutes  Therapeutic Exercise, 20 minutes  Ultrasound, 10  minutes    Untimed Procedures:  No untimed codes were used on this date of service    Total Treatment Time: 45 minutes       4 = No assist / stand by assistance

## 2024-11-23 NOTE — ED PROVIDER NOTE - OBJECTIVE STATEMENT
49-year-old female complaining of upper abdominal pain since last night.  + feeling hot,  chills, vomiting, and diarrhea.  Patient was seen in ED last week for same and had a CT scan and MRCP done.

## 2024-11-23 NOTE — PATIENT PROFILE ADULT - MST SCORE
Patient states she woke up this morning with a sore throat,cough and runny nose. No fever. Patient requesting a message be sent to Dr. Yonas Christina directly to see if she should cancel. She states she does want to wait another 2 to 3 months to reschedule. Please advise. Thank you. 0

## 2024-11-23 NOTE — ED PROVIDER NOTE - ATTENDING APP SHARED VISIT CONTRIBUTION OF CARE
49-year-old female past medical history noted including anxiety, chronic back pain, kidney stones, history of cholecystectomy, history of recent pancreatitis, presents for evaluation of nausea, vomiting and diarrhea for the last 2 days.  Patient was seen in the ED last week and was admitted for pancreatitis.  Patient had CT, MRCP.  Patient was discharged with pain medication but states she is unable to tolerate secondary to vomiting.  No fever but patient does feel hot.  Patient is on Ozempic.  On exam patient in NAD, AAOx3, dry lips and mucous membranes, abdomen is soft, nondistended, positive discomfort to the abdomen, no skin changes

## 2024-11-23 NOTE — ED PROVIDER NOTE - CLINICAL SUMMARY MEDICAL DECISION MAKING FREE TEXT BOX
Patient symptoms treated.  We obtained labs.  Patient with elevated lipase.  Was 171 last week.  It is 177 today.  Patient requiring multiple doses of pain medication as well as antiemetics.  Patient unable to tolerate p.o. and therefore require admission at this time.

## 2024-11-23 NOTE — H&P ADULT - ASSESSMENT
48 yo female with PMHx of overactive bladder, HLD, GERD, migraines, kidney stones, hypothyroidism and PSHx of cholecystectomy, with recent admission 11/15 --11/20 for suspected acute pancreatitis now presents with abdominal pain, subjective fevers, N/V 50 yo female with PMHx of overactive bladder, HLD, GERD, migraines, kidney stones, hypothyroidism and PSHx of cholecystectomy, with recent admission 11/15 --11/20 for suspected acute pancreatitis now presents with abdominal pain, subjective fevers, N/V, diarrhea since discharge      #Epigastric pain - ?pancreatitis   #lipase 177 (from previous admission: trig = 181; lip = 171)  -Admit to inpatient level of care under medicine   -NPO x meds  - IV fluids: 150 cc/hr LR  -F/U panculture   no abx at this time  -GI consult   -Pain control: hold PO meds: dilaudid 0.5 mg Q 4 hr PRN severe pain  -IV Compazine prn for nausea/vomiting (, f/u AM EKG)  -IV Protonix     #Anxiety     #Overactive bladder  -C/w oxybutynin    #HLD  -C/w rosuvastatin     #Hypothyroidism  -C/w synthroid     #Anxiety   -C/w Xanax prn  -[facility] Reference #[id number]    #Chronic neck/back pain  -Pt takes Oxycodone/Acetaminophen 10/325 TID prn and Morphine ER 15 BID prn - confirmed on iStop last admission  -will hold PO meds 2/2 N/V; IV dilaudid    #H/o migraines  -Holding home meds while inpatient     #Diet: NPO  #Activity: AAT  #VTE ppx: Low risk   50 yo female with PMHx of overactive bladder, HLD, GERD, migraines, kidney stones, hypothyroidism and PSHx of cholecystectomy, with recent admission 11/15 --11/20 for suspected acute pancreatitis now presents with abdominal pain, subjective fevers, N/V, diarrhea since discharge      #Epigastric pain - ?pancreatitis   #lipase 177 (from previous admission: trig = 181; lip = 171)  -Admit to inpatient level of care under medicine   -NPO x meds  - IV fluids: 150 cc/hr LR  -CT A/P w/IV contrast  -F/U panculture   no abx at this time  -GI consult   -Pain control: hold PO meds: dilaudid 0.5 mg Q 4 hr PRN severe pain  -IV Compazine prn for nausea/vomiting (, f/u AM EKG)  -IV Protonix     #Anxiety     #Overactive bladder  -C/w oxybutynin    #HLD  -C/w rosuvastatin     #Hypothyroidism  -C/w synthroid     #Anxiety   -C/w Xanax prn  -[facility] Reference #[id number]    #Chronic neck/back pain  -Pt takes Oxycodone/Acetaminophen 10/325 TID prn and Morphine ER 15 BID prn - confirmed on [facility] last admission  -will hold PO meds 2/2 N/V; IV dilaudid    #H/o migraines  -Holding home meds while inpatient     #Diet: NPO  #Activity: AAT  #VTE ppx: Low risk   50 yo female with PMHx of overactive bladder, HLD, GERD, migraines, kidney stones, hypothyroidism and PSHx of cholecystectomy, with recent admission 11/15 --11/20 for suspected acute pancreatitis now presents with abdominal pain, subjective fevers, N/V, diarrhea since discharge      #Epigastric pain - ?pancreatitis   #lipase 177 (from previous admission: trig = 181; lip = 171)  -Admit to inpatient level of care under medicine   -NPO x meds  - IV fluids: 150 cc/hr LR  -CT A/P w/IV contrast  -F/U panculture   no abx at this time  -GI consult   -Pain control: hold PO meds: dilaudid 0.5 mg Q 4 hr PRN severe pain  -IV Protonix       #Overactive bladder  -C/w oxybutynin    #HLD  -C/w rosuvastatin     #Hypothyroidism  -C/w synthroid     #Anxiety   -C/w Xanax prn  -[facility] Reference #[id number]    #Chronic neck/back pain  -Pt takes Oxycodone/Acetaminophen 10/325 TID prn and Morphine ER 15 BID prn - confirmed on [facility] last admission  -will hold PO meds 2/2 N/V; IV dilaudid    #H/o migraines  -Holding home meds while inpatient     #Diet: NPO  #Activity: AAT  #VTE ppx: Low risk

## 2024-11-23 NOTE — ED PROVIDER NOTE - PHYSICAL EXAMINATION
Gen: Alert, NAD, well appearing  Head: NC, AT, PERRL, EOMI, normal lids/conjunctiva  ENT: normal hearing  Neck: +supple, no tenderness/meningismus,  Pulm: Bilateral BS, normal resp effort, no wheeze/stridor/retractions  CV: S1S2  Abd: soft, +upper abdominal tenderness  Mskel: no edema/erythema/cyanosis  Skin: no rash, warm/dry  Neuro: AAOx3, no sensory/motor deficits

## 2024-11-23 NOTE — H&P ADULT - NS ATTEND AMEND GEN_ALL_CORE FT
48 yo female with PMHx of overactive bladder, HLD, GERD, migraines, kidney stones, hypothyroidism and PSHx of cholecystectomy, with recent admission 11/15 --11/20 for suspected acute pancreatitis now presents with abdominal pain, subjective fevers, with elevated lipase, admitted for presumed acute pancreatitis.    General: Appears uncomfortable  HEENT: NCAT  Lungs: No increased WOB  CVS: RRR  Abdomen: Epigastric pain on palpation  Extremities: no edema      #Recurrent Epigastric pain   #Presumed acute pancreatitis.  Patient with multiple presentations for recurrent epigastric pain, no clear cause identified. Current presentation technically fulfills 2/3 criteria for acute pancreatitis for epigastric pain radiating to the back and elevated lipase, however no CT findings of this. Suspect underlying functional component of pain vs. SOD dysfunction.  -RUQ sono: S/p cholecystectomy. CBD dilated to 8 mm, which may be seen postcholecystectomy, however correlation with LFTs is recommended.  -CTAP: Mild intrahepatic bile duct dilatation which appears new since the previous exam. Otherwise unremarkable exam.   -Previous MRCP on 11/18 demonstrating known bile duct dilatation, otherwise unremarkable  - AMA, ASMA negative per previous testing  -Continue LR @150cc/hr, NPO for now, plan to re-introduce diet within the first 24 hours if feasible.  -Pain control with home meds morphine 15mg ER BID, oxycodone 5/10 mg for moderate/severe pain, APAP 1gr TID scheduled for baseline pain control    #Overactive bladder  -C/w oxybutynin    #HLD  -C/w rosuvastatin     #Hypothyroidism  -C/w synthroid     #Anxiety   -C/w Xanax prn  -[facility] Reference #[id number]    #Chronic neck/back pain  -Pt takes Oxycodone/Acetaminophen 10/325 TID prn and Morphine ER 15 BID prn - confirmed on [facility]     #H/o migraines  -Holding home meds while inpatient     Misc:  DVT ppx: N/A  GI ppx: NPO, advance to clears today  Diet: Regular  Activity: IAT  Code: Full Code

## 2024-11-24 LAB
ANION GAP SERPL CALC-SCNC: 10 MMOL/L — SIGNIFICANT CHANGE UP (ref 7–14)
BUN SERPL-MCNC: 7 MG/DL — LOW (ref 10–20)
CALCIUM SERPL-MCNC: 9.2 MG/DL — SIGNIFICANT CHANGE UP (ref 8.4–10.5)
CHLORIDE SERPL-SCNC: 106 MMOL/L — SIGNIFICANT CHANGE UP (ref 98–110)
CO2 SERPL-SCNC: 23 MMOL/L — SIGNIFICANT CHANGE UP (ref 17–32)
CREAT SERPL-MCNC: 0.6 MG/DL — LOW (ref 0.7–1.5)
CULTURE RESULTS: SIGNIFICANT CHANGE UP
EGFR: 111 ML/MIN/1.73M2 — SIGNIFICANT CHANGE UP
GLUCOSE SERPL-MCNC: 73 MG/DL — SIGNIFICANT CHANGE UP (ref 70–99)
HCT VFR BLD CALC: 36.2 % — LOW (ref 37–47)
HGB BLD-MCNC: 11.6 G/DL — LOW (ref 12–16)
LIDOCAIN IGE QN: 182 U/L — HIGH (ref 7–60)
MAGNESIUM SERPL-MCNC: 1.7 MG/DL — LOW (ref 1.8–2.4)
MCHC RBC-ENTMCNC: 28.6 PG — SIGNIFICANT CHANGE UP (ref 27–31)
MCHC RBC-ENTMCNC: 32 G/DL — SIGNIFICANT CHANGE UP (ref 32–37)
MCV RBC AUTO: 89.2 FL — SIGNIFICANT CHANGE UP (ref 81–99)
NRBC # BLD: 0 /100 WBCS — SIGNIFICANT CHANGE UP (ref 0–0)
PHOSPHATE SERPL-MCNC: 3.6 MG/DL — SIGNIFICANT CHANGE UP (ref 2.1–4.9)
PLATELET # BLD AUTO: 233 K/UL — SIGNIFICANT CHANGE UP (ref 130–400)
PMV BLD: 10.6 FL — HIGH (ref 7.4–10.4)
POTASSIUM SERPL-MCNC: 4.1 MMOL/L — SIGNIFICANT CHANGE UP (ref 3.5–5)
POTASSIUM SERPL-SCNC: 4.1 MMOL/L — SIGNIFICANT CHANGE UP (ref 3.5–5)
RBC # BLD: 4.06 M/UL — LOW (ref 4.2–5.4)
RBC # FLD: 14.3 % — SIGNIFICANT CHANGE UP (ref 11.5–14.5)
SODIUM SERPL-SCNC: 139 MMOL/L — SIGNIFICANT CHANGE UP (ref 135–146)
SPECIMEN SOURCE: SIGNIFICANT CHANGE UP
WBC # BLD: 8.5 K/UL — SIGNIFICANT CHANGE UP (ref 4.8–10.8)
WBC # FLD AUTO: 8.5 K/UL — SIGNIFICANT CHANGE UP (ref 4.8–10.8)

## 2024-11-24 PROCEDURE — 99232 SBSQ HOSP IP/OBS MODERATE 35: CPT

## 2024-11-24 RX ORDER — ONDANSETRON HYDROCHLORIDE 4 MG/1
4 TABLET, FILM COATED ORAL EVERY 6 HOURS
Refills: 0 | Status: DISCONTINUED | OUTPATIENT
Start: 2024-11-24 | End: 2024-11-26

## 2024-11-24 RX ORDER — ACETAMINOPHEN 500MG 500 MG/1
1000 TABLET, COATED ORAL EVERY 8 HOURS
Refills: 0 | Status: DISCONTINUED | OUTPATIENT
Start: 2024-11-24 | End: 2024-11-26

## 2024-11-24 RX ORDER — OXYCODONE HYDROCHLORIDE 30 MG/1
10 TABLET ORAL EVERY 4 HOURS
Refills: 0 | Status: DISCONTINUED | OUTPATIENT
Start: 2024-11-24 | End: 2024-11-26

## 2024-11-24 RX ORDER — OXYCODONE HYDROCHLORIDE 30 MG/1
5 TABLET ORAL EVERY 4 HOURS
Refills: 0 | Status: DISCONTINUED | OUTPATIENT
Start: 2024-11-24 | End: 2024-11-26

## 2024-11-24 RX ORDER — HYDROMORPHONE HYDROCHLORIDE 2 MG/1
0.5 TABLET ORAL EVERY 4 HOURS
Refills: 0 | Status: DISCONTINUED | OUTPATIENT
Start: 2024-11-24 | End: 2024-11-26

## 2024-11-24 RX ORDER — HYDROMORPHONE HYDROCHLORIDE 2 MG/1
0.5 TABLET ORAL EVERY 6 HOURS
Refills: 0 | Status: DISCONTINUED | OUTPATIENT
Start: 2024-11-24 | End: 2024-11-24

## 2024-11-24 RX ORDER — ONDANSETRON HYDROCHLORIDE 4 MG/1
4 TABLET, FILM COATED ORAL EVERY 6 HOURS
Refills: 0 | Status: DISCONTINUED | OUTPATIENT
Start: 2024-11-24 | End: 2024-11-24

## 2024-11-24 RX ORDER — ONDANSETRON HYDROCHLORIDE 4 MG/1
4 TABLET, FILM COATED ORAL ONCE
Refills: 0 | Status: COMPLETED | OUTPATIENT
Start: 2024-11-24 | End: 2024-11-24

## 2024-11-24 RX ADMIN — OXYCODONE HYDROCHLORIDE 10 MILLIGRAM(S): 30 TABLET ORAL at 17:54

## 2024-11-24 RX ADMIN — Medication 40 MILLIGRAM(S): at 12:23

## 2024-11-24 RX ADMIN — OXYCODONE HYDROCHLORIDE 10 MILLIGRAM(S): 30 TABLET ORAL at 13:09

## 2024-11-24 RX ADMIN — OXYCODONE HYDROCHLORIDE 10 MILLIGRAM(S): 30 TABLET ORAL at 23:00

## 2024-11-24 RX ADMIN — Medication 15 MILLIGRAM(S): at 08:29

## 2024-11-24 RX ADMIN — HYDROMORPHONE HYDROCHLORIDE 0.5 MILLIGRAM(S): 2 TABLET ORAL at 09:18

## 2024-11-24 RX ADMIN — Medication 75 MICROGRAM(S): at 05:09

## 2024-11-24 RX ADMIN — HYDROMORPHONE HYDROCHLORIDE 0.5 MILLIGRAM(S): 2 TABLET ORAL at 00:40

## 2024-11-24 RX ADMIN — ONDANSETRON HYDROCHLORIDE 4 MILLIGRAM(S): 4 TABLET, FILM COATED ORAL at 17:24

## 2024-11-24 RX ADMIN — ACETAMINOPHEN 500MG 1000 MILLIGRAM(S): 500 TABLET, COATED ORAL at 08:59

## 2024-11-24 RX ADMIN — OXYCODONE HYDROCHLORIDE 10 MILLIGRAM(S): 30 TABLET ORAL at 17:24

## 2024-11-24 RX ADMIN — HYDROMORPHONE HYDROCHLORIDE 0.5 MILLIGRAM(S): 2 TABLET ORAL at 14:06

## 2024-11-24 RX ADMIN — Medication 5 MILLIGRAM(S): at 05:09

## 2024-11-24 RX ADMIN — Medication 150 MILLILITER(S): at 05:09

## 2024-11-24 RX ADMIN — OXYCODONE HYDROCHLORIDE 10 MILLIGRAM(S): 30 TABLET ORAL at 13:29

## 2024-11-24 RX ADMIN — HYDROMORPHONE HYDROCHLORIDE 0.5 MILLIGRAM(S): 2 TABLET ORAL at 00:03

## 2024-11-24 RX ADMIN — Medication 0.5 MILLIGRAM(S): at 23:48

## 2024-11-24 RX ADMIN — Medication 15 MILLIGRAM(S): at 08:59

## 2024-11-24 RX ADMIN — HYDROMORPHONE HYDROCHLORIDE 0.5 MILLIGRAM(S): 2 TABLET ORAL at 19:00

## 2024-11-24 RX ADMIN — Medication 5 MILLIGRAM(S): at 17:24

## 2024-11-24 RX ADMIN — Medication 150 MILLILITER(S): at 21:04

## 2024-11-24 RX ADMIN — OXYCODONE HYDROCHLORIDE 10 MILLIGRAM(S): 30 TABLET ORAL at 08:43

## 2024-11-24 RX ADMIN — ACETAMINOPHEN 500MG 1000 MILLIGRAM(S): 500 TABLET, COATED ORAL at 21:08

## 2024-11-24 RX ADMIN — OXYCODONE HYDROCHLORIDE 10 MILLIGRAM(S): 30 TABLET ORAL at 09:13

## 2024-11-24 RX ADMIN — Medication 15 MILLIGRAM(S): at 17:24

## 2024-11-24 RX ADMIN — HYDROMORPHONE HYDROCHLORIDE 0.5 MILLIGRAM(S): 2 TABLET ORAL at 23:00

## 2024-11-24 RX ADMIN — Medication 150 MILLILITER(S): at 12:23

## 2024-11-24 RX ADMIN — Medication 2 MILLIGRAM(S): at 02:41

## 2024-11-24 RX ADMIN — Medication 15 MILLIGRAM(S): at 17:30

## 2024-11-24 RX ADMIN — HYDROMORPHONE HYDROCHLORIDE 0.5 MILLIGRAM(S): 2 TABLET ORAL at 22:40

## 2024-11-24 RX ADMIN — ONDANSETRON HYDROCHLORIDE 4 MILLIGRAM(S): 4 TABLET, FILM COATED ORAL at 23:49

## 2024-11-24 RX ADMIN — HYDROMORPHONE HYDROCHLORIDE 0.5 MILLIGRAM(S): 2 TABLET ORAL at 09:48

## 2024-11-24 RX ADMIN — HYDROMORPHONE HYDROCHLORIDE 0.5 MILLIGRAM(S): 2 TABLET ORAL at 05:12

## 2024-11-24 RX ADMIN — PANTOPRAZOLE SODIUM 40 MILLIGRAM(S): 40 TABLET, DELAYED RELEASE ORAL at 12:23

## 2024-11-24 RX ADMIN — ACETAMINOPHEN 500MG 1000 MILLIGRAM(S): 500 TABLET, COATED ORAL at 08:29

## 2024-11-24 RX ADMIN — HYDROMORPHONE HYDROCHLORIDE 0.5 MILLIGRAM(S): 2 TABLET ORAL at 18:36

## 2024-11-24 RX ADMIN — HYDROMORPHONE HYDROCHLORIDE 0.5 MILLIGRAM(S): 2 TABLET ORAL at 05:09

## 2024-11-24 RX ADMIN — OXYCODONE HYDROCHLORIDE 10 MILLIGRAM(S): 30 TABLET ORAL at 21:42

## 2024-11-24 RX ADMIN — HYDROMORPHONE HYDROCHLORIDE 0.5 MILLIGRAM(S): 2 TABLET ORAL at 14:36

## 2024-11-24 RX ADMIN — ROSUVASTATIN CALCIUM 10 MILLIGRAM(S): 5 TABLET, FILM COATED ORAL at 21:09

## 2024-11-24 RX ADMIN — ACETAMINOPHEN 500MG 1000 MILLIGRAM(S): 500 TABLET, COATED ORAL at 13:29

## 2024-11-24 RX ADMIN — ACETAMINOPHEN 500MG 1000 MILLIGRAM(S): 500 TABLET, COATED ORAL at 13:09

## 2024-11-24 RX ADMIN — ONDANSETRON HYDROCHLORIDE 4 MILLIGRAM(S): 4 TABLET, FILM COATED ORAL at 09:05

## 2024-11-25 LAB
ALBUMIN SERPL ELPH-MCNC: 4.1 G/DL — SIGNIFICANT CHANGE UP (ref 3.5–5.2)
ALP SERPL-CCNC: 78 U/L — SIGNIFICANT CHANGE UP (ref 30–115)
ALT FLD-CCNC: 17 U/L — SIGNIFICANT CHANGE UP (ref 0–41)
ANION GAP SERPL CALC-SCNC: 9 MMOL/L — SIGNIFICANT CHANGE UP (ref 7–14)
APTT BLD: 35.9 SEC — SIGNIFICANT CHANGE UP (ref 27–39.2)
AST SERPL-CCNC: 16 U/L — SIGNIFICANT CHANGE UP (ref 0–41)
BILIRUB SERPL-MCNC: 0.3 MG/DL — SIGNIFICANT CHANGE UP (ref 0.2–1.2)
BUN SERPL-MCNC: 4 MG/DL — LOW (ref 10–20)
CALCIUM SERPL-MCNC: 9.1 MG/DL — SIGNIFICANT CHANGE UP (ref 8.4–10.5)
CANCER AG125 SERPL-ACNC: 7 U/ML — SIGNIFICANT CHANGE UP
CHLORIDE SERPL-SCNC: 104 MMOL/L — SIGNIFICANT CHANGE UP (ref 98–110)
CO2 SERPL-SCNC: 24 MMOL/L — SIGNIFICANT CHANGE UP (ref 17–32)
CREAT SERPL-MCNC: 0.6 MG/DL — LOW (ref 0.7–1.5)
EGFR: 111 ML/MIN/1.73M2 — SIGNIFICANT CHANGE UP
GLUCOSE SERPL-MCNC: 80 MG/DL — SIGNIFICANT CHANGE UP (ref 70–99)
HCT VFR BLD CALC: 34 % — LOW (ref 37–47)
HGB BLD-MCNC: 11.3 G/DL — LOW (ref 12–16)
INR BLD: 1.13 RATIO — SIGNIFICANT CHANGE UP (ref 0.65–1.3)
MCHC RBC-ENTMCNC: 29 PG — SIGNIFICANT CHANGE UP (ref 27–31)
MCHC RBC-ENTMCNC: 33.2 G/DL — SIGNIFICANT CHANGE UP (ref 32–37)
MCV RBC AUTO: 87.2 FL — SIGNIFICANT CHANGE UP (ref 81–99)
NRBC # BLD: 0 /100 WBCS — SIGNIFICANT CHANGE UP (ref 0–0)
PLATELET # BLD AUTO: 245 K/UL — SIGNIFICANT CHANGE UP (ref 130–400)
PMV BLD: 10.7 FL — HIGH (ref 7.4–10.4)
POTASSIUM SERPL-MCNC: 3.8 MMOL/L — SIGNIFICANT CHANGE UP (ref 3.5–5)
POTASSIUM SERPL-SCNC: 3.8 MMOL/L — SIGNIFICANT CHANGE UP (ref 3.5–5)
PROT SERPL-MCNC: 6.5 G/DL — SIGNIFICANT CHANGE UP (ref 6–8)
PROTHROM AB SERPL-ACNC: 13.4 SEC — HIGH (ref 9.95–12.87)
RBC # BLD: 3.9 M/UL — LOW (ref 4.2–5.4)
RBC # FLD: 14 % — SIGNIFICANT CHANGE UP (ref 11.5–14.5)
SODIUM SERPL-SCNC: 137 MMOL/L — SIGNIFICANT CHANGE UP (ref 135–146)
WBC # BLD: 6.73 K/UL — SIGNIFICANT CHANGE UP (ref 4.8–10.8)
WBC # FLD AUTO: 6.73 K/UL — SIGNIFICANT CHANGE UP (ref 4.8–10.8)

## 2024-11-25 PROCEDURE — 99223 1ST HOSP IP/OBS HIGH 75: CPT

## 2024-11-25 PROCEDURE — 99232 SBSQ HOSP IP/OBS MODERATE 35: CPT

## 2024-11-25 RX ORDER — HYDROMORPHONE HYDROCHLORIDE 2 MG/1
0.5 TABLET ORAL ONCE
Refills: 0 | Status: DISCONTINUED | OUTPATIENT
Start: 2024-11-25 | End: 2024-11-25

## 2024-11-25 RX ADMIN — HYDROMORPHONE HYDROCHLORIDE 0.5 MILLIGRAM(S): 2 TABLET ORAL at 12:00

## 2024-11-25 RX ADMIN — HYDROMORPHONE HYDROCHLORIDE 0.5 MILLIGRAM(S): 2 TABLET ORAL at 17:12

## 2024-11-25 RX ADMIN — OXYCODONE HYDROCHLORIDE 10 MILLIGRAM(S): 30 TABLET ORAL at 10:33

## 2024-11-25 RX ADMIN — OXYCODONE HYDROCHLORIDE 10 MILLIGRAM(S): 30 TABLET ORAL at 06:18

## 2024-11-25 RX ADMIN — OXYCODONE HYDROCHLORIDE 10 MILLIGRAM(S): 30 TABLET ORAL at 15:29

## 2024-11-25 RX ADMIN — HYDROMORPHONE HYDROCHLORIDE 0.5 MILLIGRAM(S): 2 TABLET ORAL at 15:57

## 2024-11-25 RX ADMIN — HYDROMORPHONE HYDROCHLORIDE 0.5 MILLIGRAM(S): 2 TABLET ORAL at 08:00

## 2024-11-25 RX ADMIN — HYDROMORPHONE HYDROCHLORIDE 0.5 MILLIGRAM(S): 2 TABLET ORAL at 16:27

## 2024-11-25 RX ADMIN — OXYCODONE HYDROCHLORIDE 5 MILLIGRAM(S): 30 TABLET ORAL at 19:53

## 2024-11-25 RX ADMIN — Medication 15 MILLIGRAM(S): at 06:15

## 2024-11-25 RX ADMIN — HYDROMORPHONE HYDROCHLORIDE 0.5 MILLIGRAM(S): 2 TABLET ORAL at 07:43

## 2024-11-25 RX ADMIN — Medication 5 MILLIGRAM(S): at 17:11

## 2024-11-25 RX ADMIN — OXYCODONE HYDROCHLORIDE 10 MILLIGRAM(S): 30 TABLET ORAL at 15:56

## 2024-11-25 RX ADMIN — ACETAMINOPHEN 500MG 1000 MILLIGRAM(S): 500 TABLET, COATED ORAL at 06:15

## 2024-11-25 RX ADMIN — Medication 150 MILLILITER(S): at 19:54

## 2024-11-25 RX ADMIN — Medication 15 MILLIGRAM(S): at 05:09

## 2024-11-25 RX ADMIN — Medication 150 MILLILITER(S): at 03:29

## 2024-11-25 RX ADMIN — ACETAMINOPHEN 500MG 1000 MILLIGRAM(S): 500 TABLET, COATED ORAL at 13:27

## 2024-11-25 RX ADMIN — Medication 15 MILLIGRAM(S): at 18:40

## 2024-11-25 RX ADMIN — ROSUVASTATIN CALCIUM 10 MILLIGRAM(S): 5 TABLET, FILM COATED ORAL at 21:05

## 2024-11-25 RX ADMIN — HYDROMORPHONE HYDROCHLORIDE 0.5 MILLIGRAM(S): 2 TABLET ORAL at 17:37

## 2024-11-25 RX ADMIN — HYDROMORPHONE HYDROCHLORIDE 0.5 MILLIGRAM(S): 2 TABLET ORAL at 03:25

## 2024-11-25 RX ADMIN — Medication 5 MILLIGRAM(S): at 05:09

## 2024-11-25 RX ADMIN — OXYCODONE HYDROCHLORIDE 10 MILLIGRAM(S): 30 TABLET ORAL at 11:13

## 2024-11-25 RX ADMIN — HYDROMORPHONE HYDROCHLORIDE 0.5 MILLIGRAM(S): 2 TABLET ORAL at 20:59

## 2024-11-25 RX ADMIN — ACETAMINOPHEN 500MG 1000 MILLIGRAM(S): 500 TABLET, COATED ORAL at 14:20

## 2024-11-25 RX ADMIN — ACETAMINOPHEN 500MG 1000 MILLIGRAM(S): 500 TABLET, COATED ORAL at 05:09

## 2024-11-25 RX ADMIN — Medication 75 MICROGRAM(S): at 05:09

## 2024-11-25 RX ADMIN — OXYCODONE HYDROCHLORIDE 10 MILLIGRAM(S): 30 TABLET ORAL at 22:33

## 2024-11-25 RX ADMIN — ACETAMINOPHEN 500MG 1000 MILLIGRAM(S): 500 TABLET, COATED ORAL at 21:04

## 2024-11-25 RX ADMIN — HYDROMORPHONE HYDROCHLORIDE 0.5 MILLIGRAM(S): 2 TABLET ORAL at 20:42

## 2024-11-25 RX ADMIN — OXYCODONE HYDROCHLORIDE 10 MILLIGRAM(S): 30 TABLET ORAL at 01:45

## 2024-11-25 RX ADMIN — OXYCODONE HYDROCHLORIDE 10 MILLIGRAM(S): 30 TABLET ORAL at 06:32

## 2024-11-25 RX ADMIN — ACETAMINOPHEN 500MG 1000 MILLIGRAM(S): 500 TABLET, COATED ORAL at 21:53

## 2024-11-25 RX ADMIN — Medication 15 MILLIGRAM(S): at 17:59

## 2024-11-25 RX ADMIN — Medication 40 MILLIGRAM(S): at 10:34

## 2024-11-25 RX ADMIN — OXYCODONE HYDROCHLORIDE 10 MILLIGRAM(S): 30 TABLET ORAL at 23:07

## 2024-11-25 RX ADMIN — PANTOPRAZOLE SODIUM 40 MILLIGRAM(S): 40 TABLET, DELAYED RELEASE ORAL at 10:34

## 2024-11-25 RX ADMIN — OXYCODONE HYDROCHLORIDE 5 MILLIGRAM(S): 30 TABLET ORAL at 20:33

## 2024-11-25 RX ADMIN — ONDANSETRON HYDROCHLORIDE 4 MILLIGRAM(S): 4 TABLET, FILM COATED ORAL at 06:10

## 2024-11-25 RX ADMIN — HYDROMORPHONE HYDROCHLORIDE 0.5 MILLIGRAM(S): 2 TABLET ORAL at 11:38

## 2024-11-25 NOTE — CONSULT NOTE ADULT - SUBJECTIVE AND OBJECTIVE BOX
Chief complaint/Reason for consult:    HPI:  48 yo female with PMHx of overactive bladder, HLD, GERD, migraines, kidney stones, hypothyroidism and PSHx of cholecystectomy, with recent admission 11/15 -- for suspected acute pancreatitis now presents with abdominal pain, subjective fevers, N/V and diarrhea the day after she was discharged.  She then developed epigastric pain, radiating to B/L back yesterday, unable to hold down her pills/food. Denies starting new medications since being discharged.     As per prior admission: Per GI:  Symptoms may be multifactorial, possible etiologies include functional, retained sludge/stone, sphincter of oddi dysfunction, gastritis/pud, component of constipation in setting of narcotics. Findings not consistent with acute pancreatitis. Low fat diet as tolerated. MRI wnl CBD dilation likely due to narcotic use. PPI daily. Miralax daily. Monitor LFTs daily.      In ED:  WBC = 12, LFT normal, lipase 177 (previously 171 on 24). Imaging not performed in ED (2024 18:04)    49yFemale      PAST MEDICAL & SURGICAL HISTORY:   GERD (gastroesophageal reflux disease)      Migraine headache      Back pain      Anxiety      Kidney stones      Murmur  since age 20           Thyroid nodule      Hypothyroidism      Overactive bladder      History of surgery  hernia repair    x2      History of cholecystectomy      History of lithotripsy            Family history:  FAMILY HISTORY:  DM (diabetes mellitus) (Father)    Family history of heart disease (Father)      No GI cancers in first or second degree relatives    Social History: No smoking. No alcohol. No illegal drug use.    Allergies:   Bananas (Unknown)  Cipro (Rash)  citrus (Unknown)  NSAIDs (Angioedema; Anaphylaxis; Hives)  eggs (Unknown)  Intolerances            MEDICATIONS: Home Medications:  citalopram 40 mg oral tablet: 1 tab(s) orally once a day (in the morning) (2024 19:01)  levothyroxine 75 mcg (0.075 mg) oral tablet: 1 tab(s) orally once a day (2024 19:06)  morphine 15 mg oral tablet: 1 tab(s) orally 2 times a day as needed for  pain (2024 19:01)  oxybutynin 5 mg oral tablet: 2 tab(s) orally once a day (2024 19:01)  pantoprazole 40 mg oral delayed release tablet: 1 tab(s) orally once a day (2024 19:01)  Percocet 10/325 oral tablet: 1 tab(s) orally 3 times a day as needed for  pain (2024 19:01)  rizatriptan 10 mg oral tablet, disintegratin tab(s) orally once a day as needed for  migraine (2024 19:01)  rosuvastatin 10 mg oral tablet: 1 tab(s) orally once a day (2024 19:01)      MEDICATIONS  (STANDING):  acetaminophen     Tablet .. 1000 milliGRAM(s) Oral every 8 hours  citalopram 40 milliGRAM(s) Oral daily  lactated ringers. 1000 milliLiter(s) (150 mL/Hr) IV Continuous <Continuous>  levothyroxine 75 MICROGram(s) Oral daily  morphine ER Tablet 15 milliGRAM(s) Oral two times a day  oxybutynin 5 milliGRAM(s) Oral every 12 hours  pantoprazole  Injectable 40 milliGRAM(s) IV Push daily  rosuvastatin 10 milliGRAM(s) Oral at bedtime    MEDICATIONS  (PRN):  ALPRAZolam 0.5 milliGRAM(s) Oral two times a day PRN for anxiety  HYDROmorphone  Injectable 0.5 milliGRAM(s) IV Push every 4 hours PRN Severe Pain (7 - 10)  ondansetron Injectable 4 milliGRAM(s) IV Push every 6 hours PRN Nausea and/or Vomiting  oxyCODONE    IR 5 milliGRAM(s) Oral every 4 hours PRN Moderate Pain (4 - 6)  oxyCODONE    IR 10 milliGRAM(s) Oral every 4 hours PRN Severe Pain (7 - 10)        REVIEW OF SYSTEMS  General:  No weight loss, fevers, or chills.  Eyes:  No reported pain or visual changes  ENT:  No sore throat or runny nose.  NECK: No stiffness   CV:  No chest pain or palpitations.  Resp:  No shortness of breath, cough, wheezing or hemoptysis  GI:  No abdominal pain, nausea, vomiting, dysphagia, diarrhea or constipation. No rectal bleeding, melena, or hematemesis.  Neuro:  No tingling, numbness       VITALS:   T(F): 97.4 (24 @ 04:44), Max: 97.6 (24 @ 21:06)  HR: 77 (11-25-24 @ 04:44) (69 - 85)  BP: 122/86 (24 @ 04:44) (96/67 - 151/107)  RR: 18 (24 @ 04:44) (18 - 18)  SpO2: 98% (24 @ 04:44) (97% - 98%)    PHYSICAL EXAM:  GENERAL: AAOx3, no acute distress.  HEAD:  Atraumatic, Normocephalic  EYES: conjunctiva and sclera clear  NECK: Supple, No thyromegaly   CHEST/LUNG: Clear to auscultation bilaterally; No wheeze, rhonchi, or rales  HEART: Regular rate and rhythm; normal S1, S2, No murmurs.  ABDOMEN: Soft, nontender, nondistended; Bowel sounds present  NEUROLOGY: No asterixis or tremor  SKIN: Intact, no jaundice          LABS:      137  |  104  |  4[L]  ----------------------------<  80  3.8   |  24  |  0.6[L]    Ca    9.1      2024 06:11  Phos  3.6       Mg     1.7         TPro  6.5  /  Alb  4.1  /  TBili  0.3  /  DBili  x   /  AST  16  /  ALT  17  /  AlkPhos  78                            11.3   6.73  )-----------( 245      ( 2024 06:11 )             34.0     LIVER FUNCTIONS - ( 2024 06:11 )  Alb: 4.1 g/dL / Pro: 6.5 g/dL / ALK PHOS: 78 U/L / ALT: 17 U/L / AST: 16 U/L / GGT: x           PT/INR - ( 2024 06:11 )   PT: 13.40 sec;   INR: 1.13 ratio         PTT - ( 2024 06:11 )  PTT:35.9 sec    IMAGING:    < from: CT Abdomen and Pelvis w/ IV Cont (24 @ 21:00) >    ACC: [id number] EXAM:  CT ABDOMEN AND PELVIS IC   ORDERED BY: ALEM LUKE     PROCEDURE DATE:  2024          INTERPRETATION:  CLINICAL INFORMATION: Abdominal pain    COMPARISON: CT ABDOMEN/PELVIS 11/15/2024.    CORRELATION:  MR ABDOMEN 2024.    CONTRAST/COMPLICATIONS:  IV Contrast: Omnipaque 350  95 cc administered   5 cc discarded  Oral Contrast: NONE    PROCEDURE:  CT of the Abdomen and Pelvis was performed.  Sagittal and coronal reformats were performed.    FINDINGS:  LOWER CHEST: Right middle lobe branching opacities    LIVER: Stable hepatic cyst  BILE DUCTS: Normal caliber.  GALLBLADDER: Cholecystectomy.  SPLEEN: Within normal limits.  PANCREAS: Unremarkable CT appearance of the pancreas no perinephric fluid   collection. Homogeneous enhancement  ADRENALS: Within normal limits.  KIDNEYS/URETERS: No renal stones or hydronephrosis.    BLADDER: Within normal limits.  REPRODUCTIVE ORGANS: Left adnexal hypodensity measuring up to 2.1 cm    BOWEL: No bowel obstruction. Appendix is normal.  PERITONEUM/RETROPERITONEUM: Within normal limits.  VESSELS: Within normal limits.  LYMPH NODES: No lymphadenopathy.  ABDOMINAL WALL: Fat-containing ventral hernia  BONES: Within normal limits.    IMPRESSION:  No CT evidence of acute intra-abdominal pathology    --- End of Report ---            SHEY LY MD; Attending Radiologist  This document has been electronically signed. 2024  9:11PM    < end of copied text >       Chief complaint/Reason for consult: r/o pancreatitis     HPI:  50 yo female with PMHx of overactive bladder, HLD, GERD, migraines, kidney stones, hypothyroidism and PSHx of cholecystectomy, with recent admission 11/15 -- for suspected acute pancreatitis now presents with abdominal pain, subjective fevers, N/V and diarrhea the day after she was discharged.  She then developed epigastric pain, radiating to B/L back yesterday, unable to hold down her pills/food. Denies starting new medications since being discharged.     As per prior admission: Per GI:  Symptoms may be multifactorial, possible etiologies include functional, retained sludge/stone, sphincter of oddi dysfunction, gastritis/pud, component of constipation in setting of narcotics. Findings not consistent with acute pancreatitis. Low fat diet as tolerated. MRI wnl CBD dilation likely due to narcotic use. PPI daily. Miralax daily. Monitor LFTs daily.      In ED:  WBC = 12, LFT normal, lipase 177 (previously 171 on 24). Imaging not performed in ED (2024 18:04)    GI Updates: 50yo female h/o migraines, kidney stones, hypothyroidism, cholecystectomy in , prediabetes/obesity on ozempic since  presenting with epigastric pain on and off patient with possible acute pancreatitis. Currently Patient denies nausea, vomiting, hematemesis, melena, blood in stool, diarrhea, constipation, abdominal pain.        PAST MEDICAL & SURGICAL HISTORY:   GERD (gastroesophageal reflux disease)      Migraine headache      Back pain      Anxiety      Kidney stones      Murmur  since age 20           Thyroid nodule      Hypothyroidism      Overactive bladder      History of surgery  hernia repair    x2      History of cholecystectomy      History of lithotripsy            Family history:  FAMILY HISTORY:  DM (diabetes mellitus) (Father)    Family history of heart disease (Father)      No GI cancers in first or second degree relatives    Social History: No smoking. No alcohol. No illegal drug use.    Allergies:   Bananas (Unknown)  Cipro (Rash)  citrus (Unknown)  NSAIDs (Angioedema; Anaphylaxis; Hives)  eggs (Unknown)  Intolerances            MEDICATIONS: Home Medications:  citalopram 40 mg oral tablet: 1 tab(s) orally once a day (in the morning) (2024 19:01)  levothyroxine 75 mcg (0.075 mg) oral tablet: 1 tab(s) orally once a day (2024 19:06)  morphine 15 mg oral tablet: 1 tab(s) orally 2 times a day as needed for  pain (2024 19:01)  oxybutynin 5 mg oral tablet: 2 tab(s) orally once a day (:01)  pantoprazole 40 mg oral delayed release tablet: 1 tab(s) orally once a day (2024 19:01)  Percocet 10/325 oral tablet: 1 tab(s) orally 3 times a day as needed for  pain (:)  rizatriptan 10 mg oral tablet, disintegratin tab(s) orally once a day as needed for  migraine (2024 19:01)  rosuvastatin 10 mg oral tablet: 1 tab(s) orally once a day (:)      MEDICATIONS  (STANDING):  acetaminophen     Tablet .. 1000 milliGRAM(s) Oral every 8 hours  citalopram 40 milliGRAM(s) Oral daily  lactated ringers. 1000 milliLiter(s) (150 mL/Hr) IV Continuous <Continuous>  levothyroxine 75 MICROGram(s) Oral daily  morphine ER Tablet 15 milliGRAM(s) Oral two times a day  oxybutynin 5 milliGRAM(s) Oral every 12 hours  pantoprazole  Injectable 40 milliGRAM(s) IV Push daily  rosuvastatin 10 milliGRAM(s) Oral at bedtime    MEDICATIONS  (PRN):  ALPRAZolam 0.5 milliGRAM(s) Oral two times a day PRN for anxiety  HYDROmorphone  Injectable 0.5 milliGRAM(s) IV Push every 4 hours PRN Severe Pain (7 - 10)  ondansetron Injectable 4 milliGRAM(s) IV Push every 6 hours PRN Nausea and/or Vomiting  oxyCODONE    IR 5 milliGRAM(s) Oral every 4 hours PRN Moderate Pain (4 - 6)  oxyCODONE    IR 10 milliGRAM(s) Oral every 4 hours PRN Severe Pain (7 - 10)        REVIEW OF SYSTEMS  General:  No weight loss, fevers, or chills.  Eyes:  No reported pain or visual changes  ENT:  No sore throat or runny nose.  NECK: No stiffness   CV:  No chest pain or palpitations.  Resp:  No shortness of breath, cough, wheezing or hemoptysis  GI:  No abdominal pain, nausea, vomiting, dysphagia, diarrhea or constipation. No rectal bleeding, melena, or hematemesis.  Neuro:  No tingling, numbness       VITALS:   T(F): 97.4 (24 @ 04:44), Max: 97.6 (24 @ 21:06)  HR: 77 (24 @ 04:44) (69 - 85)  BP: 122/86 (24 @ 04:44) (96/67 - 151/107)  RR: 18 (24 @ 04:44) (18 - 18)  SpO2: 98% (24 @ 04:44) (97% - 98%)    PHYSICAL EXAM:  GENERAL: AAOx3, no acute distress.  HEAD:  Atraumatic, Normocephalic  EYES: conjunctiva and sclera clear  NECK: Supple, No thyromegaly   CHEST/LUNG: Clear to auscultation bilaterally; No wheeze, rhonchi, or rales  HEART: Regular rate and rhythm; normal S1, S2, No murmurs.  ABDOMEN: Soft, nontender, nondistended; Bowel sounds present  NEUROLOGY: No asterixis or tremor  SKIN: Intact, no jaundice          LABS:      137  |  104  |  4[L]  ----------------------------<  80  3.8   |  24  |  0.6[L]    Ca    9.1      2024 06:11  Phos  3.6       Mg     1.7         TPro  6.5  /  Alb  4.1  /  TBili  0.3  /  DBili  x   /  AST  16  /  ALT  17  /  AlkPhos  78                            11.3   6.73  )-----------( 245      ( 2024 06:11 )             34.0     LIVER FUNCTIONS - ( 2024 06:11 )  Alb: 4.1 g/dL / Pro: 6.5 g/dL / ALK PHOS: 78 U/L / ALT: 17 U/L / AST: 16 U/L / GGT: x           PT/INR - ( 2024 06:11 )   PT: 13.40 sec;   INR: 1.13 ratio         PTT - ( 2024 06:11 )  PTT:35.9 sec    IMAGING:    < from: CT Abdomen and Pelvis w/ IV Cont (24 @ 21:00) >    ACC: [id number] EXAM:  CT ABDOMEN AND PELVIS IC   ORDERED BY: ALEM LUKE     PROCEDURE DATE:  2024          INTERPRETATION:  CLINICAL INFORMATION: Abdominal pain    COMPARISON: CT ABDOMEN/PELVIS 11/15/2024.    CORRELATION:  MR ABDOMEN 2024.    CONTRAST/COMPLICATIONS:  IV Contrast: Omnipaque 350  95 cc administered   5 cc discarded  Oral Contrast: NONE    PROCEDURE:  CT of the Abdomen and Pelvis was performed.  Sagittal and coronal reformats were performed.    FINDINGS:  LOWER CHEST: Right middle lobe branching opacities    LIVER: Stable hepatic cyst  BILE DUCTS: Normal caliber.  GALLBLADDER: Cholecystectomy.  SPLEEN: Within normal limits.  PANCREAS: Unremarkable CT appearance of the pancreas no perinephric fluid   collection. Homogeneous enhancement  ADRENALS: Within normal limits.  KIDNEYS/URETERS: No renal stones or hydronephrosis.    BLADDER: Within normal limits.  REPRODUCTIVE ORGANS: Left adnexal hypodensity measuring up to 2.1 cm    BOWEL: No bowel obstruction. Appendix is normal.  PERITONEUM/RETROPERITONEUM: Within normal limits.  VESSELS: Within normal limits.  LYMPH NODES: No lymphadenopathy.  ABDOMINAL WALL: Fat-containing ventral hernia  BONES: Within normal limits.    IMPRESSION:  No CT evidence of acute intra-abdominal pathology    --- End of Report ---            SHEY LY MD; Attending Radiologist  This document has been electronically signed. 2024  9:11PM    < end of copied text >    < from: MR MRCP No Cont (24 @ 18:42) >    ACC: [id number] EXAM:  MR MRCP   ORDERED BY: JESUSITA ROWE     PROCEDURE DATE:  2024          INTERPRETATION:  CLINICAL INFORMATION: Biliary dilation on CT    COMPARISON: Correlation made with CT abdomen pelvis November 15, 2024    CONTRAST/COMPLICATIONS:  IV Contrast: None  Oral Contrast: None      PROCEDURE:  Noncontrast MRI of the abdomen was performed.  MRCP protocol was utilized.    FINDINGS:  LOWER CHEST: Within normal limits.    LIVER: Normal hepatic contour. Simple 1.3 cm hepatic cyst near the   gallbladder fossa.  BILE DUCTS: Mild CBD dilation to 0.9 cm with distal small tapering to   normal caliber. Minimal central intrahepatic biliary ductal dilation. No   filling defects seen within the common bile duct  GALLBLADDER: Post cholecystectomy.  SPLEEN: Within normal limits.  PANCREAS: Within normal limits.  ADRENALS: Within normal limits.  KIDNEYS/URETERS: Within normal limits.    VISUALIZED PORTIONS:  BOWEL: Within normal limits.  PERITONEUM: No ascites.  VESSELS: Within normal limits.  RETROPERITONEUM/LYMPH NODES: No lymphadenopathy.  ABDOMINAL WALL: Within normal limits.  BONES: Within normal limits.    IMPRESSION:  Post cholecystectomy with mild proximal CBD and minimal intrahepatic   biliary ductal dilation; the CBDtapers to normal caliber distally. No   evidence for choledocholithiasis.    --- End of Report ---            UDAY CASILLAS MD; Attending Radiologist  This document has been electronically signed. 2024 11:28AM    < end of copied text >     Chief complaint/Reason for consult: r/o pancreatitis     HPI:  50 yo female with PMHx of overactive bladder, HLD, GERD, migraines, kidney stones, hypothyroidism and PSHx of cholecystectomy, with recent admission 11/15 -- for suspected acute pancreatitis now presents with abdominal pain, subjective fevers, N/V and diarrhea the day after she was discharged.  She then developed epigastric pain, radiating to B/L back yesterday, unable to hold down her pills/food. Denies starting new medications since being discharged.     As per prior admission: Per GI:  Symptoms may be multifactorial, possible etiologies include functional, retained sludge/stone, sphincter of oddi dysfunction, gastritis/pud, component of constipation in setting of narcotics. Findings not consistent with acute pancreatitis. Low fat diet as tolerated. MRI wnl CBD dilation likely due to narcotic use. PPI daily. Miralax daily. Monitor LFTs daily.      In ED:  WBC = 12, LFT normal, lipase 177 (previously 171 on 24). Imaging not performed in ED (2024 18:04)    GI Updates: 48yo female h/o migraines, kidney stones, hypothyroidism, cholecystectomy in , prediabetes/obesity on ozempic since  presenting with epigastric pain on and off patient with possible acute pancreatitis. Currently Patient denies nausea, vomiting, hematemesis, melena, blood in stool, diarrhea, constipation, abdominal pain.        PAST MEDICAL & SURGICAL HISTORY:   GERD (gastroesophageal reflux disease)      Migraine headache      Back pain      Anxiety      Kidney stones      Murmur  since age 20           Thyroid nodule      Hypothyroidism      Overactive bladder      History of surgery  hernia repair    x2      History of cholecystectomy      History of lithotripsy            Family history:  FAMILY HISTORY:  DM (diabetes mellitus) (Father)    Family history of heart disease (Father)      No GI cancers in first or second degree relatives    Social History: No smoking. No alcohol. No illegal drug use.    Allergies:   Bananas (Unknown)  Cipro (Rash)  citrus (Unknown)  NSAIDs (Angioedema; Anaphylaxis; Hives)  eggs (Unknown)  Intolerances            MEDICATIONS: Home Medications:  citalopram 40 mg oral tablet: 1 tab(s) orally once a day (in the morning) (2024 19:01)  levothyroxine 75 mcg (0.075 mg) oral tablet: 1 tab(s) orally once a day (2024 19:06)  morphine 15 mg oral tablet: 1 tab(s) orally 2 times a day as needed for  pain (2024 19:01)  oxybutynin 5 mg oral tablet: 2 tab(s) orally once a day (:01)  pantoprazole 40 mg oral delayed release tablet: 1 tab(s) orally once a day (2024 19:01)  Percocet 10/325 oral tablet: 1 tab(s) orally 3 times a day as needed for  pain (:)  rizatriptan 10 mg oral tablet, disintegratin tab(s) orally once a day as needed for  migraine (2024 19:01)  rosuvastatin 10 mg oral tablet: 1 tab(s) orally once a day (:)      MEDICATIONS  (STANDING):  acetaminophen     Tablet .. 1000 milliGRAM(s) Oral every 8 hours  citalopram 40 milliGRAM(s) Oral daily  lactated ringers. 1000 milliLiter(s) (150 mL/Hr) IV Continuous <Continuous>  levothyroxine 75 MICROGram(s) Oral daily  morphine ER Tablet 15 milliGRAM(s) Oral two times a day  oxybutynin 5 milliGRAM(s) Oral every 12 hours  pantoprazole  Injectable 40 milliGRAM(s) IV Push daily  rosuvastatin 10 milliGRAM(s) Oral at bedtime    MEDICATIONS  (PRN):  ALPRAZolam 0.5 milliGRAM(s) Oral two times a day PRN for anxiety  HYDROmorphone  Injectable 0.5 milliGRAM(s) IV Push every 4 hours PRN Severe Pain (7 - 10)  ondansetron Injectable 4 milliGRAM(s) IV Push every 6 hours PRN Nausea and/or Vomiting  oxyCODONE    IR 5 milliGRAM(s) Oral every 4 hours PRN Moderate Pain (4 - 6)  oxyCODONE    IR 10 milliGRAM(s) Oral every 4 hours PRN Severe Pain (7 - 10)        REVIEW OF SYSTEMS  General:  + weight loss, no fevers, or chills.  Eyes:  No reported pain or visual changes  ENT:  No sore throat or runny nose.  NECK: No stiffness   CV:  No chest pain or palpitations.  Resp:  No shortness of breath, cough, wheezing or hemoptysis  GI:  No abdominal pain, nausea, vomiting, dysphagia, diarrhea or constipation. No rectal bleeding, melena, or hematemesis.  Neuro:  No tingling, numbness       VITALS:   T(F): 97.4 (24 @ 04:44), Max: 97.6 (24 @ 21:06)  HR: 77 (24 @ 04:44) (69 - 85)  BP: 122/86 (24 @ 04:44) (96/67 - 151/107)  RR: 18 (24 @ 04:44) (18 - 18)  SpO2: 98% (24 @ 04:44) (97% - 98%)    PHYSICAL EXAM:  GENERAL: AAOx3, no acute distress.  HEAD:  Atraumatic, Normocephalic  EYES: conjunctiva and sclera clear  NECK: Supple, No thyromegaly   CHEST/LUNG: Clear to auscultation bilaterally; No wheeze, rhonchi, or rales  HEART: Regular rate and rhythm; normal S1, S2, No murmurs.  ABDOMEN: Soft, nontender, nondistended; Bowel sounds present  NEUROLOGY: No asterixis or tremor  SKIN: Intact, no jaundice          LABS:      137  |  104  |  4[L]  ----------------------------<  80  3.8   |  24  |  0.6[L]    Ca    9.1      2024 06:11  Phos  3.6       Mg     1.7         TPro  6.5  /  Alb  4.1  /  TBili  0.3  /  DBili  x   /  AST  16  /  ALT  17  /  AlkPhos  78                            11.3   6.73  )-----------( 245      ( 2024 06:11 )             34.0     LIVER FUNCTIONS - ( 2024 06:11 )  Alb: 4.1 g/dL / Pro: 6.5 g/dL / ALK PHOS: 78 U/L / ALT: 17 U/L / AST: 16 U/L / GGT: x           PT/INR - ( 2024 06:11 )   PT: 13.40 sec;   INR: 1.13 ratio         PTT - ( 2024 06:11 )  PTT:35.9 sec    IMAGING:    < from: CT Abdomen and Pelvis w/ IV Cont (24 @ 21:00) >    ACC: [id number] EXAM:  CT ABDOMEN AND PELVIS IC   ORDERED BY: ALEM LUEK     PROCEDURE DATE:  2024          INTERPRETATION:  CLINICAL INFORMATION: Abdominal pain    COMPARISON: CT ABDOMEN/PELVIS 11/15/2024.    CORRELATION:  MR ABDOMEN 2024.    CONTRAST/COMPLICATIONS:  IV Contrast: Omnipaque 350  95 cc administered   5 cc discarded  Oral Contrast: NONE    PROCEDURE:  CT of the Abdomen and Pelvis was performed.  Sagittal and coronal reformats were performed.    FINDINGS:  LOWER CHEST: Right middle lobe branching opacities    LIVER: Stable hepatic cyst  BILE DUCTS: Normal caliber.  GALLBLADDER: Cholecystectomy.  SPLEEN: Within normal limits.  PANCREAS: Unremarkable CT appearance of the pancreas no perinephric fluid   collection. Homogeneous enhancement  ADRENALS: Within normal limits.  KIDNEYS/URETERS: No renal stones or hydronephrosis.    BLADDER: Within normal limits.  REPRODUCTIVE ORGANS: Left adnexal hypodensity measuring up to 2.1 cm    BOWEL: No bowel obstruction. Appendix is normal.  PERITONEUM/RETROPERITONEUM: Within normal limits.  VESSELS: Within normal limits.  LYMPH NODES: No lymphadenopathy.  ABDOMINAL WALL: Fat-containing ventral hernia  BONES: Within normal limits.    IMPRESSION:  No CT evidence of acute intra-abdominal pathology    --- End of Report ---            SHEY LY MD; Attending Radiologist  This document has been electronically signed. 2024  9:11PM    < end of copied text >    < from: MR MRCP No Cont (24 @ 18:42) >    ACC: [id number] EXAM:  MR MRCP   ORDERED BY: JESUSITA ROWE     PROCEDURE DATE:  2024          INTERPRETATION:  CLINICAL INFORMATION: Biliary dilation on CT    COMPARISON: Correlation made with CT abdomen pelvis November 15, 2024    CONTRAST/COMPLICATIONS:  IV Contrast: None  Oral Contrast: None      PROCEDURE:  Noncontrast MRI of the abdomen was performed.  MRCP protocol was utilized.    FINDINGS:  LOWER CHEST: Within normal limits.    LIVER: Normal hepatic contour. Simple 1.3 cm hepatic cyst near the   gallbladder fossa.  BILE DUCTS: Mild CBD dilation to 0.9 cm with distal small tapering to   normal caliber. Minimal central intrahepatic biliary ductal dilation. No   filling defects seen within the common bile duct  GALLBLADDER: Post cholecystectomy.  SPLEEN: Within normal limits.  PANCREAS: Within normal limits.  ADRENALS: Within normal limits.  KIDNEYS/URETERS: Within normal limits.    VISUALIZED PORTIONS:  BOWEL: Within normal limits.  PERITONEUM: No ascites.  VESSELS: Within normal limits.  RETROPERITONEUM/LYMPH NODES: No lymphadenopathy.  ABDOMINAL WALL: Within normal limits.  BONES: Within normal limits.    IMPRESSION:  Post cholecystectomy with mild proximal CBD and minimal intrahepatic   biliary ductal dilation; the CBDtapers to normal caliber distally. No   evidence for choledocholithiasis.    --- End of Report ---            UDAY CASILLAS MD; Attending Radiologist  This document has been electronically signed. 2024 11:28AM    < end of copied text >

## 2024-11-25 NOTE — PROGRESS NOTE ADULT - SUBJECTIVE AND OBJECTIVE BOX
RAMON WOODS  [facility] 4I 023 B ([facility]-S 4I)            Patient was evaluated and examined  by bedside.                REVIEW OF SYSTEMS:  please see pertinent positives mentioned above, all other 12 ROS negative      T(C): , Max: 36.4 (11-24-24 @ 21:06)  HR: 77 (11-25-24 @ 04:44)  BP: 122/86 (11-25-24 @ 04:44)  RR: 18 (11-25-24 @ 04:44)  SpO2: 98% (11-25-24 @ 04:44)  CAPILLARY BLOOD GLUCOSE          PHYSICAL EXAM:  General: Appears more comfortable today  HEENT: NCAT  Lungs: No increased WOB  CVS: RRR  Abdomen: Epigastric pain on palpation  Extremities: no edema    LAB  CBC  Date: 11-25-24 @ 06:11  Mean cell Domdhscmdn15.0  Mean cell Hemoglobin Conc33.2  Mean cell Volum 87.2  Platelet count-Automate 245  RBC Count 3.90  Red Cell Distrib Width14.0  WBC Count6.73  % Albumin, Urine--  Hematocrit 34.0  Hemoglobin 11.3  CBC  Date: 11-24-24 @ 07:11  Mean cell Nelhrokdfb57.6  Mean cell Hemoglobin Conc32.0  Mean cell Volum 89.2  Platelet count-Automate 233  RBC Count 4.06  Red Cell Distrib Width14.3  WBC Count8.50  % Albumin, Urine--  Hematocrit 36.2  Hemoglobin 11.6  CBC  Date: 11-23-24 @ 15:27  Mean cell Nyhooqzyaz70.0  Mean cell Hemoglobin Conc32.7  Mean cell Volum 88.5  Platelet count-Automate 375  RBC Count 5.14  Red Cell Distrib Width14.2  WBC Count12.02  % Albumin, Urine--  Hematocrit 45.5  Hemoglobin 14.9    [facility]  11-25-24 @ 06:11  Blood Gas Arterial-Calcium,Ionized--  Blood Urea Nitrogen, Serum 4 mg/dL[L] [10 - 20]  Carbon Dioxide, Serum24 mmol/L [17 - 32]  Chloride, Cwwjy887 mmol/L [98 - 110]  Creatinie, Serum0.6 mg/dL[L] [0.7 - 1.5]  Glucose, Serum80 mg/dL [70 - 99]  Potassium, Serum3.8 mmol/L [3.5 - 5.0]  Sodium, Serum 137 mmol/L [135 - 146]  [facility]  11-24-24 @ 07:11  Blood Gas Arterial-Calcium,Ionized--  Blood Urea Nitrogen, Serum 7 mg/dL[L] [10 - 20]  Carbon Dioxide, Serum23 mmol/L [17 - 32]  Chloride, Rcwja497 mmol/L [98 - 110]  Creatinie, Serum0.6 mg/dL[L] [0.7 - 1.5]  Glucose, Serum73 mg/dL [70 - 99]  Potassium, Serum4.1 mmol/L [3.5 - 5.0]  Sodium, Serum 139 mmol/L [135 - 146]  BMP  11-23-24 @ 15:27  Blood Gas Arterial-Calcium,Ionized--  Blood Urea Nitrogen, Serum 11 mg/dL [10 - 20]  Carbon Dioxide, Serum20 mmol/L [17 - 32]  Chloride, Kbyii986 mmol/L [98 - 110]  Creatinie, Serum0.8 mg/dL [0.7 - 1.5]  Glucose, Serum95 mg/dL [70 - 99]  Potassium, Serum4.6 mmol/L [3.5 - 5.0]  Sodium, Serum 137 mmol/L [135 - 146]        PT/INR - ( 25 Nov 2024 06:11 )   PT: 13.40 sec;   INR: 1.13 ratio         PTT - ( 25 Nov 2024 06:11 )  PTT:35.9 sec      Microbiology:    Culture - Blood (collected 11-23-24 @ 21:26)  Source: .Blood BLOOD  Preliminary Report (11-25-24 @ 04:01):    No growth at 24 hours    Culture - Blood (collected 11-23-24 @ 21:26)  Source: .Blood BLOOD  Preliminary Report (11-25-24 @ 04:01):    No growth at 24 hours    Culture - Urine (collected 11-23-24 @ 20:55)  Source: Clean Catch Clean Catch (Midstream)  Final Report (11-24-24 @ 23:29):    <10,000 CFU/mL Normal Urogenital Ana    Culture - Urine (collected 11-15-24 @ 21:44)  Source: Clean Catch None  Final Report (11-17-24 @ 16:39):    <10,000 CFU/mL Normal Urogenital Ana    Urinalysis with Rflx Culture (collected 11-15-24 @ 21:44)        RADIOLOGY & ADDITIONAL TESTS:        Medications:  acetaminophen     Tablet .. 1000 milliGRAM(s) Oral every 8 hours  ALPRAZolam 0.5 milliGRAM(s) Oral two times a day PRN  citalopram 40 milliGRAM(s) Oral daily  HYDROmorphone  Injectable 0.5 milliGRAM(s) IV Push every 4 hours PRN  lactated ringers. 1000 milliLiter(s) IV Continuous <Continuous>  levothyroxine 75 MICROGram(s) Oral daily  morphine ER Tablet 15 milliGRAM(s) Oral two times a day  ondansetron Injectable 4 milliGRAM(s) IV Push every 6 hours PRN  oxybutynin 5 milliGRAM(s) Oral every 12 hours  oxyCODONE    IR 5 milliGRAM(s) Oral every 4 hours PRN  oxyCODONE    IR 10 milliGRAM(s) Oral every 4 hours PRN  pantoprazole  Injectable 40 milliGRAM(s) IV Push daily  rosuvastatin 10 milliGRAM(s) Oral at bedtime        Assessment and Plan:      50 yo female with PMHx of overactive bladder, HLD, GERD, migraines, kidney stones, hypothyroidism and PSHx of cholecystectomy, with recent admission 11/15 --11/20 for suspected acute pancreatitis now presents with abdominal pain, subjective fevers, with elevated lipase, admitted for presumed acute pancreatitis.      #Recurrent Epigastric pain   #Presumed acute pancreatitis.  Patient with multiple presentations for recurrent epigastric pain, no clear cause identified. Current presentation technically fulfills 2/3 criteria for acute pancreatitis for epigastric pain radiating to the back and elevated lipase, however no CT findings of this. Suspect underlying functional component of pain vs. SOD dysfunction.  -RUQ sono: S/p cholecystectomy. CBD dilated to 8 mm, which may be seen postcholecystectomy, however correlation with LFTs is recommended.  -CTAP: Mild intrahepatic bile duct dilatation which appears new since the previous exam. Otherwise unremarkable exam.   -Previous MRCP on 11/18 demonstrating known bile duct dilatation, otherwise unremarkable  - AMA, ASMA negative per previous testing  -Continue LR @150cc/hr, NPO for now, plan to re-introduce diet within the first 24 hours if feasible.  -Pain control with home meds morphine 15mg ER BID, oxycodone 5/10 mg for moderate/severe pain, APAP 1gr TID scheduled for baseline pain control, dilaudid 0.5mg IV q6hr PRN for breakthrough pain  -Pain management consult  -GI consult    #Left adnexal hypodensity measuring up to 2.1 cm  -Send CA-125 in AM  -Recommend outpatient US surveillance and OB/Gyn consultation    #Overactive bladder  -C/w oxybutynin    #HLD  -C/w rosuvastatin     #Hypothyroidism  -C/w synthroid     #Anxiety   -C/w Xanax prn  -iStop Reference #[id number]    #Chronic neck/back pain  -Pt takes Oxycodone/Acetaminophen 10/325 TID prn and Morphine ER 15 BID prn - confirmed on iStop     #H/o migraines  -Holding home meds while inpatient     Misc:  DVT ppx: N/A  GI ppx: Advance to soft and bite sized today  Diet: Regular  Activity: IAT  Code: Full Code.

## 2024-11-25 NOTE — CONSULT NOTE ADULT - ASSESSMENT
48yo female h/o migraines, kidney stones, hypothyroidism, cholecystectomy in 2014, prediabetes/obesity on ozempic since June presenting with epigastric pain on and off    #Recurrent epigastric pain  #Mild intrahepatic biliary dilation (new)  #Mild transaminitis improved   s/p cholecystectomy 2014  s/p EGD 1/2023, EGD/EUS in 5/2023 by Dr. German revealing small hiatal hernia, non erosive bile reflux gastritis (biopsy), EUS: CBD measuring 7 mm with no evidence of stones or sludge, there is a reactive appearing 14 x 11 mm periportal lymph node. Examination of the pancreas revealed a hyperechoic pancreatic parenchyma with no other parenchymal abnormalities and normal PD throughout the pancreas. Otherwise unremarkable EUS exam.  Symptoms may be multifactorial, possible etiologies include functional, retained sludge/stone, sphincter of oddi dysfunction, gastritis/pud, component of constipation in setting of narcotics  Findings not consistent with acute pancreatitis  -low fat diet as tolerated  -MRI wnl CBD dilation likely due to narcotic use  -PPI daily  -Miralax daily  -Monitor LFTs daily  - Follow up with our GI office located on [address] [place] [address], Phone number [phone] with Dr. German   50yo female h/o migraines, kidney stones, hypothyroidism, cholecystectomy in 2014, prediabetes/obesity on ozempic since June presenting with epigastric pain on and off. patient with possible acute pancreatitis.     #Recurrent epigastric pain #intermittent constipation  2/3 elevated lipase and clinical picture treated as pancreatitis possible source ozempic  s/p cholecystectomy 2014  s/p EGD 1/2023, EGD/EUS in 5/2023 by Dr. German revealing small hiatal hernia, non erosive bile reflux gastritis (biopsy), EUS: CBD measuring 7 mm with no evidence of stones or sludge, there is a reactive appearing 14 x 11 mm periportal lymph node. Examination of the pancreas revealed a hyperechoic pancreatic parenchyma with no other parenchymal abnormalities and normal PD throughout the pancreas. Otherwise unremarkable EUS exam.  Symptoms may be multifactorial, possible etiologies include functional, retained sludge/stone, sphincter of oddi dysfunction, gastritis/pud, component of constipation in setting of narcotics  Rec  -low fat diet as tolerated  -MRI wnl CBD dilation likely due to narcotic use  -PPI daily  -Miralax daily  -Monitor LFTs daily  - Follow up with our GI office located on [address] [place] [id number], Phone number [phone] with Dr. German    Recall GI if needed  48yo female h/o migraines, kidney stones, hypothyroidism, cholecystectomy in 2014, prediabetes/obesity on ozempic since June presenting with epigastric pain on and off. patient with possible acute pancreatitis.     #Recurrent epigastric pain #intermittent constipation  2/3 elevated lipase and clinical picture treated as pancreatitis possible source ozempic  s/p cholecystectomy 2014  s/p EGD 1/2023, EGD/EUS in 5/2023 by Dr. German revealing small hiatal hernia, non erosive bile reflux gastritis (biopsy), EUS: CBD measuring 7 mm with no evidence of stones or sludge, there is a reactive appearing 14 x 11 mm periportal lymph node. Examination of the pancreas revealed a hyperechoic pancreatic parenchyma with no other parenchymal abnormalities and normal PD throughout the pancreas. Otherwise unremarkable EUS exam.  Symptoms may be multifactorial, possible etiologies include functional, retained sludge/stone, sphincter of oddi dysfunction, gastritis/pud, component of constipation in setting of narcotics  Rec  -low fat diet as tolerated  -MRI wnl CBD dilation likely due to narcotic use  -PPI daily  -Miralax daily  -Monitor LFTs daily  - Follow up with our GI office located on [address] [place] [address], Phone number [phone] with Dr. German    #CRC screening   Rec  - Follow up with our GI office located on [address] [place] [address], Phone number [phone] with Dr. German

## 2024-11-26 ENCOUNTER — TRANSCRIPTION ENCOUNTER (OUTPATIENT)
Age: 49
End: 2024-11-26

## 2024-11-26 VITALS
SYSTOLIC BLOOD PRESSURE: 108 MMHG | TEMPERATURE: 98 F | DIASTOLIC BLOOD PRESSURE: 74 MMHG | RESPIRATION RATE: 18 BRPM | OXYGEN SATURATION: 99 % | HEART RATE: 84 BPM

## 2024-11-26 DIAGNOSIS — G89.29 OTHER CHRONIC PAIN: ICD-10-CM

## 2024-11-26 DIAGNOSIS — Z79.890 HORMONE REPLACEMENT THERAPY: ICD-10-CM

## 2024-11-26 DIAGNOSIS — T40.605A ADVERSE EFFECT OF UNSPECIFIED NARCOTICS, INITIAL ENCOUNTER: ICD-10-CM

## 2024-11-26 DIAGNOSIS — Z79.891 LONG TERM (CURRENT) USE OF OPIATE ANALGESIC: ICD-10-CM

## 2024-11-26 DIAGNOSIS — Z91.012 ALLERGY TO EGGS: ICD-10-CM

## 2024-11-26 DIAGNOSIS — K21.9 GASTRO-ESOPHAGEAL REFLUX DISEASE WITHOUT ESOPHAGITIS: ICD-10-CM

## 2024-11-26 DIAGNOSIS — R73.03 PREDIABETES: ICD-10-CM

## 2024-11-26 DIAGNOSIS — Z79.899 OTHER LONG TERM (CURRENT) DRUG THERAPY: ICD-10-CM

## 2024-11-26 DIAGNOSIS — K83.8 OTHER SPECIFIED DISEASES OF BILIARY TRACT: ICD-10-CM

## 2024-11-26 DIAGNOSIS — G43.909 MIGRAINE, UNSPECIFIED, NOT INTRACTABLE, WITHOUT STATUS MIGRAINOSUS: ICD-10-CM

## 2024-11-26 DIAGNOSIS — Z79.85 LONG-TERM (CURRENT) USE OF INJECTABLE NON-INSULIN ANTIDIABETIC DRUGS: ICD-10-CM

## 2024-11-26 DIAGNOSIS — E03.9 HYPOTHYROIDISM, UNSPECIFIED: ICD-10-CM

## 2024-11-26 DIAGNOSIS — R74.01 ELEVATION OF LEVELS OF LIVER TRANSAMINASE LEVELS: ICD-10-CM

## 2024-11-26 DIAGNOSIS — M54.9 DORSALGIA, UNSPECIFIED: ICD-10-CM

## 2024-11-26 DIAGNOSIS — Z88.6 ALLERGY STATUS TO ANALGESIC AGENT: ICD-10-CM

## 2024-11-26 DIAGNOSIS — E78.5 HYPERLIPIDEMIA, UNSPECIFIED: ICD-10-CM

## 2024-11-26 DIAGNOSIS — N32.81 OVERACTIVE BLADDER: ICD-10-CM

## 2024-11-26 DIAGNOSIS — E04.1 NONTOXIC SINGLE THYROID NODULE: ICD-10-CM

## 2024-11-26 DIAGNOSIS — E66.9 OBESITY, UNSPECIFIED: ICD-10-CM

## 2024-11-26 DIAGNOSIS — Z91.018 ALLERGY TO OTHER FOODS: ICD-10-CM

## 2024-11-26 DIAGNOSIS — Z88.1 ALLERGY STATUS TO OTHER ANTIBIOTIC AGENTS: ICD-10-CM

## 2024-11-26 DIAGNOSIS — R01.1 CARDIAC MURMUR, UNSPECIFIED: ICD-10-CM

## 2024-11-26 DIAGNOSIS — Z87.442 PERSONAL HISTORY OF URINARY CALCULI: ICD-10-CM

## 2024-11-26 PROCEDURE — 36000 PLACE NEEDLE IN VEIN: CPT

## 2024-11-26 PROCEDURE — 76937 US GUIDE VASCULAR ACCESS: CPT | Mod: 26,59

## 2024-11-26 PROCEDURE — 99239 HOSP IP/OBS DSCHRG MGMT >30: CPT

## 2024-11-26 RX ORDER — OXYCODONE HYDROCHLORIDE 30 MG/1
1 TABLET ORAL
Qty: 21 | Refills: 0
Start: 2024-11-26 | End: 2024-12-02

## 2024-11-26 RX ORDER — NALOXONE HCL 0.4 MG/ML
1 AMPUL (ML) INJECTION
Qty: 1 | Refills: 0
Start: 2024-11-26

## 2024-11-26 RX ORDER — ACETAMINOPHEN 500MG 500 MG/1
2 TABLET, COATED ORAL
Qty: 90 | Refills: 0
Start: 2024-11-26

## 2024-11-26 RX ADMIN — HYDROMORPHONE HYDROCHLORIDE 0.5 MILLIGRAM(S): 2 TABLET ORAL at 01:13

## 2024-11-26 RX ADMIN — Medication 75 MICROGRAM(S): at 06:19

## 2024-11-26 RX ADMIN — PANTOPRAZOLE SODIUM 40 MILLIGRAM(S): 40 TABLET, DELAYED RELEASE ORAL at 12:15

## 2024-11-26 RX ADMIN — OXYCODONE HYDROCHLORIDE 10 MILLIGRAM(S): 30 TABLET ORAL at 03:39

## 2024-11-26 RX ADMIN — OXYCODONE HYDROCHLORIDE 10 MILLIGRAM(S): 30 TABLET ORAL at 12:15

## 2024-11-26 RX ADMIN — ACETAMINOPHEN 500MG 1000 MILLIGRAM(S): 500 TABLET, COATED ORAL at 06:19

## 2024-11-26 RX ADMIN — Medication 150 MILLILITER(S): at 04:59

## 2024-11-26 RX ADMIN — HYDROMORPHONE HYDROCHLORIDE 0.5 MILLIGRAM(S): 2 TABLET ORAL at 05:30

## 2024-11-26 RX ADMIN — HYDROMORPHONE HYDROCHLORIDE 0.5 MILLIGRAM(S): 2 TABLET ORAL at 10:50

## 2024-11-26 RX ADMIN — Medication 15 MILLIGRAM(S): at 06:18

## 2024-11-26 RX ADMIN — Medication 40 MILLIGRAM(S): at 12:15

## 2024-11-26 RX ADMIN — Medication 15 MILLIGRAM(S): at 07:12

## 2024-11-26 RX ADMIN — HYDROMORPHONE HYDROCHLORIDE 0.5 MILLIGRAM(S): 2 TABLET ORAL at 00:55

## 2024-11-26 RX ADMIN — OXYCODONE HYDROCHLORIDE 10 MILLIGRAM(S): 30 TABLET ORAL at 13:00

## 2024-11-26 RX ADMIN — OXYCODONE HYDROCHLORIDE 10 MILLIGRAM(S): 30 TABLET ORAL at 08:45

## 2024-11-26 RX ADMIN — HYDROMORPHONE HYDROCHLORIDE 0.5 MILLIGRAM(S): 2 TABLET ORAL at 09:29

## 2024-11-26 RX ADMIN — OXYCODONE HYDROCHLORIDE 10 MILLIGRAM(S): 30 TABLET ORAL at 02:52

## 2024-11-26 RX ADMIN — Medication 5 MILLIGRAM(S): at 06:19

## 2024-11-26 RX ADMIN — ACETAMINOPHEN 500MG 1000 MILLIGRAM(S): 500 TABLET, COATED ORAL at 07:12

## 2024-11-26 RX ADMIN — OXYCODONE HYDROCHLORIDE 10 MILLIGRAM(S): 30 TABLET ORAL at 07:48

## 2024-11-26 RX ADMIN — HYDROMORPHONE HYDROCHLORIDE 0.5 MILLIGRAM(S): 2 TABLET ORAL at 04:59

## 2024-11-26 NOTE — DISCHARGE NOTE NURSING/CASE MANAGEMENT/SOCIAL WORK - PATIENT PORTAL LINK FT
You can access the FollowMyHealth Patient Portal offered by [facility] by registering at the following website: [email or web address]. By joining [facility]’s FollowMyHealth portal, you will also be able to view your health information using other applications (apps) compatible with our system.

## 2024-11-26 NOTE — DISCHARGE NOTE NURSING/CASE MANAGEMENT/SOCIAL WORK - NSDCPEFALRISK_GEN_ALL_CORE
For information on Fall & Injury Prevention, visit: [email or web address] OR  [email or web address] OR  https://www.cdc.gov/steadi/patient.html

## 2024-11-26 NOTE — DISCHARGE NOTE NURSING/CASE MANAGEMENT/SOCIAL WORK - FINANCIAL ASSISTANCE
[facility] provides services at a reduced cost to those who are determined to be eligible through [facility]’s financial assistance program. Information regarding [facility]’s financial assistance program can be found by going to [email or web address] or by calling [phone].

## 2024-11-26 NOTE — DISCHARGE NOTE PROVIDER - CARE PROVIDER_API CALL
Kong Wood  Physical/Rehab Medicine  [address] [place] [id number]  Phone: [phone]  Fax: [phone]  Follow Up Time: 1 week    Domo German  Gastroenterology  [address] [place] [id number]  Phone: [phone]  Fax: [phone]  Follow Up Time: 1 week

## 2024-11-26 NOTE — DISCHARGE NOTE PROVIDER - HOSPITAL COURSE
50 yo female with PMHx of overactive bladder, HLD, GERD, migraines, kidney stones, hypothyroidism and PSHx of cholecystectomy, with recent admission 11/15 --11/20 for suspected acute pancreatitis now presents with abdominal pain, subjective fevers, with elevated lipase, admitted for presumed acute pancreatitis.      #Recurrent Epigastric pain   #Presumed acute pancreatitis.  Patient with multiple presentations for recurrent epigastric pain, no clear cause identified. Current presentation technically fulfills 2/3 criteria for acute pancreatitis for epigastric pain radiating to the back and elevated lipase, however no CT findings of this. Suspect underlying functional component of pain vs. SOD dysfunction.  -RUQ sono: S/p cholecystectomy. CBD dilated to 8 mm, which may be seen postcholecystectomy, however correlation with LFTs is recommended.  -CTAP: Mild intrahepatic bile duct dilatation which appears new since the previous exam. Otherwise unremarkable exam.   -Previous MRCP on 11/18 demonstrating known bile duct dilatation, otherwise unremarkable  - AMA, ASMA negative per previous testing  -Able to tolerate diet advancement. Provided pain med refills as below, patient to follow with pain mgt.  -Pain control with home meds morphine 15mg ER BID, oxycodone 10 mg for severe pain, APAP 1gr TID  -GI consult-->follow with GI as an outpatient    #Left adnexal hypodensity measuring up to 2.1 cm  -Send CA-125-->WNL, low concern for cancerous at this point.  -Recommend outpatient US surveillance and OB/Gyn consultation. PCP to follow and order as needed.    #Overactive bladder  -C/w oxybutynin    #HLD  -C/w rosuvastatin     #Hypothyroidism  -C/w synthroid     #Anxiety   -C/w Xanax prn, already has refills  -iStop Reference #[id number]    #Chronic neck/back pain  -Pt takes Oxycodone/Acetaminophen 10/325 TID prn and Morphine ER 15 BID prn - confirmed on iStop. Discussed with patient and she would prefer oxycodone given separately from Tylenol, I also agree that this would reduce chances of Tylenol OD and also lead to better pain control as 325mg of APAP in Percocet are not overall effective for pain. Provided a week's supply of oxycodone 10mg TID until she sees pain management. Also Provided a week's supply of Morphine ER after confirming that last refill was 1 month ago.    #H/o migraines  -Resume home meds.

## 2024-11-26 NOTE — DISCHARGE NOTE PROVIDER - PROVIDER TOKENS
PROVIDER:[TOKEN:[[id number]],FOLLOWUP:[1 week]],PROVIDER:[TOKEN:[[id number]],FOLLOWUP:[1 week]]

## 2024-11-26 NOTE — DISCHARGE NOTE PROVIDER - NSDCMRMEDTOKEN_GEN_ALL_CORE_FT
citalopram 40 mg oral tablet: 1 tab(s) orally once a day (in the morning)  levothyroxine 75 mcg (0.075 mg) oral tablet: 1 tab(s) orally once a day  morphine 15 mg/8 to 12 hr oral tablet, extended release: 1 tab(s) orally 2 times a day MDD: 2 tablets  Narcan 4 mg/0.1 mL nasal spray: 1 spray(s) intranasally once a day Use as needed for opioid overdose.  oxybutynin 5 mg oral tablet: 2 tab(s) orally once a day  oxyCODONE 10 mg oral tablet: 1 tab(s) orally every 8 hours as needed for Severe Pain (7 - 10) MDD: 3 tablets  pantoprazole 40 mg oral delayed release tablet: 1 tab(s) orally once a day  rizatriptan 10 mg oral tablet, disintegratin tab(s) orally once a day as needed for  migraine  rosuvastatin 10 mg oral tablet: 1 tab(s) orally once a day  Tylenol Extra Strength 500 mg oral tablet: 2 tab(s) orally every 8 hours as needed for  mild pain Can combine with oxycodone. Do not use with percocet.  Xanax 0.5 mg oral tablet: 1 tab(s) orally 2 times a day as needed for  anxiety More prescriptions to be provided by outpatient provider MDD: 2 tabs

## 2024-11-26 NOTE — DISCHARGE NOTE PROVIDER - NSDCFUSCHEDAPPT_GEN_ALL_CORE_FT
Domo German  [facility]  GASTRO Doc Off [id number]  Scheduled Appointment: 01/17/2025

## 2024-11-26 NOTE — DISCHARGE NOTE PROVIDER - NPI NUMBER (FOR SYSADMIN USE ONLY) :
Gait is slightly wide-based, with shuffling steps. Stable compared to 12/10/18. Family feels there has been a slight improvement in balance. Continue PT/OT, strength and balance exercises. Reviewed fall precautions.   [[id number]],[[id number]]

## 2024-11-26 NOTE — PROGRESS NOTE ADULT - SUBJECTIVE AND OBJECTIVE BOX
RAMON WOODS  [facility] 4I 023 B ([facility]-S 4I)      Patient was evaluated and examined  by bedside.      REVIEW OF SYSTEMS:  please see pertinent positives mentioned above, all other 12 ROS negative      T(C): , Max: 36.8 (11-25-24 @ 20:31)  HR: 84 (11-26-24 @ 05:17)  BP: 108/74 (11-26-24 @ 05:17)  RR: 18 (11-26-24 @ 05:17)  SpO2: 99% (11-26-24 @ 05:17)  CAPILLARY BLOOD GLUCOSE          PHYSICAL EXAM:  General: NAD, comfortable in bed  HEENT: NCAT  Lungs: No increased WOB  CVS: RRR  Abdomen: , non-distended  Extremities: no edema      LAB  CBC  Date: 11-25-24 @ 06:11  Mean cell Qfjiskeqko76.0  Mean cell Hemoglobin Conc33.2  Mean cell Volum 87.2  Platelet count-Automate 245  RBC Count 3.90  Red Cell Distrib Width14.0  WBC Count6.73  % Albumin, Urine--  Hematocrit 34.0  Hemoglobin 11.3  CBC  Date: 11-24-24 @ 07:11  Mean cell Fyikxccyph65.6  Mean cell Hemoglobin Conc32.0  Mean cell Volum 89.2  Platelet count-Automate 233  RBC Count 4.06  Red Cell Distrib Width14.3  WBC Count8.50  % Albumin, Urine--  Hematocrit 36.2  Hemoglobin 11.6  CBC  Date: 11-23-24 @ 15:27  Mean cell Qkajbinbty04.0  Mean cell Hemoglobin Conc32.7  Mean cell Volum 88.5  Platelet count-Automate 375  RBC Count 5.14  Red Cell Distrib Width14.2  WBC Count12.02  % Albumin, Urine--  Hematocrit 45.5  Hemoglobin 14.9    [facility]  11-25-24 @ 06:11  Blood Gas Arterial-Calcium,Ionized--  Blood Urea Nitrogen, Serum 4 mg/dL[L] [10 - 20]  Carbon Dioxide, Serum24 mmol/L [17 - 32]  Chloride, Wturn218 mmol/L [98 - 110]  Creatinie, Serum0.6 mg/dL[L] [0.7 - 1.5]  Glucose, Serum80 mg/dL [70 - 99]  Potassium, Serum3.8 mmol/L [3.5 - 5.0]  Sodium, Serum 137 mmol/L [135 - 146]  BMP  11-24-24 @ 07:11  Blood Gas Arterial-Calcium,Ionized--  Blood Urea Nitrogen, Serum 7 mg/dL[L] [10 - 20]  Carbon Dioxide, Serum23 mmol/L [17 - 32]  Chloride, Rjcnw858 mmol/L [98 - 110]  Creatinie, Serum0.6 mg/dL[L] [0.7 - 1.5]  Glucose, Serum73 mg/dL [70 - 99]  Potassium, Serum4.1 mmol/L [3.5 - 5.0]  Sodium, Serum 139 mmol/L [135 - 146]  BMP  11-23-24 @ 15:27  Blood Gas Arterial-Calcium,Ionized--  Blood Urea Nitrogen, Serum 11 mg/dL [10 - 20]  Carbon Dioxide, Serum20 mmol/L [17 - 32]  Chloride, Ielru549 mmol/L [98 - 110]  Creatinie, Serum0.8 mg/dL [0.7 - 1.5]  Glucose, Serum95 mg/dL [70 - 99]  Potassium, Serum4.6 mmol/L [3.5 - 5.0]  Sodium, Serum 137 mmol/L [135 - 146]        PT/INR - ( 25 Nov 2024 06:11 )   PT: 13.40 sec;   INR: 1.13 ratio         PTT - ( 25 Nov 2024 06:11 )  PTT:35.9 sec      Microbiology:    Culture - Blood (collected 11-23-24 @ 21:26)  Source: .Blood BLOOD  Preliminary Report (11-26-24 @ 04:01):    No growth at 48 Hours    Culture - Blood (collected 11-23-24 @ 21:26)  Source: .Blood BLOOD  Preliminary Report (11-26-24 @ 04:01):    No growth at 48 Hours    Culture - Urine (collected 11-23-24 @ 20:55)  Source: Clean Catch Clean Catch (Midstream)  Final Report (11-24-24 @ 23:29):    <10,000 CFU/mL Normal Urogenital Ana    Culture - Urine (collected 11-15-24 @ 21:44)  Source: Clean Catch None  Final Report (11-17-24 @ 16:39):    <10,000 CFU/mL Normal Urogenital Ana    Urinalysis with Rflx Culture (collected 11-15-24 @ 21:44)        RADIOLOGY & ADDITIONAL TESTS:        Medications:  acetaminophen     Tablet .. 1000 milliGRAM(s) Oral every 8 hours  ALPRAZolam 0.5 milliGRAM(s) Oral two times a day PRN  citalopram 40 milliGRAM(s) Oral daily  HYDROmorphone  Injectable 0.5 milliGRAM(s) IV Push every 4 hours PRN  lactated ringers. 1000 milliLiter(s) IV Continuous <Continuous>  levothyroxine 75 MICROGram(s) Oral daily  morphine ER Tablet 15 milliGRAM(s) Oral two times a day  ondansetron Injectable 4 milliGRAM(s) IV Push every 6 hours PRN  oxybutynin 5 milliGRAM(s) Oral every 12 hours  oxyCODONE    IR 5 milliGRAM(s) Oral every 4 hours PRN  oxyCODONE    IR 10 milliGRAM(s) Oral every 4 hours PRN  pantoprazole  Injectable 40 milliGRAM(s) IV Push daily  rosuvastatin 10 milliGRAM(s) Oral at bedtime        Assessment and Plan:      #Recurrent Epigastric pain   #Presumed acute pancreatitis vs. SOD dysfyction.  Patient with multiple presentations for recurrent epigastric pain, no clear cause identified. Current presentation technically fulfills 2/3 criteria for acute pancreatitis for epigastric pain radiating to the back and elevated lipase, however no CT findings of this. Suspect underlying functional component of pain vs. SOD dysfunction.  -RUQ sono: S/p cholecystectomy. CBD dilated to 8 mm, which may be seen postcholecystectomy, however correlation with LFTs is recommended.  -CTAP: Mild intrahepatic bile duct dilatation which appears new since the previous exam. Otherwise unremarkable exam.   -Previous MRCP on 11/18 demonstrating known bile duct dilatation, otherwise unremarkable  - AMA, ASMA negative per previous testing  -Advanced diet without issues  -Pain control with home meds morphine 15mg ER BID, oxycodone 5/10 mg for moderate/severe pain, APAP 1gr TID scheduled for baseline pain control, dilaudid 0.5mg IV q6hr PRN for breakthrough pain  -GI consult-->nothing acute, follow up o/p    #Left adnexal hypodensity measuring up to 2.1 cm  -Send CA-125 in AM-->WNL  -Recommend outpatient US surveillance and OB/Gyn consultation    #Overactive bladder  -C/w oxybutynin    #HLD  -C/w rosuvastatin     #Hypothyroidism  -C/w synthroid     #Anxiety   -C/w Xanax prn  -iStop Reference #[id number]    #Chronic neck/back pain  -Pt takes Oxycodone/Acetaminophen 10/325 TID prn and Morphine ER 15 BID prn - confirmed on iStop     #H/o migraines  -Holding home meds while inpatient     Misc:  DVT ppx: N/A  GI ppx: Advance to soft and bite sized today  Diet: Regular  Activity: IAT  Code: Full Code.

## 2024-12-01 ENCOUNTER — INPATIENT (INPATIENT)
Facility: HOSPITAL | Age: 49
LOS: 2 days | Discharge: HOME | DRG: 251 | End: 2024-12-04
Attending: STUDENT IN AN ORGANIZED HEALTH CARE EDUCATION/TRAINING PROGRAM | Admitting: FAMILY MEDICINE
Payer: MEDICAID

## 2024-12-01 VITALS
DIASTOLIC BLOOD PRESSURE: 73 MMHG | RESPIRATION RATE: 20 BRPM | HEART RATE: 111 BPM | OXYGEN SATURATION: 96 % | TEMPERATURE: 98 F | HEIGHT: 64 IN | SYSTOLIC BLOOD PRESSURE: 146 MMHG | WEIGHT: 195.99 LBS

## 2024-12-01 DIAGNOSIS — Z98.890 OTHER SPECIFIED POSTPROCEDURAL STATES: Chronic | ICD-10-CM

## 2024-12-01 DIAGNOSIS — Z90.49 ACQUIRED ABSENCE OF OTHER SPECIFIED PARTS OF DIGESTIVE TRACT: Chronic | ICD-10-CM

## 2024-12-01 PROCEDURE — 99285 EMERGENCY DEPT VISIT HI MDM: CPT

## 2024-12-01 NOTE — ED ADULT TRIAGE NOTE - CHIEF COMPLAINT QUOTE
Pancreatitis and has nausea and vomiting.  Pt states that she was just discharged last week for the second time in 3 weeks

## 2024-12-02 DIAGNOSIS — R10.13 EPIGASTRIC PAIN: ICD-10-CM

## 2024-12-02 LAB
ALBUMIN SERPL ELPH-MCNC: 4.6 G/DL — SIGNIFICANT CHANGE UP (ref 3.5–5.2)
ALP SERPL-CCNC: 75 U/L — SIGNIFICANT CHANGE UP (ref 30–115)
ALT FLD-CCNC: 15 U/L — SIGNIFICANT CHANGE UP (ref 0–41)
ANION GAP SERPL CALC-SCNC: 11 MMOL/L — SIGNIFICANT CHANGE UP (ref 7–14)
APPEARANCE UR: CLEAR — SIGNIFICANT CHANGE UP
AST SERPL-CCNC: 18 U/L — SIGNIFICANT CHANGE UP (ref 0–41)
BASOPHILS # BLD AUTO: 0.01 K/UL — SIGNIFICANT CHANGE UP (ref 0–0.2)
BASOPHILS NFR BLD AUTO: 0.1 % — SIGNIFICANT CHANGE UP (ref 0–1)
BILIRUB DIRECT SERPL-MCNC: <0.2 MG/DL — SIGNIFICANT CHANGE UP (ref 0–0.3)
BILIRUB INDIRECT FLD-MCNC: >0.1 MG/DL — LOW (ref 0.2–1.2)
BILIRUB SERPL-MCNC: 0.3 MG/DL — SIGNIFICANT CHANGE UP (ref 0.2–1.2)
BILIRUB UR-MCNC: NEGATIVE — SIGNIFICANT CHANGE UP
BUN SERPL-MCNC: 9 MG/DL — LOW (ref 10–20)
CALCIUM SERPL-MCNC: 9.3 MG/DL — SIGNIFICANT CHANGE UP (ref 8.4–10.5)
CHLORIDE SERPL-SCNC: 101 MMOL/L — SIGNIFICANT CHANGE UP (ref 98–110)
CO2 SERPL-SCNC: 22 MMOL/L — SIGNIFICANT CHANGE UP (ref 17–32)
COLOR SPEC: YELLOW — SIGNIFICANT CHANGE UP
CREAT SERPL-MCNC: 0.6 MG/DL — LOW (ref 0.7–1.5)
DIFF PNL FLD: NEGATIVE — SIGNIFICANT CHANGE UP
EGFR: 111 ML/MIN/1.73M2 — SIGNIFICANT CHANGE UP
EOSINOPHIL # BLD AUTO: 0 K/UL — SIGNIFICANT CHANGE UP (ref 0–0.7)
EOSINOPHIL NFR BLD AUTO: 0 % — SIGNIFICANT CHANGE UP (ref 0–8)
GLUCOSE SERPL-MCNC: 96 MG/DL — SIGNIFICANT CHANGE UP (ref 70–99)
GLUCOSE UR QL: NEGATIVE MG/DL — SIGNIFICANT CHANGE UP
HCG SERPL QL: NEGATIVE — SIGNIFICANT CHANGE UP
HCT VFR BLD CALC: 39.1 % — SIGNIFICANT CHANGE UP (ref 37–47)
HGB BLD-MCNC: 12.9 G/DL — SIGNIFICANT CHANGE UP (ref 12–16)
IMM GRANULOCYTES NFR BLD AUTO: 0.3 % — SIGNIFICANT CHANGE UP (ref 0.1–0.3)
KETONES UR-MCNC: NEGATIVE MG/DL — SIGNIFICANT CHANGE UP
LACTATE SERPL-SCNC: 0.8 MMOL/L — SIGNIFICANT CHANGE UP (ref 0.7–2)
LEUKOCYTE ESTERASE UR-ACNC: NEGATIVE — SIGNIFICANT CHANGE UP
LIDOCAIN IGE QN: 55 U/L — SIGNIFICANT CHANGE UP (ref 7–60)
LYMPHOCYTES # BLD AUTO: 1.64 K/UL — SIGNIFICANT CHANGE UP (ref 1.2–3.4)
LYMPHOCYTES # BLD AUTO: 21.5 % — SIGNIFICANT CHANGE UP (ref 20.5–51.1)
MCHC RBC-ENTMCNC: 29.2 PG — SIGNIFICANT CHANGE UP (ref 27–31)
MCHC RBC-ENTMCNC: 33 G/DL — SIGNIFICANT CHANGE UP (ref 32–37)
MCV RBC AUTO: 88.5 FL — SIGNIFICANT CHANGE UP (ref 81–99)
MONOCYTES # BLD AUTO: 0.69 K/UL — HIGH (ref 0.1–0.6)
MONOCYTES NFR BLD AUTO: 9 % — SIGNIFICANT CHANGE UP (ref 1.7–9.3)
NEUTROPHILS # BLD AUTO: 5.27 K/UL — SIGNIFICANT CHANGE UP (ref 1.4–6.5)
NEUTROPHILS NFR BLD AUTO: 69.1 % — SIGNIFICANT CHANGE UP (ref 42.2–75.2)
NITRITE UR-MCNC: NEGATIVE — SIGNIFICANT CHANGE UP
NRBC # BLD: 0 /100 WBCS — SIGNIFICANT CHANGE UP (ref 0–0)
PH UR: 6 — SIGNIFICANT CHANGE UP (ref 5–8)
PLATELET # BLD AUTO: 297 K/UL — SIGNIFICANT CHANGE UP (ref 130–400)
PMV BLD: 10.2 FL — SIGNIFICANT CHANGE UP (ref 7.4–10.4)
POTASSIUM SERPL-MCNC: 4.2 MMOL/L — SIGNIFICANT CHANGE UP (ref 3.5–5)
POTASSIUM SERPL-SCNC: 4.2 MMOL/L — SIGNIFICANT CHANGE UP (ref 3.5–5)
PROT SERPL-MCNC: 7.2 G/DL — SIGNIFICANT CHANGE UP (ref 6–8)
PROT UR-MCNC: NEGATIVE MG/DL — SIGNIFICANT CHANGE UP
RBC # BLD: 4.42 M/UL — SIGNIFICANT CHANGE UP (ref 4.2–5.4)
RBC # FLD: 13.9 % — SIGNIFICANT CHANGE UP (ref 11.5–14.5)
SODIUM SERPL-SCNC: 134 MMOL/L — LOW (ref 135–146)
SP GR SPEC: >1.03 — HIGH (ref 1–1.03)
UROBILINOGEN FLD QL: 0.2 MG/DL — SIGNIFICANT CHANGE UP (ref 0.2–1)
WBC # BLD: 7.63 K/UL — SIGNIFICANT CHANGE UP (ref 4.8–10.8)
WBC # FLD AUTO: 7.63 K/UL — SIGNIFICANT CHANGE UP (ref 4.8–10.8)

## 2024-12-02 PROCEDURE — 88305 TISSUE EXAM BY PATHOLOGIST: CPT

## 2024-12-02 PROCEDURE — 74177 CT ABD & PELVIS W/CONTRAST: CPT | Mod: 26

## 2024-12-02 PROCEDURE — 36415 COLL VENOUS BLD VENIPUNCTURE: CPT

## 2024-12-02 PROCEDURE — 83605 ASSAY OF LACTIC ACID: CPT

## 2024-12-02 PROCEDURE — 80053 COMPREHEN METABOLIC PANEL: CPT

## 2024-12-02 PROCEDURE — 81003 URINALYSIS AUTO W/O SCOPE: CPT

## 2024-12-02 PROCEDURE — 99223 1ST HOSP IP/OBS HIGH 75: CPT

## 2024-12-02 PROCEDURE — 99222 1ST HOSP IP/OBS MODERATE 55: CPT

## 2024-12-02 PROCEDURE — 83735 ASSAY OF MAGNESIUM: CPT

## 2024-12-02 PROCEDURE — 85025 COMPLETE CBC W/AUTO DIFF WBC: CPT

## 2024-12-02 PROCEDURE — 88312 SPECIAL STAINS GROUP 1: CPT

## 2024-12-02 PROCEDURE — 71046 X-RAY EXAM CHEST 2 VIEWS: CPT | Mod: 26

## 2024-12-02 PROCEDURE — 83690 ASSAY OF LIPASE: CPT

## 2024-12-02 PROCEDURE — 74177 CT ABD & PELVIS W/CONTRAST: CPT | Mod: MC

## 2024-12-02 PROCEDURE — 81025 URINE PREGNANCY TEST: CPT

## 2024-12-02 RX ORDER — ONDANSETRON HYDROCHLORIDE 4 MG/1
8 TABLET, FILM COATED ORAL ONCE
Refills: 0 | Status: COMPLETED | OUTPATIENT
Start: 2024-12-02 | End: 2024-12-02

## 2024-12-02 RX ORDER — SENNOSIDES 8.6 MG
2 TABLET ORAL AT BEDTIME
Refills: 0 | Status: DISCONTINUED | OUTPATIENT
Start: 2024-12-02 | End: 2024-12-04

## 2024-12-02 RX ORDER — METOCLOPRAMIDE HYDROCHLORIDE 10 MG/1
10 TABLET ORAL ONCE
Refills: 0 | Status: COMPLETED | OUTPATIENT
Start: 2024-12-02 | End: 2024-12-02

## 2024-12-02 RX ORDER — CITALOPRAM HYDROBROMIDE 20 MG
40 TABLET ORAL DAILY
Refills: 0 | Status: DISCONTINUED | OUTPATIENT
Start: 2024-12-02 | End: 2024-12-04

## 2024-12-02 RX ORDER — HYDROMORPHONE HYDROCHLORIDE 2 MG/1
0.5 TABLET ORAL EVERY 4 HOURS
Refills: 0 | Status: DISCONTINUED | OUTPATIENT
Start: 2024-12-02 | End: 2024-12-04

## 2024-12-02 RX ORDER — ALPRAZOLAM 0.5 MG
0.5 TABLET ORAL
Refills: 0 | Status: DISCONTINUED | OUTPATIENT
Start: 2024-12-02 | End: 2024-12-04

## 2024-12-02 RX ORDER — HYDROMORPHONE HYDROCHLORIDE 2 MG/1
0.5 TABLET ORAL ONCE
Refills: 0 | Status: DISCONTINUED | OUTPATIENT
Start: 2024-12-02 | End: 2024-12-02

## 2024-12-02 RX ORDER — NALOXONE HCL 0.4 MG/ML
4 AMPUL (ML) INJECTION
Refills: 0 | Status: DISCONTINUED | OUTPATIENT
Start: 2024-12-02 | End: 2024-12-04

## 2024-12-02 RX ORDER — PANTOPRAZOLE SODIUM 40 MG/1
40 TABLET, DELAYED RELEASE ORAL
Refills: 0 | Status: DISCONTINUED | OUTPATIENT
Start: 2024-12-02 | End: 2024-12-04

## 2024-12-02 RX ORDER — LEVOTHYROXINE SODIUM 150 MCG
75 TABLET ORAL DAILY
Refills: 0 | Status: DISCONTINUED | OUTPATIENT
Start: 2024-12-02 | End: 2024-12-04

## 2024-12-02 RX ORDER — ROSUVASTATIN CALCIUM 5 MG/1
10 TABLET, FILM COATED ORAL AT BEDTIME
Refills: 0 | Status: DISCONTINUED | OUTPATIENT
Start: 2024-12-02 | End: 2024-12-04

## 2024-12-02 RX ORDER — OXYBUTYNIN CHLORIDE 5 MG
10 TABLET ORAL DAILY
Refills: 0 | Status: DISCONTINUED | OUTPATIENT
Start: 2024-12-02 | End: 2024-12-04

## 2024-12-02 RX ORDER — SODIUM CHLORIDE 9 MG/ML
1000 INJECTION, SOLUTION INTRAMUSCULAR; INTRAVENOUS; SUBCUTANEOUS ONCE
Refills: 0 | Status: COMPLETED | OUTPATIENT
Start: 2024-12-02 | End: 2024-12-02

## 2024-12-02 RX ORDER — HYDROMORPHONE HYDROCHLORIDE 2 MG/1
1 TABLET ORAL ONCE
Refills: 0 | Status: DISCONTINUED | OUTPATIENT
Start: 2024-12-02 | End: 2024-12-02

## 2024-12-02 RX ORDER — 0.9 % SODIUM CHLORIDE 0.9 %
1000 INTRAVENOUS SOLUTION INTRAVENOUS
Refills: 0 | Status: DISCONTINUED | OUTPATIENT
Start: 2024-12-02 | End: 2024-12-03

## 2024-12-02 RX ORDER — FAMOTIDINE 20 MG/1
20 TABLET, FILM COATED ORAL ONCE
Refills: 0 | Status: COMPLETED | OUTPATIENT
Start: 2024-12-02 | End: 2024-12-02

## 2024-12-02 RX ORDER — PANTOPRAZOLE SODIUM 40 MG/1
40 TABLET, DELAYED RELEASE ORAL ONCE
Refills: 0 | Status: COMPLETED | OUTPATIENT
Start: 2024-12-02 | End: 2024-12-02

## 2024-12-02 RX ORDER — POLYETHYLENE GLYCOL 3350 17 G/17G
17 POWDER, FOR SOLUTION ORAL DAILY
Refills: 0 | Status: DISCONTINUED | OUTPATIENT
Start: 2024-12-02 | End: 2024-12-04

## 2024-12-02 RX ADMIN — Medication 2 TABLET(S): at 21:11

## 2024-12-02 RX ADMIN — HYDROMORPHONE HYDROCHLORIDE 0.5 MILLIGRAM(S): 2 TABLET ORAL at 13:25

## 2024-12-02 RX ADMIN — ONDANSETRON HYDROCHLORIDE 8 MILLIGRAM(S): 4 TABLET, FILM COATED ORAL at 01:07

## 2024-12-02 RX ADMIN — PANTOPRAZOLE SODIUM 40 MILLIGRAM(S): 40 TABLET, DELAYED RELEASE ORAL at 18:54

## 2024-12-02 RX ADMIN — PANTOPRAZOLE SODIUM 40 MILLIGRAM(S): 40 TABLET, DELAYED RELEASE ORAL at 02:58

## 2024-12-02 RX ADMIN — Medication 40 MILLIGRAM(S): at 15:51

## 2024-12-02 RX ADMIN — HYDROMORPHONE HYDROCHLORIDE 0.5 MILLIGRAM(S): 2 TABLET ORAL at 09:17

## 2024-12-02 RX ADMIN — HYDROMORPHONE HYDROCHLORIDE 0.5 MILLIGRAM(S): 2 TABLET ORAL at 06:25

## 2024-12-02 RX ADMIN — HYDROMORPHONE HYDROCHLORIDE 0.5 MILLIGRAM(S): 2 TABLET ORAL at 15:42

## 2024-12-02 RX ADMIN — HYDROMORPHONE HYDROCHLORIDE 0.5 MILLIGRAM(S): 2 TABLET ORAL at 07:47

## 2024-12-02 RX ADMIN — METOCLOPRAMIDE HYDROCHLORIDE 10 MILLIGRAM(S): 10 TABLET ORAL at 06:26

## 2024-12-02 RX ADMIN — HYDROMORPHONE HYDROCHLORIDE 0.5 MILLIGRAM(S): 2 TABLET ORAL at 21:41

## 2024-12-02 RX ADMIN — Medication 4 MILLIGRAM(S): at 01:01

## 2024-12-02 RX ADMIN — HYDROMORPHONE HYDROCHLORIDE 0.5 MILLIGRAM(S): 2 TABLET ORAL at 18:21

## 2024-12-02 RX ADMIN — Medication 150 MILLILITER(S): at 04:37

## 2024-12-02 RX ADMIN — Medication 150 MILLILITER(S): at 02:58

## 2024-12-02 RX ADMIN — HYDROMORPHONE HYDROCHLORIDE 0.5 MILLIGRAM(S): 2 TABLET ORAL at 09:45

## 2024-12-02 RX ADMIN — HYDROMORPHONE HYDROCHLORIDE 0.5 MILLIGRAM(S): 2 TABLET ORAL at 04:45

## 2024-12-02 RX ADMIN — Medication 150 MILLILITER(S): at 21:41

## 2024-12-02 RX ADMIN — Medication 10 MILLIGRAM(S): at 13:01

## 2024-12-02 RX ADMIN — HYDROMORPHONE HYDROCHLORIDE 1 MILLIGRAM(S): 2 TABLET ORAL at 01:57

## 2024-12-02 RX ADMIN — PANTOPRAZOLE SODIUM 40 MILLIGRAM(S): 40 TABLET, DELAYED RELEASE ORAL at 04:50

## 2024-12-02 RX ADMIN — HYDROMORPHONE HYDROCHLORIDE 0.5 MILLIGRAM(S): 2 TABLET ORAL at 18:33

## 2024-12-02 RX ADMIN — Medication 0.5 MILLIGRAM(S): at 04:41

## 2024-12-02 RX ADMIN — SODIUM CHLORIDE 1000 MILLILITER(S): 9 INJECTION, SOLUTION INTRAMUSCULAR; INTRAVENOUS; SUBCUTANEOUS at 01:01

## 2024-12-02 RX ADMIN — HYDROMORPHONE HYDROCHLORIDE 0.5 MILLIGRAM(S): 2 TABLET ORAL at 13:00

## 2024-12-02 RX ADMIN — Medication 150 MILLILITER(S): at 15:45

## 2024-12-02 RX ADMIN — FAMOTIDINE 20 MILLIGRAM(S): 20 TABLET, FILM COATED ORAL at 01:01

## 2024-12-02 RX ADMIN — ROSUVASTATIN CALCIUM 10 MILLIGRAM(S): 5 TABLET, FILM COATED ORAL at 21:11

## 2024-12-02 RX ADMIN — Medication 75 MICROGRAM(S): at 04:45

## 2024-12-02 RX ADMIN — Medication 0.5 MILLIGRAM(S): at 21:51

## 2024-12-02 RX ADMIN — HYDROMORPHONE HYDROCHLORIDE 0.5 MILLIGRAM(S): 2 TABLET ORAL at 21:55

## 2024-12-02 RX ADMIN — HYDROMORPHONE HYDROCHLORIDE 0.5 MILLIGRAM(S): 2 TABLET ORAL at 00:00

## 2024-12-02 NOTE — H&P ADULT - PATIENT'S SEXUAL ORIENTATION
Heterosexual Consent (Near Eyelid Margin)/Introductory Paragraph: The rationale for Mohs was explained to the patient and consent was obtained. The risks, benefits and alternatives to therapy were discussed in detail. Specifically, the risks of ectropion or eyelid deformity, infection, scarring, bleeding, prolonged wound healing, incomplete removal, allergy to anesthesia, nerve injury and recurrence were addressed. Prior to the procedure, the treatment site was clearly identified and confirmed by the patient. All components of Universal Protocol/PAUSE Rule completed.

## 2024-12-02 NOTE — H&P ADULT - HISTORY OF PRESENT ILLNESS
49 years old female present complaint of upper abdominal pain associated multiple episodes of vomiting started yesterday. The onset was sudden .She was taking Zofran, morphine p.o. and oxycodone but not able to tolerate the medication today so she comes back to ED for evaluation.   There were no precipitating factors. Pt with a history of anxiety, back pain, GERD, obesity on Ozempic 2 mg weekly, status post cholecystectomy, kidney stone .  Patient was admitted to hospital 2 times over the past month and was diagnosed of ?  Pancreatitis.  Reports She was supposed to follow-up with GI next week to schedule for EGD.  Otherwise denies fever, chills, chest pain, shortness of breath, diarrhea or, constipations or urinary symptoms

## 2024-12-02 NOTE — CONSULT NOTE ADULT - SUBJECTIVE AND OBJECTIVE BOX
Gastroenterology Consultation:    Patient is a 49y old  Female who presents with a chief complaint of abdominal pain (02 Dec 2024 03:40)        Admitted on: 24      HPI:  49 years old female present complaint of upper abdominal pain associated multiple episodes of vomiting started yesterday. The onset was sudden .She was taking Zofran, morphine p.o. and oxycodone but not able to tolerate the medication today so she comes back to ED for evaluation.   There were no precipitating factors. Pt with a history of anxiety, back pain, GERD, obesity on Ozempic 2 mg weekly, status post cholecystectomy, kidney stone .  Patient was admitted to hospital 2 times over the past month.  Reports She was supposed to follow-up with GI next week to schedule for EGD.  Otherwise denies fever, chills, chest pain, shortness of breath, diarrhea or, constipations or urinary symptoms. GI is called for evaluation.         Prior EGD:      < from: EGD (23 @ 09:30) >    Impressions:    Irregularity in the Z-line and gastroesophageal junction. (Biopsy).    Erythema in the stomach compatible with non-erosive gastritis. (Biopsy).    Normal mucosa in the whole examined duodenum. (Biopsy).    < end of copied text >    Prior Colonoscopy: none on chart       PAST MEDICAL & SURGICAL HISTORY:  GERD (gastroesophageal reflux disease)      Migraine headache      Back pain      Anxiety      Kidney stones      Murmur  since age 20           Thyroid nodule      Hypothyroidism      Overactive bladder      History of surgery  hernia repair    x2      History of cholecystectomy      History of lithotripsy            FAMILY HISTORY:  DM (diabetes mellitus) (Father)    Family history of heart disease (Father)        Social History:  Tobacco: denies   Alcohol: denies  Drugs: denies     Home Medications:  citalopram 40 mg oral tablet: 1 tab(s) orally once a day (in the morning) (2024 19:01)  levothyroxine 75 mcg (0.075 mg) oral tablet: 1 tab(s) orally once a day (2024 19:06)  oxybutynin 5 mg oral tablet: 2 tab(s) orally once a day (2024 19:01)  pantoprazole 40 mg oral delayed release tablet: 1 tab(s) orally once a day (2024 19:01)  rizatriptan 10 mg oral tablet, disintegratin tab(s) orally once a day as needed for  migraine (2024 19:01)  rosuvastatin 10 mg oral tablet: 1 tab(s) orally once a day (2024 19:01)        MEDICATIONS  (STANDING):  citalopram 40 milliGRAM(s) Oral daily  lactated ringers. 1000 milliLiter(s) (150 mL/Hr) IV Continuous <Continuous>  levothyroxine 75 MICROGram(s) Oral daily  oxybutynin 10 milliGRAM(s) Oral daily  pantoprazole  Injectable 40 milliGRAM(s) IV Push two times a day  rosuvastatin 10 milliGRAM(s) Oral at bedtime    MEDICATIONS  (PRN):  ALPRAZolam 0.5 milliGRAM(s) Oral two times a day PRN for anxiety  HYDROmorphone  Injectable 0.5 milliGRAM(s) IV Push every 4 hours PRN Severe Pain (7 - 10)  naloxone 1 mG/mL Injection for Intranasal Use 4 milliGRAM(s) IntraNasal every 2 hours PRN opiate od      Allergies  Cipro (Rash)  Bananas (Unknown)  eggs (Unknown)  NSAIDs (Angioedema; Anaphylaxis; Hives)  citrus (Unknown)      Review of Systems:   Constitutional:  No Fever, No Chills  ENT/Mouth:  No Hearing Changes,  No Difficulty Swallowing  Eyes:  No Eye Pain, No Vision Changes  Cardiovascular:  No Chest Pain, No Palpitations  Respiratory:  No Cough, No Dyspnea  Gastrointestinal:  As described in HPI  Musculoskeletal:  No Joint Swelling, No Back Pain  Skin:  No Skin Lesions, No Jaundice  Neuro:  No Syncope, No Dizziness  Heme/Lymph:  No Bruising, No Bleeding.          Physical Examination:  T(C): 36.7 (24 @ 04:46), Max: 36.8 (24 @ 02:01)  HR: 89 (24 @ 04:46) (75 - 111)  BP: 131/86 (24 @ 04:46) (121/70 - 146/73)  RR: 18 (24 @ 04:46) (18 - 20)  SpO2: 98% (24 @ 04:46) (92% - 98%)  Height (cm): 165.1 (24 @ 04:46)  Weight (kg): 88.9 (24 @ 04:46)        GENERAL: AAOx3, no acute distress.  HEAD:  Atraumatic, Normocephalic  EYES: conjunctiva and sclera clear  NECK: Supple, no JVD or thyromegaly  CHEST/LUNG: Clear to auscultation bilaterally; No wheeze, rhonchi, or rales  HEART: Regular rate and rhythm; normal S1, S2, No murmurs.  ABDOMEN: Soft, nontender, nondistended; Bowel sounds present  NEUROLOGY: No asterixis or tremor.   SKIN: Intact, no jaundice        Data:                        12.9   7.63  )-----------( 297      ( 02 Dec 2024 01:01 )             39.1     Hgb Trend:  12.9  24 @ 01:01          134[L]  |  101  |  9[L]  ----------------------------<  96  4.2   |  22  |  0.6[L]    Ca    9.3      02 Dec 2024 01:01    TPro  7.2  /  Alb  4.6  /  TBili  0.3  /  DBili  <0.2  /  AST  18  /  ALT  15  /  AlkPhos  75      Liver panel trend:  TBili 0.3   /   AST 18   /   ALT 15   /   AlkP 75   /   Tptn 7.2   /   Alb 4.6    /   DBili <0.2        TBili 0.3   /   AST 16   /   ALT 17   /   AlkP 78   /   Tptn 6.5   /   Alb 4.1    /   DBili --        TBili 0.3   /   AST 24   /   ALT 24   /   AlkP 103   /   Tptn 8.0   /   Alb 5.0    /   DBili --                Urinalysis with Rflx Culture (collected 02 Dec 2024 08:00)          Radiology:  CT Abdomen and Pelvis w/ IV Cont:   ACC: [id number] EXAM:  CT ABDOMEN AND PELVIS IC   ORDERED BY: DANNY MASTERS     PROCEDURE DATE:  2024          INTERPRETATION:  CLINICAL STATEMENT: Abd pain    TECHNIQUE: Contiguous axial CT images were obtained from the lower chest   to the pubic symphysis following administration of intravenous contrast.   Oral contrast was not administered.  Reformatted images in the coronal   and sagittal planes were acquired.    COMPARISON: CT ABDOMEN 2024.    CONTRAST VOLUME: Omnipaque 350. 95 cc administered. 5 cc discarded.    FINDINGS:    LOWER CHEST: Unremarkable.    HEPATOBILIARY: Unchanged.    SPLEEN: Unchanged.    PANCREAS: Unchanged.    ADRENAL GLANDS: Unchanged.    KIDNEYS: Symmetric enhancement. No hydronephrosis.    ABDOMINOPELVIC NODES: Unchanged.    PELVIC ORGANS: Unremarkable.    PERITONEUM/MESENTERY/BOWEL: No bowel obstruction, pneumatosis,   pneumoperitoneum, or ascites. Normal appendix.    BONES/SOFT TISSUES: No acute osseous abnormality.    OTHER: Normal caliber aorta.      IMPRESSION:    No CT evidence of an acute intra-abdominal abnormality.        --- End of Report ---            JONNY VILLEDA MD; Attending Radiologist  This document has been electronically signed. Dec  2 2024  5:33AM (24 @ 04:14)

## 2024-12-02 NOTE — ED PROVIDER NOTE - NS_BEDUNITTYPES_ED_ALL_ED
Patient presents to urgent care with c/o diarrhea, vomiting, cough, congestion and fever. Also states he has body aches all over. States it all started four days ago and diarrhea has stopped today. States he's hardly eating but is drinking okay. Will need a work note.        MED/SURG

## 2024-12-02 NOTE — H&P ADULT - ASSESSMENT
49 years old female present complaint of upper abdominal pain associated multiple episodes of vomiting started yesterday. The onset was sudden .She was taking Zofran, morphine p.o. and oxycodone but not able to tolerate the medication today so she comes back to ED for evaluation.   There were no precipitating factors. Pt with a history of anxiety, back pain, GERD, obesity on Ozempic 2 mg weekly, status post cholecystectomy, kidney stone .  Patient was admitted to hospital 2 times over the past month and was diagnosed of ?  Pancreatitis.  Reports She was supposed to follow-up with GI next week to schedule for EGD.  Otherwise denies fever, chills, chest pain, shortness of breath, diarrhea or, constipations or urinary symptoms    Dx abdominal pain:  npo   IVF@ 150ml/h  GI consult  F/U AM labs CBC/ BMP  Prn pain : dilaudid 0.5mg po Q4   IV protonix    continu ehome meds    C/w rosuvastatin     #Hypothyroidism  -C/w synthroid     #Anxiety   -C/w Xanax prn      #Chronic neck/back pain  -Pt takes Oxycodone/Acetaminophen 10/325 TID prn and Morphine ER 15 BID prn - confirmed on iStop last admission  -will hold PO meds 2/2 N/V; IV dilaudid    #H/o migraines  -Holding home meds while inpatient     #Diet: NPO  #Activity: AAT  #VTE ppx: Low risk   49 years old female present complaint of upper abdominal pain associated multiple episodes of vomiting started yesterday. The onset was sudden .She was taking Zofran, morphine p.o. and oxycodone but not able to tolerate the medication today so she comes back to ED for evaluation.   There were no precipitating factors. Pt with a history of anxiety, back pain, GERD, obesity on Ozempic 2 mg weekly, status post cholecystectomy, kidney stone .  Patient was admitted to hospital 2 times over the past month and was diagnosed of ?  Pancreatitis.  Reports She was supposed to follow-up with GI next week to schedule for EGD.  Otherwise denies fever, chills, chest pain, shortness of breath, diarrhea or, constipations or urinary symptoms    Dx abdominal pain:  npo   IVF@ 150ml/h  GI consult  F/U AM labs CBC/ BMP  Prn pain : dilaudid 0.5mg po Q4   IV protonix  F/U abdo /pelvis CT  pending result    continue ehome meds    C/w rosuvastatin     #Hypothyroidism  -C/w synthroid     #Anxiety   -C/w Xanax prn      #Chronic neck/back pain  -Pt takes Oxycodone/Acetaminophen 10/325 TID prn and Morphine ER 15 BID prn - confirmed on iStop last admission  -will hold PO meds 2/2 N/V; IV dilaudid    #H/o migraines  -Holding home meds while inpatient     #Diet: NPO  #Activity: AAT  #VTE ppx: Low risk   49 years old female present complaint of upper abdominal pain associated multiple episodes of vomiting started yesterday. The onset was sudden .She was taking Zofran, morphine p.o. and oxycodone but not able to tolerate the medication today so she comes back to ED for evaluation.   There were no precipitating factors. Pt with a history of anxiety, back pain, GERD, obesity on Ozempic 2 mg weekly, status post cholecystectomy, kidney stone .  Patient was admitted to hospital 2 times over the past month and was diagnosed of ?  Pancreatitis.  Reports She was supposed to follow-up with GI next week to schedule for EGD.  Otherwise denies fever, chills, chest pain, shortness of breath, diarrhea or, constipations or urinary symptoms    #Dx abdominal pain:  npo   IVF@ 150ml/h  GI consult  F/U AM labs CBC/ BMP  Prn pain : dilaudid 0.5mg po Q4   IV protonix  F/U abdo /pelvis CT  pending result       #HLD  C/w rosuvastatin     #Hypothyroidism  -C/w synthroid     #Anxiety   -C/w Xanax prn      #Chronic neck/back pain  -Pt takes Oxycodone/Acetaminophen 10/325 TID prn and Morphine ER 15 BID prn - confirmed on iStop.  Patient takes medications periodically   -will hold PO meds 2/2 N/V; IV dilaudid    #H/o migraines  -Holding home meds while inpatient     #Diet: NPO  #Activity: AAT  #VTE ppx: Low risk

## 2024-12-02 NOTE — ED PROVIDER NOTE - OBJECTIVE STATEMENT
49 years old female history of anxiety, back pain, GERD, obesity on Ozempic 2 mg weekly, status post cholecystectomy, kidney stone present complaint of upper abdominal pain associated multiple episodes of vomiting started yesterday.  Patient was admitted to hospital 2 times over the past month and was diagnosed of ?  Pancreatitis.  Reports she was taking Zofran, morphine p.o. and oxycodone but not able to tolerate the medication today so she comes back to ED for evaluation.  She was supposed to follow-up with GI next week to schedule for EGD.  Otherwise denies fever, chills, chest pain, shortness of breath, diarrhea or, constipations or urinary symptoms.  Had normal bowel movement earlier today.

## 2024-12-02 NOTE — H&P ADULT - NSHPLABSRESULTS_GEN_ALL_CORE
12.9   7.63  )-----------( 297      ( 02 Dec 2024 01:01 )             39.1       12-02    134[L]  |  101  |  9[L]  ----------------------------<  96  4.2   |  22  |  0.6[L]    Ca    9.3      02 Dec 2024 01:01    TPro  7.2  /  Alb  4.6  /  TBili  0.3  /  DBili  <0.2  /  AST  18  /  ALT  15  /  AlkPhos  75  12-02              Urinalysis Basic - ( 02 Dec 2024 01:01 )    Color: x / Appearance: x / SG: x / pH: x  Gluc: 96 mg/dL / Ketone: x  / Bili: x / Urobili: x   Blood: x / Protein: x / Nitrite: x   Leuk Esterase: x / RBC: x / WBC x   Sq Epi: x / Non Sq Epi: x / Bacteria: x            Lactate Trend            CAPILLARY BLOOD GLUCOSE            Culture Results:   No growth at 5 days (11-23 @ 21:26)  Culture Results:   No growth at 5 days (11-23 @ 21:26)  Culture Results:   <10,000 CFU/mL Normal Urogenital Ana (11-23 @ 20:55)  Culture Results:   <10,000 CFU/mL Normal Urogenital Ana (11-15 @ 21:44)

## 2024-12-02 NOTE — H&P ADULT - NSHPPHYSICALEXAM_GEN_ALL_CORE
Vital Signs Last 24 Hrs  T(C): 36.8 (02 Dec 2024 02:01), Max: 36.8 (02 Dec 2024 02:01)  T(F): 98.2 (02 Dec 2024 02:01), Max: 98.2 (02 Dec 2024 02:01)  HR: 75 (02 Dec 2024 02:01) (75 - 111)  BP: 121/70 (02 Dec 2024 02:01) (121/70 - 146/73)  BP(mean): --  RR: 20 (02 Dec 2024 02:01) (20 - 20)  SpO2: 92% (02 Dec 2024 02:01) (92% - 96%)    Parameters below as of 02 Dec 2024 02:01  Patient On (Oxygen Delivery Method): room air      GENERAL:  48y/o Female NAD, resting comfortably.  HEAD:  Atraumatic, Normocephalic  EYES: EOMI, PERRLA, conjunctiva and sclera clear  NECK: Supple, No JVD, no cervical lymphadenopathy, non-tender  CHEST/LUNG: Clear to auscultation bilaterally; No wheeze, rhonchi, or rales  HEART: Regular rate and rhythm; S1&S2  ABDOMEN: Soft, mildly tender, Nondistended x 4 quadrants; Bowel sounds present  EXTREMITIES:   Peripheral Pulses Present, No clubbing, no cyanosis, or no edema, no calf tenderness  PSYCH: AAOx3, cooperative, appropriate  NEUROLOGY: WNL  SKIN: WNL

## 2024-12-02 NOTE — ED PROVIDER NOTE - CLINICAL SUMMARY MEDICAL DECISION MAKING FREE TEXT BOX
49-year-old female, history of pancreatitis, GERD, cholecystectomy, on Ozempic, presents with abdominal pain and vomiting since yesterday.  Exam noted for epigastric tenderness.  Labs show WBC 7.6, hemoglobin 12.9, BUN 9, creatinine 0.6, lipase 55, serum pregnancy negative.  Chest x-ray no acute disease.  Given IV fluids, Pepcid, morphine, Dilaudid, Zofran and pantoprazole without improvement.  Will admit.

## 2024-12-02 NOTE — CHART NOTE - NSCHARTNOTEFT_GEN_A_CORE
49 years old female present complaint of upper abdominal pain associated multiple episodes of vomiting started yesterday. The onset was sudden .She was taking Zofran, morphine p.o. and oxycodone but not able to tolerate the medication today so she comes back to ED for evaluation.   There were no precipitating factors. Pt with a history of anxiety, back pain, GERD, obesity on Ozempic 2 mg weekly, status post cholecystectomy, kidney stone .  Patient was admitted to hospital 2 times over the past month and was diagnosed of ?  Pancreatitis.  Reports She was supposed to follow-up with GI next week to schedule for EGD.  Otherwise denies fever, chills, chest pain, shortness of breath, diarrhea or, constipations or urinary symptoms    abdominal pain with N/V:  -Symptoms may be multifactorial, possible etiologies include functional, retained sludge/stone, sphincter of oddi dysfunction, gastritis/pud, component of constipation in setting of narcotics  Advance diet as tolerated  IVF@ 150ml/h  GI consult appreciated  Prn pain : dilaudid 0.5mg po Q4   IV protonix  CT ab/plv unremarkable       #HLD  C/w rosuvastatin     #Hypothyroidism  -C/w synthroid     #Anxiety   -C/w Xanax prn      #Chronic neck/back pain  -Pt takes Oxycodone/Acetaminophen 10/325 TID prn and Morphine ER 15 BID prn - confirmed on iStop.  Patient takes medications periodically   -will hold PO meds 2/2 N/V; IV dilaudid    #H/o migraines  -Holding home meds while inpatient 49 years old female present complaint of upper abdominal pain associated multiple episodes of vomiting started yesterday. The onset was sudden .She was taking Zofran, morphine p.o. and oxycodone but not able to tolerate the medication today so she comes back to ED for evaluation.   There were no precipitating factors. Pt with a history of anxiety, back pain, GERD, obesity on Ozempic 2 mg weekly, status post cholecystectomy, kidney stone .  Patient was admitted to hospital 2 times over the past month and was diagnosed of ?  Pancreatitis.  Reports She was supposed to follow-up with GI next week to schedule for EGD.  Otherwise denies fever, chills, chest pain, shortness of breath, diarrhea or, constipations or urinary symptoms    abdominal pain with N/V:  -Symptoms may be multifactorial, possible etiologies include functional, retained sludge/stone, sphincter of oddi dysfunction, gastritis/pud, component of constipation in setting of narcotics  Advance diet as tolerated  IVF@ 150ml/h  GI consult appreciated-possible inpatient scope (was due for scope this week)  Prn pain : dilaudid 0.5mg po Q4   IV protonix  CT ab/plv unremarkable       #HLD  C/w rosuvastatin     #Hypothyroidism  -C/w synthroid     #Anxiety   -C/w Xanax prn      #Chronic neck/back pain  -Pt takes Oxycodone/Acetaminophen 10/325 TID prn and Morphine ER 15 BID prn - confirmed on iStop.  Patient takes medications periodically   -will hold PO meds 2/2 N/V; IV dilaudid    #H/o migraines  -Holding home meds while inpatient

## 2024-12-02 NOTE — ED PROVIDER NOTE - ATTENDING APP SHARED VISIT CONTRIBUTION OF CARE
49-year-old female, history of pancreatitis GERD, cholecystectomy, on Ozempic, presents with abdominal pain and vomiting since yesterday.  Was admitted twice with similar symptoms, diagnosed with pancreatitis.  Exam shows alert patient in no distress, HEENT NCAT PERRL, neck supple, lungs clear, RR S1S2, abdomen soft + epigastric tenderness +BS, no CCE.

## 2024-12-02 NOTE — ED PROVIDER NOTE - PHYSICAL EXAMINATION
CONSTITUTIONAL: Well-appearing; well-nourished; in no apparent distress.   EYES: PERRL; EOM intact.   CARDIOVASCULAR: Normal S1, S2; no murmurs, rubs, or gallops.   RESPIRATORY: Normal chest excursion with respiration; breath sounds clear and equal bilaterally; no wheezes, rhonchi, or rales.  GI: + epigastric tenderness. Normal bowel sounds; non-distended; no palpable organomegaly.   MS: No calf swelling and tenderness.  SKIN: Normal for age and race; warm; dry; good turgor; no apparent lesions or exudate.   NEURO/PSYCH: A & O x 4; grossly unremarkable.

## 2024-12-02 NOTE — CONSULT NOTE ADULT - ASSESSMENT
49 years old female present complaint of upper abdominal pain associated multiple episodes of vomiting started yesterday. The onset was sudden .She was taking Zofran, morphine p.o. and oxycodone but not able to tolerate the medication today so she comes back to ED for evaluation.   There were no precipitating factors. Pt with a history of anxiety, back pain, GERD, obesity on Ozempic 2 mg weekly, status post cholecystectomy, kidney stone .  Patient was admitted to hospital 2 times over the past month.  Reports She was supposed to follow-up with GI next week to schedule for EGD.  Otherwise denies fever, chills, chest pain, shortness of breath, diarrhea or, constipations or urinary symptoms. GI is called for evaluation.       #Recurrent epigastric pain   #intermittent constipation  on ozempic  s/p cholecystectomy 2014  s/p EGD 1/2023, EGD/EUS in 5/2023 by Dr. German revealing small hiatal hernia, non erosive bile reflux gastritis (biopsy), EUS: CBD measuring 7 mm with no evidence of stones or sludge, there is a reactive appearing 14 x 11 mm periportal lymph node. Examination of the pancreas revealed a hyperechoic pancreatic parenchyma with no other parenchymal abnormalities and normal PD throughout the pancreas. Otherwise unremarkable EUS exam.  Symptoms may be multifactorial, possible etiologies include functional, retained sludge/stone, sphincter of oddi dysfunction, gastritis/pud, component of constipation in setting of narcotics    Rec  -low fat diet as tolerated  -MRI wnl CBD dilation likely due to narcotic use  -PPI daily  -Miralax daily  -Monitor LFTs daily  -may consider inpt EGD    #CRC screening   Rec  - Follow up with our GI office located on [address] [place] [address], Phone number [phone] with Dr. German   49 years old female present complaint of upper abdominal pain associated multiple episodes of vomiting started yesterday. The onset was sudden .She was taking Zofran, morphine p.o. and oxycodone but not able to tolerate the medication today so she comes back to ED for evaluation.   There were no precipitating factors. Pt with a history of anxiety, back pain, GERD, obesity on Ozempic 2 mg weekly, status post cholecystectomy, kidney stone .  Patient was admitted to hospital 2 times over the past month.  Reports She was supposed to follow-up with GI next week to schedule for EGD.  Otherwise denies fever, chills, chest pain, shortness of breath, diarrhea or, constipations or urinary symptoms. GI is called for evaluation.       #Recurrent epigastric pain   #intermittent constipation  on ozempic, last dose 10 days ago  s/p cholecystectomy 2014  s/p EGD 1/2023, EGD/EUS in 5/2023 by Dr. German revealing small hiatal hernia, non erosive bile reflux gastritis (biopsy), EUS: CBD measuring 7 mm with no evidence of stones or sludge, there is a reactive appearing 14 x 11 mm periportal lymph node. Examination of the pancreas revealed a hyperechoic pancreatic parenchyma with no other parenchymal abnormalities and normal PD throughout the pancreas. Otherwise unremarkable EUS exam.  Symptoms may be multifactorial, possible etiologies include functional, retained sludge/stone, sphincter of oddi dysfunction, gastritis/pud, component of constipation in setting of narcotics    Rec  -low fat diet as tolerated  -MRI wnl CBD dilation likely due to narcotic use  -PPI daily  -Miralax daily  -Monitor LFTs daily  -Please keep NPO for possible EGD tomorrow, risks and benefits discussed with the patient who is agreeable     #CRC screening   Rec  - Follow up with our GI office located on [address] [place] [address], Phone number [phone] with Dr. German

## 2024-12-03 ENCOUNTER — RESULT REVIEW (OUTPATIENT)
Age: 49
End: 2024-12-03

## 2024-12-03 ENCOUNTER — TRANSCRIPTION ENCOUNTER (OUTPATIENT)
Age: 49
End: 2024-12-03

## 2024-12-03 DIAGNOSIS — E66.9 OBESITY, UNSPECIFIED: ICD-10-CM

## 2024-12-03 DIAGNOSIS — Z88.8 ALLERGY STATUS TO OTHER DRUGS, MEDICAMENTS AND BIOLOGICAL SUBSTANCES: ICD-10-CM

## 2024-12-03 DIAGNOSIS — E03.9 HYPOTHYROIDISM, UNSPECIFIED: ICD-10-CM

## 2024-12-03 DIAGNOSIS — Z91.018 ALLERGY TO OTHER FOODS: ICD-10-CM

## 2024-12-03 DIAGNOSIS — E78.5 HYPERLIPIDEMIA, UNSPECIFIED: ICD-10-CM

## 2024-12-03 DIAGNOSIS — G89.29 OTHER CHRONIC PAIN: ICD-10-CM

## 2024-12-03 DIAGNOSIS — K21.9 GASTRO-ESOPHAGEAL REFLUX DISEASE WITHOUT ESOPHAGITIS: ICD-10-CM

## 2024-12-03 DIAGNOSIS — Z79.890 HORMONE REPLACEMENT THERAPY: ICD-10-CM

## 2024-12-03 DIAGNOSIS — R73.03 PREDIABETES: ICD-10-CM

## 2024-12-03 DIAGNOSIS — Z91.012 ALLERGY TO EGGS: ICD-10-CM

## 2024-12-03 DIAGNOSIS — M54.2 CERVICALGIA: ICD-10-CM

## 2024-12-03 DIAGNOSIS — Z88.1 ALLERGY STATUS TO OTHER ANTIBIOTIC AGENTS: ICD-10-CM

## 2024-12-03 DIAGNOSIS — G43.909 MIGRAINE, UNSPECIFIED, NOT INTRACTABLE, WITHOUT STATUS MIGRAINOSUS: ICD-10-CM

## 2024-12-03 DIAGNOSIS — K59.00 CONSTIPATION, UNSPECIFIED: ICD-10-CM

## 2024-12-03 DIAGNOSIS — N32.81 OVERACTIVE BLADDER: ICD-10-CM

## 2024-12-03 DIAGNOSIS — K85.90 ACUTE PANCREATITIS WITHOUT NECROSIS OR INFECTION, UNSPECIFIED: ICD-10-CM

## 2024-12-03 DIAGNOSIS — Z83.3 FAMILY HISTORY OF DIABETES MELLITUS: ICD-10-CM

## 2024-12-03 LAB
ALBUMIN SERPL ELPH-MCNC: 3.9 G/DL — SIGNIFICANT CHANGE UP (ref 3.5–5.2)
ALP SERPL-CCNC: 66 U/L — SIGNIFICANT CHANGE UP (ref 30–115)
ALT FLD-CCNC: 13 U/L — SIGNIFICANT CHANGE UP (ref 0–41)
ANION GAP SERPL CALC-SCNC: 12 MMOL/L — SIGNIFICANT CHANGE UP (ref 7–14)
AST SERPL-CCNC: 18 U/L — SIGNIFICANT CHANGE UP (ref 0–41)
BASOPHILS # BLD AUTO: 0.01 K/UL — SIGNIFICANT CHANGE UP (ref 0–0.2)
BASOPHILS NFR BLD AUTO: 0.2 % — SIGNIFICANT CHANGE UP (ref 0–1)
BILIRUB SERPL-MCNC: 0.3 MG/DL — SIGNIFICANT CHANGE UP (ref 0.2–1.2)
BUN SERPL-MCNC: 4 MG/DL — LOW (ref 10–20)
CALCIUM SERPL-MCNC: 8.7 MG/DL — SIGNIFICANT CHANGE UP (ref 8.4–10.5)
CHLORIDE SERPL-SCNC: 104 MMOL/L — SIGNIFICANT CHANGE UP (ref 98–110)
CO2 SERPL-SCNC: 22 MMOL/L — SIGNIFICANT CHANGE UP (ref 17–32)
CREAT SERPL-MCNC: 0.5 MG/DL — LOW (ref 0.7–1.5)
EGFR: 116 ML/MIN/1.73M2 — SIGNIFICANT CHANGE UP
EOSINOPHIL # BLD AUTO: 0 K/UL — SIGNIFICANT CHANGE UP (ref 0–0.7)
EOSINOPHIL NFR BLD AUTO: 0 % — SIGNIFICANT CHANGE UP (ref 0–8)
GLUCOSE SERPL-MCNC: 87 MG/DL — SIGNIFICANT CHANGE UP (ref 70–99)
HCT VFR BLD CALC: 32.7 % — LOW (ref 37–47)
HGB BLD-MCNC: 10.5 G/DL — LOW (ref 12–16)
IMM GRANULOCYTES NFR BLD AUTO: 0.3 % — SIGNIFICANT CHANGE UP (ref 0.1–0.3)
LIDOCAIN IGE QN: 43 U/L — SIGNIFICANT CHANGE UP (ref 7–60)
LYMPHOCYTES # BLD AUTO: 1.21 K/UL — SIGNIFICANT CHANGE UP (ref 1.2–3.4)
LYMPHOCYTES # BLD AUTO: 20.5 % — SIGNIFICANT CHANGE UP (ref 20.5–51.1)
MAGNESIUM SERPL-MCNC: 1.7 MG/DL — LOW (ref 1.8–2.4)
MCHC RBC-ENTMCNC: 28.5 PG — SIGNIFICANT CHANGE UP (ref 27–31)
MCHC RBC-ENTMCNC: 32.1 G/DL — SIGNIFICANT CHANGE UP (ref 32–37)
MCV RBC AUTO: 88.6 FL — SIGNIFICANT CHANGE UP (ref 81–99)
MONOCYTES # BLD AUTO: 0.55 K/UL — SIGNIFICANT CHANGE UP (ref 0.1–0.6)
MONOCYTES NFR BLD AUTO: 9.3 % — SIGNIFICANT CHANGE UP (ref 1.7–9.3)
NEUTROPHILS # BLD AUTO: 4.12 K/UL — SIGNIFICANT CHANGE UP (ref 1.4–6.5)
NEUTROPHILS NFR BLD AUTO: 69.7 % — SIGNIFICANT CHANGE UP (ref 42.2–75.2)
NRBC # BLD: 0 /100 WBCS — SIGNIFICANT CHANGE UP (ref 0–0)
PLATELET # BLD AUTO: 225 K/UL — SIGNIFICANT CHANGE UP (ref 130–400)
PMV BLD: 10.3 FL — SIGNIFICANT CHANGE UP (ref 7.4–10.4)
POTASSIUM SERPL-MCNC: 3.6 MMOL/L — SIGNIFICANT CHANGE UP (ref 3.5–5)
POTASSIUM SERPL-SCNC: 3.6 MMOL/L — SIGNIFICANT CHANGE UP (ref 3.5–5)
PROT SERPL-MCNC: 6.1 G/DL — SIGNIFICANT CHANGE UP (ref 6–8)
RBC # BLD: 3.69 M/UL — LOW (ref 4.2–5.4)
RBC # FLD: 13.7 % — SIGNIFICANT CHANGE UP (ref 11.5–14.5)
SODIUM SERPL-SCNC: 138 MMOL/L — SIGNIFICANT CHANGE UP (ref 135–146)
WBC # BLD: 5.91 K/UL — SIGNIFICANT CHANGE UP (ref 4.8–10.8)
WBC # FLD AUTO: 5.91 K/UL — SIGNIFICANT CHANGE UP (ref 4.8–10.8)

## 2024-12-03 PROCEDURE — 99232 SBSQ HOSP IP/OBS MODERATE 35: CPT

## 2024-12-03 PROCEDURE — 88312 SPECIAL STAINS GROUP 1: CPT | Mod: 26

## 2024-12-03 PROCEDURE — 88305 TISSUE EXAM BY PATHOLOGIST: CPT | Mod: 26

## 2024-12-03 PROCEDURE — 43239 EGD BIOPSY SINGLE/MULTIPLE: CPT

## 2024-12-03 RX ORDER — SUCRALFATE 1 G/1
1 TABLET ORAL
Refills: 0 | Status: DISCONTINUED | OUTPATIENT
Start: 2024-12-03 | End: 2024-12-04

## 2024-12-03 RX ORDER — METOCLOPRAMIDE HYDROCHLORIDE 10 MG/1
10 TABLET ORAL EVERY 8 HOURS
Refills: 0 | Status: DISCONTINUED | OUTPATIENT
Start: 2024-12-03 | End: 2024-12-04

## 2024-12-03 RX ORDER — 0.9 % SODIUM CHLORIDE 0.9 %
1000 INTRAVENOUS SOLUTION INTRAVENOUS
Refills: 0 | Status: DISCONTINUED | OUTPATIENT
Start: 2024-12-03 | End: 2024-12-04

## 2024-12-03 RX ORDER — HYDROMORPHONE HYDROCHLORIDE 2 MG/1
1 TABLET ORAL ONCE
Refills: 0 | Status: DISCONTINUED | OUTPATIENT
Start: 2024-12-03 | End: 2024-12-03

## 2024-12-03 RX ORDER — ACETAMINOPHEN 500MG 500 MG/1
650 TABLET, COATED ORAL EVERY 6 HOURS
Refills: 0 | Status: DISCONTINUED | OUTPATIENT
Start: 2024-12-03 | End: 2024-12-04

## 2024-12-03 RX ORDER — ONDANSETRON HYDROCHLORIDE 4 MG/1
4 TABLET, FILM COATED ORAL EVERY 4 HOURS
Refills: 0 | Status: DISCONTINUED | OUTPATIENT
Start: 2024-12-03 | End: 2024-12-03

## 2024-12-03 RX ADMIN — Medication 40 MILLIGRAM(S): at 13:36

## 2024-12-03 RX ADMIN — HYDROMORPHONE HYDROCHLORIDE 1 MILLIGRAM(S): 2 TABLET ORAL at 15:39

## 2024-12-03 RX ADMIN — Medication 75 MICROGRAM(S): at 05:37

## 2024-12-03 RX ADMIN — PANTOPRAZOLE SODIUM 40 MILLIGRAM(S): 40 TABLET, DELAYED RELEASE ORAL at 18:53

## 2024-12-03 RX ADMIN — Medication 10 MILLIGRAM(S): at 13:36

## 2024-12-03 RX ADMIN — HYDROMORPHONE HYDROCHLORIDE 0.5 MILLIGRAM(S): 2 TABLET ORAL at 14:10

## 2024-12-03 RX ADMIN — HYDROMORPHONE HYDROCHLORIDE 0.5 MILLIGRAM(S): 2 TABLET ORAL at 09:38

## 2024-12-03 RX ADMIN — HYDROMORPHONE HYDROCHLORIDE 0.5 MILLIGRAM(S): 2 TABLET ORAL at 05:32

## 2024-12-03 RX ADMIN — ROSUVASTATIN CALCIUM 10 MILLIGRAM(S): 5 TABLET, FILM COATED ORAL at 21:50

## 2024-12-03 RX ADMIN — ACETAMINOPHEN 500MG 650 MILLIGRAM(S): 500 TABLET, COATED ORAL at 08:31

## 2024-12-03 RX ADMIN — HYDROMORPHONE HYDROCHLORIDE 0.5 MILLIGRAM(S): 2 TABLET ORAL at 21:52

## 2024-12-03 RX ADMIN — HYDROMORPHONE HYDROCHLORIDE 0.5 MILLIGRAM(S): 2 TABLET ORAL at 06:28

## 2024-12-03 RX ADMIN — METOCLOPRAMIDE HYDROCHLORIDE 10 MILLIGRAM(S): 10 TABLET ORAL at 20:50

## 2024-12-03 RX ADMIN — Medication 0.5 MILLIGRAM(S): at 21:51

## 2024-12-03 RX ADMIN — HYDROMORPHONE HYDROCHLORIDE 0.5 MILLIGRAM(S): 2 TABLET ORAL at 01:28

## 2024-12-03 RX ADMIN — PANTOPRAZOLE SODIUM 40 MILLIGRAM(S): 40 TABLET, DELAYED RELEASE ORAL at 05:37

## 2024-12-03 RX ADMIN — Medication 25 GRAM(S): at 13:35

## 2024-12-03 RX ADMIN — Medication 150 MILLILITER(S): at 05:32

## 2024-12-03 RX ADMIN — HYDROMORPHONE HYDROCHLORIDE 0.5 MILLIGRAM(S): 2 TABLET ORAL at 10:08

## 2024-12-03 RX ADMIN — HYDROMORPHONE HYDROCHLORIDE 0.5 MILLIGRAM(S): 2 TABLET ORAL at 20:14

## 2024-12-03 RX ADMIN — ACETAMINOPHEN 500MG 650 MILLIGRAM(S): 500 TABLET, COATED ORAL at 08:01

## 2024-12-03 RX ADMIN — HYDROMORPHONE HYDROCHLORIDE 0.5 MILLIGRAM(S): 2 TABLET ORAL at 13:40

## 2024-12-03 NOTE — PROGRESS NOTE ADULT - ASSESSMENT
49 years old female present complaint of upper abdominal pain associated multiple episodes of vomiting.    abdominal pain with N/V likely 2' to erosive gastritis:  GI consult appreciated-s/p EGD  s/p EGD   impressions  Normal mucosa in the whole esophagus.  	Erosions and erythema in the stomach compatible with erosive gastritis. (Biopsy).  	Normal mucosa in the whole examined duodenum. (Biopsy).  Plan:	  - Await pathology results.  - Avoid NSAIDs  - Protonix 40 mg IV BID and switch to Esomeprazole BID upon discharge.  - Sucralfate 1 g TID.   Prn pain : dilaudid   CT ab/plv unremarkable       #HLD  C/w rosuvastatin     #Hypothyroidism  -C/w synthroid     #Anxiety   -C/w Xanax prn      #Chronic neck/back pain  -Pt takes Oxycodone/Acetaminophen 10/325 TID prn and Morphine ER 15 BID prn - confirmed on iStop.  Patient takes medications periodically     #H/o migraines  -Holding home meds while inpatient.    Possible DC in 24h if symptoms resolve and patient tolerates diet

## 2024-12-03 NOTE — CHART NOTE - NSCHARTNOTEFT_GEN_A_CORE
Will renew LR for about 2.5 hours then discontinue (no documents suggesting to continue LR on my review)

## 2024-12-03 NOTE — ED ADULT TRIAGE NOTE - ESI TRIAGE ACUITY LEVEL, MLM
HISTORY AND PHYSICAL  ADMITTING H&P FOR SURGERY    Pre-op diagnosis: Lisfranc dislocation, right  Surgeon: Dr. Ren Howell  Procedure: Right foot ORIF  Location: [facility]  Date of Surgery: 12/9/2024      Subjective   HPI (Indications/symptoms): Marv Ross is a 53 year old male here for preop exam.    Today`s Date: 12/3/2024  Prior Anesthesia: Yes  Adverse Reaction, Epidural: No  Adverse Reaction, Spinal: No  Adverse Reaction, General:No    Angina No  Arrhythmia No  CAD No  CAD w/Prior MI No  CAD w/Recent PCI No  CHF No    Chronic Liver Disease No  Acute Hepatitis No    Coagulation Delay No  Primary Hypercoagulable State No  Secondary Hypercoagulable State No  PE No  DVT No  Uses Anticoagulants No    Diabetes No  Uses Insulin No  Thyroid Disease No    Neck Osteoarthrosis No  TMJ Osteoarthrosis No  Wears Dentures No    Seizure Disorder No  CVA No      Asthma No  COPD No  VIDHYA No    Renal Disease No      Symptoms:    Easy BleedingNo  Easy Brusing No  Frequent Nose Bleeds No   Chest Pain No  Cough No  Dyspnea No  Edema No  Heart Rate Is Fast No  Heart Rate Is Slow No  Palpitations No  Wheezing No    STOP Questionnaire (Sleep Apnea Pre-operative Risk Assessment): Score was 3-4 Intermediate Risk of VIDHYA(see screenings for details)    Past History (Comorbidities)  Patient Active Problem List   Diagnosis    Anxiety    Depression    Panic attacks    Tobacco abuse    Sleep disturbance    Pre-operative examination    Lisfranc dislocation, right, subsequent encounter       Marv  has a past surgical history that includes nasal scopy,open maxill sinus.  His family history is not on file. He was adopted.    Marv   Current Outpatient Medications   Medication Sig Dispense Refill    Varenicline Tartrate, Starter, 0.5 MG X 11 & 1 MG X 42 Tablet Therapy Pack Take 0.5 mg by mouth daily for 3 days, THEN 0.5 mg 2 times daily for 4 days, THEN 1 mg 2 times daily. 53 each 0    [START ON 1/14/2025]  varenicline (CHANTIX) 1 MG tablet Take 1 tablet by mouth in the morning and 1 tablet in the evening. Begin taking on January 14, 2025. 60 tablet 2    propRANolol (INDERAL) 20 MG tablet Take 1 tablet by mouth 3 times daily as needed (anxiety). 30 tablet 1    nabumetone (RELAFEN) 750 MG tablet Take 1 tablet by mouth in the morning and 1 tablet in the evening. Take with meals. Do all this for 15 days. 30 tablet 0    traMADol (ULTRAM) 50 MG tablet Take 1 tablet by mouth every 6 hours as needed for Pain. 20 tablet 0     No current facility-administered medications for this visit.       Marv has No Known Allergies.    Review of Systems   Constitutional:  Negative for fatigue.   HENT:  Negative for dental problem, ear pain, sore throat and trouble swallowing.    Eyes:  Negative for pain and visual disturbance.   Respiratory:  Negative for cough and shortness of breath.    Cardiovascular:  Negative for chest pain and palpitations.   Gastrointestinal:  Negative for abdominal pain, constipation and diarrhea.   Genitourinary:  Negative for dysuria.   Musculoskeletal:  Positive for arthralgias.   Skin:  Negative for rash.   Neurological:  Negative for dizziness.   Psychiatric/Behavioral:  Negative for dysphoric mood.          Objective   /89   Pulse 98   Temp 98.4 °F (36.9 °C) (Temporal)   Ht 5' 7\" (1.702 m)   Wt 76.5 kg (168 lb 10.4 oz)   BMI 26.41 kg/m²   BSA 1.88 m²   Physical Exam  Vitals and nursing note reviewed.   Constitutional:       General: He is not in acute distress.     Appearance: Normal appearance. He is well-developed.   HENT:      Head: Normocephalic and atraumatic.   Cardiovascular:      Rate and Rhythm: Normal rate and regular rhythm.      Heart sounds: Normal heart sounds. No murmur heard.  Pulmonary:      Effort: Pulmonary effort is normal.      Breath sounds: Normal breath sounds. No wheezing.   Skin:     General: Skin is warm and dry.   Neurological:      Mental Status: He is alert and  oriented to person, place, and time.   Psychiatric:         Mood and Affect: Mood normal.           Assessment   Problem List Items Addressed This Visit          Health Encounters    Pre-operative examination - Primary     Low risk for surgery  EKG is in normal sinus rhythm, no ST-T wave changes noted  Labs ordered         Relevant Medications    propRANolol (INDERAL) 20 MG tablet    Other Relevant Orders    CBC with Automated Differential    Comprehensive Metabolic Panel    Prothrombin Time (INR/PT)    Partial Thromboplastin Time (PTT)    Electrocardiogram 12-Lead (Completed)       Musculoskeletal and Injuries    Lisfranc dislocation, right, subsequent encounter    Relevant Orders    CBC with Automated Differential    Comprehensive Metabolic Panel       Sleep    Sleep disturbance     Counselled on tobacco cessation. ed on risks and benefits. ed on medication options for smoking cessation. Total time spent for counselling- 5 minutes.              Tobacco    Tobacco abuse    Relevant Medications    Varenicline Tartrate, Starter, 0.5 MG X 11 & 1 MG X 42 Tablet Therapy Pack    varenicline (CHANTIX) 1 MG tablet (Start on 1/14/2025)       ASA PHYSICAL STATUS:  ASA 1: Generally healthy patient free of systemic disease. Patient may have localized disease.     Medically cleared for surgery: Yes    Marv understands that there are risks inherent in any procedure and that the risks must be weighed against the benefits. Discussed that he should review surgery specifics, what to expect for recovery and alternatives with the operating physician.      Electronically signed by Mat hatch MD   3

## 2024-12-03 NOTE — PROGRESS NOTE ADULT - SUBJECTIVE AND OBJECTIVE BOX
49 years old female present complaint of upper abdominal pain associated multiple episodes of vomiting.    Today:  Seen at bedside, no new complaints.            REVIEW OF SYSTEMS:  No new complaints      MEDICATIONS  (STANDING):  citalopram 40 milliGRAM(s) Oral daily  lactated ringers. 1000 milliLiter(s) (150 mL/Hr) IV Continuous <Continuous>  levothyroxine 75 MICROGram(s) Oral daily  oxybutynin 10 milliGRAM(s) Oral daily  pantoprazole  Injectable 40 milliGRAM(s) IV Push two times a day  polyethylene glycol 3350 17 Gram(s) Oral daily  rosuvastatin 10 milliGRAM(s) Oral at bedtime  senna 2 Tablet(s) Oral at bedtime    MEDICATIONS  (PRN):  acetaminophen     Tablet .. 650 milliGRAM(s) Oral every 6 hours PRN Mild Pain (1 - 3)  ALPRAZolam 0.5 milliGRAM(s) Oral two times a day PRN for anxiety  HYDROmorphone  Injectable 0.5 milliGRAM(s) IV Push every 4 hours PRN Severe Pain (7 - 10)  metoclopramide Injectable 10 milliGRAM(s) IV Push every 8 hours PRN nausea, vomiting  naloxone 1 mG/mL Injection for Intranasal Use 4 milliGRAM(s) IntraNasal every 2 hours PRN opiate od      Allergies  Cipro (Rash)  Bananas (Unknown)  eggs (Unknown)  NSAIDs (Angioedema; Anaphylaxis; Hives)  citrus (Unknown)      FAMILY HISTORY:  DM (diabetes mellitus) (Father)  Family history of heart disease (Father)        Vital Signs Last 24 Hrs  T(C): 36.7 (03 Dec 2024 13:24), Max: 37.3 (02 Dec 2024 20:21)  T(F): 98.1 (03 Dec 2024 13:24), Max: 99.2 (02 Dec 2024 20:21)  HR: 84 (03 Dec 2024 13:24) (63 - 84)  BP: 132/78 (03 Dec 2024 13:24) (103/62 - 132/78)  RR: 16 (03 Dec 2024 13:24) (11 - 18)  SpO2: 99% (03 Dec 2024 13:24) (94% - 99%)    Parameters below as of 03 Dec 2024 11:58  Patient On (Oxygen Delivery Method): room air        PHYSICAL EXAM:  GENERAL:  48y/o Female NAD, resting comfortably.  HEAD:  Atraumatic, Normocephalic  EYES: EOMI, PERRLA, conjunctiva and sclera clear  NECK: Supple, No JVD, no cervical lymphadenopathy, non-tender  CHEST/LUNG: Clear to auscultation bilaterally; No wheeze, rhonchi, or rales  HEART: Regular rate and rhythm; S1&S2  ABDOMEN: Soft, mildly tender, Nondistended x 4 quadrants; Bowel sounds present  EXTREMITIES:   Peripheral Pulses Present, No clubbing, no cyanosis, or no edema, no calf tenderness  PSYCH: AAOx3, cooperative, appropriate      LABS:                        10.5   5.91  )-----------( 225      ( 03 Dec 2024 07:19 )             32.7     12-03    138  |  104  |  4[L]  ----------------------------<  87  3.6   |  22  |  0.5[L]    Ca    8.7      03 Dec 2024 07:19  Mg     1.7     12-03    TPro  6.1  /  Alb  3.9  /  TBili  0.3  /  DBili  x   /  AST  18  /  ALT  13  /  AlkPhos  66  12-03      Urinalysis Basic - ( 03 Dec 2024 07:19 )    Color: x / Appearance: x / SG: x / pH: x  Gluc: 87 mg/dL / Ketone: x  / Bili: x / Urobili: x   Blood: x / Protein: x / Nitrite: x   Leuk Esterase: x / RBC: x / WBC x   Sq Epi: x / Non Sq Epi: x / Bacteria: x

## 2024-12-03 NOTE — PRE-ANESTHESIA EVALUATION ADULT - NSANTHPMHFT_GEN_ALL_CORE
Chart reviewed, Med/GI evaluation seen. Pt interviewed and examined. Labs EKG seen.  Last Ozempic was 10 days ago, no vomiting more than 24 hours.

## 2024-12-03 NOTE — CHART NOTE - NSCHARTNOTEFT_GEN_A_CORE
Normal mucosa in the whole esophagus.  	Erosions and erythema in the stomach compatible with erosive gastritis. (Biopsy).  	Normal mucosa in the whole examined duodenum. (Biopsy).  Plan:	  - Await pathology results.  - Avoid NSAIDs  - Protonix 40 mg IV BID and switch to Esomeprazole BID upon discharge.  - Sucralfate 1 g TID.   - Follow up visit with GI office s/p EGD   impressions  Normal mucosa in the whole esophagus.  	Erosions and erythema in the stomach compatible with erosive gastritis. (Biopsy).  	Normal mucosa in the whole examined duodenum. (Biopsy).  Plan:	  - Await pathology results.  - Avoid NSAIDs  - Protonix 40 mg IV BID and switch to Esomeprazole BID upon discharge.  - Sucralfate 1 g TID.   - Follow up visit with GI office Follow up with our GI office located on [address] [place] [address], Phone number [phone] with Dr. German

## 2024-12-04 ENCOUNTER — TRANSCRIPTION ENCOUNTER (OUTPATIENT)
Age: 49
End: 2024-12-04

## 2024-12-04 VITALS
DIASTOLIC BLOOD PRESSURE: 76 MMHG | SYSTOLIC BLOOD PRESSURE: 112 MMHG | OXYGEN SATURATION: 95 % | TEMPERATURE: 98 F | HEART RATE: 79 BPM | RESPIRATION RATE: 16 BRPM

## 2024-12-04 LAB — MAGNESIUM SERPL-MCNC: 2.2 MG/DL — SIGNIFICANT CHANGE UP (ref 1.8–2.4)

## 2024-12-04 PROCEDURE — 99233 SBSQ HOSP IP/OBS HIGH 50: CPT

## 2024-12-04 PROCEDURE — 99239 HOSP IP/OBS DSCHRG MGMT >30: CPT

## 2024-12-04 RX ORDER — SUCRALFATE 1 G/1
1 TABLET ORAL
Qty: 120 | Refills: 1
Start: 2024-12-04 | End: 2025-02-01

## 2024-12-04 RX ORDER — ESOMEPRAZOLE MAGNESIUM 40 MG/1
1 CAPSULE, DELAYED RELEASE ORAL
Qty: 60 | Refills: 1 | DISCHARGE
Start: 2024-12-04 | End: 2025-02-01

## 2024-12-04 RX ORDER — ESOMEPRAZOLE MAGNESIUM 20 MG/1
1 CAPSULE, DELAYED RELEASE ORAL
Qty: 60 | Refills: 1
Start: 2024-12-04 | End: 2025-02-01

## 2024-12-04 RX ADMIN — HYDROMORPHONE HYDROCHLORIDE 0.5 MILLIGRAM(S): 2 TABLET ORAL at 09:47

## 2024-12-04 RX ADMIN — Medication 10 MILLIGRAM(S): at 11:54

## 2024-12-04 RX ADMIN — SUCRALFATE 1 GRAM(S): 1 TABLET ORAL at 05:10

## 2024-12-04 RX ADMIN — HYDROMORPHONE HYDROCHLORIDE 0.5 MILLIGRAM(S): 2 TABLET ORAL at 10:10

## 2024-12-04 RX ADMIN — ACETAMINOPHEN 500MG 650 MILLIGRAM(S): 500 TABLET, COATED ORAL at 11:56

## 2024-12-04 RX ADMIN — Medication 75 MICROGRAM(S): at 05:10

## 2024-12-04 RX ADMIN — POLYETHYLENE GLYCOL 3350 17 GRAM(S): 17 POWDER, FOR SOLUTION ORAL at 11:53

## 2024-12-04 RX ADMIN — HYDROMORPHONE HYDROCHLORIDE 0.5 MILLIGRAM(S): 2 TABLET ORAL at 05:10

## 2024-12-04 RX ADMIN — HYDROMORPHONE HYDROCHLORIDE 0.5 MILLIGRAM(S): 2 TABLET ORAL at 00:57

## 2024-12-04 RX ADMIN — PANTOPRAZOLE SODIUM 40 MILLIGRAM(S): 40 TABLET, DELAYED RELEASE ORAL at 05:10

## 2024-12-04 RX ADMIN — Medication 40 MILLIGRAM(S): at 11:53

## 2024-12-04 RX ADMIN — SUCRALFATE 1 GRAM(S): 1 TABLET ORAL at 11:54

## 2024-12-04 NOTE — DISCHARGE NOTE PROVIDER - HOSPITAL COURSE
49 years old female present complaint of upper abdominal pain associated multiple episodes of vomiting.    abdominal pain with N/V likely 2' to erosive gastritis:  GI consult appreciated-s/p EGD  s/p EGD   impressions  Normal mucosa in the whole esophagus.  	Erosions and erythema in the stomach compatible with erosive gastritis. (Biopsy).  	Normal mucosa in the whole examined duodenum. (Biopsy).  Plan:	  - Await pathology results.  - Avoid NSAIDs  - Protonix 40 mg IV BID and switch to Esomeprazole BID upon discharge.  - Sucralfate 1 g TID.   Prn pain : dilaudid   CT ab/plv unremarkable  - tolerated diet on day of discharge        #H/O HLD- cw rosuvastatin     #H/O Hypothyroidism -C/w synthroid     #H/O Anxiety  -C/w Xanax prn, citalopram       #H/O Chronic neck/back pain- cw home Oxycodone/Acetaminophen 10/325 TID prn and Morphine ER 15 BID prn - confirmed on iStop.  Patient takes medications periodically     #H/o migraines - held home meds inpatient     attending attestation:  patient seen and examined on day of discharge  labs and vitals reviewed  patient has no complaints  plan of care discussed with patient/family in agreement and understanding

## 2024-12-04 NOTE — DISCHARGE NOTE PROVIDER - NSDCCPCAREPLAN_GEN_ALL_CORE_FT
PRINCIPAL DISCHARGE DIAGNOSIS  Diagnosis: Acute epigastric pain  Assessment and Plan of Treatment: You had EGD done which showed :  ormal mucosa in the whole esophagus.  	Erosions and erythema in the stomach compatible with erosive gastritis. (Biopsy).  	Normal mucosa in the whole examined duodenum. (Biopsy).  - STOP home pantoprazole  - START Esompeprazole twice a day   - START Sucrulfate 1 G four times a day  - Follow up with GI Dr. Monserrat Oliveros: Follow up visit with GI office Follow up with our GI office located on [address] [place] [address], Phone number [phone] with Dr. German.        SECONDARY DISCHARGE DIAGNOSES  Diagnosis: Back pain, chronic  Assessment and Plan of Treatment: continue with home medications

## 2024-12-04 NOTE — DISCHARGE NOTE NURSING/CASE MANAGEMENT/SOCIAL WORK - FINANCIAL ASSISTANCE
[facility] provides services at a reduced cost to those who are determined to be eligible through [facility]’s financial assistance program. Information regarding [facility]’s financial assistance program can be found by going to [email or web address] or by calling [phone].

## 2024-12-04 NOTE — PROGRESS NOTE ADULT - SUBJECTIVE AND OBJECTIVE BOX
49yFemale  Being followed for   Interval history:      PAST MEDICAL & SURGICAL HISTORY:   GERD (gastroesophageal reflux disease)      Migraine headache      Back pain      Anxiety      Kidney stones      Murmur  since age 20           Thyroid nodule      Hypothyroidism      Overactive bladder      History of surgery  hernia repair    x2      History of cholecystectomy      History of lithotripsy                Social History: No smoking. No alcohol. No illegal drug use.            MEDICATIONS  (STANDING):  citalopram 40 milliGRAM(s) Oral daily  levothyroxine 75 MICROGram(s) Oral daily  oxybutynin 10 milliGRAM(s) Oral daily  pantoprazole  Injectable 40 milliGRAM(s) IV Push two times a day  polyethylene glycol 3350 17 Gram(s) Oral daily  rosuvastatin 10 milliGRAM(s) Oral at bedtime  senna 2 Tablet(s) Oral at bedtime  sucralfate 1 Gram(s) Oral four times a day    MEDICATIONS  (PRN):  acetaminophen     Tablet .. 650 milliGRAM(s) Oral every 6 hours PRN Mild Pain (1 - 3)  ALPRAZolam 0.5 milliGRAM(s) Oral two times a day PRN for anxiety  metoclopramide Injectable 10 milliGRAM(s) IV Push every 8 hours PRN nausea, vomiting  naloxone 1 mG/mL Injection for Intranasal Use 4 milliGRAM(s) IntraNasal every 2 hours PRN opiate od      Allergies:  Cipro (Rash)  Bananas (Unknown)  eggs (Unknown)  NSAIDs (Angioedema; Anaphylaxis; Hives)  citrus (Unknown)  Intolerances          REVIEW OF SYSTEMS:  General:  No weight loss, fevers, or chills.  Eyes:  No reported pain or visual changes  ENT:  No sore throat or runny nose.  NECK: No stiffness   CV:  No chest pain or palpitations.  Resp:  No shortness of breath, cough  GI:  No abdominal pain, nausea, vomiting, dysphagia, diarrhea or constipation. No rectal bleeding, melena, or hematemesis.  Neuro:  No tingling, numbness         VITAL SIGNS:   T(F): 98.4 (24 @ 04:53), Max: 99 (24 @ 20:33)  HR: 79 (24 @ 04:53) (79 - 96)  BP: 112/76 (24 @ 04:53) (112/76 - 166/92)  RR: 16 (24 @ 04:53) (16 - 18)  SpO2: 95% (24 @ 04:53) (95% - 96%)    PHYSICAL EXAM:  GENERAL: AAOx3, no acute distress.  HEAD:  Atraumatic, Normocephalic  EYES: conjunctiva and sclera clear  NECK: Supple, no thyromegaly  CHEST/LUNG: Clear to auscultation bilaterally; No wheeze, rhonchi, or rales  HEART: Regular rate and rhythm; normal S1, S2, No murmurs.  ABDOMEN: Soft, nontender, nondistended; Bowel sounds present  NEUROLOGY: No asterixis or tremor.   SKIN: Intact, no jaundice            LABS:                        10.5   5.91  )-----------( 225      ( 03 Dec 2024 07:19 )             32.7     12    138  |  104  |  4[L]  ----------------------------<  87  3.6   |  22  |  0.5[L]    Ca    8.7      03 Dec 2024 07:19  Mg     2.2     12-    TPro  6.1  /  Alb  3.9  /  TBili  0.3  /  DBili  x   /  AST  18  /  ALT  13  /  AlkPhos  66  12-03    LIVER FUNCTIONS - ( 03 Dec 2024 07:19 )  Alb: 3.9 g/dL / Pro: 6.1 g/dL / ALK PHOS: 66 U/L / ALT: 13 U/L / AST: 18 U/L / GGT: x               IMAGING:    < from: CT Abdomen and Pelvis w/ IV Cont (24 @ 04:14) >    ACC: [id number] EXAM:  CT ABDOMEN AND PELVIS IC   ORDERED BY: DANNY MASTERS     PROCEDURE DATE:  2024          INTERPRETATION:  CLINICAL STATEMENT: Abd pain    TECHNIQUE: Contiguous axial CT images were obtained from the lower chest   to the pubic symphysis following administration of intravenous contrast.   Oral contrast was not administered.  Reformatted images in the coronal   and sagittal planes were acquired.    COMPARISON: CT ABDOMEN 2024.    CONTRAST VOLUME: Omnipaque 350. 95 cc administered. 5 cc discarded.    FINDINGS:    LOWER CHEST: Unremarkable.    HEPATOBILIARY: Unchanged.    SPLEEN: Unchanged.    PANCREAS: Unchanged.    ADRENAL GLANDS: Unchanged.    KIDNEYS: Symmetric enhancement. No hydronephrosis.    ABDOMINOPELVIC NODES: Unchanged.    PELVIC ORGANS: Unremarkable.    PERITONEUM/MESENTERY/BOWEL: No bowel obstruction, pneumatosis,   pneumoperitoneum, or ascites. Normal appendix.    BONES/SOFT TISSUES: No acute osseous abnormality.    OTHER: Normal caliber aorta.      IMPRESSION:    No CT evidence of an acute intra-abdominal abnormality.        --- End of Report ---            JONNY VILLEDA MD; Attending Radiologist  This document has been electronically signed. Dec  2 2024  5:33AM    < end of copied text >      < from: EGD (24 @ 11:30) >  EGD Report    Date: 12/3/2024 11:30 AM    Patient Name: RAMON WOODS    MRN: [id number]    Account Number:    [id number]    Gender: Female     (age): 1975 (49)    Instrument(s):    Olympus: egd([id number])    Attending/Fellow:    MD Zainab Huerta MD        Referring Physician:    ED PHYSICIAN UNASSIGNED        Nurse(s):    Linda Zacarias    History of Present Illness:    H&P was previously reviewed.    Administered Medications:    As per Anesthesiology Record    Indications:    Epigastric pain: 789.06 - R10.13    Procedure:    The procedure, indications, preparation and potential complications were  explained to the patient, who indicated understanding and signed the  corresponding consent forms.MAC was administered by anesthesiologist.  Continuous pulse oximetry and blood pressure monitoring were used throughout the  procedure. Supplemental oxygen was used. Patient was placed in the left lateral  decubitus position. The endoscope was introduced through the mouth and advanced  under direct visualization until the second part of the duodenum was reached.  Patient tolerance to the procedure was good. The procedure was not difficult.  Blood loss was none.    Limitations/Complications:    There were no apparent limitations or complications    Findings:    Esophagus Mucosa Normal mucosa was noted in the whole esophagus.    Stomach Mucosa Erosions and erythema of the mucosa was noted in the stomach.  These findings are compatible with erosive gastritis.Multiple cold forceps  biopsies were performed for histology.    Duodenum Mucosa Normal mucosa was noted in the whole examined duodenum.Multiple  cold forceps biopsies were performed for histology.    Impressions:    Normal mucosa in thewhole esophagus.    Erosions and erythema in the stomach compatible with erosive gastritis.  (Biopsy).    Normal mucosa in the whole examined duodenum. (Biopsy).    Plan:    - Await pathology results.    - Avoid NSAIDs    - Protonix 40 mg IV BID and switch to Esomeprazole BID upon discharge.    - Sucralfate 1 g TID.    - Follow up visit with GI office        Attending Participation:    I was present and participated during the entire procedure, including non-key  portions.    Lexi Tena MD    Version 1, Electronically signed on 12/3/2024 2:53:32 PM by MD Zainab Huerta MD    < end of copied text >         49yFemale  Being followed for erosive gastritis   Interval history: Patient denies nausea, vomiting, hematemesis, melena, blood in stool, diarrhea, constipation, abdominal pain. Patient s/p EGD doing well.      PAST MEDICAL & SURGICAL HISTORY:   GERD (gastroesophageal reflux disease)      Migraine headache      Back pain      Anxiety      Kidney stones      Murmur  since age 20     6/18      Thyroid nodule      Hypothyroidism      Overactive bladder      History of surgery  hernia repair    x2      History of cholecystectomy      History of lithotripsy                Social History: No smoking. No alcohol. No illegal drug use.            MEDICATIONS  (STANDING):  citalopram 40 milliGRAM(s) Oral daily  levothyroxine 75 MICROGram(s) Oral daily  oxybutynin 10 milliGRAM(s) Oral daily  pantoprazole  Injectable 40 milliGRAM(s) IV Push two times a day  polyethylene glycol 3350 17 Gram(s) Oral daily  rosuvastatin 10 milliGRAM(s) Oral at bedtime  senna 2 Tablet(s) Oral at bedtime  sucralfate 1 Gram(s) Oral four times a day    MEDICATIONS  (PRN):  acetaminophen     Tablet .. 650 milliGRAM(s) Oral every 6 hours PRN Mild Pain (1 - 3)  ALPRAZolam 0.5 milliGRAM(s) Oral two times a day PRN for anxiety  metoclopramide Injectable 10 milliGRAM(s) IV Push every 8 hours PRN nausea, vomiting  naloxone 1 mG/mL Injection for Intranasal Use 4 milliGRAM(s) IntraNasal every 2 hours PRN opiate od      Allergies:  Cipro (Rash)  Bananas (Unknown)  eggs (Unknown)  NSAIDs (Angioedema; Anaphylaxis; Hives)  citrus (Unknown)  Intolerances          REVIEW OF SYSTEMS:  General:  No weight loss, fevers, or chills.  Eyes:  No reported pain or visual changes  ENT:  No sore throat or runny nose.  NECK: No stiffness   CV:  No chest pain or palpitations.  Resp:  No shortness of breath, cough  GI:  No abdominal pain, nausea, vomiting, dysphagia, diarrhea or constipation. No rectal bleeding, melena, or hematemesis.  Neuro:  No tingling, numbness         VITAL SIGNS:   T(F): 98.4 (24 @ 04:53), Max: 99 (24 @ 20:33)  HR: 79 (24 @ 04:53) (79 - 96)  BP: 112/76 (24 @ 04:53) (112/76 - 166/92)  RR: 16 (24 @ 04:53) (16 - 18)  SpO2: 95% (24 @ 04:53) (95% - 96%)    PHYSICAL EXAM:  GENERAL: AAOx3, no acute distress.  HEAD:  Atraumatic, Normocephalic  EYES: conjunctiva and sclera clear  NECK: Supple, no thyromegaly  CHEST/LUNG: Clear to auscultation bilaterally; No wheeze, rhonchi, or rales  HEART: Regular rate and rhythm; normal S1, S2, No murmurs.  ABDOMEN: Soft, nontender, nondistended; Bowel sounds present  NEUROLOGY: No asterixis or tremor.   SKIN: Intact, no jaundice            LABS:                        10.5   5.91  )-----------( 225      ( 03 Dec 2024 07:19 )             32.7     12    138  |  104  |  4[L]  ----------------------------<  87  3.6   |  22  |  0.5[L]    Ca    8.7      03 Dec 2024 07:19  Mg     2.2     12-    TPro  6.1  /  Alb  3.9  /  TBili  0.3  /  DBili  x   /  AST  18  /  ALT  13  /  AlkPhos  66  12-03    LIVER FUNCTIONS - ( 03 Dec 2024 07:19 )  Alb: 3.9 g/dL / Pro: 6.1 g/dL / ALK PHOS: 66 U/L / ALT: 13 U/L / AST: 18 U/L / GGT: x               IMAGING:    < from: CT Abdomen and Pelvis w/ IV Cont (24 @ 04:14) >    ACC: [id number] EXAM:  CT ABDOMEN AND PELVIS IC   ORDERED BY: DANNY MASTERS     PROCEDURE DATE:  2024          INTERPRETATION:  CLINICAL STATEMENT: Abd pain    TECHNIQUE: Contiguous axial CT images were obtained from the lower chest   to the pubic symphysis following administration of intravenous contrast.   Oral contrast was not administered.  Reformatted images in the coronal   and sagittal planes were acquired.    COMPARISON: CT ABDOMEN 2024.    CONTRAST VOLUME: Omnipaque 350. 95 cc administered. 5 cc discarded.    FINDINGS:    LOWER CHEST: Unremarkable.    HEPATOBILIARY: Unchanged.    SPLEEN: Unchanged.    PANCREAS: Unchanged.    ADRENAL GLANDS: Unchanged.    KIDNEYS: Symmetric enhancement. No hydronephrosis.    ABDOMINOPELVIC NODES: Unchanged.    PELVIC ORGANS: Unremarkable.    PERITONEUM/MESENTERY/BOWEL: No bowel obstruction, pneumatosis,   pneumoperitoneum, or ascites. Normal appendix.    BONES/SOFT TISSUES: No acute osseous abnormality.    OTHER: Normal caliber aorta.      IMPRESSION:    No CT evidence of an acute intra-abdominal abnormality.        --- End of Report ---            JONNY VILLEDA MD; Attending Radiologist  This document has been electronically signed. Dec  2 2024  5:33AM    < end of copied text >      < from: EGD (24 @ 11:30) >  EGD Report    Date: 12/3/2024 11:30 AM    Patient Name: RAMON WOODS    MRN: [id number]    Account Number:    [id number]    Gender: Female     (age): 1975 (49)    Instrument(s):    Olympus: egd([id number])    Attending/Fellow:    MD Zainab Huerta MD        Referring Physician:    ED PHYSICIAN UNASSIGNED        Nurse(s):    Linda Zacarias    History of Present Illness:    H&P was previously reviewed.    Administered Medications:    As per Anesthesiology Record    Indications:    Epigastric pain: 789.06 - R10.13    Procedure:    The procedure, indications, preparation and potential complications were  explained to the patient, who indicated understanding and signed the  corresponding consent forms.MAC was administered by anesthesiologist.  Continuous pulse oximetry and blood pressure monitoring were used throughout the  procedure. Supplemental oxygen was used. Patient was placed in the left lateral  decubitus position. The endoscope was introduced through the mouth and advanced  under direct visualization until the second part of the duodenum was reached.  Patient tolerance to the procedure was good. The procedure was not difficult.  Blood loss was none.    Limitations/Complications:    There were no apparent limitations or complications    Findings:    Esophagus Mucosa Normal mucosa was noted in the whole esophagus.    Stomach Mucosa Erosions and erythema of the mucosa was noted in the stomach.  These findings are compatible with erosive gastritis.Multiple cold forceps  biopsies were performed for histology.    Duodenum Mucosa Normal mucosa was noted in the whole examined duodenum.Multiple  cold forceps biopsies were performed for histology.    Impressions:    Normal mucosa in thewhole esophagus.    Erosions and erythema in the stomach compatible with erosive gastritis.  (Biopsy).    Normal mucosa in the whole examined duodenum. (Biopsy).    Plan:    - Await pathology results.    - Avoid NSAIDs    - Protonix 40 mg IV BID and switch to Esomeprazole BID upon discharge.    - Sucralfate 1 g TID.    - Follow up visit with GI office        Attending Participation:    I was present and participated during the entire procedure, including non-key  portions.    Lexi Tena MD    Version 1, Electronically signed on 12/3/2024 2:53:32 PM by MD Zainab Huerta MD    < end of copied text >

## 2024-12-04 NOTE — DISCHARGE NOTE NURSING/CASE MANAGEMENT/SOCIAL WORK - PATIENT PORTAL LINK FT
You can access the FollowMyHealth Patient Portal offered by [facility] by registering at the following website: [email or web address]. By joining [facility]’s FollowMyHealth portal, you will also be able to view your health information using other applications (apps) compatible with our system.

## 2024-12-04 NOTE — DISCHARGE NOTE PROVIDER - NSDCMRMEDTOKEN_GEN_ALL_CORE_FT
citalopram 40 mg oral tablet: 1 tab(s) orally once a day (in the morning)  esomeprazole 40 mg oral delayed release capsule: 1 cap(s) orally 2 times a day  levothyroxine 75 mcg (0.075 mg) oral tablet: 1 tab(s) orally once a day  morphine 15 mg/8 to 12 hr oral tablet, extended release: 1 tab(s) orally 2 times a day MDD: 2 tablets  Narcan 4 mg/0.1 mL nasal spray: 1 spray(s) intranasally once a day Use as needed for opioid overdose.  oxybutynin 5 mg oral tablet: 2 tab(s) orally once a day  oxyCODONE 10 mg oral tablet: 1 tab(s) orally every 8 hours as needed for Severe Pain (7 - 10) MDD: 3 tablets  rizatriptan 10 mg oral tablet, disintegratin tab(s) orally once a day as needed for  migraine  rosuvastatin 10 mg oral tablet: 1 tab(s) orally once a day  sucralfate 1 g oral tablet: 1 tab(s) orally 4 times a day  Tylenol Extra Strength 500 mg oral tablet: 2 tab(s) orally every 8 hours as needed for  mild pain Can combine with oxycodone. Do not use with percocet.  Xanax 0.5 mg oral tablet: 1 tab(s) orally 2 times a day as needed for  anxiety More prescriptions to be provided by outpatient provider MDD: 2 tabs

## 2024-12-04 NOTE — DISCHARGE NOTE PROVIDER - NSDCFUSCHEDAPPT_GEN_ALL_CORE_FT
Domo German  [facility]  GASTRO Doc Off [id number]  Scheduled Appointment: 01/17/2025

## 2024-12-04 NOTE — DISCHARGE NOTE PROVIDER - ATTENDING DISCHARGE PHYSICAL EXAMINATION:
PHYSICAL EXAM:  GENERAL: NAD, lying in bed comfortably  HEAD:  Atraumatic, Normocephalic  EYES: EOMI, PERRLA, conjunctiva and sclera clear  ENT: Moist mucous membranes  NECK: Supple, No JVD  CHEST/LUNG: Clear to auscultation bilaterally; No rales, rhonchi, wheezing, or rubs. Unlabored respirations  HEART: Regular rate and rhythm; No murmurs, rubs, or gallops  ABDOMEN: Bowel sounds present; Soft, mild epigastric discomfort, Nondistended. No hepatomegally  , no rebound/ no guarding   EXTREMITIES:  2+ Peripheral Pulses, brisk capillary refill. No clubbing, cyanosis, or edema

## 2024-12-04 NOTE — PROGRESS NOTE ADULT - ASSESSMENT
49 years old female present complaint of upper abdominal pain associated multiple episodes of vomiting started yesterday. The onset was sudden .She was taking Zofran, morphine p.o. and oxycodone but not able to tolerate the medication today so she comes back to ED for evaluation.   There were no precipitating factors. Pt with a history of anxiety, back pain, GERD, obesity on Ozempic 2 mg weekly, status post cholecystectomy, kidney stone .  Patient was admitted to hospital 2 times over the past month.  Reports She was supposed to follow-up with GI next week to schedule for EGD.  Otherwise denies fever, chills, chest pain, shortness of breath, diarrhea or, constipations or urinary symptoms. GI is called for evaluation.       #Recurrent epigastric pain   #intermittent constipation  on ozempic, last dose 10 days ago  s/p cholecystectomy 2014  s/p EGD 1/2023, EGD/EUS in 5/2023 by Dr. German revealing small hiatal hernia, non erosive bile reflux gastritis (biopsy), EUS: CBD measuring 7 mm with no evidence of stones or sludge, there is a reactive appearing 14 x 11 mm periportal lymph node. Examination of the pancreas revealed a hyperechoic pancreatic parenchyma with no other parenchymal abnormalities and normal PD throughout the pancreas. Otherwise unremarkable EUS exam.  Symptoms may be multifactorial, possible etiologies include functional, retained sludge/stone, sphincter of oddi dysfunction, gastritis/pud, component of constipation in setting of narcotics    Rec  s/p EGD 12/3/24 Dr. Elliott  Impressions:    Normal mucosa in thewhole esophagus.    Erosions and erythema in the stomach compatible with erosive gastritis.  (Biopsy).    Normal mucosa in the whole examined duodenum. (Biopsy).    Plan:  - Await pathology results.  - Avoid NSAIDs  - Protonix 40 mg IV BID and switch to Esomeprazole BID upon discharge.  - Sucralfate 1 g TID.  - Follow up visit with GI office   -low fat diet as tolerated  -MRI wnl CBD dilation likely due to narcotic use  -PPI daily  -Miralax daily  -Monitor LFTs daily    #CRC screening   Rec  - Follow up with our GI office located on [address] [place] [address], Phone number [phone] with Dr. German 49 years old female present complaint of upper abdominal pain associated multiple episodes of vomiting started yesterday. The onset was sudden .She was taking Zofran, morphine p.o. and oxycodone but not able to tolerate the medication today so she comes back to ED for evaluation.   There were no precipitating factors. Pt with a history of anxiety, back pain, GERD, obesity on Ozempic 2 mg weekly, status post cholecystectomy, kidney stone .  Patient was admitted to hospital 2 times over the past month.  Reports She was supposed to follow-up with GI next week to schedule for EGD.  Otherwise denies fever, chills, chest pain, shortness of breath, diarrhea or, constipations or urinary symptoms. GI is called for evaluation.       #Recurrent epigastric pain   #intermittent constipation  on ozempic, last dose 10 days ago  s/p cholecystectomy 2014  s/p EGD 1/2023, EGD/EUS in 5/2023 by Dr. German revealing small hiatal hernia, non erosive bile reflux gastritis (biopsy), EUS: CBD measuring 7 mm with no evidence of stones or sludge, there is a reactive appearing 14 x 11 mm periportal lymph node. Examination of the pancreas revealed a hyperechoic pancreatic parenchyma with no other parenchymal abnormalities and normal PD throughout the pancreas. Otherwise unremarkable EUS exam.  Symptoms may be multifactorial, possible etiologies include functional, retained sludge/stone, sphincter of oddi dysfunction, gastritis/pud, component of constipation in setting of narcotics    Rec  s/p EGD 12/3/24 Dr. Elliott  Impressions:  -Normal mucosa in thewhole esophagus.  -Erosions and erythema in the stomach compatible with erosive gastritis.  (Biopsy).  -Normal mucosa in the whole examined duodenum. (Biopsy).    Plan:  - Await pathology results.  - Avoid NSAIDs  - Protonix 40 mg IV BID and switch to Esomeprazole BID upon discharge.  - Sucralfate 1 g TID.  - Follow up visit with GI office   -low fat diet as tolerated  -MRI wnl CBD dilation likely due to narcotic use  -PPI daily  -Miralax daily  -Monitor LFTs daily    #CRC screening   Rec  - Follow up with our GI office located on [address] [place] [address], Phone number [phone] with Dr. German

## 2024-12-04 NOTE — DISCHARGE NOTE PROVIDER - CARE PROVIDER_API CALL
Domo German  Gastroenterology  [address] [place] [id number]  Phone: [phone]  Fax: [phone]  Follow Up Time:     AMARILIS MÉNDEZ  Phone: [phone]  Fax: ()-  Follow Up Time:

## 2024-12-10 DIAGNOSIS — E78.5 HYPERLIPIDEMIA, UNSPECIFIED: ICD-10-CM

## 2024-12-10 DIAGNOSIS — Z87.442 PERSONAL HISTORY OF URINARY CALCULI: ICD-10-CM

## 2024-12-10 DIAGNOSIS — G43.909 MIGRAINE, UNSPECIFIED, NOT INTRACTABLE, WITHOUT STATUS MIGRAINOSUS: ICD-10-CM

## 2024-12-10 DIAGNOSIS — Z88.1 ALLERGY STATUS TO OTHER ANTIBIOTIC AGENTS: ICD-10-CM

## 2024-12-10 DIAGNOSIS — K21.9 GASTRO-ESOPHAGEAL REFLUX DISEASE WITHOUT ESOPHAGITIS: ICD-10-CM

## 2024-12-10 DIAGNOSIS — E66.9 OBESITY, UNSPECIFIED: ICD-10-CM

## 2024-12-10 DIAGNOSIS — K29.00 ACUTE GASTRITIS WITHOUT BLEEDING: ICD-10-CM

## 2024-12-10 DIAGNOSIS — K83.8 OTHER SPECIFIED DISEASES OF BILIARY TRACT: ICD-10-CM

## 2024-12-10 DIAGNOSIS — Z88.8 ALLERGY STATUS TO OTHER DRUGS, MEDICAMENTS AND BIOLOGICAL SUBSTANCES: ICD-10-CM

## 2024-12-10 DIAGNOSIS — Z91.018 ALLERGY TO OTHER FOODS: ICD-10-CM

## 2024-12-10 DIAGNOSIS — Z91.012 ALLERGY TO EGGS: ICD-10-CM

## 2024-12-10 DIAGNOSIS — M54.2 CERVICALGIA: ICD-10-CM

## 2024-12-10 DIAGNOSIS — G89.29 OTHER CHRONIC PAIN: ICD-10-CM

## 2024-12-10 DIAGNOSIS — E83.42 HYPOMAGNESEMIA: ICD-10-CM

## 2024-12-10 DIAGNOSIS — T40.605A ADVERSE EFFECT OF UNSPECIFIED NARCOTICS, INITIAL ENCOUNTER: ICD-10-CM

## 2024-12-10 DIAGNOSIS — Z79.891 LONG TERM (CURRENT) USE OF OPIATE ANALGESIC: ICD-10-CM

## 2024-12-10 DIAGNOSIS — Z79.890 HORMONE REPLACEMENT THERAPY: ICD-10-CM
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
## 2024-12-10 DIAGNOSIS — E03.9 HYPOTHYROIDISM, UNSPECIFIED: ICD-10-CM

## 2024-12-10 DIAGNOSIS — K59.00 CONSTIPATION, UNSPECIFIED: ICD-10-CM

## 2025-01-17 ENCOUNTER — APPOINTMENT (OUTPATIENT)
Dept: GASTROENTEROLOGY | Facility: CLINIC | Age: 50
End: 2025-01-17
Payer: MEDICAID

## 2025-01-17 DIAGNOSIS — Z12.11 ENCOUNTER FOR SCREENING FOR MALIGNANT NEOPLASM OF COLON: ICD-10-CM

## 2025-01-17 DIAGNOSIS — R10.13 EPIGASTRIC PAIN: ICD-10-CM

## 2025-01-17 DIAGNOSIS — T30.4 TOXIC EFFECT OF CORROSIVE ACIDS AND ACID-LIKE SUBSTANCES, ACCIDENTAL (UNINTENTIONAL), INITIAL ENCOUNTER: ICD-10-CM

## 2025-01-17 DIAGNOSIS — K85.90 ACUTE PANCREATITIS WITHOUT NECROSIS OR INFECTION, UNSPECIFIED: ICD-10-CM

## 2025-01-17 DIAGNOSIS — K83.8 OTHER SPECIFIED DISEASES OF BILIARY TRACT: ICD-10-CM

## 2025-01-17 DIAGNOSIS — T54.2X1A TOXIC EFFECT OF CORROSIVE ACIDS AND ACID-LIKE SUBSTANCES, ACCIDENTAL (UNINTENTIONAL), INITIAL ENCOUNTER: ICD-10-CM

## 2025-01-17 DIAGNOSIS — R79.89 OTHER SPECIFIED ABNORMAL FINDINGS OF BLOOD CHEMISTRY: ICD-10-CM

## 2025-01-17 PROCEDURE — G2211 COMPLEX E/M VISIT ADD ON: CPT | Mod: NC

## 2025-01-17 PROCEDURE — 99214 OFFICE O/P EST MOD 30 MIN: CPT

## 2025-01-17 RX ORDER — SUCRALFATE 1 G/1
1 TABLET ORAL
Qty: 120 | Refills: 0 | Status: ACTIVE | COMMUNITY
Start: 2025-01-17 | End: 1900-01-01

## 2025-01-17 RX ORDER — TRAZODONE HYDROCHLORIDE 50 MG/1
50 TABLET ORAL
Refills: 0 | Status: ACTIVE | COMMUNITY

## 2025-01-17 RX ORDER — ROSUVASTATIN CALCIUM 40 MG/1
40 TABLET, FILM COATED ORAL
Refills: 0 | Status: ACTIVE | COMMUNITY

## 2025-01-17 RX ORDER — SUCRALFATE 1 G/1
1 TABLET ORAL
Refills: 0 | Status: ACTIVE | COMMUNITY

## 2025-01-17 RX ORDER — ESOMEPRAZOLE MAGNESIUM 40 MG/1
40 CAPSULE, DELAYED RELEASE ORAL
Qty: 30 | Refills: 0 | Status: ACTIVE | COMMUNITY
Start: 2025-01-17 | End: 1900-01-01

## 2025-01-17 RX ORDER — ALPRAZOLAM 0.5 MG/1
0.5 TABLET ORAL
Refills: 0 | Status: ACTIVE | COMMUNITY

## 2025-01-17 RX ORDER — LEVOTHYROXINE SODIUM 0.07 MG/1
75 TABLET ORAL
Refills: 0 | Status: ACTIVE | COMMUNITY

## 2025-03-29 ENCOUNTER — INPATIENT (INPATIENT)
Facility: HOSPITAL | Age: 50
LOS: 0 days | Discharge: HOME | DRG: 251 | End: 2025-03-30
Attending: STUDENT IN AN ORGANIZED HEALTH CARE EDUCATION/TRAINING PROGRAM | Admitting: STUDENT IN AN ORGANIZED HEALTH CARE EDUCATION/TRAINING PROGRAM
Payer: MEDICAID

## 2025-03-29 VITALS
OXYGEN SATURATION: 98 % | RESPIRATION RATE: 20 BRPM | HEART RATE: 103 BPM | SYSTOLIC BLOOD PRESSURE: 138 MMHG | TEMPERATURE: 98 F | DIASTOLIC BLOOD PRESSURE: 89 MMHG | WEIGHT: 186.07 LBS

## 2025-03-29 DIAGNOSIS — Z90.49 ACQUIRED ABSENCE OF OTHER SPECIFIED PARTS OF DIGESTIVE TRACT: Chronic | ICD-10-CM

## 2025-03-29 DIAGNOSIS — Z98.890 OTHER SPECIFIED POSTPROCEDURAL STATES: Chronic | ICD-10-CM

## 2025-03-29 DIAGNOSIS — R10.9 UNSPECIFIED ABDOMINAL PAIN: ICD-10-CM

## 2025-03-29 LAB
ALBUMIN SERPL ELPH-MCNC: 5 G/DL — SIGNIFICANT CHANGE UP (ref 3.5–5.2)
ALP SERPL-CCNC: 81 U/L — SIGNIFICANT CHANGE UP (ref 30–115)
ALT FLD-CCNC: 13 U/L — SIGNIFICANT CHANGE UP (ref 0–41)
ANION GAP SERPL CALC-SCNC: 15 MMOL/L — HIGH (ref 7–14)
AST SERPL-CCNC: 15 U/L — SIGNIFICANT CHANGE UP (ref 0–41)
BASOPHILS # BLD AUTO: 0.01 K/UL — SIGNIFICANT CHANGE UP (ref 0–0.2)
BASOPHILS NFR BLD AUTO: 0.1 % — SIGNIFICANT CHANGE UP (ref 0–1)
BILIRUB DIRECT SERPL-MCNC: <0.2 MG/DL — SIGNIFICANT CHANGE UP (ref 0–0.3)
BILIRUB INDIRECT FLD-MCNC: >0.1 MG/DL — LOW (ref 0.2–1.2)
BILIRUB SERPL-MCNC: 0.3 MG/DL — SIGNIFICANT CHANGE UP (ref 0.2–1.2)
BUN SERPL-MCNC: 11 MG/DL — SIGNIFICANT CHANGE UP (ref 10–20)
CALCIUM SERPL-MCNC: 10.6 MG/DL — HIGH (ref 8.4–10.5)
CHLORIDE SERPL-SCNC: 100 MMOL/L — SIGNIFICANT CHANGE UP (ref 98–110)
CO2 SERPL-SCNC: 26 MMOL/L — SIGNIFICANT CHANGE UP (ref 17–32)
CREAT SERPL-MCNC: 0.8 MG/DL — SIGNIFICANT CHANGE UP (ref 0.7–1.5)
EGFR: 91 ML/MIN/1.73M2 — SIGNIFICANT CHANGE UP
EGFR: 91 ML/MIN/1.73M2 — SIGNIFICANT CHANGE UP
EOSINOPHIL # BLD AUTO: 0 K/UL — SIGNIFICANT CHANGE UP (ref 0–0.7)
EOSINOPHIL NFR BLD AUTO: 0 % — SIGNIFICANT CHANGE UP (ref 0–8)
GLUCOSE SERPL-MCNC: 101 MG/DL — HIGH (ref 70–99)
HCG SERPL QL: ABNORMAL
HCG SERPL QL: ABNORMAL
HCG SERPL-ACNC: 5.7 MIU/ML — HIGH
HCT VFR BLD CALC: 42.2 % — SIGNIFICANT CHANGE UP (ref 37–47)
HGB BLD-MCNC: 13.9 G/DL — SIGNIFICANT CHANGE UP (ref 12–16)
IMM GRANULOCYTES NFR BLD AUTO: 0.4 % — HIGH (ref 0.1–0.3)
LACTATE SERPL-SCNC: 1.5 MMOL/L — SIGNIFICANT CHANGE UP (ref 0.7–2)
LIDOCAIN IGE QN: 156 U/L — HIGH (ref 7–60)
LYMPHOCYTES # BLD AUTO: 1.7 K/UL — SIGNIFICANT CHANGE UP (ref 1.2–3.4)
LYMPHOCYTES # BLD AUTO: 20.5 % — SIGNIFICANT CHANGE UP (ref 20.5–51.1)
MAGNESIUM SERPL-MCNC: 1.8 MG/DL — SIGNIFICANT CHANGE UP (ref 1.8–2.4)
MCHC RBC-ENTMCNC: 29.6 PG — SIGNIFICANT CHANGE UP (ref 27–31)
MCHC RBC-ENTMCNC: 32.9 G/DL — SIGNIFICANT CHANGE UP (ref 32–37)
MCV RBC AUTO: 90 FL — SIGNIFICANT CHANGE UP (ref 81–99)
MONOCYTES # BLD AUTO: 0.68 K/UL — HIGH (ref 0.1–0.6)
MONOCYTES NFR BLD AUTO: 8.2 % — SIGNIFICANT CHANGE UP (ref 1.7–9.3)
NEUTROPHILS # BLD AUTO: 5.86 K/UL — SIGNIFICANT CHANGE UP (ref 1.4–6.5)
NEUTROPHILS NFR BLD AUTO: 70.8 % — SIGNIFICANT CHANGE UP (ref 42.2–75.2)
NRBC BLD AUTO-RTO: 0 /100 WBCS — SIGNIFICANT CHANGE UP (ref 0–0)
PLATELET # BLD AUTO: 299 K/UL — SIGNIFICANT CHANGE UP (ref 130–400)
PMV BLD: 10 FL — SIGNIFICANT CHANGE UP (ref 7.4–10.4)
POTASSIUM SERPL-MCNC: 4.3 MMOL/L — SIGNIFICANT CHANGE UP (ref 3.5–5)
POTASSIUM SERPL-SCNC: 4.3 MMOL/L — SIGNIFICANT CHANGE UP (ref 3.5–5)
PROT SERPL-MCNC: 8 G/DL — SIGNIFICANT CHANGE UP (ref 6–8)
RBC # BLD: 4.69 M/UL — SIGNIFICANT CHANGE UP (ref 4.2–5.4)
RBC # FLD: 13.5 % — SIGNIFICANT CHANGE UP (ref 11.5–14.5)
SODIUM SERPL-SCNC: 141 MMOL/L — SIGNIFICANT CHANGE UP (ref 135–146)
WBC # BLD: 8.28 K/UL — SIGNIFICANT CHANGE UP (ref 4.8–10.8)
WBC # FLD AUTO: 8.28 K/UL — SIGNIFICANT CHANGE UP (ref 4.8–10.8)

## 2025-03-29 PROCEDURE — 93005 ELECTROCARDIOGRAM TRACING: CPT

## 2025-03-29 PROCEDURE — 80346 BENZODIAZEPINES1-12: CPT

## 2025-03-29 PROCEDURE — G0378: CPT

## 2025-03-29 PROCEDURE — 85027 COMPLETE CBC AUTOMATED: CPT

## 2025-03-29 PROCEDURE — 80307 DRUG TEST PRSMV CHEM ANLYZR: CPT

## 2025-03-29 PROCEDURE — 84702 CHORIONIC GONADOTROPIN TEST: CPT

## 2025-03-29 PROCEDURE — 36415 COLL VENOUS BLD VENIPUNCTURE: CPT

## 2025-03-29 PROCEDURE — 74177 CT ABD & PELVIS W/CONTRAST: CPT | Mod: 26

## 2025-03-29 PROCEDURE — 84703 CHORIONIC GONADOTROPIN ASSAY: CPT

## 2025-03-29 PROCEDURE — 80053 COMPREHEN METABOLIC PANEL: CPT

## 2025-03-29 PROCEDURE — 84704 HCG FREE BETACHAIN TEST: CPT

## 2025-03-29 PROCEDURE — 80361 OPIATES 1 OR MORE: CPT

## 2025-03-29 PROCEDURE — 80358 DRUG SCREENING METHADONE: CPT

## 2025-03-29 PROCEDURE — 99285 EMERGENCY DEPT VISIT HI MDM: CPT

## 2025-03-29 PROCEDURE — 80354 DRUG SCREENING FENTANYL: CPT

## 2025-03-29 PROCEDURE — 99222 1ST HOSP IP/OBS MODERATE 55: CPT

## 2025-03-29 PROCEDURE — 93010 ELECTROCARDIOGRAM REPORT: CPT

## 2025-03-29 RX ORDER — ROSUVASTATIN CALCIUM 5 MG/1
10 TABLET, FILM COATED ORAL AT BEDTIME
Refills: 0 | Status: DISCONTINUED | OUTPATIENT
Start: 2025-03-29 | End: 2025-03-30

## 2025-03-29 RX ORDER — ALPRAZOLAM 0.5 MG
0.5 TABLET, EXTENDED RELEASE 24 HR ORAL
Refills: 0 | Status: DISCONTINUED | OUTPATIENT
Start: 2025-03-29 | End: 2025-03-30

## 2025-03-29 RX ORDER — OXYBUTYNIN CHLORIDE 5 MG/1
5 TABLET, FILM COATED, EXTENDED RELEASE ORAL
Refills: 0 | Status: DISCONTINUED | OUTPATIENT
Start: 2025-03-29 | End: 2025-03-30

## 2025-03-29 RX ORDER — ENOXAPARIN SODIUM 100 MG/ML
40 INJECTION SUBCUTANEOUS EVERY 24 HOURS
Refills: 0 | Status: DISCONTINUED | OUTPATIENT
Start: 2025-03-29 | End: 2025-03-30

## 2025-03-29 RX ORDER — HYDROMORPHONE/SOD CHLOR,ISO/PF 2 MG/10 ML
1 SYRINGE (ML) INJECTION ONCE
Refills: 0 | Status: DISCONTINUED | OUTPATIENT
Start: 2025-03-29 | End: 2025-03-29

## 2025-03-29 RX ORDER — ONDANSETRON HCL/PF 4 MG/2 ML
4 VIAL (ML) INJECTION ONCE
Refills: 0 | Status: COMPLETED | OUTPATIENT
Start: 2025-03-29 | End: 2025-03-29

## 2025-03-29 RX ORDER — CITALOPRAM 20 MG/1
40 TABLET ORAL DAILY
Refills: 0 | Status: DISCONTINUED | OUTPATIENT
Start: 2025-03-29 | End: 2025-03-30

## 2025-03-29 RX ORDER — HEPARIN SODIUM 1000 [USP'U]/ML
5000 INJECTION INTRAVENOUS; SUBCUTANEOUS EVERY 12 HOURS
Refills: 0 | Status: DISCONTINUED | OUTPATIENT
Start: 2025-03-29 | End: 2025-03-29

## 2025-03-29 RX ORDER — SUCRALFATE 1 G
1 TABLET ORAL ONCE
Refills: 0 | Status: COMPLETED | OUTPATIENT
Start: 2025-03-29 | End: 2025-03-29

## 2025-03-29 RX ORDER — OXYBUTYNIN CHLORIDE 5 MG/1
10 TABLET, FILM COATED, EXTENDED RELEASE ORAL DAILY
Refills: 0 | Status: DISCONTINUED | OUTPATIENT
Start: 2025-03-29 | End: 2025-03-29

## 2025-03-29 RX ORDER — SUCRALFATE 1 G
1 TABLET ORAL
Refills: 0 | Status: DISCONTINUED | OUTPATIENT
Start: 2025-03-29 | End: 2025-03-30

## 2025-03-29 RX ORDER — METHADONE HCL 10 MG
10 TABLET ORAL
Refills: 0 | Status: DISCONTINUED | OUTPATIENT
Start: 2025-03-29 | End: 2025-03-30

## 2025-03-29 RX ORDER — HYDROMORPHONE/SOD CHLOR,ISO/PF 2 MG/10 ML
0.5 SYRINGE (ML) INJECTION EVERY 4 HOURS
Refills: 0 | Status: DISCONTINUED | OUTPATIENT
Start: 2025-03-29 | End: 2025-03-30

## 2025-03-29 RX ORDER — ONDANSETRON HCL/PF 4 MG/2 ML
4 VIAL (ML) INJECTION EVERY 6 HOURS
Refills: 0 | Status: DISCONTINUED | OUTPATIENT
Start: 2025-03-29 | End: 2025-03-30

## 2025-03-29 RX ORDER — LEVOTHYROXINE SODIUM 300 MCG
75 TABLET ORAL DAILY
Refills: 0 | Status: DISCONTINUED | OUTPATIENT
Start: 2025-03-29 | End: 2025-03-30

## 2025-03-29 RX ORDER — SODIUM CHLORIDE 9 G/1000ML
1000 INJECTION, SOLUTION INTRAVENOUS
Refills: 0 | Status: DISCONTINUED | OUTPATIENT
Start: 2025-03-29 | End: 2025-03-30

## 2025-03-29 RX ORDER — MAGNESIUM, ALUMINUM HYDROXIDE 200-200 MG
30 TABLET,CHEWABLE ORAL ONCE
Refills: 0 | Status: COMPLETED | OUTPATIENT
Start: 2025-03-29 | End: 2025-03-29

## 2025-03-29 RX ORDER — ONDANSETRON HCL/PF 4 MG/2 ML
4 VIAL (ML) INJECTION EVERY 8 HOURS
Refills: 0 | Status: DISCONTINUED | OUTPATIENT
Start: 2025-03-29 | End: 2025-03-29

## 2025-03-29 RX ORDER — PROCHLORPERAZINE 25 MG
10 SUPPOSITORY, RECTAL RECTAL EVERY 6 HOURS
Refills: 0 | Status: DISCONTINUED | OUTPATIENT
Start: 2025-03-29 | End: 2025-03-30

## 2025-03-29 RX ADMIN — Medication 1000 MILLILITER(S): at 13:32

## 2025-03-29 RX ADMIN — Medication 0.5 MILLIGRAM(S): at 22:43

## 2025-03-29 RX ADMIN — Medication 4 MILLIGRAM(S): at 20:50

## 2025-03-29 RX ADMIN — Medication 1 MILLIGRAM(S): at 16:11

## 2025-03-29 RX ADMIN — Medication 4 MILLIGRAM(S): at 10:23

## 2025-03-29 RX ADMIN — Medication 4 MILLIGRAM(S): at 20:19

## 2025-03-29 RX ADMIN — Medication 4 MILLIGRAM(S): at 14:01

## 2025-03-29 RX ADMIN — SODIUM CHLORIDE 100 MILLILITER(S): 9 INJECTION, SOLUTION INTRAVENOUS at 18:31

## 2025-03-29 RX ADMIN — Medication 1 MILLIGRAM(S): at 14:01

## 2025-03-29 RX ADMIN — Medication 1000 MILLILITER(S): at 10:23

## 2025-03-29 RX ADMIN — ROSUVASTATIN CALCIUM 10 MILLIGRAM(S): 5 TABLET, FILM COATED ORAL at 22:43

## 2025-03-29 RX ADMIN — Medication 4 MILLIGRAM(S): at 10:24

## 2025-03-29 RX ADMIN — Medication 1 GRAM(S): at 22:44

## 2025-03-29 RX ADMIN — Medication 1 MILLIGRAM(S): at 12:19

## 2025-03-29 RX ADMIN — Medication 20 MILLIGRAM(S): at 10:23

## 2025-03-29 RX ADMIN — Medication 40 MILLIGRAM(S): at 10:24

## 2025-03-29 RX ADMIN — Medication 1 MILLIGRAM(S): at 17:40

## 2025-03-29 RX ADMIN — Medication 0.5 MILLIGRAM(S): at 18:38

## 2025-03-29 RX ADMIN — Medication 40 MILLIGRAM(S): at 18:30

## 2025-03-29 RX ADMIN — Medication 1 GRAM(S): at 18:30

## 2025-03-29 RX ADMIN — Medication 0.5 MILLIGRAM(S): at 18:50

## 2025-03-29 RX ADMIN — Medication 1 GRAM(S): at 13:32

## 2025-03-29 RX ADMIN — Medication 1 MILLIGRAM(S): at 11:15

## 2025-03-29 RX ADMIN — Medication 30 MILLILITER(S): at 13:32

## 2025-03-29 RX ADMIN — OXYBUTYNIN CHLORIDE 5 MILLIGRAM(S): 5 TABLET, FILM COATED, EXTENDED RELEASE ORAL at 18:31

## 2025-03-29 NOTE — ED PROVIDER NOTE - CARE PLAN
1 Principal Discharge DX:	Intractable abdominal pain  Secondary Diagnosis:	Intractable nausea and vomiting   Principal Discharge DX:	Intractable abdominal pain  Assessment and plan of treatment:	Plan- ekg labs meds reassess  Secondary Diagnosis:	Intractable nausea and vomiting

## 2025-03-29 NOTE — H&P ADULT - HISTORY OF PRESENT ILLNESS
49 year old female with PMH of HLD, hypothyroid, anxiety, back pain, erosive gastritis presents to ED c/o  49 year old female with PMH of HLD, hypothyroid, anxiety, chronic back pain, OAB, erosive gastritis presents to ED c/o  abdominal pain for 3-4 days. Pt reports sudden onset of epigastric pain associated with nausea and NBNB vomiting, Pt was able to hold neither solids nor liquids, Pain 10/10, stabbing, intermittent. Pt also reports chills and loose bowel movement. She describes pain is very similar to the one she had in December when was dx with erosive gastritis. Pt follows with Dr. German and next appt is on 4/4. She denies fever, chest pain, sob, melena, urinary complaints.    49 year old female with PMH of HLD, hypothyroidism, anxiety, chronic back pain on methadone, OAB, erosive gastritis presents to ED c/o  abdominal pain for 3-4 days. Pt reports sudden onset of epigastric pain associated with nausea and NBNB vomiting, Pt was able to hold neither solids nor liquids, Pain 10/10, stabbing, intermittent. Pt also reports chills and loose bowel movement. She describes pain is very similar to the one she had in December when was dx with erosive gastritis. Pt follows with Dr. German and next appt is on 4/4. Was told she had sphincter of oddi dysfunction and was scheduled for evaluation this Friday for possible sphincterectomy but now admitted. She denies fever, chest pain, sob, melena, urinary complaints.     In ED, symptoms improved with zofran.

## 2025-03-29 NOTE — H&P ADULT - NSHPLABSRESULTS_GEN_ALL_CORE
13.9   8.28  )-----------( 299      ( 29 Mar 2025 10:20 )             42.2     141  |  100  |  11  ----------------------------<  101[H]  4.3   |  26  |  0.8    Ca    10.6[H]      29 Mar 2025 10:20  Mg     1.8     03-29    TPro  8.0  /  Alb  5.0  /  TBili  0.3  /  DBili  <0.2  /  AST  15  /  ALT  13  /  AlkPhos  81  03-29        Urinalysis Basic - ( 29 Mar 2025 10:20 )  Color: x / Appearance: x / SG: x / pH: x  Gluc: 101 mg/dL / Ketone: x  / Bili: x / Urobili: x   Blood: x / Protein: x / Nitrite: x   Leuk Esterase: x / RBC: x / WBC x   Sq Epi: x / Non Sq Epi: x / Bacteria: x      Lactate Trend  03-29 @ 10:20 Lactate:1.5       RADIOLOGY:  CT Abdomen and Pelvis w/ IV Cont (03.29.25 @ 14:36)     IMPRESSION:    Since December 2, 2024    No CT evidence of acute abdominopelvic pathology.    EVANGELISTA DURAN MD; Attending Radiologist  This document has been electronically signed. Mar 29 2025  3:47PM

## 2025-03-29 NOTE — ED PROVIDER NOTE - STUDIES
Last colonoscopy, 1/16/13, stated to 'repeat in colonoscopy in 3 years'.  Pathology showed tubulovillous adenoma and tubular adenoma.                                     Dr. Chino Colonoscopy    Paula Durbin  1941    Procedure: Colon  Type of prep: Golytely    Date of procedure:  3/2/23 Time: 1015    The encounter diagnosis was History of colon polyps.    Previous Scope: 1/16/13 tubulovillous adenoma, tubular adenoma    Family History   Problem Relation Age of Onset   • Other Mother         tb, gout   • Heart Mother    • Heart disease Mother    • Cataracts Mother    • Cataracts Sister    • Diabetes Sister    • COPD Sister    • Osteoarthritis Father         Rheumatoid arthritis   • Other Father         Emilia Gehrig's disease   • Rheumatoid Arthritis Father    • Cancer Maternal Aunt         leukemia   • Other Brother         accidental drowning   • High cholesterol Paternal Aunt        ALLERGIES:   Allergen Reactions   • Seasonal Other (See Comments)     Runny nose / watery eyes / cough   • Singulair HIVES     Latex?: No BMI: 36.18    ASA/ANTICOAGS Yes, Eliquis.  Message sent to Karen Radha NSAIDS No,   BP/Heart Yes, DIABETES/INSULIN ORAL No    Kidney Issues?: Yes,Golytely sent Respiratory issues?: No,   GI Symptoms?: No, Cardiac Issues?: No  Pacemaker (12mos)?: Brand na Check na  Defibrilator (6mos)?:  Brand na Check na    Special needs: no    RT (BMI over 40 or Sleep Apnea no  PKU or Glucose -6-Phospate Deficiency?: No    Pharmacy: [facility]  Notes: no covid testing needed  Consent form completed?: No  
Patient: Angie Durbin [[id number]]     Procedure: SERVICE TO SURGERY GENERAL Status: Needs Scheduling   Requested appt date:  Authorizing: ANITHA Quiñones in [facility]   Referral: [id number]   (Authorized)       Priority: Routine               Diagnosis: Screen for colon cancer [Z12.11]       Screening colonoscopy  
EKG - see results section for interpretation/Xray Image(s) - see wet read section for interpretation

## 2025-03-29 NOTE — ED PROVIDER NOTE - DIFFERENTIAL DIAGNOSIS
The differential diagnosis for patients clinical presentation includes but is not limited to: gerd, pancreatitis, sbo, colitis, acs, arrythmia Differential Diagnosis

## 2025-03-29 NOTE — ED PROVIDER NOTE - ATTENDING APP SHARED VISIT CONTRIBUTION OF CARE
49-year-old female past medical history of hypothyroidism, GERD, hiatal hernia, kidney stones, chronic back pain on methadone, anxiety, migraines, status post cholecystectomy presents emergency department for evaluation of epigastric abdominal pain, nausea, NBNB vomiting for the past few days similar to her previous GERD symptoms.  Patient denies fever, diarrhea, bloody stools, shortness of breath, chest pain, dysuria.    CONSTITUTIONAL: uncomfortable in stretcher.   SKIN: warm, dry  HEAD: Normocephalic; atraumatic.  ENT: MMM.   NECK: Supple.  CARD: RRR.   RESP: No wheezes, rales or rhonchi.  ABD: soft nondistended (+) epigastric abdominal pain, no CVAT.   EXT: Normal ROM.  No lower extremity edema.   NEURO: Alert, oriented, grossly unremarkable.

## 2025-03-29 NOTE — ED PROVIDER NOTE - CLINICAL SUMMARY MEDICAL DECISION MAKING FREE TEXT BOX
48 yo F presented to ED with epigastric abdominal pain. Labs and EKG were ordered and reviewed. EKG NSR. Imaging was ordered and reviewed by me. No acute intra-abdominal pathology on CT imaging. Appropriate medications for patient's presenting complaints were ordered and effects were reassessed.  Patient's records (prior hospital, ED visit, and/or nursing home notes if available) were reviewed.  Additional history was obtained from EMS, family, and/or PCP (where available).  Escalation to admission/observation was considered. Patient requiring more pain medications on multiple reassessments. Patient requires inpatient hospitalization for further care.

## 2025-03-29 NOTE — H&P ADULT - ASSESSMENT
49 year old female with PMH of HLD, hypothyroid, anxiety, OAB, chronic back pain, erosive gastritis presents with  abdominal pain for 3-4 days.    # Abdominal pain  # Hx Erosive gastritis  - admit to medicine  CT A/P w/ IV Cont (03.29.25 @ 14:36): No CT evidence of acute abdominopelvic pathology.  Last EGD (12.03.24): Normal mucosa in thewhole esophagus. Erosions and erythema in the stomach compatible with erosive gastritis. Normal mucosa in the whole examined duodenum.   - keep NPO  - continue IVF hydration  - PPI IV BID  - GI consult  - monitor stool for frequency, consistency and count  - send stool studies if diarrhea noted  - pain control  - antiemetics  - check EKG for QTC    # Hx Hypothyroid  - continue synthroid    # Hx HLD  - continue statin    # Hx anxiety  - continue Celexa    # Hx OAB  - continue Oxybutynin    # Hx Chronic back pain  - continue Methadone  - will i-stop    DVT/GI ppx     49 year old female with PMH of HLD, hypothyroid, anxiety, OAB, chronic back pain, erosive gastritis presents with  abdominal pain for 3-4 days.    # Abdominal pain  # Hx Erosive gastritis  - admit to medicine  CT A/P w/ IV Cont (03.29.25 @ 14:36): No CT evidence of acute abdominopelvic pathology.  Last EGD (12.03.24): Normal mucosa in thewhole esophagus. Erosions and erythema in the stomach compatible with erosive gastritis. Normal mucosa in the whole examined duodenum.   - keep NPO  - continue IVF hydration  - PPI IV BID  - GI consult  - monitor stool for frequency, consistency and count  - send stool studies if diarrhea noted  - pain control  - antiemetics  - check EKG for QTC    # Elevated HCG   - HCG Quantitative, Serum: 5.7  - will check urine pregnancy test    # Hx Hypothyroid  - continue synthroid    # Hx HLD  - continue statin    # Hx anxiety  - continue Celexa    # Hx OAB  - continue Oxybutynin    # Hx Chronic back pain  - continue Methadone  - will i-stop    DVT/GI ppx     49 year old female with PMH of HLD, hypothyroid, anxiety, OAB, chronic back pain, erosive gastritis presents with  abdominal pain for 3-4 days.    # Abdominal pain  # Hx Erosive gastritis  - admit to medicine  CT A/P w/ IV Cont (03.29.25 @ 14:36): No CT evidence of acute abdominopelvic pathology.  Last EGD (12.03.24): Normal mucosa in the whole esophagus. Erosions and erythema in the stomach compatible with erosive gastritis. Normal mucosa in the whole examined duodenum.   - keep NPO  - continue IVF hydration  - PPI IV BID  - GI consult  - monitor stool for frequency, consistency and count  - send stool studies if diarrhea noted  - pain control  - antiemetics  - check EKG for QTC    # Elevated HCG   - HCG Quantitative, Serum: 5.7  - will check urine pregnancy test    # Hx Hypothyroid  - continue synthroid    # Hx HLD  - continue statin    # Hx anxiety  - continue Celexa    # Hx OAB  - continue Oxybutynin    # Hx Chronic back pain  - continue Methadone  - will i-stop    DVT/GI ppx     49 year old female with PMH of HLD, hypothyroid, anxiety, OAB, chronic back pain, erosive gastritis presents with  abdominal pain for 3-4 days.    # Abdominal pain  # Hx Erosive gastritis  - admit to medicine  CT A/P w/ IV Cont (03.29.25 @ 14:36): No CT evidence of acute abdominopelvic pathology.  Last EGD (12.03.24): Normal mucosa in the whole esophagus. Erosions and erythema in the stomach compatible with erosive gastritis. Normal mucosa in the whole examined duodenum.   - keep NPO  - continue IVF hydration  - PPI IV BID  - GI consult  - monitor stool for frequency, consistency and count  - send stool studies if diarrhea noted  - pain control  - antiemetics  - check EKG for QTC  - urine toxicology    # Elevated HCG   - HCG Quantitative, Serum: 5.7  - will check urine pregnancy test    # Hx Hypothyroid  - continue synthroid    # Hx HLD  - continue statin    # Hx anxiety  - continue Celexa  - I-stop checked: on xanax 0.5 mg po bid    # Hx OAB  - continue Oxybutynin    # Hx Chronic back pain  - I-stop checked: on Methadone 10 mg po 4 times a day (rx by Dr. Wood)  - as per Attending will reduce Methadone to 10 mg po bid for now    DVT/GI ppx     49 year old female with PMH of HLD, hypothyroid, anxiety, OAB, chronic back pain on methadone, erosive gastritis, sphincter of Oddi dysfunction presented with intractable nausea and vomiting with abominable pain for 3-4 days.    # Intractable nausea and vomiting and abdominal pain  # Erosive gastritis  Gastroenterology consulted  CT A/P w/ IV Cont (03.29.25 @ 14:36): No CT evidence of acute abdominopelvic pathology.  Last EGD (12.03.24): Normal mucosa in the whole esophagus. Erosions and erythema in the stomach compatible with erosive gastritis. Normal mucosa in the whole examined duodenum.   - clear liquids  - continue IVF hydration  - PPI IV BID  - monitor stool for frequency, consistency and count  - send stool studies if diarrhea noted  - pain control  - antiemetics  - check EKG for QTC  - urine toxicology    # Elevated HCG   - HCG Quantitative, Serum: 5.7  - will check urine pregnancy test    # Hx Hypothyroid  - continue synthroid    # Hx HLD  - continue statin    # Hx anxiety  - continue Celexa  - I-stop checked: on xanax 0.5 mg po bid    # Hx OAB  - continue Oxybutynin    # Hx Chronic back pain  - I-stop checked: on Methadone 10 mg po 4 times a day (rx by Dr. Wood)  - as per Attending will reduce Methadone to 10 mg po bid for now    DVT prophylaxis: lovenox  Diet: clear liquid  Code status: full  Disposition: await clinical improvement

## 2025-03-29 NOTE — ED PROVIDER NOTE - PHYSICAL EXAMINATION
VITAL SIGNS: I have reviewed nursing notes and confirm.  CONSTITUTIONAL: 48 yo F laying on stretcher; in no acute distress.  SKIN: Skin exam is warm and dry, no acute rash.  HEAD: Normocephalic; atraumatic.  EYES: Conjunctiva and sclera clear.  ENT: No nasal discharge; airway clear.   CARD: S1, S2 normal; no murmurs, gallops, or rubs. Regular rate and rhythm.  RESP: No wheezes, rales or rhonchi. Speaking in full sentences.   ABD: Normal bowel sounds; soft; non-distended; (+) epigastric TTP; No rebound or guarding. No CVA tenderness.  EXT: Normal ROM. No clubbing, cyanosis or edema.  NEURO: Alert, oriented. Grossly unremarkable. No focal deficits.

## 2025-03-29 NOTE — PATIENT PROFILE ADULT - FALL HARM RISK - UNIVERSAL INTERVENTIONS
Bed in lowest position, wheels locked, appropriate side rails in place/Call bell, personal items and telephone in reach/Instruct patient to call for assistance before getting out of bed or chair/Non-slip footwear when patient is out of bed/[place] to call system/Physically safe environment - no spills, clutter or unnecessary equipment/Purposeful Proactive Rounding/Room/bathroom lighting operational, light cord in reach

## 2025-03-29 NOTE — H&P ADULT - TIME-BASED BILLING (NON-CRITICAL CARE)
Time-based billing (NON-critical care) Rituxan Counseling:  I discussed with the patient the risks of Rituxan infusions. Side effects can include infusion reactions, severe drug rashes including mucocutaneous reactions, reactivation of latent hepatitis and other infections and rarely progressive multifocal leukoencephalopathy.  All of the patient's questions and concerns were addressed.

## 2025-03-29 NOTE — ED ADULT NURSE NOTE - NSFALLRISKASMT_ED_ALL_ED_DT
Last seen: 1/28/21  Follow up appointment: 1/10/22  Last filled: Amlodipine 3/1/21  Last BP: 130/82      Scripts eprescribed to pharmacy per MD     29-Mar-2025 10:32

## 2025-03-29 NOTE — H&P ADULT - NSHPSOCIALHISTORY_GEN_ALL_CORE
Tobacco use:   EtOH use:  Illicit drug use:  Marital Status: Tobacco use: denies  EtOH use: denies  Illicit drug use: denies

## 2025-03-29 NOTE — H&P ADULT - NSHPPHYSICALEXAM_GEN_ALL_CORE
VITALS:   T(C): 36.9 (03-29-25 @ 13:14), Max: 36.9 (03-29-25 @ 13:14)  HR: 89 (03-29-25 @ 13:14) (89 - 103)  BP: 123/88 (03-29-25 @ 13:14) (123/88 - 138/89)  RR: 18 (03-29-25 @ 13:14) (18 - 20)  SpO2: 97% (03-29-25 @ 13:14) (97% - 98%)    GENERAL: NAD, lying in bed comfortably  HEAD:  Atraumatic, Normocephalic  EYES: EOMI, PERRLA, conjunctiva and sclera clear  ENT: Moist mucous membranes  NECK: Supple, No JVD  CHEST/LUNG: Clear to auscultation bilaterally; No rales, rhonchi, wheezing, or rubs. Unlabored respirations  HEART: Regular rate and rhythm; No murmurs, rubs, or gallops  ABDOMEN: Bowel sounds present; Soft, Nontender, Nondistended  EXTREMITIES:  2+ Peripheral Pulses, brisk capillary refill. No clubbing, cyanosis, or edema  NERVOUS SYSTEM:  Alert & Oriented X3, speech clear. No deficits   MSK: FROM all 4 extremities, full and equal strength  SKIN: No rashes or lesions VITALS:   T(C): 36.9 (03-29-25 @ 13:14), Max: 36.9 (03-29-25 @ 13:14)  HR: 89 (03-29-25 @ 13:14) (89 - 103)  BP: 123/88 (03-29-25 @ 13:14) (123/88 - 138/89)  RR: 18 (03-29-25 @ 13:14) (18 - 20)  SpO2: 97% (03-29-25 @ 13:14) (97% - 98%)    GENERAL: NAD, lying in bed comfortably  HEAD:  Atraumatic, Normocephalic  EYES: EOMI  ENT: Moist mucous membranes  NECK: Supple  CHEST/LUNG: Clear to auscultation bilaterally; No wheezing, or rubs. Unlabored respirations  HEART: Regular rate and rhythm; No murmurs, rubs, or gallops  ABDOMEN: + tender to palpation to epigastric region, + Bowel sounds, Soft, Nondistended  EXTREMITIES:  No LE edema bilaterally  NERVOUS SYSTEM:  Alert & Oriented X3, speech clear. No deficits   MSK: FROM all 4 extremities, full and equal strength  SKIN: No rashes or lesions VITALS:   T(C): 36.9 (03-29-25 @ 13:14), Max: 36.9 (03-29-25 @ 13:14)  HR: 89 (03-29-25 @ 13:14) (89 - 103)  BP: 123/88 (03-29-25 @ 13:14) (123/88 - 138/89)  RR: 18 (03-29-25 @ 13:14) (18 - 20)  SpO2: 97% (03-29-25 @ 13:14) (97% - 98%)    GENERAL: NAD, lying in bed comfortably  HEAD:  Atraumatic, Normocephalic  EYES: EOMI  ENT: Moist mucous membranes  NECK: Supple  CHEST/LUNG: Clear to auscultation bilaterally; No wheezing, or rubs. Unlabored respirations  HEART: Regular rate and rhythm; No murmurs, rubs, or gallops  ABDOMEN: + tender to palpation to epigastric region, + Bowel sounds, Soft, Nondistended, no rebound tenderness  EXTREMITIES:  No LE edema bilaterally  NERVOUS SYSTEM:  Alert & Oriented X3, speech clear. No deficits   MSK: FROM all 4 extremities, full and equal strength  SKIN: No rashes or lesions

## 2025-03-29 NOTE — ED ADULT NURSE NOTE - NSICDXFAMILYHX_GEN_ALL_CORE_FT
FAMILY HISTORY:  Father  Still living? Unknown  DM (diabetes mellitus), Age at diagnosis: Age Unknown  Family history of heart disease, Age at diagnosis: Age Unknown     no palpitations/no chest pain

## 2025-03-29 NOTE — PATIENT PROFILE ADULT - HEALTH LITERACY
[facility] medication for Losartan-Hctz 100-12.5 is on back order. I have resent to Pharmacy separate refills.
no

## 2025-03-29 NOTE — ED ADULT NURSE NOTE - NSFALLUNIVINTERV_ED_ALL_ED
Bed/Stretcher in lowest position, wheels locked, appropriate side rails in place/Call bell, personal items and telephone in reach/Instruct patient to call for assistance before getting out of bed/chair/stretcher/Non-slip footwear applied when patient is off stretcher/[place] to call system/Physically safe environment - no spills, clutter or unnecessary equipment/Purposeful proactive rounding/Room/bathroom lighting operational, light cord in reach

## 2025-03-29 NOTE — ED PROVIDER NOTE - OBJECTIVE STATEMENT
48 yo F with PMHx of GERD, hiatal hernia, kidney stones, chronic back pain on methadone, anxiety, migraines, cholecystectomy, and hypothyroidism presents to the ED c/o persisting upper abd pain, nausea and NBNB vomiting x few days. Pt tried taking her prescribed medications without much relief prompting ED eval. She denies other complaints. Pt denies fever, chills, diarrhea, headache, dizziness, weakness, chest pain, SOB, back pain, LOC, trauma, urinary symptoms, cough, calf pain/swelling, recent travel, recent surgery.

## 2025-03-30 VITALS
OXYGEN SATURATION: 96 % | TEMPERATURE: 98 F | HEART RATE: 59 BPM | SYSTOLIC BLOOD PRESSURE: 108 MMHG | RESPIRATION RATE: 16 BRPM | DIASTOLIC BLOOD PRESSURE: 75 MMHG

## 2025-03-30 LAB
ALBUMIN SERPL ELPH-MCNC: 4.4 G/DL — SIGNIFICANT CHANGE UP (ref 3.5–5.2)
ALP SERPL-CCNC: 75 U/L — SIGNIFICANT CHANGE UP (ref 30–115)
ALT FLD-CCNC: 19 U/L — SIGNIFICANT CHANGE UP (ref 0–41)
ANION GAP SERPL CALC-SCNC: 12 MMOL/L — SIGNIFICANT CHANGE UP (ref 7–14)
AST SERPL-CCNC: 21 U/L — SIGNIFICANT CHANGE UP (ref 0–41)
BILIRUB SERPL-MCNC: 0.3 MG/DL — SIGNIFICANT CHANGE UP (ref 0.2–1.2)
BUN SERPL-MCNC: 5 MG/DL — LOW (ref 10–20)
CALCIUM SERPL-MCNC: 9.5 MG/DL — SIGNIFICANT CHANGE UP (ref 8.4–10.5)
CHLORIDE SERPL-SCNC: 103 MMOL/L — SIGNIFICANT CHANGE UP (ref 98–110)
CO2 SERPL-SCNC: 25 MMOL/L — SIGNIFICANT CHANGE UP (ref 17–32)
CREAT SERPL-MCNC: 0.6 MG/DL — LOW (ref 0.7–1.5)
EGFR: 111 ML/MIN/1.73M2 — SIGNIFICANT CHANGE UP
EGFR: 111 ML/MIN/1.73M2 — SIGNIFICANT CHANGE UP
GLUCOSE SERPL-MCNC: 84 MG/DL — SIGNIFICANT CHANGE UP (ref 70–99)
HCG SERPL-ACNC: 5.4 MIU/ML — HIGH
HCT VFR BLD CALC: 39.3 % — SIGNIFICANT CHANGE UP (ref 37–47)
HGB BLD-MCNC: 12.5 G/DL — SIGNIFICANT CHANGE UP (ref 12–16)
MCHC RBC-ENTMCNC: 29.6 PG — SIGNIFICANT CHANGE UP (ref 27–31)
MCHC RBC-ENTMCNC: 31.8 G/DL — LOW (ref 32–37)
MCV RBC AUTO: 93.1 FL — SIGNIFICANT CHANGE UP (ref 81–99)
NRBC BLD AUTO-RTO: 0 /100 WBCS — SIGNIFICANT CHANGE UP (ref 0–0)
PLATELET # BLD AUTO: 232 K/UL — SIGNIFICANT CHANGE UP (ref 130–400)
PMV BLD: 10.4 FL — SIGNIFICANT CHANGE UP (ref 7.4–10.4)
POTASSIUM SERPL-MCNC: 4 MMOL/L — SIGNIFICANT CHANGE UP (ref 3.5–5)
POTASSIUM SERPL-SCNC: 4 MMOL/L — SIGNIFICANT CHANGE UP (ref 3.5–5)
PROT SERPL-MCNC: 6.9 G/DL — SIGNIFICANT CHANGE UP (ref 6–8)
RBC # BLD: 4.22 M/UL — SIGNIFICANT CHANGE UP (ref 4.2–5.4)
RBC # FLD: 13.6 % — SIGNIFICANT CHANGE UP (ref 11.5–14.5)
SODIUM SERPL-SCNC: 140 MMOL/L — SIGNIFICANT CHANGE UP (ref 135–146)
WBC # BLD: 5.4 K/UL — SIGNIFICANT CHANGE UP (ref 4.8–10.8)
WBC # FLD AUTO: 5.4 K/UL — SIGNIFICANT CHANGE UP (ref 4.8–10.8)

## 2025-03-30 PROCEDURE — 99239 HOSP IP/OBS DSCHRG MGMT >30: CPT

## 2025-03-30 RX ORDER — METOCLOPRAMIDE HCL 10 MG
1 TABLET ORAL
Qty: 2 | Refills: 0
Start: 2025-03-30 | End: 2025-04-05

## 2025-03-30 RX ORDER — NALOXONE HYDROCHLORIDE 0.4 MG/ML
1 INJECTION, SOLUTION INTRAMUSCULAR; INTRAVENOUS; SUBCUTANEOUS
Qty: 1 | Refills: 0
Start: 2025-03-30 | End: 2025-03-30

## 2025-03-30 RX ORDER — METHADONE HCL 10 MG
20 TABLET ORAL
Refills: 0 | Status: DISCONTINUED | OUTPATIENT
Start: 2025-03-30 | End: 2025-03-30

## 2025-03-30 RX ORDER — ONDANSETRON HCL/PF 4 MG/2 ML
1 VIAL (ML) INJECTION
Qty: 3 | Refills: 0
Start: 2025-03-30 | End: 2025-04-05

## 2025-03-30 RX ADMIN — Medication 0.5 MILLIGRAM(S): at 02:40

## 2025-03-30 RX ADMIN — Medication 20 MILLIGRAM(S): at 05:25

## 2025-03-30 RX ADMIN — Medication 0.5 MILLIGRAM(S): at 03:22

## 2025-03-30 RX ADMIN — Medication 1 GRAM(S): at 05:25

## 2025-03-30 RX ADMIN — Medication 1 APPLICATION(S): at 05:25

## 2025-03-30 RX ADMIN — Medication 0.5 MILLIGRAM(S): at 12:16

## 2025-03-30 RX ADMIN — Medication 1 GRAM(S): at 16:15

## 2025-03-30 RX ADMIN — Medication 1 GRAM(S): at 10:51

## 2025-03-30 RX ADMIN — OXYBUTYNIN CHLORIDE 5 MILLIGRAM(S): 5 TABLET, FILM COATED, EXTENDED RELEASE ORAL at 05:26

## 2025-03-30 RX ADMIN — Medication 40 MILLIGRAM(S): at 05:24

## 2025-03-30 RX ADMIN — Medication 4 MILLIGRAM(S): at 10:51

## 2025-03-30 RX ADMIN — SODIUM CHLORIDE 100 MILLILITER(S): 9 INJECTION, SOLUTION INTRAVENOUS at 05:26

## 2025-03-30 RX ADMIN — Medication 10 MILLIGRAM(S): at 08:02

## 2025-03-30 RX ADMIN — Medication 40 MILLIGRAM(S): at 16:14

## 2025-03-30 RX ADMIN — CITALOPRAM 40 MILLIGRAM(S): 20 TABLET ORAL at 11:22

## 2025-03-30 RX ADMIN — Medication 4 MILLIGRAM(S): at 05:26

## 2025-03-30 RX ADMIN — Medication 0.5 MILLIGRAM(S): at 08:01

## 2025-03-30 RX ADMIN — Medication 0.5 MILLIGRAM(S): at 16:15

## 2025-03-30 RX ADMIN — OXYBUTYNIN CHLORIDE 5 MILLIGRAM(S): 5 TABLET, FILM COATED, EXTENDED RELEASE ORAL at 16:17

## 2025-03-30 RX ADMIN — Medication 75 MICROGRAM(S): at 05:25

## 2025-03-30 RX ADMIN — Medication 10 MILLIGRAM(S): at 17:48

## 2025-03-30 RX ADMIN — Medication 4 MILLIGRAM(S): at 16:15

## 2025-03-30 RX ADMIN — Medication 20 MILLIGRAM(S): at 16:15

## 2025-03-30 NOTE — DISCHARGE NOTE PROVIDER - ATTENDING DISCHARGE PHYSICAL EXAMINATION:
Patient seen and examined 3/30/2025.    Vital Signs Last 24 Hrs  T(C): 36.6 (30 Mar 2025 13:31), Max: 36.7 (29 Mar 2025 20:12)  T(F): 97.8 (30 Mar 2025 13:31), Max: 98.1 (29 Mar 2025 20:12)  HR: 59 (30 Mar 2025 13:31) (59 - 93)  BP: 108/75 (30 Mar 2025 13:31) (108/67 - 108/75)  BP(mean): --  RR: 16 (30 Mar 2025 13:31) (16 - 16)  SpO2: 96% (30 Mar 2025 13:31) (96% - 97%)    PHYSICAL EXAM:  GENERAL: NAD, sitting up in bed  PSYCH: no agitation, baseline mentation  NERVOUS SYSTEM:  Alert, freely moving all extremities  PULMONARY: Clear to percussion bilaterally  CARDIOVASCULAR: Regular rate and rhythm  GI: Soft, mild epigastric tenderness to palpation improved from admission  EXTREMITIES:  No cyanosis or edema  SKIN: No obvious rashes or lesions

## 2025-03-30 NOTE — DISCHARGE NOTE NURSING/CASE MANAGEMENT/SOCIAL WORK - FINANCIAL ASSISTANCE
[facility] provides services at a reduced cost to those who are determined to be eligible through [facility]’s financial assistance program. Information regarding [facility]’s financial assistance program can be found by going to [email or web address] or by calling [phone].

## 2025-03-30 NOTE — DISCHARGE NOTE PROVIDER - NSDCCPCAREPLAN_GEN_ALL_CORE_FT
PRINCIPAL DISCHARGE DIAGNOSIS  Diagnosis: Intractable abdominal pain  Assessment and Plan of Treatment: CT A/P with no evidence of acute abdominopelvic pathology.  Last EGD (12.03.24): Normal mucosa in the whole esophagus. Erosions and erythema in the stomach compatible with erosive gastritis.   Clear liquids --> advanced to reg diet   C/w PPI BID      SECONDARY DISCHARGE DIAGNOSES  Diagnosis: Intractable nausea and vomiting  Assessment and Plan of Treatment:      PRINCIPAL DISCHARGE DIAGNOSIS  Diagnosis: Intractable nausea and vomiting  Assessment and Plan of Treatment: Your nausea improved with anti-emetic medication.  You had a mildly positive pregnancy test (beta hCG serum).  A human chorionic gonadotropic tumor marker level was sent, result is still pending.  Please follow up with your primary care provider regarding this result.  CT A/P with no evidence of acute abdominopelvic pathology.  Last EGD (12.03.24): Normal mucosa in the whole esophagus. Erosions and erythema in the stomach compatible with erosive gastritis.   Clear liquids --> advanced to reg diet   C/w PPI BID      SECONDARY DISCHARGE DIAGNOSES  Diagnosis: Intractable nausea and vomiting  Assessment and Plan of Treatment:

## 2025-03-30 NOTE — DISCHARGE NOTE PROVIDER - HOSPITAL COURSE
49 year old female with PMH of HLD, hypothyroid, anxiety, OAB, chronic back pain on methadone, erosive gastritis, sphincter of Oddi dysfunction presented with intractable nausea and vomiting with abdominal pain for 3-4 days.    # Intractable nausea and vomiting and abdominal pain  # Erosive gastritis  Gastroenterology consulted  CT A/P w/ IV Cont (03.29.25 @ 14:36): No CT evidence of acute abdominopelvic pathology.  Last EGD (12.03.24): Normal mucosa in the whole esophagus. Erosions and erythema in the stomach compatible with erosive gastritis. Normal mucosa in the whole examined duodenum.   - clear liquids --> advanced to reg diet   - IVF   - PPI IV BID  - send stool studies if diarrhea noted  - pain control  - antiemetics  - urine toxicology- received     # Elevated HCG   - HCG Quantitative, Serum: 5.7  - will check urine pregnancy test    # Hx Hypothyroid  - continue synthroid    # Hx HLD  - continue statin    # Hx anxiety  - continue Celexa  -on xanax 0.5 mg po bid    # Hx OAB  - continue Oxybutynin    # Hx Chronic back pain  - I-stop checked: on Methadone 10 mg po 4 times a day (rx by Dr. Wood)  - as per Attending will reduce Methadone to 10 mg po bid for now       Admission HPI  49 year old female with PMH of HLD, hypothyroidism, anxiety, chronic back pain on methadone, OAB, erosive gastritis presents to ED c/o  abdominal pain for 3-4 days. Pt reports sudden onset of epigastric pain associated with nausea and NBNB vomiting, Pt was able to hold neither solids nor liquids, Pain 10/10, stabbing, intermittent. Pt also reports chills and loose bowel movement. She describes pain is very similar to the one she had in December when was dx with erosive gastritis. Pt follows with Dr. German and next appt is on 4/4. Was told she had sphincter of oddi dysfunction and was scheduled for evaluation this Friday for possible sphincterectomy but now admitted. She denies fever, chest pain, sob, melena, urinary complaints.     In ED, symptoms improved with zofran.    Hospital course:  She was given scheduled zofran.  Her nausea improved on the second day of admission and she was able to tolerate a diet.  She had a mildly positive serum pregnancy test with quantitative hCG.  Human chorionic gonadotropin tumor marker was ordered while inpatient and is pending at time of discharge. the result for which she can follow up with her outpatient provider.  She is stable for discharge home to follow up with her primary care physician.    # Intractable nausea and vomiting and abdominal pain  # Erosive gastritis  Gastroenterology consulted  CT A/P w/ IV Cont (03.29.25 @ 14:36): No CT evidence of acute abdominopelvic pathology.  Last EGD (12.03.24): Normal mucosa in the whole esophagus. Erosions and erythema in the stomach compatible with erosive gastritis. Normal mucosa in the whole examined duodenum.   - clear liquids --> advanced to reg diet   - IVF   - PPI IV BID  - send stool studies if diarrhea noted  - pain control  - antiemetics  - urine toxicology- received     # Elevated HCG   - HCG Quantitative, Serum: 5.7  - will check urine pregnancy test    # Hx Hypothyroid  - continue synthroid    # Hx HLD  - continue statin    # Hx anxiety  - continue Celexa  -on xanax 0.5 mg po bid    # Hx OAB  - continue Oxybutynin    # Hx Chronic back pain  - I-stop checked: on Methadone 10 mg po 4 times a day (rx by Dr. Wood)  - as per Attending will reduce Methadone to 10 mg po bid for now

## 2025-03-30 NOTE — DISCHARGE NOTE NURSING/CASE MANAGEMENT/SOCIAL WORK - PATIENT PORTAL LINK FT
You can access the FollowMyHealth Patient Portal offered by [facility] by registering at the following website: [email or web address]. By joining [employer]s FollowMyHealth portal, you will also be able to view your health information using other applications (apps) compatible with our system.

## 2025-03-30 NOTE — DISCHARGE NOTE PROVIDER - NSDCFUSCHEDAPPT_GEN_ALL_CORE_FT
Domo German  [facility]  GASTRO Doc Off [id number]  Scheduled Appointment: 04/04/2025

## 2025-03-30 NOTE — DISCHARGE NOTE NURSING/CASE MANAGEMENT/SOCIAL WORK - NSDCPEFALRISK_GEN_ALL_CORE
For information on Fall & Injury Prevention, visit: [email or web address] OR  [email or web address] OR  https://www.cdc.gov/steadi/patient.html

## 2025-03-30 NOTE — DISCHARGE NOTE PROVIDER - NSDCMRMEDTOKEN_GEN_ALL_CORE_FT
citalopram 40 mg oral tablet: 1 tab(s) orally once a day (in the morning)  esomeprazole 40 mg oral delayed release capsule: 1 cap(s) orally 2 times a day  levothyroxine 75 mcg (0.075 mg) oral tablet: 1 tab(s) orally once a day  methadone 10 mg oral tablet: 1 tab(s) orally 4 times a day  oxybutynin 5 mg oral tablet: 2 tab(s) orally once a day  rosuvastatin 10 mg oral tablet: 1 tab(s) orally once a day  sucralfate 1 g oral tablet: 1 tab(s) orally 4 times a day  Xanax 0.5 mg oral tablet: 1 tab(s) orally 2 times a day   citalopram 40 mg oral tablet: 1 tab(s) orally once a day (in the morning)  esomeprazole 40 mg oral delayed release capsule: 1 cap(s) orally 2 times a day  levothyroxine 75 mcg (0.075 mg) oral tablet: 1 tab(s) orally once a day  methadone 10 mg oral tablet: 1 tab(s) orally 4 times a day  metoclopramide 10 mg oral tablet, disintegratin tab(s) orally every 6 hours Nausea refractory to zofran  Narcan 4 mg/0.1 mL nasal spray: 1 spray(s) intranasally once a day as needed for Opiate reversal  ondansetron 4 mg oral tablet, disintegratin tab(s) orally every 6 hours as needed for  nausea  oxybutynin 5 mg oral tablet: 2 tab(s) orally once a day  rosuvastatin 10 mg oral tablet: 1 tab(s) orally once a day  sucralfate 1 g oral tablet: 1 tab(s) orally 4 times a day  Xanax 0.5 mg oral tablet: 1 tab(s) orally 2 times a day

## 2025-03-31 LAB
AMPHET UR-MCNC: NEGATIVE — SIGNIFICANT CHANGE UP
BARBITURATES UR SCN-MCNC: NEGATIVE — SIGNIFICANT CHANGE UP
BENZODIAZ UR-MCNC: POSITIVE
COCAINE METAB.OTHER UR-MCNC: NEGATIVE — SIGNIFICANT CHANGE UP
FENTANYL UR QL: NEGATIVE — SIGNIFICANT CHANGE UP
HCG-TM SERPL-ACNC: 5 MIU/ML — HIGH
METHADONE UR-MCNC: POSITIVE
OPIATES UR-MCNC: POSITIVE
PCP SPEC-MCNC: SIGNIFICANT CHANGE UP
PROPOXYPHENE QUALITATIVE URINE RESULT: NEGATIVE — SIGNIFICANT CHANGE UP

## 2025-03-31 NOTE — CHART NOTE - NSCHARTNOTEFT_GEN_A_CORE
Was able to talk to patient by phone, informed her of positive hcg tumor marker result and recommended that she follow up with her primary care physician who she has an appointment with this week as well as call her Ob/Gyn physician to see if they would like to see her sooner than her scheduled appointment at the end of this month.  She states her nausea has improved.
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  Called patient, left voicemail stating that a blood test had returned as positive and requested that she call the hospital back to discuss her positive test result.  Also recommended that she follow up with her Ob/Gyn provider as further testing may be beneficial.

## 2025-04-04 ENCOUNTER — APPOINTMENT (OUTPATIENT)
Dept: GASTROENTEROLOGY | Facility: CLINIC | Age: 50
End: 2025-04-04
Payer: MEDICAID

## 2025-04-04 DIAGNOSIS — R79.89 OTHER SPECIFIED ABNORMAL FINDINGS OF BLOOD CHEMISTRY: ICD-10-CM

## 2025-04-04 DIAGNOSIS — E78.5 HYPERLIPIDEMIA, UNSPECIFIED: ICD-10-CM

## 2025-04-04 DIAGNOSIS — Z12.11 ENCOUNTER FOR SCREENING FOR MALIGNANT NEOPLASM OF COLON: ICD-10-CM

## 2025-04-04 DIAGNOSIS — G89.29 OTHER CHRONIC PAIN: ICD-10-CM

## 2025-04-04 DIAGNOSIS — Z91.018 ALLERGY TO OTHER FOODS: ICD-10-CM

## 2025-04-04 DIAGNOSIS — F11.20 OPIOID DEPENDENCE, UNCOMPLICATED: ICD-10-CM

## 2025-04-04 DIAGNOSIS — R11.2 NAUSEA WITH VOMITING, UNSPECIFIED: ICD-10-CM

## 2025-04-04 DIAGNOSIS — R10.9 UNSPECIFIED ABDOMINAL PAIN: ICD-10-CM

## 2025-04-04 DIAGNOSIS — G43.909 MIGRAINE, UNSPECIFIED, NOT INTRACTABLE, WITHOUT STATUS MIGRAINOSUS: ICD-10-CM

## 2025-04-04 DIAGNOSIS — Z91.012 ALLERGY TO EGGS: ICD-10-CM

## 2025-04-04 DIAGNOSIS — Z88.8 ALLERGY STATUS TO OTHER DRUGS, MEDICAMENTS AND BIOLOGICAL SUBSTANCES: ICD-10-CM

## 2025-04-04 DIAGNOSIS — K29.00 ACUTE GASTRITIS WITHOUT BLEEDING: ICD-10-CM

## 2025-04-04 DIAGNOSIS — K21.9 GASTRO-ESOPHAGEAL REFLUX DISEASE WITHOUT ESOPHAGITIS: ICD-10-CM

## 2025-04-04 DIAGNOSIS — N32.81 OVERACTIVE BLADDER: ICD-10-CM

## 2025-04-04 DIAGNOSIS — T30.4 TOXIC EFFECT OF CORROSIVE ACIDS AND ACID-LIKE SUBSTANCES, ACCIDENTAL (UNINTENTIONAL), INITIAL ENCOUNTER: ICD-10-CM

## 2025-04-04 DIAGNOSIS — Z88.2 ALLERGY STATUS TO SULFONAMIDES: ICD-10-CM

## 2025-04-04 DIAGNOSIS — E03.9 HYPOTHYROIDISM, UNSPECIFIED: ICD-10-CM

## 2025-04-04 DIAGNOSIS — T54.2X1A TOXIC EFFECT OF CORROSIVE ACIDS AND ACID-LIKE SUBSTANCES, ACCIDENTAL (UNINTENTIONAL), INITIAL ENCOUNTER: ICD-10-CM

## 2025-04-04 PROCEDURE — 99214 OFFICE O/P EST MOD 30 MIN: CPT | Mod: 95

## 2025-04-04 RX ORDER — SODIUM SULFATE, POTASSIUM SULFATE AND MAGNESIUM SULFATE 1.6; 3.13; 17.5 G/177ML; G/177ML; G/177ML
17.5-3.13-1.6 SOLUTION ORAL
Qty: 1 | Refills: 0 | Status: ACTIVE | COMMUNITY
Start: 2025-04-04 | End: 1900-01-01

## 2025-04-07 LAB
1OH-MIDAZOLAM UR CFM-MCNC: NEGATIVE NG/ML — SIGNIFICANT CHANGE UP
6MAM UR CFM-MCNC: NEGATIVE NG/ML — SIGNIFICANT CHANGE UP
7AMINOCLONAZEPAM UR CFM-MCNC: NEGATIVE NG/ML — SIGNIFICANT CHANGE UP
ALPHA-HYDROXYALPRAZOLAM, UR RESULT: 220 NG/ML — HIGH
ALPRAZ UR CFM-MCNC: NEGATIVE NG/ML — SIGNIFICANT CHANGE UP
BENZODIAZ UR QL SCN: POSITIVE
CLONAZEPAM UR CFM-MCNC: NEGATIVE NG/ML — SIGNIFICANT CHANGE UP
CODEINE UR CFM-MCNC: NEGATIVE NG/ML — SIGNIFICANT CHANGE UP
DIAZEPAM UR CFM-MCNC: NEGATIVE NG/ML — SIGNIFICANT CHANGE UP
DIHYDROCODONE RESULT: NEGATIVE NG/ML — SIGNIFICANT CHANGE UP
EDDP UR QL CFM: 857 NG/ML — HIGH
EDDP, UR RESULT: 857 NG/ML — HIGH
FLUNITRAZEPAM UR CFM-MCNC: NEGATIVE NG/ML — SIGNIFICANT CHANGE UP
FLURAZEPAM UR CFM-MCNC: NEGATIVE NG/ML — SIGNIFICANT CHANGE UP
HYDROCODONE UR QL CFM: NEGATIVE NG/ML — SIGNIFICANT CHANGE UP
HYDROMORPHONE UR QL CFM: 879 NG/ML — HIGH
LORAZEPAM UR CFM-MCNC: NEGATIVE NG/ML — SIGNIFICANT CHANGE UP
MEPERIDINE RESULT: NEGATIVE NG/ML — SIGNIFICANT CHANGE UP
METHADONE UR CFM-MCNC: POSITIVE
MIDAZOLAM UR CFM-MCNC: NEGATIVE NG/ML — SIGNIFICANT CHANGE UP
MORPHINE UR QL CFM: 850 NG/ML — HIGH
NALOXONE RESULT: NEGATIVE NG/ML — SIGNIFICANT CHANGE UP
NALOXONE UR CFM-MCNC: NEGATIVE NG/ML — SIGNIFICANT CHANGE UP
NALTREXONE RESULT: NEGATIVE NG/ML — SIGNIFICANT CHANGE UP
NORDIAZEPAM, UR RESULT: NEGATIVE NG/ML — SIGNIFICANT CHANGE UP
OPIATES UR QL CFM: POSITIVE
OXAZEPAM UR QL SCN: NEGATIVE NG/ML — SIGNIFICANT CHANGE UP
TAPENTADOL RESULT: NEGATIVE NG/ML — SIGNIFICANT CHANGE UP
TEMAZEPAM UR CFM-MCNC: NEGATIVE NG/ML — SIGNIFICANT CHANGE UP

## 2025-04-25 ENCOUNTER — INPATIENT (INPATIENT)
Facility: HOSPITAL | Age: 50
LOS: 1 days | Discharge: HOME | DRG: 861 | End: 2025-04-27
Attending: STUDENT IN AN ORGANIZED HEALTH CARE EDUCATION/TRAINING PROGRAM | Admitting: STUDENT IN AN ORGANIZED HEALTH CARE EDUCATION/TRAINING PROGRAM
Payer: MEDICAID

## 2025-04-25 VITALS
SYSTOLIC BLOOD PRESSURE: 133 MMHG | RESPIRATION RATE: 18 BRPM | TEMPERATURE: 98 F | WEIGHT: 160.06 LBS | HEIGHT: 62 IN | DIASTOLIC BLOOD PRESSURE: 93 MMHG | OXYGEN SATURATION: 98 % | HEART RATE: 84 BPM

## 2025-04-25 DIAGNOSIS — Z98.890 OTHER SPECIFIED POSTPROCEDURAL STATES: Chronic | ICD-10-CM

## 2025-04-25 DIAGNOSIS — R52 PAIN, UNSPECIFIED: ICD-10-CM

## 2025-04-25 DIAGNOSIS — Z90.49 ACQUIRED ABSENCE OF OTHER SPECIFIED PARTS OF DIGESTIVE TRACT: Chronic | ICD-10-CM

## 2025-04-25 LAB
ALBUMIN SERPL ELPH-MCNC: 5.2 G/DL — SIGNIFICANT CHANGE UP (ref 3.5–5.2)
ALP SERPL-CCNC: 77 U/L — SIGNIFICANT CHANGE UP (ref 30–115)
ALT FLD-CCNC: 18 U/L — SIGNIFICANT CHANGE UP (ref 0–41)
ANION GAP SERPL CALC-SCNC: 15 MMOL/L — HIGH (ref 7–14)
APPEARANCE UR: ABNORMAL
AST SERPL-CCNC: 15 U/L — SIGNIFICANT CHANGE UP (ref 0–41)
BACTERIA # UR AUTO: ABNORMAL /HPF
BASOPHILS # BLD AUTO: 0.01 K/UL — SIGNIFICANT CHANGE UP (ref 0–0.2)
BASOPHILS NFR BLD AUTO: 0.2 % — SIGNIFICANT CHANGE UP (ref 0–1)
BILIRUB DIRECT SERPL-MCNC: <0.2 MG/DL — SIGNIFICANT CHANGE UP (ref 0–0.3)
BILIRUB INDIRECT FLD-MCNC: >0.1 MG/DL — LOW (ref 0.2–1.2)
BILIRUB SERPL-MCNC: 0.3 MG/DL — SIGNIFICANT CHANGE UP (ref 0.2–1.2)
BILIRUB UR-MCNC: NEGATIVE — SIGNIFICANT CHANGE UP
BUN SERPL-MCNC: 13 MG/DL — SIGNIFICANT CHANGE UP (ref 10–20)
CALCIUM SERPL-MCNC: 10.2 MG/DL — SIGNIFICANT CHANGE UP (ref 8.4–10.5)
CHLORIDE SERPL-SCNC: 100 MMOL/L — SIGNIFICANT CHANGE UP (ref 98–110)
CO2 SERPL-SCNC: 22 MMOL/L — SIGNIFICANT CHANGE UP (ref 17–32)
COD CRY URNS QL: PRESENT
COLOR SPEC: SIGNIFICANT CHANGE UP
CREAT SERPL-MCNC: 0.8 MG/DL — SIGNIFICANT CHANGE UP (ref 0.7–1.5)
DIFF PNL FLD: NEGATIVE — SIGNIFICANT CHANGE UP
EGFR: 91 ML/MIN/1.73M2 — SIGNIFICANT CHANGE UP
EGFR: 91 ML/MIN/1.73M2 — SIGNIFICANT CHANGE UP
EOSINOPHIL # BLD AUTO: 0 K/UL — SIGNIFICANT CHANGE UP (ref 0–0.7)
EOSINOPHIL NFR BLD AUTO: 0 % — SIGNIFICANT CHANGE UP (ref 0–8)
EPI CELLS # UR: PRESENT
GLUCOSE SERPL-MCNC: 113 MG/DL — HIGH (ref 70–99)
GLUCOSE UR QL: NEGATIVE MG/DL — SIGNIFICANT CHANGE UP
HCG SERPL QL: ABNORMAL
HCG SERPL-ACNC: 5.1 MIU/ML — HIGH
HCT VFR BLD CALC: 42.4 % — SIGNIFICANT CHANGE UP (ref 37–47)
HGB BLD-MCNC: 13.6 G/DL — SIGNIFICANT CHANGE UP (ref 12–16)
IMM GRANULOCYTES NFR BLD AUTO: 0.2 % — SIGNIFICANT CHANGE UP (ref 0.1–0.3)
KETONES UR-MCNC: ABNORMAL MG/DL
LEUKOCYTE ESTERASE UR-ACNC: ABNORMAL
LIDOCAIN IGE QN: 49 U/L — SIGNIFICANT CHANGE UP (ref 7–60)
LYMPHOCYTES # BLD AUTO: 1.27 K/UL — SIGNIFICANT CHANGE UP (ref 1.2–3.4)
LYMPHOCYTES # BLD AUTO: 19.8 % — LOW (ref 20.5–51.1)
MCHC RBC-ENTMCNC: 29.5 PG — SIGNIFICANT CHANGE UP (ref 27–31)
MCHC RBC-ENTMCNC: 32.1 G/DL — SIGNIFICANT CHANGE UP (ref 32–37)
MCV RBC AUTO: 92 FL — SIGNIFICANT CHANGE UP (ref 81–99)
MONOCYTES # BLD AUTO: 0.54 K/UL — SIGNIFICANT CHANGE UP (ref 0.1–0.6)
MONOCYTES NFR BLD AUTO: 8.4 % — SIGNIFICANT CHANGE UP (ref 1.7–9.3)
MUCOUS THREADS # UR AUTO: PRESENT
NEUTROPHILS # BLD AUTO: 4.58 K/UL — SIGNIFICANT CHANGE UP (ref 1.4–6.5)
NEUTROPHILS NFR BLD AUTO: 71.4 % — SIGNIFICANT CHANGE UP (ref 42.2–75.2)
NITRITE UR-MCNC: NEGATIVE — SIGNIFICANT CHANGE UP
NRBC BLD AUTO-RTO: 0 /100 WBCS — SIGNIFICANT CHANGE UP (ref 0–0)
PH UR: 5.5 — SIGNIFICANT CHANGE UP (ref 5–8)
PLATELET # BLD AUTO: 283 K/UL — SIGNIFICANT CHANGE UP (ref 130–400)
PMV BLD: 10.2 FL — SIGNIFICANT CHANGE UP (ref 7.4–10.4)
POTASSIUM SERPL-MCNC: 4.7 MMOL/L — SIGNIFICANT CHANGE UP (ref 3.5–5)
POTASSIUM SERPL-SCNC: 4.7 MMOL/L — SIGNIFICANT CHANGE UP (ref 3.5–5)
PROT SERPL-MCNC: 8.2 G/DL — HIGH (ref 6–8)
PROT UR-MCNC: 30 MG/DL
RBC # BLD: 4.61 M/UL — SIGNIFICANT CHANGE UP (ref 4.2–5.4)
RBC # FLD: 12.7 % — SIGNIFICANT CHANGE UP (ref 11.5–14.5)
RBC CASTS # UR COMP ASSIST: 1 /HPF — SIGNIFICANT CHANGE UP (ref 0–4)
SODIUM SERPL-SCNC: 137 MMOL/L — SIGNIFICANT CHANGE UP (ref 135–146)
SP GR SPEC: >1.03 — HIGH (ref 1–1.03)
SQUAMOUS # UR AUTO: 20 /HPF — HIGH (ref 0–5)
UROBILINOGEN FLD QL: 1 MG/DL — SIGNIFICANT CHANGE UP (ref 0.2–1)
WBC # BLD: 6.41 K/UL — SIGNIFICANT CHANGE UP (ref 4.8–10.8)
WBC # FLD AUTO: 6.41 K/UL — SIGNIFICANT CHANGE UP (ref 4.8–10.8)
WBC UR QL: 7 /HPF — HIGH (ref 0–5)

## 2025-04-25 PROCEDURE — 83735 ASSAY OF MAGNESIUM: CPT

## 2025-04-25 PROCEDURE — 84080 ASSAY ALKALINE PHOSPHATASES: CPT

## 2025-04-25 PROCEDURE — 74177 CT ABD & PELVIS W/CONTRAST: CPT | Mod: 26

## 2025-04-25 PROCEDURE — 84704 HCG FREE BETACHAIN TEST: CPT

## 2025-04-25 PROCEDURE — 82105 ALPHA-FETOPROTEIN SERUM: CPT

## 2025-04-25 PROCEDURE — 76856 US EXAM PELVIC COMPLETE: CPT

## 2025-04-25 PROCEDURE — 87521 HEPATITIS C PROBE&RVRS TRNSC: CPT

## 2025-04-25 PROCEDURE — 80053 COMPREHEN METABOLIC PANEL: CPT

## 2025-04-25 PROCEDURE — 80074 ACUTE HEPATITIS PANEL: CPT

## 2025-04-25 PROCEDURE — 36415 COLL VENOUS BLD VENIPUNCTURE: CPT

## 2025-04-25 PROCEDURE — 84100 ASSAY OF PHOSPHORUS: CPT

## 2025-04-25 PROCEDURE — 87040 BLOOD CULTURE FOR BACTERIA: CPT

## 2025-04-25 PROCEDURE — 99222 1ST HOSP IP/OBS MODERATE 55: CPT

## 2025-04-25 PROCEDURE — 99285 EMERGENCY DEPT VISIT HI MDM: CPT

## 2025-04-25 PROCEDURE — 85025 COMPLETE CBC W/AUTO DIFF WBC: CPT

## 2025-04-25 PROCEDURE — 87389 HIV-1 AG W/HIV-1&-2 AB AG IA: CPT

## 2025-04-25 PROCEDURE — 76705 ECHO EXAM OF ABDOMEN: CPT

## 2025-04-25 PROCEDURE — 76856 US EXAM PELVIC COMPLETE: CPT | Mod: 26

## 2025-04-25 RX ORDER — ACETAMINOPHEN 500 MG/5ML
975 LIQUID (ML) ORAL EVERY 8 HOURS
Refills: 0 | Status: DISCONTINUED | OUTPATIENT
Start: 2025-04-25 | End: 2025-04-27

## 2025-04-25 RX ORDER — ONDANSETRON HCL/PF 4 MG/2 ML
4 VIAL (ML) INJECTION EVERY 8 HOURS
Refills: 0 | Status: DISCONTINUED | OUTPATIENT
Start: 2025-04-25 | End: 2025-04-27

## 2025-04-25 RX ORDER — LEVOTHYROXINE SODIUM 300 MCG
75 TABLET ORAL DAILY
Refills: 0 | Status: DISCONTINUED | OUTPATIENT
Start: 2025-04-25 | End: 2025-04-27

## 2025-04-25 RX ORDER — ALPRAZOLAM 0.5 MG
1 TABLET, EXTENDED RELEASE 24 HR ORAL
Refills: 0 | DISCHARGE

## 2025-04-25 RX ORDER — ONDANSETRON HCL/PF 4 MG/2 ML
4 VIAL (ML) INJECTION ONCE
Refills: 0 | Status: COMPLETED | OUTPATIENT
Start: 2025-04-25 | End: 2025-04-25

## 2025-04-25 RX ORDER — CITALOPRAM 20 MG/1
40 TABLET ORAL DAILY
Refills: 0 | Status: DISCONTINUED | OUTPATIENT
Start: 2025-04-25 | End: 2025-04-27

## 2025-04-25 RX ORDER — OXYBUTYNIN CHLORIDE 5 MG/1
10 TABLET, FILM COATED, EXTENDED RELEASE ORAL DAILY
Refills: 0 | Status: DISCONTINUED | OUTPATIENT
Start: 2025-04-25 | End: 2025-04-27

## 2025-04-25 RX ORDER — ALPRAZOLAM 0.5 MG
0.5 TABLET, EXTENDED RELEASE 24 HR ORAL
Refills: 0 | Status: DISCONTINUED | OUTPATIENT
Start: 2025-04-25 | End: 2025-04-27

## 2025-04-25 RX ORDER — TRAZODONE HCL 100 MG
1 TABLET ORAL
Refills: 0 | DISCHARGE

## 2025-04-25 RX ORDER — ROSUVASTATIN CALCIUM 20 MG/1
10 TABLET, FILM COATED ORAL AT BEDTIME
Refills: 0 | Status: DISCONTINUED | OUTPATIENT
Start: 2025-04-25 | End: 2025-04-27

## 2025-04-25 RX ORDER — OXYCODONE HYDROCHLORIDE 30 MG/1
10 TABLET ORAL EVERY 6 HOURS
Refills: 0 | Status: DISCONTINUED | OUTPATIENT
Start: 2025-04-25 | End: 2025-04-26

## 2025-04-25 RX ORDER — METHADONE HCL 10 MG
1 TABLET ORAL
Refills: 0 | DISCHARGE

## 2025-04-25 RX ORDER — ACETAMINOPHEN 500 MG/5ML
650 LIQUID (ML) ORAL EVERY 6 HOURS
Refills: 0 | Status: DISCONTINUED | OUTPATIENT
Start: 2025-04-25 | End: 2025-04-25

## 2025-04-25 RX ORDER — TRAMADOL HYDROCHLORIDE 50 MG/1
50 TABLET, FILM COATED ORAL EVERY 6 HOURS
Refills: 0 | Status: DISCONTINUED | OUTPATIENT
Start: 2025-04-25 | End: 2025-04-26

## 2025-04-25 RX ORDER — MELATONIN 5 MG
3 TABLET ORAL AT BEDTIME
Refills: 0 | Status: DISCONTINUED | OUTPATIENT
Start: 2025-04-25 | End: 2025-04-27

## 2025-04-25 RX ORDER — MAGNESIUM, ALUMINUM HYDROXIDE 200-200 MG
30 TABLET,CHEWABLE ORAL EVERY 4 HOURS
Refills: 0 | Status: DISCONTINUED | OUTPATIENT
Start: 2025-04-25 | End: 2025-04-27

## 2025-04-25 RX ORDER — SODIUM CHLORIDE 9 G/1000ML
1000 INJECTION, SOLUTION INTRAVENOUS ONCE
Refills: 0 | Status: COMPLETED | OUTPATIENT
Start: 2025-04-25 | End: 2025-04-25

## 2025-04-25 RX ORDER — POLYETHYLENE GLYCOL 3350 17 G/17G
17 POWDER, FOR SOLUTION ORAL
Refills: 0 | Status: DISCONTINUED | OUTPATIENT
Start: 2025-04-25 | End: 2025-04-27

## 2025-04-25 RX ORDER — METHADONE HCL 10 MG
10 TABLET ORAL
Refills: 0 | Status: DISCONTINUED | OUTPATIENT
Start: 2025-04-25 | End: 2025-04-27

## 2025-04-25 RX ORDER — ENOXAPARIN SODIUM 100 MG/ML
40 INJECTION SUBCUTANEOUS EVERY 24 HOURS
Refills: 0 | Status: DISCONTINUED | OUTPATIENT
Start: 2025-04-26 | End: 2025-04-27

## 2025-04-25 RX ORDER — TRAZODONE HCL 100 MG
100 TABLET ORAL AT BEDTIME
Refills: 0 | Status: DISCONTINUED | OUTPATIENT
Start: 2025-04-25 | End: 2025-04-27

## 2025-04-25 RX ORDER — CEFTRIAXONE 500 MG/1
1000 INJECTION, POWDER, FOR SOLUTION INTRAMUSCULAR; INTRAVENOUS EVERY 24 HOURS
Refills: 0 | Status: DISCONTINUED | OUTPATIENT
Start: 2025-04-25 | End: 2025-04-26

## 2025-04-25 RX ADMIN — Medication 2 MILLIGRAM(S): at 21:22

## 2025-04-25 RX ADMIN — SODIUM CHLORIDE 1000 MILLILITER(S): 9 INJECTION, SOLUTION INTRAVENOUS at 14:32

## 2025-04-25 RX ADMIN — Medication 2 MILLIGRAM(S): at 15:42

## 2025-04-25 RX ADMIN — TRAMADOL HYDROCHLORIDE 50 MILLIGRAM(S): 50 TABLET, FILM COATED ORAL at 23:47

## 2025-04-25 RX ADMIN — OXYCODONE HYDROCHLORIDE 10 MILLIGRAM(S): 30 TABLET ORAL at 19:53

## 2025-04-25 RX ADMIN — Medication 4 MILLIGRAM(S): at 12:49

## 2025-04-25 RX ADMIN — Medication 100 MILLIGRAM(S): at 21:08

## 2025-04-25 RX ADMIN — Medication 4 MILLIGRAM(S): at 14:39

## 2025-04-25 RX ADMIN — ROSUVASTATIN CALCIUM 10 MILLIGRAM(S): 20 TABLET, FILM COATED ORAL at 21:08

## 2025-04-25 RX ADMIN — Medication 4 MILLIGRAM(S): at 18:12

## 2025-04-25 RX ADMIN — CEFTRIAXONE 100 MILLIGRAM(S): 500 INJECTION, POWDER, FOR SOLUTION INTRAMUSCULAR; INTRAVENOUS at 19:55

## 2025-04-25 RX ADMIN — Medication 10 MILLIGRAM(S): at 23:25

## 2025-04-25 RX ADMIN — Medication 975 MILLIGRAM(S): at 21:08

## 2025-04-25 NOTE — ED PROVIDER NOTE - CARE PLAN
1 Principal Discharge DX:	Intractable pain   Principal Discharge DX:	Intractable pain  Assessment and plan of treatment:	plan - labs ct ua reassess

## 2025-04-25 NOTE — ED PROVIDER NOTE - CLINICAL SUMMARY MEDICAL DECISION MAKING FREE TEXT BOX
50 yo F presented to ED for eval of L flank pain. Labs were ordered and reviewed.  Imaging was ordered and reviewed by me. CT abdomen pelvis unremarkable.  Appropriate medications for patient's presenting complaints were ordered and effects were reassessed.  Patient's records (prior hospital, ED visit, and/or nursing home notes if available) were reviewed.  Additional history was obtained from EMS, family, and/or PCP (where available).  Escalation to admission/observation was considered.  Pt still having intractable pain vs pyelo. Patient requires inpatient hospitalization for further care.

## 2025-04-25 NOTE — ED PROVIDER NOTE - PHYSICAL EXAMINATION
CONSTITUTIONAL: well-appearing, well nourished, non-toxic, in mild distress  HEAD: NCAT  EYES: EOMI, no scleral icterus  ENT: Dry mucous membranes  NECK: Full ROM.   ABD: soft, +left flank and suprapubic tenderness, + L CVA tenderness  EXT: Full ROM  NEURO: normal motor. normal sensory. Normal gait.  PSYCH: Cooperative, appropriate. standing/walking

## 2025-04-25 NOTE — H&P ADULT - HISTORY OF PRESENT ILLNESS
A 49-year-old female with history of hypothyroidism, kidney stones with stent placement in the past who follows Dr. Hightower, migraine, GERD, chronic back pain on Methadone 10 mg q 6 hrs, cholecystectomy and ventral hernia repair presents for evaluation of left flank and L groin pain. PT reports pain since Wednesday , constant  8/10, reports feels like kidney stone. Pt reports L groin pain worsening with urination. Pt denies hematuria or dysuria. Pt reports frequent urination. Pt reports nausea, vomiting x 2 this am NBNB. Pt reports had 2x UTI past month initially treated with Nitrofurantion and Last Wednesday was started on bactrim took it for 3 days. PT reports chills and fever Tmax 101. Pt denies vaginal discharge, reports currently not sexually active. Pt seen by her pain management today and was advised to come to ED for possible CT scan a/p . 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 Pt denies fecal or urinary incontinence. PT denies chest pain, shortness of breath, constipation calf pain.

## 2025-04-25 NOTE — ED PROVIDER NOTE - DIFFERENTIAL DIAGNOSIS
The differential diagnosis for patients clinical presentation includes but is not limited to: uti, pyelo, kidney stone Differential Diagnosis

## 2025-04-25 NOTE — H&P ADULT - NSHPPHYSICALEXAM_GEN_ALL_CORE
VITALS:     ICU Vital Signs Last 24 Hrs  T(C): 36.6 (25 Apr 2025 12:19), Max: 36.6 (25 Apr 2025 12:19)  T(F): 97.9 (25 Apr 2025 12:19), Max: 97.9 (25 Apr 2025 12:19)  HR: 92 (25 Apr 2025 17:51) (84 - 92)  BP: 151/113 (25 Apr 2025 17:51) (133/93 - 151/113)  RR: 18 (25 Apr 2025 12:19) (18 - 18)  SpO2: 98% (25 Apr 2025 12:19) (98% - 98%)    O2 Parameters below as of 25 Apr 2025 12:19  Patient On (Oxygen Delivery Method): room air      GENERAL: NAD, lying in bed comfortably  HEAD:  Atraumatic, Normocephalic  EYES: EOMI, PERRLA, conjunctiva and sclera clear  ENT: Moist mucous membranes  NECK: Supple, No JVD  CHEST/LUNG: Clear to auscultation bilaterally; No rales, rhonchi, wheezing, or rubs. Unlabored respirations  HEART: Regular rate and rhythm; No murmurs, rubs, or gallops  ABDOMEN: Soft, Nontender, Nondistended.  palpable ventral hernia, non tender, No hepatomegally  EXTREMITIES:  no edema or  tenderness   back: no c-spine tenderness, mild CVA tenderness   NERVOUS SYSTEM:  Alert & Oriented X3, speech clear. No deficits   MSK: FROM all 4 extremities, full and equal strength  SKIN: No rashes or lesions

## 2025-04-25 NOTE — H&P ADULT - NS ATTEND AMEND GEN_ALL_CORE FT
Intractable Left Flank Pain   Possibly Passed stone v.s Pyelonephritis   CT A/P : Unremarkable   Recently treated for E.coli UTI with bactrim.   however O/P UCX on patient's phone is resistant to bactrim   Continue ceftriaxone, ID eval for Guidance  Pain control    Anticipate Discharge in 24-48hrs

## 2025-04-25 NOTE — ED PROVIDER NOTE - OBJECTIVE STATEMENT
49-year-old female with history of hypothyroidism, kidney stones with stent placement in the past who follows Dr. Hightower, migraine, GERD, cholecystectomy and hernia repair presents for evaluation of left flank pain that started earlier today.  Patient associated nausea, but denies vomiting and she took a dose of Zofran at home.  He also endorses having diarrhea which she normally has with her kidney stones.  She endorses having chills and sweats and a tactile fever.  She also has burning and pain with urination, but endorses having a UTI which was diagnosed a week ago and needed to have a change of course of antibiotics to Bactrim double strength twice daily which she is currently taking, however she missed this morning's dose.

## 2025-04-25 NOTE — ED PROVIDER NOTE - ATTENDING CONTRIBUTION TO CARE
48 yo F pmhx hypothyroidism, kidney stones , migraines, GERD, presents to ED for eval of L flank pain starting today. Reports nausea and nonbloody diarrhea. Reports chills and subjective fever. Reports dysuria, was diagnosed with UTI outpatient on Bactrim.     CONSTITUTIONAL: NAD.   SKIN: warm, dry  HEAD: Normocephalic; atraumatic.  ENT: MMM.   NECK: Supple.  CARD: RRR.   RESP: No wheezes, rales or rhonchi.  ABD: soft nondistended +L suprapubic tenderness +LCVAT  EXT: Normal ROM.  No lower extremity edema.   NEURO: Alert, oriented, grossly unremarkable.

## 2025-04-25 NOTE — ED ADULT NURSE NOTE - NS ED NURSE PATIENT LEFT UNIT TIME
Pt alert on arrival to phase II. Pt [place] and reads lips. Denies pain at present. Dressing CD&I to right chest. Given coffee and cookies. Daughter-in-law to room. Call light within reach.
Pt tolerates PO and sitting up in chair. Denies pain. VSS. Daughter-in-law assisting pt to dress in chair. Call light within reach.
19:17

## 2025-04-25 NOTE — H&P ADULT - NSHPLABSRESULTS_GEN_ALL_CORE
13.6   6.41  )-----------( 283      ( 25 Apr 2025 12:50 )             42.4       04-25    137  |  100  |  13  ----------------------------<  113[H]  4.7   |  22  |  0.8    Ca    10.2      25 Apr 2025 12:50    TPro  8.2[H]  /  Alb  5.2  /  TBili  0.3  /  DBili  <0.2  /  AST  15  /  ALT  18  /  AlkPhos  77  04-25            Urinalysis Basic - ( 25 Apr 2025 13:45 )    Color: Dark Yellow / Appearance: Cloudy / SG: >1.030 / pH: x  Gluc: x / Ketone: Trace mg/dL  / Bili: Negative / Urobili: 1.0 mg/dL   Blood: x / Protein: 30 mg/dL / Nitrite: Negative   Leuk Esterase: Trace / RBC: 1 /HPF / WBC 7 /HPF   Sq Epi: x / Non Sq Epi: 20 /HPF / Bacteria: Few /HPF      Lactate Trend    Serum Pregnancy: Indeterminate (04-25-25 @ 12:50)      < from: CT Abdomen and Pelvis w/ IV Cont (04.25.25 @ 14:37) >    BLADDER: Unremarkable.  REPRODUCTIVE ORGANS: Unremarkable    BOWEL: No evidence of bowel obstruction, colitis, or inflammatory   process. Normal caliber appendix.  PERITONEUM/RETROPERITONEUM: No ascites. No free intraperitoneal air.  VESSELS: Normal caliber aorta.  LYMPH NODES: No abdominal or pelvic adenopathy.  BONES: Degenerative changes of the spine at L5-S1.  ABDOMINAL WALL: Small fat containing umbilical hernia.    IMPRESSION:  No evidence of abdominal or pelvic mass or acute inflammatory process.    No evidence of left renal or ureteral stone.    --- End of Report ---      JOSE WOODRUFF MD; Attending Interventional Radiologist  This document has been electronically signed. Apr 25 2025  3:39PM    < end of copied text >

## 2025-04-25 NOTE — H&P ADULT - ASSESSMENT
A 49-year-old female with history of hypothyroidism, kidney stones with stent placement in the past who follows Dr. Hightower, migraine, GERD, chronic back pain on Methadone 10 mg q 6 hrs, cholecystectomy and ventral hernia repair presents for evaluation of left flank and L groin pain.     #intractable back pain with radiation to L groin   #hx chronic back pain   CT a/p Degenerative changes of the spine at L5-S1  -Pt received 11mg IV morphine in ED  -cw methadone 10 mg q 6 hr (istop reference [id number])  -cw tylenol 975 standing  -Tramadol 50 q6 prn for mild pain   -oxycodone prn for sever pain   - urine toxicology    #recent UTI   -failed outp treatement   -cw Rocephin   -monitor for fever   -follow up ucx and Bcx   -ID consult     # hx GERD  #Erosive gastritis  Gastroenterology consulted  - antiemetics  -PPI     # chronic Elevated HCG   - HCG Quantitative, Serum: 5.1  - will check urine pregnancy test  -outpt follow up with GYN     # Hx Hypothyroid  - continue synthroid    # Hx HLD  - continue statin    # Hx anxiety  - continue Celexa      # Hx OAB  - continue Oxybutynin      DVT prophylaxis: lovenox  GI prophylaxis PPI

## 2025-04-26 LAB
AFP-TM SERPL-MCNC: 2.2 NG/ML — SIGNIFICANT CHANGE UP
ALBUMIN SERPL ELPH-MCNC: 4.6 G/DL — SIGNIFICANT CHANGE UP (ref 3.5–5.2)
ALP SERPL-CCNC: 133 U/L — HIGH (ref 30–115)
ALT FLD-CCNC: 226 U/L — HIGH (ref 0–41)
ANION GAP SERPL CALC-SCNC: 13 MMOL/L — SIGNIFICANT CHANGE UP (ref 7–14)
AST SERPL-CCNC: 389 U/L — HIGH (ref 0–41)
BASOPHILS # BLD AUTO: 0.01 K/UL — SIGNIFICANT CHANGE UP (ref 0–0.2)
BASOPHILS NFR BLD AUTO: 0.2 % — SIGNIFICANT CHANGE UP (ref 0–1)
BILIRUB SERPL-MCNC: 0.4 MG/DL — SIGNIFICANT CHANGE UP (ref 0.2–1.2)
BUN SERPL-MCNC: 11 MG/DL — SIGNIFICANT CHANGE UP (ref 10–20)
CALCIUM SERPL-MCNC: 9.5 MG/DL — SIGNIFICANT CHANGE UP (ref 8.4–10.5)
CHLORIDE SERPL-SCNC: 104 MMOL/L — SIGNIFICANT CHANGE UP (ref 98–110)
CO2 SERPL-SCNC: 22 MMOL/L — SIGNIFICANT CHANGE UP (ref 17–32)
CREAT SERPL-MCNC: 0.7 MG/DL — SIGNIFICANT CHANGE UP (ref 0.7–1.5)
EGFR: 107 ML/MIN/1.73M2 — SIGNIFICANT CHANGE UP
EGFR: 107 ML/MIN/1.73M2 — SIGNIFICANT CHANGE UP
EOSINOPHIL # BLD AUTO: 0 K/UL — SIGNIFICANT CHANGE UP (ref 0–0.7)
EOSINOPHIL NFR BLD AUTO: 0 % — SIGNIFICANT CHANGE UP (ref 0–8)
GLUCOSE SERPL-MCNC: 143 MG/DL — HIGH (ref 70–99)
HCG-TM SERPL-ACNC: 5 MIU/ML — HIGH
HCT VFR BLD CALC: 38.3 % — SIGNIFICANT CHANGE UP (ref 37–47)
HGB BLD-MCNC: 12.1 G/DL — SIGNIFICANT CHANGE UP (ref 12–16)
IMM GRANULOCYTES NFR BLD AUTO: 0.2 % — SIGNIFICANT CHANGE UP (ref 0.1–0.3)
LYMPHOCYTES # BLD AUTO: 1.16 K/UL — LOW (ref 1.2–3.4)
LYMPHOCYTES # BLD AUTO: 27.4 % — SIGNIFICANT CHANGE UP (ref 20.5–51.1)
MAGNESIUM SERPL-MCNC: 1.9 MG/DL — SIGNIFICANT CHANGE UP (ref 1.8–2.4)
MCHC RBC-ENTMCNC: 29.2 PG — SIGNIFICANT CHANGE UP (ref 27–31)
MCHC RBC-ENTMCNC: 31.6 G/DL — LOW (ref 32–37)
MCV RBC AUTO: 92.5 FL — SIGNIFICANT CHANGE UP (ref 81–99)
MONOCYTES # BLD AUTO: 0.26 K/UL — SIGNIFICANT CHANGE UP (ref 0.1–0.6)
MONOCYTES NFR BLD AUTO: 6.1 % — SIGNIFICANT CHANGE UP (ref 1.7–9.3)
NEUTROPHILS # BLD AUTO: 2.79 K/UL — SIGNIFICANT CHANGE UP (ref 1.4–6.5)
NEUTROPHILS NFR BLD AUTO: 66.1 % — SIGNIFICANT CHANGE UP (ref 42.2–75.2)
NRBC BLD AUTO-RTO: 0 /100 WBCS — SIGNIFICANT CHANGE UP (ref 0–0)
PHOSPHATE SERPL-MCNC: 4.2 MG/DL — SIGNIFICANT CHANGE UP (ref 2.1–4.9)
PLATELET # BLD AUTO: 235 K/UL — SIGNIFICANT CHANGE UP (ref 130–400)
PMV BLD: 10.3 FL — SIGNIFICANT CHANGE UP (ref 7.4–10.4)
POTASSIUM SERPL-MCNC: 3.9 MMOL/L — SIGNIFICANT CHANGE UP (ref 3.5–5)
POTASSIUM SERPL-SCNC: 3.9 MMOL/L — SIGNIFICANT CHANGE UP (ref 3.5–5)
PROT SERPL-MCNC: 7.2 G/DL — SIGNIFICANT CHANGE UP (ref 6–8)
RBC # BLD: 4.14 M/UL — LOW (ref 4.2–5.4)
RBC # FLD: 13 % — SIGNIFICANT CHANGE UP (ref 11.5–14.5)
SODIUM SERPL-SCNC: 139 MMOL/L — SIGNIFICANT CHANGE UP (ref 135–146)
WBC # BLD: 4.23 K/UL — LOW (ref 4.8–10.8)
WBC # FLD AUTO: 4.23 K/UL — LOW (ref 4.8–10.8)

## 2025-04-26 PROCEDURE — 99232 SBSQ HOSP IP/OBS MODERATE 35: CPT

## 2025-04-26 PROCEDURE — 76705 ECHO EXAM OF ABDOMEN: CPT | Mod: 26

## 2025-04-26 RX ORDER — OXYCODONE HYDROCHLORIDE 30 MG/1
20 TABLET ORAL EVERY 6 HOURS
Refills: 0 | Status: DISCONTINUED | OUTPATIENT
Start: 2025-04-26 | End: 2025-04-27

## 2025-04-26 RX ORDER — TRAMADOL HYDROCHLORIDE 50 MG/1
100 TABLET, FILM COATED ORAL EVERY 8 HOURS
Refills: 0 | Status: DISCONTINUED | OUTPATIENT
Start: 2025-04-26 | End: 2025-04-27

## 2025-04-26 RX ORDER — CEFTRIAXONE 500 MG/1
2000 INJECTION, POWDER, FOR SOLUTION INTRAMUSCULAR; INTRAVENOUS EVERY 24 HOURS
Refills: 0 | Status: DISCONTINUED | OUTPATIENT
Start: 2025-04-26 | End: 2025-04-27

## 2025-04-26 RX ADMIN — Medication 975 MILLIGRAM(S): at 14:45

## 2025-04-26 RX ADMIN — OXYBUTYNIN CHLORIDE 10 MILLIGRAM(S): 5 TABLET, FILM COATED, EXTENDED RELEASE ORAL at 12:02

## 2025-04-26 RX ADMIN — Medication 4 MILLIGRAM(S): at 23:25

## 2025-04-26 RX ADMIN — OXYCODONE HYDROCHLORIDE 20 MILLIGRAM(S): 30 TABLET ORAL at 21:25

## 2025-04-26 RX ADMIN — Medication 975 MILLIGRAM(S): at 05:09

## 2025-04-26 RX ADMIN — OXYCODONE HYDROCHLORIDE 20 MILLIGRAM(S): 30 TABLET ORAL at 20:41

## 2025-04-26 RX ADMIN — Medication 75 MICROGRAM(S): at 05:09

## 2025-04-26 RX ADMIN — CITALOPRAM 40 MILLIGRAM(S): 20 TABLET ORAL at 12:01

## 2025-04-26 RX ADMIN — OXYCODONE HYDROCHLORIDE 20 MILLIGRAM(S): 30 TABLET ORAL at 14:42

## 2025-04-26 RX ADMIN — Medication 10 MILLIGRAM(S): at 17:09

## 2025-04-26 RX ADMIN — POLYETHYLENE GLYCOL 3350 17 GRAM(S): 17 POWDER, FOR SOLUTION ORAL at 17:09

## 2025-04-26 RX ADMIN — TRAMADOL HYDROCHLORIDE 50 MILLIGRAM(S): 50 TABLET, FILM COATED ORAL at 08:35

## 2025-04-26 RX ADMIN — Medication 3 MILLIGRAM(S): at 23:10

## 2025-04-26 RX ADMIN — ROSUVASTATIN CALCIUM 10 MILLIGRAM(S): 20 TABLET, FILM COATED ORAL at 21:09

## 2025-04-26 RX ADMIN — Medication 0.5 MILLIGRAM(S): at 17:08

## 2025-04-26 RX ADMIN — OXYCODONE HYDROCHLORIDE 10 MILLIGRAM(S): 30 TABLET ORAL at 04:04

## 2025-04-26 RX ADMIN — Medication 10 MILLIGRAM(S): at 05:09

## 2025-04-26 RX ADMIN — Medication 40 MILLIGRAM(S): at 06:28

## 2025-04-26 RX ADMIN — Medication 10 MILLIGRAM(S): at 12:01

## 2025-04-26 RX ADMIN — Medication 100 MILLIGRAM(S): at 21:09

## 2025-04-26 RX ADMIN — Medication 4 MILLIGRAM(S): at 17:09

## 2025-04-26 RX ADMIN — CEFTRIAXONE 100 MILLIGRAM(S): 500 INJECTION, POWDER, FOR SOLUTION INTRAMUSCULAR; INTRAVENOUS at 14:44

## 2025-04-26 RX ADMIN — Medication 2 MILLIGRAM(S): at 10:11

## 2025-04-26 RX ADMIN — Medication 0.5 MILLIGRAM(S): at 05:09

## 2025-04-26 RX ADMIN — OXYCODONE HYDROCHLORIDE 10 MILLIGRAM(S): 30 TABLET ORAL at 10:55

## 2025-04-26 RX ADMIN — Medication 10 MILLIGRAM(S): at 23:08

## 2025-04-26 RX ADMIN — Medication 4 MILLIGRAM(S): at 23:08

## 2025-04-26 NOTE — PROGRESS NOTE ADULT - ASSESSMENT
A 49-year-old female with history of hypothyroidism, kidney stones with stent placement in the past who follows Dr. Hightower, migraine, GERD, chronic back pain on Methadone 10 mg q 6 hrs, cholecystectomy and ventral hernia repair presents for evaluation of left flank and L groin pain.       Intractable Left Flank  pain with radiation to L groin   Suspect secondary to Passed Renal Caliculi v.s Pyelonephritis   Recent UTI resistant to home abx   H/O Renal stone   Per Pt's phone record, Ucx: ecoli resistant to bactrim, continue Rocephin  CT A/P unremarkable for renal Stone   UA Mildly positive, F.up Ucx and Blood it pt is still in hospital  Pain control, Pt and RN was told to encourage to use PO Pills given d/c planning     Chronically  Elevated HCG   - HCG Quantitative 5.1, Serum pregnancy test: indeterminant   -Pelvic US: Normal, however unable to visualize bilateral ovaries  -Patient endorsed irregular periods    - F/up AFP and HCG-TM  -outpt follow up with GYN     Chronic Back Pain   CT a/p Degenerative changes of the spine at L5-S1  c/w methadone 10 mg q 6 hr (istop reference [id number])    Hypothyroid: continue synthroid  HLD: continue statin  Anxiety: continue Celexa  OAB:  continue Oxybutynin    DVT prophylaxis: Lovenox  GI prophylaxis PPI   Full code   Dispo: from home   Discharge in AM

## 2025-04-26 NOTE — PATIENT PROFILE ADULT - FUNCTIONAL ASSESSMENT - BASIC MOBILITY 5.
Certification: Based upon physical, mental and social evaluations, I certify that inpatient psychiatric services are medically necessary for this patient for a duration of 21 midnights for the treatment of Major depressive disorder, recurrent episode, severe with anxious distress (HCC)  Available alternative community resources do not meet the patient's mental health care needs.  I further attest that an established written individualized plan of care has been implemented and is outlined in the patient's medical records.     4 = No assist / stand by assistance

## 2025-04-26 NOTE — PROGRESS NOTE ADULT - SUBJECTIVE AND OBJECTIVE BOX
Patient is a 49y old  Female who presents with a chief complaint of intractable L groin and back pain , ?UTI (25 Apr 2025 18:54)        MEDICATIONS  (STANDING):  acetaminophen     Tablet .. 975 milliGRAM(s) Oral every 8 hours  ALPRAZolam 0.5 milliGRAM(s) Oral two times a day  cefTRIAXone   IVPB 1000 milliGRAM(s) IV Intermittent every 24 hours  chlorhexidine 2% Cloths 1 Application(s) Topical <User Schedule>  citalopram 40 milliGRAM(s) Oral daily  enoxaparin Injectable 40 milliGRAM(s) SubCutaneous every 24 hours  levothyroxine 75 MICROGram(s) Oral daily  methadone    Tablet 10 milliGRAM(s) Oral four times a day  morphine  - Injectable 4 milliGRAM(s) IV Push every 6 hours  oxybutynin 10 milliGRAM(s) Oral daily  pantoprazole    Tablet 40 milliGRAM(s) Oral before breakfast  polyethylene glycol 3350 17 Gram(s) Oral two times a day  rosuvastatin 10 milliGRAM(s) Oral at bedtime  traZODone 100 milliGRAM(s) Oral at bedtime    MEDICATIONS  (PRN):  aluminum hydroxide/magnesium hydroxide/simethicone Suspension 30 milliLiter(s) Oral every 4 hours PRN Dyspepsia  melatonin 3 milliGRAM(s) Oral at bedtime PRN Insomnia  ondansetron Injectable 4 milliGRAM(s) IV Push every 8 hours PRN Nausea and/or Vomiting  oxyCODONE    IR 20 milliGRAM(s) Oral every 6 hours PRN Moderate Pain (4 - 6)  traMADol 100 milliGRAM(s) Oral every 8 hours PRN Moderate Pain (4 - 6)      CAPILLARY BLOOD GLUCOSE  I&O's Summary    25 Apr 2025 07:01  -  26 Apr 2025 07:00  --------------------------------------------------------  IN: 0 mL / OUT: 1050 mL / NET: -1050 mL        PHYSICAL EXAM:  Vital Signs Last 24 Hrs  T(C): 36.7 (26 Apr 2025 13:16), Max: 36.8 (25 Apr 2025 20:45)  T(F): 98.1 (26 Apr 2025 13:16), Max: 98.3 (25 Apr 2025 20:45)  HR: 79 (26 Apr 2025 13:16) (66 - 96)  BP: 100/73 (26 Apr 2025 13:16) (97/66 - 151/113)  BP(mean): --  RR: 18 (26 Apr 2025 13:16) (16 - 18)  SpO2: 100% (26 Apr 2025 13:16) (96% - 100%)    Parameters below as of 26 Apr 2025 13:16  Patient On (Oxygen Delivery Method): room air        GENERAL: No acute distress, well-developed  HEAD:  Atraumatic, Normocephalic  EYES: conjunctiva and sclera clear  NECK: Supple, no JVD  CHEST/LUNG: CTAB; No wheezes, rales, or rhonchi  HEART: Regular rate and rhythm; No murmurs  ABDOMEN: Soft, non-tender, non-distended; Left lank pain   EXTREMITIES:   No clubbing, cyanosis, or edema  NEUROLOGY: A&O x 3, no focal deficits  SKIN: No rashes or lesions    LABS:                        12.1   4.23  )-----------( 235      ( 26 Apr 2025 08:58 )             38.3     04-26    139  |  104  |  11  ----------------------------<  143[H]  3.9   |  22  |  0.7    Ca    9.5      26 Apr 2025 08:58  Phos  4.2     04-26  Mg     1.9     04-26    TPro  7.2  /  Alb  4.6  /  TBili  0.4  /  DBili  x   /  AST  389[H]  /  ALT  226[H]  /  AlkPhos  133[H]  04-26      Urinalysis Basic - ( 26 Apr 2025 08:58 )    Color: x / Appearance: x / SG: x / pH: x  Gluc: 143 mg/dL / Ketone: x  / Bili: x / Urobili: x   Blood: x / Protein: x / Nitrite: x   Leuk Esterase: x / RBC: x / WBC x   Sq Epi: x / Non Sq Epi: x / Bacteria: x        Urinalysis with Rflx Culture (collected 25 Apr 2025 13:45)

## 2025-04-26 NOTE — CONSULT NOTE ADULT - ASSESSMENT
ASSESSMENT  This is a 49-year-old female with history of hypothyroidism, kidney stones, migraine, GERD, chronic back pain on Methadone 10 mg q 6 hrs, cholecystectomy and ventral hernia repair presents for evaluation of left flank and L groin pain.     IMPRESSION  #Suspected UTI/Renal colic   #History of nephrolithiasis  #Overactive bladder  #hypothyroidism, Migraine  #Chronic back pain on methadone  #Obesity BMI (kg/m2): 31    RECOMMENDATIONS  -Outpatient urine cultures 4/7/2025 reviewed. E.coli (R-Bactrim    If any questions, please send a message or call on [employer]  Please continue to update ID with any pertinent new laboratory or radiographic findings.    Davi Scott M.D  Infectious Diseases Attending/   [employer] at [facility]   ASSESSMENT  This is a 49-year-old female with history of hypothyroidism, kidney stones, migraine, GERD, chronic back pain on Methadone 10 mg q 6 hrs, cholecystectomy and ventral hernia repair presents for evaluation of left flank and L groin pain.     IMPRESSION  #Suspected UTI/Renal colic   #History of nephrolithiasis  #Overactive bladder  #Transaminitis-Unclear cause.   #hypothyroidism, Migraine  #Chronic back pain on methadone  #Obesity BMI (kg/m2): 31    RECOMMENDATIONS  -Outpatient urine cultures 4/7/2025 reviewed. E.coli (R-Bactrim)  -Follow up with urine cultures.   -IV Ceftriaxone 2 gram q 24 hours. May switch to PO Vantin 400mg BID for total of 7 days from 4/25/25 on discharge.  -Pain control.   -Outpatient follow up with urology for overactive bladder.   -Follow up outpatient for hormonal abnormalities noted (b-HCG).    If any questions, please send a message or call on [employer] Teams  Please continue to update ID with any pertinent new laboratory or radiographic findings.    Davi Scott M.D  Infectious Diseases Attending/   [employer] at [facility]

## 2025-04-26 NOTE — CONSULT NOTE ADULT - SUBJECTIVE AND OBJECTIVE BOX
RAMON WOODS  49y, Female  Allergies    Bananas (Unknown)  citrus (Unknown)  Cipro (Rash)  eggs (Unknown)  NSAIDs (Angioedema; Anaphylaxis; Hives)    Intolerances    LOS  1d    HPI  HPI:  A 49-year-old female with history of hypothyroidism, kidney stones with stent placement in the past who follows Dr. Hightower, migraine, GERD, chronic back pain on Methadone 10 mg q 6 hrs, cholecystectomy and ventral hernia repair presents for evaluation of left flank and L groin pain. PT reports pain since Wednesday , constant  8/10, reports feels like kidney stone. Pt reports L groin pain worsening with urination. Pt denies hematuria or dysuria. Pt reports frequent urination. Pt reports nausea, vomiting x 2 this am NBNB. Pt reports had 2x UTI past month initially treated with Nitrofurantion and Last Wednesday was started on bactrim took it for 3 days. PT reports chills and fever Tmax 101. Pt denies vaginal discharge, reports currently not sexually active. Pt seen by her pain management today and was advised to come to ED for possible CT scan a/p . 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 Pt denies fecal or urinary incontinence. PT denies chest pain, shortness of breath, constipation calf pain.        (25 Apr 2025 18:54)      INFECTIOUS DISEASE HISTORY:  ID consulted for antimicrobial recommendations.     Prior hospital charts reviewed [Yes]  Primary team notes reviewed [Yes]  Other consultant notes reviewed [Yes]    REVIEW OF SYSTEMS:  CONSTITUTIONAL: No fever or chills  HEENT: No sore throat  RESPIRATORY: No cough, no shortness of breath  CARDIOVASCULAR: No chest pain or palpitations  GASTROINTESTINAL: No abdominal or epigastric pain  GENITOURINARY: No dysuria  NEUROLOGICAL: No headache/dizziness  MSK: No joint pain, erythema, or swelling; no back pain  SKIN: No itching, rashes  All other ROS negative except noted above    PAST MEDICAL & SURGICAL HISTORY:  GERD (gastroesophageal reflux disease)      Migraine headache      Back pain      Anxiety      Kidney stones      Murmur  since age 20     6/18      Thyroid nodule      Hypothyroidism      Overactive bladder      History of surgery  hernia repair    x2      History of cholecystectomy      History of lithotripsy          SOCIAL HISTORY:  - No recent travel    FAMILY HISTORY:  DM (diabetes mellitus) (Father)    Family history of heart disease (Father)    ANTIMICROBIALS:  cefTRIAXone   IVPB 2000 every 24 hours      ANTIMICROBIALS (past 90 days):  MEDICATIONS  (STANDING):  cefTRIAXone   IVPB   100 mL/Hr IV Intermittent (04-26-25 @ 14:44)    cefTRIAXone   IVPB   100 mL/Hr IV Intermittent (04-25-25 @ 19:55)        OTHER MEDS:   MEDICATIONS  (STANDING):  acetaminophen     Tablet .. 975 every 8 hours  ALPRAZolam 0.5 two times a day  aluminum hydroxide/magnesium hydroxide/simethicone Suspension 30 every 4 hours PRN  citalopram 40 daily  enoxaparin Injectable 40 every 24 hours  levothyroxine 75 daily  melatonin 3 at bedtime PRN  methadone    Tablet 10 four times a day  morphine  - Injectable 4 every 6 hours  ondansetron Injectable 4 every 8 hours PRN  oxybutynin 10 daily  oxyCODONE    IR 20 every 6 hours PRN  pantoprazole    Tablet 40 before breakfast  polyethylene glycol 3350 17 two times a day  rosuvastatin 10 at bedtime  traMADol 100 every 8 hours PRN  traZODone 100 at bedtime      VITALS:  Vital Signs Last 24 Hrs  T(F): 98.1 (04-26-25 @ 13:16), Max: 98.3 (04-25-25 @ 20:45)    Vital Signs Last 24 Hrs  HR: 79 (04-26-25 @ 13:16) (66 - 96)  BP: 100/73 (04-26-25 @ 13:16) (97/66 - 151/113)  RR: 18 (04-26-25 @ 13:16)  SpO2: 100% (04-26-25 @ 13:16) (96% - 100%)  Wt(kg): --    EXAM:  GENERAL: NAD  HEAD: No head lesions  NECK: Supple  CHEST/LUNG: Clear to auscultation bilaterally  HEART: S1 S2  ABDOMEN: Soft, Left sided inguinal pain.   EXTREMITIES: No clubbing  NERVOUS SYSTEM: Alert and oriented to person  MSK: No joint erythema, swelling or pain  SKIN: No rashes or lesions, no superficial thrombophlebitis    Labs:                        12.1   4.23  )-----------( 235      ( 26 Apr 2025 08:58 )             38.3     04-26    139  |  104  |  11  ----------------------------<  143[H]  3.9   |  22  |  0.7    Ca    9.5      26 Apr 2025 08:58  Phos  4.2     04-26  Mg     1.9     04-26    TPro  7.2  /  Alb  4.6  /  TBili  0.4  /  DBili  x   /  AST  389[H]  /  ALT  226[H]  /  AlkPhos  133[H]  04-26      WBC Trend:  WBC Count: 4.23 (04-26-25 @ 08:58)  WBC Count: 6.41 (04-25-25 @ 12:50)      Auto Neutrophil #: 4.12 K/uL (12-03-24 @ 07:19)  Auto Neutrophil #: 5.27 K/uL (12-02-24 @ 01:01)  Auto Neutrophil #: 9.19 K/uL (11-23-24 @ 15:27)  Auto Neutrophil #: 3.51 K/uL (11-17-24 @ 06:14)  Auto Neutrophil #: 5.01 K/uL (11-16-24 @ 06:33)      Creatine Trend:  Creatinine: 0.7 (04-26)  Creatinine: 0.8 (04-25)      Liver Biochemical Testing Trend:  Alanine Aminotransferase (ALT/SGPT): 226 *H* (04-26)  Alanine Aminotransferase (ALT/SGPT): 18 (04-25)  Alanine Aminotransferase (ALT/SGPT): 19 (03-30)  Alanine Aminotransferase (ALT/SGPT): 13 (03-29)  Alanine Aminotransferase (ALT/SGPT): 13 (12-03)  Aspartate Aminotransferase (AST/SGOT): 389 (04-26-25 @ 08:58)  Aspartate Aminotransferase (AST/SGOT): 15 (04-25-25 @ 12:50)  Aspartate Aminotransferase (AST/SGOT): 21 (03-30-25 @ 07:52)  Aspartate Aminotransferase (AST/SGOT): 15 (03-29-25 @ 10:20)  Aspartate Aminotransferase (AST/SGOT): 18 (12-03-24 @ 07:19)  Bilirubin Total: 0.4 (04-26)  Bilirubin Direct: <0.2 (04-25)  Bilirubin Total: 0.3 (04-25)  Bilirubin Total: 0.3 (03-30)  Bilirubin Direct: <0.2 (03-29)      Trend LDH          Urinalysis Basic - ( 26 Apr 2025 08:58 )    Color: x / Appearance: x / SG: x / pH: x  Gluc: 143 mg/dL / Ketone: x  / Bili: x / Urobili: x   Blood: x / Protein: x / Nitrite: x   Leuk Esterase: x / RBC: x / WBC x   Sq Epi: x / Non Sq Epi: x / Bacteria: x        MICROBIOLOGY:    Female    Urinalysis with Rflx Culture (collected 25 Apr 2025 13:45)    INFLAMMATORY MARKERS      RADIOLOGY & ADDITIONAL TESTS:  I have personally reviewed the imagings.  CXR      CT  CT Abdomen and Pelvis w/ IV Cont:   ACC: [id number] EXAM:  CT ABDOMEN AND PELVIS IC   ORDERED BY: HARLEY LUCIANO     PROCEDURE DATE:  04/25/2025          INTERPRETATION:  CLINICAL INFORMATION: Left flank pain with vomiting. WBC   6.41  Blood, urine - negative.   PMHx of GERD, back pain, Hypothyroidism,   kidney stones, lithotripsy, cholecystectomy, hernia repair x2.    COMPARISON: CT scan of the abdomen and pelvis dated 3/29/2025    CONTRAST/COMPLICATIONS:  IV Contrast:   The patient received 95 ml of Omnipaque 350 with 5 ml   discarded.  Oral Contrast:  None  Complications:  None reported at time of study completion    PROCEDURE:  CT of the Abdomen and Pelvis was performed.  Sagittal and coronal reformats were performed.    FINDINGS:    TUBES AND LINES: None.  LOWER CHEST: The lung bases are clear. No pleural or pericardial effusion.    LIVER: The liver is normal in size with no evidence of solid mass. Small   hepatic cyst adjacent . The portal vein is patent. The hepatic veins are   opacified.  BILE DUCTS: Prominence of the intrahepatic and extrahepatic bile ducts   compatible with previous cholecystectomy.  GALLBLADDER: Post cholecystectomy  SPLEEN: Within normal limits.  PANCREAS: The pancreas is normal in size and configuration. No evidence   of mass or pancreatitis.  ADRENALS: Within normal limits.  KIDNEYS/URETERS: There is symmetric renal enhancement. No evidence of   hydronephrosis, calcified stones, or solid mass.      BLADDER: Unremarkable.  REPRODUCTIVE ORGANS: Unremarkable    BOWEL: No evidence of bowel obstruction, colitis, or inflammatory   process. Normal caliber appendix.  PERITONEUM/RETROPERITONEUM: No ascites. No free intraperitoneal air.  VESSELS: Normal caliber aorta.  LYMPH NODES: No abdominal or pelvic adenopathy.  BONES: Degenerative changes of the spine at L5-S1.  ABDOMINAL WALL: Small fat containing umbilical hernia.    IMPRESSION:  No evidence of abdominal or pelvic mass or acute inflammatory process.    No evidence of left renal or ureteral stone.    --- End of Report ---            JOSE WOODRUFF MD; Attending Interventional Radiologist  This document has been electronically signed. Apr 25 2025  3:39PM (04-25-25 @ 14:37)      CARDIOLOGY TESTING    < from: US Pelvis Complete (US Pelvis Complete .) (04.25.25 @ 22:05) >  FINDINGS:  Uterus: 8.2 cm x 4.7 cm x 4.1 cm. Multiple cervical nabothian cysts.  Endometrium: 9 mm. Within normal limits.    Right ovary: Nonvisualized.  Left ovary: Nonvisualized.    Fluid: None.    IMPRESSION:    Nonvisualized bilateral ovaries.    Limited visualization. No definite intrauterine pregnancy is seen.   Short-term follow-up can be obtained as needed.    < end of copied text >

## 2025-04-27 VITALS
RESPIRATION RATE: 16 BRPM | DIASTOLIC BLOOD PRESSURE: 73 MMHG | SYSTOLIC BLOOD PRESSURE: 103 MMHG | HEART RATE: 82 BPM | TEMPERATURE: 98 F | OXYGEN SATURATION: 95 %

## 2025-04-27 LAB
ALBUMIN SERPL ELPH-MCNC: 4.6 G/DL — SIGNIFICANT CHANGE UP (ref 3.5–5.2)
ALP SERPL-CCNC: 104 U/L — SIGNIFICANT CHANGE UP (ref 30–115)
ALT FLD-CCNC: 129 U/L — HIGH (ref 0–41)
ANION GAP SERPL CALC-SCNC: 15 MMOL/L — HIGH (ref 7–14)
AST SERPL-CCNC: 68 U/L — HIGH (ref 0–41)
BASOPHILS # BLD AUTO: 0 K/UL — SIGNIFICANT CHANGE UP (ref 0–0.2)
BASOPHILS NFR BLD AUTO: 0 % — SIGNIFICANT CHANGE UP (ref 0–1)
BILIRUB SERPL-MCNC: <0.2 MG/DL — SIGNIFICANT CHANGE UP (ref 0.2–1.2)
BUN SERPL-MCNC: 11 MG/DL — SIGNIFICANT CHANGE UP (ref 10–20)
CALCIUM SERPL-MCNC: 9.9 MG/DL — SIGNIFICANT CHANGE UP (ref 8.4–10.5)
CHLORIDE SERPL-SCNC: 101 MMOL/L — SIGNIFICANT CHANGE UP (ref 98–110)
CO2 SERPL-SCNC: 24 MMOL/L — SIGNIFICANT CHANGE UP (ref 17–32)
CREAT SERPL-MCNC: 0.6 MG/DL — LOW (ref 0.7–1.5)
EGFR: 111 ML/MIN/1.73M2 — SIGNIFICANT CHANGE UP
EGFR: 111 ML/MIN/1.73M2 — SIGNIFICANT CHANGE UP
EOSINOPHIL # BLD AUTO: 0 K/UL — SIGNIFICANT CHANGE UP (ref 0–0.7)
EOSINOPHIL NFR BLD AUTO: 0 % — SIGNIFICANT CHANGE UP (ref 0–8)
GLUCOSE SERPL-MCNC: 101 MG/DL — HIGH (ref 70–99)
HAV IGM SER-ACNC: SIGNIFICANT CHANGE UP
HBV CORE IGM SER-ACNC: SIGNIFICANT CHANGE UP
HBV SURFACE AG SER-ACNC: SIGNIFICANT CHANGE UP
HCT VFR BLD CALC: 38.6 % — SIGNIFICANT CHANGE UP (ref 37–47)
HCV AB S/CO SERPL IA: 0.81 S/CO — HIGH (ref 0–0.79)
HCV AB SERPL-IMP: ABNORMAL
HGB BLD-MCNC: 12.1 G/DL — SIGNIFICANT CHANGE UP (ref 12–16)
IMM GRANULOCYTES NFR BLD AUTO: 0.2 % — SIGNIFICANT CHANGE UP (ref 0.1–0.3)
LYMPHOCYTES # BLD AUTO: 1.78 K/UL — SIGNIFICANT CHANGE UP (ref 1.2–3.4)
LYMPHOCYTES # BLD AUTO: 38 % — SIGNIFICANT CHANGE UP (ref 20.5–51.1)
MCHC RBC-ENTMCNC: 29.4 PG — SIGNIFICANT CHANGE UP (ref 27–31)
MCHC RBC-ENTMCNC: 31.3 G/DL — LOW (ref 32–37)
MCV RBC AUTO: 93.9 FL — SIGNIFICANT CHANGE UP (ref 81–99)
MONOCYTES # BLD AUTO: 0.43 K/UL — SIGNIFICANT CHANGE UP (ref 0.1–0.6)
MONOCYTES NFR BLD AUTO: 9.2 % — SIGNIFICANT CHANGE UP (ref 1.7–9.3)
NEUTROPHILS # BLD AUTO: 2.47 K/UL — SIGNIFICANT CHANGE UP (ref 1.4–6.5)
NEUTROPHILS NFR BLD AUTO: 52.6 % — SIGNIFICANT CHANGE UP (ref 42.2–75.2)
NRBC BLD AUTO-RTO: 0 /100 WBCS — SIGNIFICANT CHANGE UP (ref 0–0)
PLATELET # BLD AUTO: 240 K/UL — SIGNIFICANT CHANGE UP (ref 130–400)
PMV BLD: 10.3 FL — SIGNIFICANT CHANGE UP (ref 7.4–10.4)
POTASSIUM SERPL-MCNC: 4.8 MMOL/L — SIGNIFICANT CHANGE UP (ref 3.5–5)
POTASSIUM SERPL-SCNC: 4.8 MMOL/L — SIGNIFICANT CHANGE UP (ref 3.5–5)
PROT SERPL-MCNC: 7.3 G/DL — SIGNIFICANT CHANGE UP (ref 6–8)
RBC # BLD: 4.11 M/UL — LOW (ref 4.2–5.4)
RBC # FLD: 13 % — SIGNIFICANT CHANGE UP (ref 11.5–14.5)
SODIUM SERPL-SCNC: 140 MMOL/L — SIGNIFICANT CHANGE UP (ref 135–146)
WBC # BLD: 4.69 K/UL — LOW (ref 4.8–10.8)
WBC # FLD AUTO: 4.69 K/UL — LOW (ref 4.8–10.8)

## 2025-04-27 PROCEDURE — 99239 HOSP IP/OBS DSCHRG MGMT >30: CPT

## 2025-04-27 RX ORDER — OXYCODONE HYDROCHLORIDE 30 MG/1
1 TABLET ORAL
Qty: 15 | Refills: 0
Start: 2025-04-27 | End: 2025-05-01

## 2025-04-27 RX ORDER — AMOXICILLIN AND CLAVULANATE POTASSIUM 500; 125 MG/1; MG/1
1 TABLET, FILM COATED ORAL
Qty: 10 | Refills: 0
Start: 2025-04-27 | End: 2025-05-01

## 2025-04-27 RX ORDER — CEFPODOXIME PROXETIL 200 MG/1
2 TABLET, FILM COATED ORAL
Qty: 20 | Refills: 0
Start: 2025-04-27 | End: 2025-05-01

## 2025-04-27 RX ORDER — OXYCODONE HYDROCHLORIDE 30 MG/1
1 TABLET ORAL
Qty: 12 | Refills: 0
Start: 2025-04-27 | End: 2025-04-30

## 2025-04-27 RX ADMIN — Medication 4 MILLIGRAM(S): at 11:29

## 2025-04-27 RX ADMIN — Medication 4 MILLIGRAM(S): at 07:08

## 2025-04-27 RX ADMIN — OXYBUTYNIN CHLORIDE 10 MILLIGRAM(S): 5 TABLET, FILM COATED, EXTENDED RELEASE ORAL at 11:31

## 2025-04-27 RX ADMIN — Medication 10 MILLIGRAM(S): at 05:24

## 2025-04-27 RX ADMIN — Medication 1 APPLICATION(S): at 05:26

## 2025-04-27 RX ADMIN — Medication 75 MICROGRAM(S): at 05:24

## 2025-04-27 RX ADMIN — Medication 0.5 MILLIGRAM(S): at 05:24

## 2025-04-27 RX ADMIN — Medication 10 MILLIGRAM(S): at 11:32

## 2025-04-27 RX ADMIN — CITALOPRAM 40 MILLIGRAM(S): 20 TABLET ORAL at 11:31

## 2025-04-27 RX ADMIN — OXYCODONE HYDROCHLORIDE 20 MILLIGRAM(S): 30 TABLET ORAL at 09:06

## 2025-04-27 RX ADMIN — Medication 4 MILLIGRAM(S): at 05:24

## 2025-04-27 RX ADMIN — Medication 975 MILLIGRAM(S): at 05:23

## 2025-04-27 RX ADMIN — TRAMADOL HYDROCHLORIDE 100 MILLIGRAM(S): 50 TABLET, FILM COATED ORAL at 12:45

## 2025-04-27 RX ADMIN — CEFTRIAXONE 100 MILLIGRAM(S): 500 INJECTION, POWDER, FOR SOLUTION INTRAMUSCULAR; INTRAVENOUS at 12:48

## 2025-04-27 RX ADMIN — POLYETHYLENE GLYCOL 3350 17 GRAM(S): 17 POWDER, FOR SOLUTION ORAL at 05:25

## 2025-04-27 RX ADMIN — Medication 975 MILLIGRAM(S): at 07:08

## 2025-04-27 RX ADMIN — Medication 40 MILLIGRAM(S): at 05:26

## 2025-04-27 NOTE — DISCHARGE NOTE NURSING/CASE MANAGEMENT/SOCIAL WORK - NSDCPEFALRISK_GEN_ALL_CORE
For information on Fall & Injury Prevention, visit: [email or web address] OR  [email or web address] OR  https://www.cdc.gov/steadi/patient.html

## 2025-04-27 NOTE — DISCHARGE NOTE PROVIDER - ATTENDING DISCHARGE PHYSICAL EXAMINATION:
GENERAL: No acute distress, well-developed  HEAD:  Atraumatic, Normocephalic  EYES: conjunctiva and sclera clear  NECK: Supple, no JVD  CHEST/LUNG: CTAB; No wheezes, rales, or rhonchi  HEART: Regular rate and rhythm; No murmurs  ABDOMEN: Soft, non-tender, non-distended; Left lank pain improved   EXTREMITIES:   No clubbing, cyanosis, or edema  NEUROLOGY: A&O x 3, no focal deficits  SKIN: No rashes or lesions

## 2025-04-27 NOTE — DISCHARGE NOTE NURSING/CASE MANAGEMENT/SOCIAL WORK - FINANCIAL ASSISTANCE
[facility] provides services at a reduced cost to those who are determined to be eligible through [facility]’s financial assistance program. Information regarding [facility]’s financial assistance program can be found by going to [email or web address] or by calling [phone].

## 2025-04-27 NOTE — PROGRESS NOTE ADULT - SUBJECTIVE AND OBJECTIVE BOX
RAMON WOODS  49y, Female    LOS  2d    INTERVAL EVENTS/HPI  - T(F): , Max: 98.6 (04-26-25 @ 20:40)  - WBC Count: 4.69 (04-27-25 @ 08:59)  WBC Count: 4.23 (04-26-25 @ 08:58)  - Creatinine: 0.6 (04-27-25 @ 08:59)  Creatinine: 0.7 (04-26-25 @ 08:58)    REVIEW OF SYSTEMS:  CONSTITUTIONAL: No fever or chills  HEENT: No sore throat  RESPIRATORY: No cough, no shortness of breath  CARDIOVASCULAR: No chest pain or palpitations  GASTROINTESTINAL: No abdominal or epigastric pain  GENITOURINARY: No dysuria  NEUROLOGICAL: No headache/dizziness  MSK: No joint pain, erythema, or swelling; no back pain  SKIN: No itching, rashes  All other ROS negative except noted above    Prior hospital charts reviewed [Yes]  Primary team notes reviewed [Yes]  Other consultant notes reviewed [Yes]    ANTIMICROBIALS:   cefTRIAXone   IVPB 2000 every 24 hours      OTHER MEDS: MEDICATIONS  (STANDING):  acetaminophen     Tablet .. 975 every 8 hours  ALPRAZolam 0.5 two times a day  aluminum hydroxide/magnesium hydroxide/simethicone Suspension 30 every 4 hours PRN  citalopram 40 daily  enoxaparin Injectable 40 every 24 hours  levothyroxine 75 daily  melatonin 3 at bedtime PRN  methadone    Tablet 10 four times a day  morphine  - Injectable 4 every 6 hours  ondansetron Injectable 4 every 8 hours PRN  oxybutynin 10 daily  oxyCODONE    IR 20 every 6 hours PRN  pantoprazole    Tablet 40 before breakfast  polyethylene glycol 3350 17 two times a day  rosuvastatin 10 at bedtime  traMADol 100 every 8 hours PRN  traZODone 100 at bedtime      Vital Signs Last 24 Hrs  T(F): 98 (04-27-25 @ 14:13), Max: 98.6 (04-26-25 @ 20:40)    Vital Signs Last 24 Hrs  HR: 82 (04-27-25 @ 14:13) (74 - 94)  BP: 103/73 (04-27-25 @ 14:13) (103/73 - 146/95)  RR: 16 (04-27-25 @ 14:13)  SpO2: 95% (04-27-25 @ 14:13) (95% - 97%)  Wt(kg): --    EXAM:  GENERAL: NAD  HEAD: No head lesions  NECK: Supple  CHEST/LUNG: Clear to auscultation bilaterally  HEART: S1 S2  ABDOMEN: Soft, nontender.   EXTREMITIES: No clubbing  NERVOUS SYSTEM: Alert and oriented to person  MSK: No joint erythema, swelling or pain  SKIN: No rashes or lesions, no superficial thrombophlebitis    Labs:                        12.1   4.69  )-----------( 240      ( 27 Apr 2025 08:59 )             38.6     04-27    140  |  101  |  11  ----------------------------<  101[H]  4.8   |  24  |  0.6[L]    Ca    9.9      27 Apr 2025 08:59  Phos  4.2     04-26  Mg     1.9     04-26    TPro  7.3  /  Alb  4.6  /  TBili  <0.2  /  DBili  x   /  AST  68[H]  /  ALT  129[H]  /  AlkPhos  104  04-27      WBC Trend:  WBC Count: 4.69 (04-27-25 @ 08:59)  WBC Count: 4.23 (04-26-25 @ 08:58)  WBC Count: 6.41 (04-25-25 @ 12:50)      Creatine Trend:  Creatinine: 0.6 (04-27)  Creatinine: 0.7 (04-26)  Creatinine: 0.8 (04-25)      Liver Biochemical Testing Trend:  Alanine Aminotransferase (ALT/SGPT): 129 *H* (04-27)  Alanine Aminotransferase (ALT/SGPT): 226 *H* (04-26)  Alanine Aminotransferase (ALT/SGPT): 18 (04-25)  Alanine Aminotransferase (ALT/SGPT): 19 (03-30)  Alanine Aminotransferase (ALT/SGPT): 13 (03-29)  Aspartate Aminotransferase (AST/SGOT): 68 (04-27-25 @ 08:59)  Aspartate Aminotransferase (AST/SGOT): 389 (04-26-25 @ 08:58)  Aspartate Aminotransferase (AST/SGOT): 15 (04-25-25 @ 12:50)  Aspartate Aminotransferase (AST/SGOT): 21 (03-30-25 @ 07:52)  Aspartate Aminotransferase (AST/SGOT): 15 (03-29-25 @ 10:20)  Bilirubin Total: <0.2 (04-27)  Bilirubin Total: 0.4 (04-26)  Bilirubin Direct: <0.2 (04-25)  Bilirubin Total: 0.3 (04-25)  Bilirubin Total: 0.3 (03-30)      Trend LDH      Urinalysis Basic - ( 27 Apr 2025 08:59 )    Color: x / Appearance: x / SG: x / pH: x  Gluc: 101 mg/dL / Ketone: x  / Bili: x / Urobili: x   Blood: x / Protein: x / Nitrite: x   Leuk Esterase: x / RBC: x / WBC x   Sq Epi: x / Non Sq Epi: x / Bacteria: x        MICROBIOLOGY:    Female    Urinalysis with Rflx Culture (collected 25 Apr 2025 13:45)    Urinalysis with Rflx Culture (collected 02 Dec 2024 08:00)    Culture - Blood (collected 23 Nov 2024 21:26)  Source: .Blood BLOOD  Final Report:    No growth at 5 days    Culture - Blood (collected 23 Nov 2024 21:26)  Source: .Blood BLOOD  Final Report:    No growth at 5 days    Culture - Urine (collected 23 Nov 2024 20:55)  Source: Clean Catch Clean Catch (Midstream)  Final Report:    <10,000 CFU/mL Normal Urogenital Ana    Urinalysis with Rflx Culture (collected 15 Nov 2024 21:44)    Culture - Urine (collected 15 Nov 2024 21:44)  Source: Clean Catch None  Final Report:    <10,000 CFU/mL Normal Urogenital Ana      RADIOLOGY & ADDITIONAL TESTS:  I have personally reviewed the relevant images.   CXR      CT  CT Abdomen and Pelvis w/ IV Cont:   ACC: [id number] EXAM:  CT ABDOMEN AND PELVIS IC   ORDERED BY: HARLEY LUCIANO     PROCEDURE DATE:  04/25/2025          INTERPRETATION:  CLINICAL INFORMATION: Left flank pain with vomiting. WBC   6.41  Blood, urine - negative.   PMHx of GERD, back pain, Hypothyroidism,   kidney stones, lithotripsy, cholecystectomy, hernia repair x2.    COMPARISON: CT scan of the abdomen and pelvis dated 3/29/2025    CONTRAST/COMPLICATIONS:  IV Contrast:   The patient received 95 ml of Omnipaque 350 with 5 ml   discarded.  Oral Contrast:  None  Complications:  None reported at time of study completion    PROCEDURE:  CT of the Abdomen and Pelvis was performed.  Sagittal and coronal reformats were performed.    FINDINGS:    TUBES AND LINES: None.  LOWER CHEST: The lung bases are clear. No pleural or pericardial effusion.    LIVER: The liver is normal in size with no evidence of solid mass. Small   hepatic cyst adjacent . The portal vein is patent. The hepatic veins are   opacified.  BILE DUCTS: Prominence of the intrahepatic and extrahepatic bile ducts   compatible with previous cholecystectomy.  GALLBLADDER: Post cholecystectomy  SPLEEN: Within normal limits.  PANCREAS: The pancreas is normal in size and configuration. No evidence   of mass or pancreatitis.  ADRENALS: Within normal limits.  KIDNEYS/URETERS: There is symmetric renal enhancement. No evidence of   hydronephrosis, calcified stones, or solid mass.      BLADDER: Unremarkable.  REPRODUCTIVE ORGANS: Unremarkable    BOWEL: No evidence of bowel obstruction, colitis, or inflammatory   process. Normal caliber appendix.  PERITONEUM/RETROPERITONEUM: No ascites. No free intraperitoneal air.  VESSELS: Normal caliber aorta.  LYMPH NODES: No abdominal or pelvic adenopathy.  BONES: Degenerative changes of the spine at L5-S1.  ABDOMINAL WALL: Small fat containing umbilical hernia.    IMPRESSION:  No evidence of abdominal or pelvic mass or acute inflammatory process.    No evidence of left renal or ureteral stone.    --- End of Report ---            JOSE WOODRUFF MD; Attending Interventional Radiologist  This document has been electronically signed. Apr 25 2025  3:39PM (04-25-25 @ 14:37)    < from: US Abdomen Upper Quadrant Right (04.26.25 @ 20:04) >  FINDINGS:  Liver: Cyst in the right lobe measuring up to 1.8 cm.  Bile ducts: Normal caliber. Common bile duct measures 5 mm.  Gallbladder: Surgically absent.  Pancreas: Visualized portions are within normal limits.  Right kidney: 12.8 cm. No hydronephrosis.  Ascites: None.  IVC: Visualized portions are within normal limits.    IMPRESSION:  No acute pathology. Surgically absent gallbladder.    < end of copied text >      WEIGHT  Weight (kg): 82 (04-25-25 @ 19:20)  Creatinine: 0.6 mg/dL (04-27-25 @ 08:59)      All available historical records have been reviewed

## 2025-04-27 NOTE — DISCHARGE NOTE PROVIDER - CARE PROVIDER_API CALL
AMARILIS MÉNDEZ  Phone: [phone]  Fax: [phone]  Follow Up Time:     Tylor Marquez  Internal Medicine  [address] [place] [address], Admin - Room [address] [place] [address]  Phone: [phone]  Fax: [phone]  Follow Up Time:

## 2025-04-27 NOTE — PROGRESS NOTE ADULT - ASSESSMENT
This is a 49-year-old female with history of hypothyroidism, kidney stones, migraine, GERD, chronic back pain on Methadone 10 mg q 6 hrs, cholecystectomy and ventral hernia repair presents for evaluation of left flank and L groin pain.     IMPRESSION  #Suspected UTI/Renal colic   #History of nephrolithiasis  #Overactive bladder  #Transaminitis-Unclear cause.   #hypothyroidism, Migraine  #Chronic back pain on methadone  #Obesity BMI (kg/m2): 31    RECOMMENDATIONS  -Outpatient urine cultures 4/7/2025 reviewed. E.coli (R-Bactrim)  -Follow up with urine cultures.   -IV Ceftriaxone 2 gram q 24 hours. May switch to PO Vantin 400mg BID for total of 7 days from 4/25/25 on discharge.  -Pain control.   -Outpatient follow up with urology for overactive bladder.   -Follow up outpatient for hormonal abnormalities noted (b-HCG).    If any questions, please send a message or call on [employer]  Please continue to update ID with any pertinent new laboratory or radiographic findings.    Davi Scott M.D  Infectious Diseases Attending/   [employer] at [facility]

## 2025-04-27 NOTE — DISCHARGE NOTE PROVIDER - NSDCCPCAREPLAN_GEN_ALL_CORE_FT
[Visit Time ___ Minutes] : [unfilled] minutes [FreeTextEntry1] : Rachelle is a 80yo s/p vaginal hysterectomy, uterosacral ligament suspension, anterior posterior enterocele repair, retropubic midurethral sling, cystoscopy. She had significant atrophy and a vaginal adhesion at her last visit, which has since improved. We reviewed the importance of using vaginal estrogen and the nonhormonal moisturizers and discussed how estrogen can cause burning in patients who have severe atrophy, but the pain usually improves with continued use. We discussed the moisturizers are not as effective but can help with some of the dryness and that once her dryness improves, she may not have as much irritation from the vaginal estrogen.   We reviewed her urinary symptoms and reviewed management options for overactive bladder including observation, fluid and behavioral modifications, bladder training, physical therapy and medications including anticholinergics and beta 3 agonists. Specifically, we discussed avoidance of bladder irritants such as caffeine, carbonation, artificial sweeteners, alcohol, acidic foods/drinks (citrus, tomatoes, pineapple), spicy foods, and chocolate. We also discussed drinking 1.5-2L of plain water to maintain adequate hydration as urine that is too concentrated can also be irritating to the bladder. In addition, we reviewed drinking slowly since ingesting large quantities of fluid can result in rapid distension of the bladder, which can worsen urinary symptoms. We discussed that there are many overactive bladder medications; however, they have multiple side effects. We discussed third line treatment options including PTNS, intra detrusor Botox, and sacral neuromodulation.  We discussed avoidance of constipation, as it can exacerbate urinary symptoms and contribute to prolapse recurrence. Specifically, we discussed increasing her fiber intake to at least 4 servings of raw fruits and vegetables, fiber supplements, stool softeners, and MiraLAX.   At this time, she will continue with the moisturizers and add vaginal estrogen as well. She will also make some of the fluid changes and start a bowel regimen.   RTO 6m for OAB, atrophy follow up.  PRINCIPAL DISCHARGE DIAGNOSIS  Diagnosis: Intractable pain  Assessment and Plan of Treatment: Intractable Left Flank  pain with radiation to L groin   Suspect secondary to Passed Renal Caliculi v.s Pyelonephritis   Recent UTI resistant to home antibiotics that was prescribed (bactrim)   CT Abdomen and Pelvis  unremarkable for renal Stone   Complete antibiotic regimen as precried  follow-up with PMD in 1 week         SECONDARY DISCHARGE DIAGNOSES  Diagnosis: Pyelonephritis  Assessment and Plan of Treatment: Complere antibiotic regimen as precribed  I recomend taking a probiotic to re[plenish your gut humaira    Diagnosis: Elevated serum hCG in female, not pregnant  Assessment and Plan of Treatment: Chronically  Elevated HCG   - HCG Quantitative 5.1, Serum pregnancy test: indeterminant   -Pelvic Ultrasound:  Normal, however unable to visualize bilateral ovaries  It is important that ypu  follow up with OB/GYN in 1-2 weeks for further evaluation

## 2025-04-27 NOTE — DISCHARGE NOTE PROVIDER - NSDCMRMEDTOKEN_GEN_ALL_CORE_FT
cefpodoxime 200 mg oral tablet: 2 tab(s) orally every 12 hours START 4/28/25  citalopram 40 mg oral tablet: 1 tab(s) orally once a day (in the morning)  esomeprazole 40 mg oral delayed release capsule: 1 cap(s) orally 2 times a day  levothyroxine 75 mcg (0.075 mg) oral tablet: 1 tab(s) orally once a day  methadone 10 mg oral tablet: 1 tab(s) orally 4 times a day  oxybutynin 5 mg oral tablet: 2 tab(s) orally once a day  oxyCODONE 20 mg oral tablet: 1 tab(s) orally every 8 hours as needed for Moderate Pain (4 - 6) MDD: 3pills  rosuvastatin 10 mg oral tablet: 1 tab(s) orally once a day  Xanax 0.5 mg oral tablet: 1 tab(s) orally 2 times a day

## 2025-04-27 NOTE — DISCHARGE NOTE PROVIDER - HOSPITAL COURSE
A 49-year-old female with history of hypothyroidism, kidney stones with stent placement in the past who follows Dr. Hightower, migraine, GERD, chronic back pain on Methadone 10 mg q 6 hrs, cholecystectomy and ventral hernia repair presents for evaluation of left flank and L groin pain.       Intractable Left Flank  pain with radiation to L groin   Suspect secondary to Passed Renal Caliculi v.s Pyelonephritis   Recent UTI resistant to home abx   H/O Renal stone   Per Pt's phone record, Ucx: ecoli resistant to bactrim, continue Rocephin  CT A/P unremarkable for renal Stone   UA Mildly positive, F.up Ucx and Blood it pt is still in hospital  Pain control, Pt and RN was told to encourage to use PO Pills given d/c planning     Chronically  Elevated HCG   - HCG Quantitative 5.1, Serum pregnancy test: indeterminant   -Pelvic US: Normal, however unable to visualize bilateral ovaries  -Patient endorsed irregular periods    - F/up AFP and HCG-TM  -outpt follow up with GYN     Chronic Back Pain   CT a/p Degenerative changes of the spine at L5-S1  c/w methadone 10 mg q 6 hr (istop reference [id number])    Hypothyroid: continue synthroid  HLD: continue statin  Anxiety: continue Celexa  OAB:  continue Oxybutynin    DVT prophylaxis: Lovenox  GI prophylaxis PPI   Full code   Dispo: from home   Discharge in AM

## 2025-04-27 NOTE — DISCHARGE NOTE NURSING/CASE MANAGEMENT/SOCIAL WORK - PATIENT PORTAL LINK FT
You can access the FollowMyHealth Patient Portal offered by [facility] by registering at the following website: [email or web address]. By joining [facility]’s FollowMyHealth portal, you will also be able to view your health information using other applications (apps) compatible with our system.

## 2025-04-28 LAB — HIV 1+2 AB+HIV1 P24 AG SERPL QL IA: SIGNIFICANT CHANGE UP

## 2025-04-30 DIAGNOSIS — F11.20 OPIOID DEPENDENCE, UNCOMPLICATED: ICD-10-CM

## 2025-04-30 DIAGNOSIS — Z16.29 RESISTANCE TO OTHER SINGLE SPECIFIED ANTIBIOTIC: ICD-10-CM

## 2025-04-30 DIAGNOSIS — Z88.6 ALLERGY STATUS TO ANALGESIC AGENT: ICD-10-CM

## 2025-04-30 DIAGNOSIS — N32.81 OVERACTIVE BLADDER: ICD-10-CM

## 2025-04-30 DIAGNOSIS — K21.9 GASTRO-ESOPHAGEAL REFLUX DISEASE WITHOUT ESOPHAGITIS: ICD-10-CM

## 2025-04-30 DIAGNOSIS — R87.1 ABNORMAL LEVEL OF HORMONES IN SPECIMENS FROM FEMALE GENITAL ORGANS: ICD-10-CM

## 2025-04-30 DIAGNOSIS — Z79.890 HORMONE REPLACEMENT THERAPY: ICD-10-CM

## 2025-04-30 DIAGNOSIS — G89.29 OTHER CHRONIC PAIN: ICD-10-CM

## 2025-04-30 DIAGNOSIS — E78.5 HYPERLIPIDEMIA, UNSPECIFIED: ICD-10-CM

## 2025-04-30 DIAGNOSIS — R74.01 ELEVATION OF LEVELS OF LIVER TRANSAMINASE LEVELS: ICD-10-CM

## 2025-04-30 DIAGNOSIS — Z88.1 ALLERGY STATUS TO OTHER ANTIBIOTIC AGENTS: ICD-10-CM

## 2025-04-30 DIAGNOSIS — E66.9 OBESITY, UNSPECIFIED: ICD-10-CM

## 2025-04-30 DIAGNOSIS — Z90.49 ACQUIRED ABSENCE OF OTHER SPECIFIED PARTS OF DIGESTIVE TRACT: ICD-10-CM

## 2025-04-30 DIAGNOSIS — K29.00 ACUTE GASTRITIS WITHOUT BLEEDING: ICD-10-CM

## 2025-04-30 DIAGNOSIS — Z87.442 PERSONAL HISTORY OF URINARY CALCULI: ICD-10-CM

## 2025-04-30 DIAGNOSIS — B96.20 UNSPECIFIED ESCHERICHIA COLI [E. COLI] AS THE CAUSE OF DISEASES CLASSIFIED ELSEWHERE: ICD-10-CM

## 2025-04-30 DIAGNOSIS — N12 TUBULO-INTERSTITIAL NEPHRITIS, NOT SPECIFIED AS ACUTE OR CHRONIC: ICD-10-CM

## 2025-04-30 DIAGNOSIS — N39.0 URINARY TRACT INFECTION, SITE NOT SPECIFIED: ICD-10-CM

## 2025-04-30 DIAGNOSIS — Z91.012 ALLERGY TO EGGS: ICD-10-CM

## 2025-04-30 DIAGNOSIS — G43.909 MIGRAINE, UNSPECIFIED, NOT INTRACTABLE, WITHOUT STATUS MIGRAINOSUS: ICD-10-CM

## 2025-04-30 DIAGNOSIS — Z91.018 ALLERGY TO OTHER FOODS: ICD-10-CM

## 2025-04-30 DIAGNOSIS — N23 UNSPECIFIED RENAL COLIC: ICD-10-CM

## 2025-04-30 DIAGNOSIS — E03.9 HYPOTHYROIDISM, UNSPECIFIED: ICD-10-CM

## 2025-05-01 LAB
CULTURE RESULTS: SIGNIFICANT CHANGE UP
SPECIMEN SOURCE: SIGNIFICANT CHANGE UP

## 2025-05-22 ENCOUNTER — EMERGENCY (EMERGENCY)
Facility: HOSPITAL | Age: 50
LOS: 0 days | Discharge: HOME | End: 2025-05-22
Attending: EMERGENCY MEDICINE
Payer: MEDICAID

## 2025-05-22 ENCOUNTER — NON-APPOINTMENT (OUTPATIENT)
Age: 50
End: 2025-05-22

## 2025-05-22 VITALS
HEART RATE: 70 BPM | SYSTOLIC BLOOD PRESSURE: 122 MMHG | RESPIRATION RATE: 18 BRPM | DIASTOLIC BLOOD PRESSURE: 82 MMHG | OXYGEN SATURATION: 96 % | TEMPERATURE: 98 F

## 2025-05-22 VITALS
RESPIRATION RATE: 18 BRPM | DIASTOLIC BLOOD PRESSURE: 82 MMHG | WEIGHT: 184.09 LBS | SYSTOLIC BLOOD PRESSURE: 112 MMHG | OXYGEN SATURATION: 97 % | HEART RATE: 124 BPM | TEMPERATURE: 98 F | HEIGHT: 64 IN

## 2025-05-22 DIAGNOSIS — R10.816 EPIGASTRIC ABDOMINAL TENDERNESS: ICD-10-CM

## 2025-05-22 DIAGNOSIS — Z87.442 PERSONAL HISTORY OF URINARY CALCULI: ICD-10-CM

## 2025-05-22 DIAGNOSIS — R11.2 NAUSEA WITH VOMITING, UNSPECIFIED: ICD-10-CM

## 2025-05-22 DIAGNOSIS — K44.9 DIAPHRAGMATIC HERNIA WITHOUT OBSTRUCTION OR GANGRENE: ICD-10-CM

## 2025-05-22 DIAGNOSIS — Z88.1 ALLERGY STATUS TO OTHER ANTIBIOTIC AGENTS: ICD-10-CM

## 2025-05-22 DIAGNOSIS — K21.9 GASTRO-ESOPHAGEAL REFLUX DISEASE WITHOUT ESOPHAGITIS: ICD-10-CM

## 2025-05-22 DIAGNOSIS — Z90.49 ACQUIRED ABSENCE OF OTHER SPECIFIED PARTS OF DIGESTIVE TRACT: Chronic | ICD-10-CM

## 2025-05-22 DIAGNOSIS — Z98.890 OTHER SPECIFIED POSTPROCEDURAL STATES: Chronic | ICD-10-CM

## 2025-05-22 DIAGNOSIS — Z91.012 ALLERGY TO EGGS: ICD-10-CM

## 2025-05-22 DIAGNOSIS — E03.9 HYPOTHYROIDISM, UNSPECIFIED: ICD-10-CM

## 2025-05-22 DIAGNOSIS — Z88.6 ALLERGY STATUS TO ANALGESIC AGENT: ICD-10-CM

## 2025-05-22 DIAGNOSIS — Z90.49 ACQUIRED ABSENCE OF OTHER SPECIFIED PARTS OF DIGESTIVE TRACT: ICD-10-CM

## 2025-05-22 DIAGNOSIS — Z87.19 PERSONAL HISTORY OF OTHER DISEASES OF THE DIGESTIVE SYSTEM: ICD-10-CM

## 2025-05-22 DIAGNOSIS — Z91.018 ALLERGY TO OTHER FOODS: ICD-10-CM

## 2025-05-22 LAB
ALBUMIN SERPL ELPH-MCNC: 4.9 G/DL — SIGNIFICANT CHANGE UP (ref 3.5–5.2)
ALP SERPL-CCNC: 74 U/L — SIGNIFICANT CHANGE UP (ref 30–115)
ALT FLD-CCNC: 17 U/L — SIGNIFICANT CHANGE UP (ref 0–41)
ANION GAP SERPL CALC-SCNC: 14 MMOL/L — SIGNIFICANT CHANGE UP (ref 7–14)
APPEARANCE UR: CLEAR — SIGNIFICANT CHANGE UP
AST SERPL-CCNC: 18 U/L — SIGNIFICANT CHANGE UP (ref 0–41)
BACTERIA # UR AUTO: ABNORMAL /HPF
BASOPHILS # BLD AUTO: 0.01 K/UL — SIGNIFICANT CHANGE UP (ref 0–0.2)
BASOPHILS NFR BLD AUTO: 0.1 % — SIGNIFICANT CHANGE UP (ref 0–1)
BILIRUB SERPL-MCNC: 0.3 MG/DL — SIGNIFICANT CHANGE UP (ref 0.2–1.2)
BILIRUB UR-MCNC: NEGATIVE — SIGNIFICANT CHANGE UP
BUN SERPL-MCNC: 12 MG/DL — SIGNIFICANT CHANGE UP (ref 10–20)
CALCIUM SERPL-MCNC: 9.9 MG/DL — SIGNIFICANT CHANGE UP (ref 8.4–10.5)
CHLORIDE SERPL-SCNC: 102 MMOL/L — SIGNIFICANT CHANGE UP (ref 98–110)
CO2 SERPL-SCNC: 22 MMOL/L — SIGNIFICANT CHANGE UP (ref 17–32)
COLOR SPEC: YELLOW — SIGNIFICANT CHANGE UP
CREAT SERPL-MCNC: 0.8 MG/DL — SIGNIFICANT CHANGE UP (ref 0.7–1.5)
DIFF PNL FLD: NEGATIVE — SIGNIFICANT CHANGE UP
EGFR: 91 ML/MIN/1.73M2 — SIGNIFICANT CHANGE UP
EGFR: 91 ML/MIN/1.73M2 — SIGNIFICANT CHANGE UP
EOSINOPHIL # BLD AUTO: 0 K/UL — SIGNIFICANT CHANGE UP (ref 0–0.7)
EOSINOPHIL NFR BLD AUTO: 0 % — SIGNIFICANT CHANGE UP (ref 0–8)
EPI CELLS # UR: PRESENT
GLUCOSE SERPL-MCNC: 87 MG/DL — SIGNIFICANT CHANGE UP (ref 70–99)
GLUCOSE UR QL: NEGATIVE MG/DL — SIGNIFICANT CHANGE UP
HCT VFR BLD CALC: 42.4 % — SIGNIFICANT CHANGE UP (ref 37–47)
HGB BLD-MCNC: 13.5 G/DL — SIGNIFICANT CHANGE UP (ref 12–16)
IMM GRANULOCYTES NFR BLD AUTO: 0.4 % — HIGH (ref 0.1–0.3)
KETONES UR QL: NEGATIVE MG/DL — SIGNIFICANT CHANGE UP
LEUKOCYTE ESTERASE UR-ACNC: ABNORMAL
LIDOCAIN IGE QN: 50 U/L — SIGNIFICANT CHANGE UP (ref 7–60)
LYMPHOCYTES # BLD AUTO: 2.39 K/UL — SIGNIFICANT CHANGE UP (ref 1.2–3.4)
LYMPHOCYTES # BLD AUTO: 32.1 % — SIGNIFICANT CHANGE UP (ref 20.5–51.1)
MAGNESIUM SERPL-MCNC: 1.8 MG/DL — SIGNIFICANT CHANGE UP (ref 1.8–2.4)
MCHC RBC-ENTMCNC: 29.2 PG — SIGNIFICANT CHANGE UP (ref 27–31)
MCHC RBC-ENTMCNC: 31.8 G/DL — LOW (ref 32–37)
MCV RBC AUTO: 91.8 FL — SIGNIFICANT CHANGE UP (ref 81–99)
MONOCYTES # BLD AUTO: 0.55 K/UL — SIGNIFICANT CHANGE UP (ref 0.1–0.6)
MONOCYTES NFR BLD AUTO: 7.4 % — SIGNIFICANT CHANGE UP (ref 1.7–9.3)
NEUTROPHILS # BLD AUTO: 4.46 K/UL — SIGNIFICANT CHANGE UP (ref 1.4–6.5)
NEUTROPHILS NFR BLD AUTO: 60 % — SIGNIFICANT CHANGE UP (ref 42.2–75.2)
NITRITE UR-MCNC: NEGATIVE — SIGNIFICANT CHANGE UP
NRBC BLD AUTO-RTO: 0 /100 WBCS — SIGNIFICANT CHANGE UP (ref 0–0)
PH UR: 6 — SIGNIFICANT CHANGE UP (ref 5–8)
PLATELET # BLD AUTO: 250 K/UL — SIGNIFICANT CHANGE UP (ref 130–400)
PMV BLD: 10.6 FL — HIGH (ref 7.4–10.4)
POTASSIUM SERPL-MCNC: 4.2 MMOL/L — SIGNIFICANT CHANGE UP (ref 3.5–5)
POTASSIUM SERPL-SCNC: 4.2 MMOL/L — SIGNIFICANT CHANGE UP (ref 3.5–5)
PROT SERPL-MCNC: 8 G/DL — SIGNIFICANT CHANGE UP (ref 6–8)
PROT UR-MCNC: NEGATIVE MG/DL — SIGNIFICANT CHANGE UP
RBC # BLD: 4.62 M/UL — SIGNIFICANT CHANGE UP (ref 4.2–5.4)
RBC # FLD: 12.6 % — SIGNIFICANT CHANGE UP (ref 11.5–14.5)
RBC CASTS # UR COMP ASSIST: 1 /HPF — SIGNIFICANT CHANGE UP (ref 0–4)
SODIUM SERPL-SCNC: 138 MMOL/L — SIGNIFICANT CHANGE UP (ref 135–146)
SP GR SPEC: 1.02 — SIGNIFICANT CHANGE UP (ref 1–1.03)
SQUAMOUS # UR AUTO: 15 /HPF — HIGH (ref 0–5)
TRIGL SERPL-MCNC: 189 MG/DL — HIGH
UROBILINOGEN FLD QL: 0.2 MG/DL — SIGNIFICANT CHANGE UP (ref 0.2–1)
WBC # BLD: 7.44 K/UL — SIGNIFICANT CHANGE UP (ref 4.8–10.8)
WBC # FLD AUTO: 7.44 K/UL — SIGNIFICANT CHANGE UP (ref 4.8–10.8)
WBC UR QL: 5 /HPF — SIGNIFICANT CHANGE UP (ref 0–5)

## 2025-05-22 PROCEDURE — 74177 CT ABD & PELVIS W/CONTRAST: CPT

## 2025-05-22 PROCEDURE — 80053 COMPREHEN METABOLIC PANEL: CPT

## 2025-05-22 PROCEDURE — 81025 URINE PREGNANCY TEST: CPT

## 2025-05-22 PROCEDURE — 71045 X-RAY EXAM CHEST 1 VIEW: CPT | Mod: 26

## 2025-05-22 PROCEDURE — 96374 THER/PROPH/DIAG INJ IV PUSH: CPT | Mod: XU

## 2025-05-22 PROCEDURE — 96376 TX/PRO/DX INJ SAME DRUG ADON: CPT

## 2025-05-22 PROCEDURE — 84478 ASSAY OF TRIGLYCERIDES: CPT

## 2025-05-22 PROCEDURE — 93005 ELECTROCARDIOGRAM TRACING: CPT

## 2025-05-22 PROCEDURE — 96375 TX/PRO/DX INJ NEW DRUG ADDON: CPT

## 2025-05-22 PROCEDURE — 74177 CT ABD & PELVIS W/CONTRAST: CPT | Mod: 26

## 2025-05-22 PROCEDURE — 71045 X-RAY EXAM CHEST 1 VIEW: CPT

## 2025-05-22 PROCEDURE — 36415 COLL VENOUS BLD VENIPUNCTURE: CPT

## 2025-05-22 PROCEDURE — 99285 EMERGENCY DEPT VISIT HI MDM: CPT

## 2025-05-22 PROCEDURE — 83690 ASSAY OF LIPASE: CPT

## 2025-05-22 PROCEDURE — 83735 ASSAY OF MAGNESIUM: CPT

## 2025-05-22 PROCEDURE — 99285 EMERGENCY DEPT VISIT HI MDM: CPT | Mod: 25

## 2025-05-22 PROCEDURE — 93010 ELECTROCARDIOGRAM REPORT: CPT

## 2025-05-22 PROCEDURE — 81001 URINALYSIS AUTO W/SCOPE: CPT

## 2025-05-22 PROCEDURE — 85025 COMPLETE CBC W/AUTO DIFF WBC: CPT

## 2025-05-22 RX ORDER — SUCRALFATE 1 G
1 TABLET ORAL ONCE
Refills: 0 | Status: DISCONTINUED | OUTPATIENT
Start: 2025-05-22 | End: 2025-05-22

## 2025-05-22 RX ORDER — ONDANSETRON HCL/PF 4 MG/2 ML
4 VIAL (ML) INJECTION ONCE
Refills: 0 | Status: COMPLETED | OUTPATIENT
Start: 2025-05-22 | End: 2025-05-22

## 2025-05-22 RX ORDER — HYDROMORPHONE/SOD CHLOR,ISO/PF 2 MG/10 ML
1 SYRINGE (ML) INJECTION ONCE
Refills: 0 | Status: DISCONTINUED | OUTPATIENT
Start: 2025-05-22 | End: 2025-05-22

## 2025-05-22 RX ORDER — HYDROMORPHONE/SOD CHLOR,ISO/PF 2 MG/10 ML
0.5 SYRINGE (ML) INJECTION ONCE
Refills: 0 | Status: DISCONTINUED | OUTPATIENT
Start: 2025-05-22 | End: 2025-05-22

## 2025-05-22 RX ORDER — METOCLOPRAMIDE HCL 10 MG
10 TABLET ORAL ONCE
Refills: 0 | Status: COMPLETED | OUTPATIENT
Start: 2025-05-22 | End: 2025-05-22

## 2025-05-22 RX ADMIN — Medication 0.5 MILLIGRAM(S): at 22:31

## 2025-05-22 RX ADMIN — Medication 1 MILLIGRAM(S): at 16:11

## 2025-05-22 RX ADMIN — Medication 4 MILLIGRAM(S): at 14:59

## 2025-05-22 RX ADMIN — Medication 10 MILLIGRAM(S): at 20:17

## 2025-05-22 RX ADMIN — Medication 4 MILLIGRAM(S): at 14:55

## 2025-05-22 RX ADMIN — Medication 1 MILLIGRAM(S): at 17:59

## 2025-05-22 RX ADMIN — Medication 40 MILLIGRAM(S): at 14:55

## 2025-05-22 RX ADMIN — Medication 1000 MILLILITER(S): at 14:56

## 2025-05-22 RX ADMIN — Medication 1 MILLIGRAM(S): at 20:25

## 2025-05-22 NOTE — ED PROVIDER NOTE - OBJECTIVE STATEMENT
48 y/o female with hx of GERD, hiatal hernia, kidney stones, migraines, cholecystectomy, and hypothyroidism presents to the ED c/o persisting upper abd pain, nausea and NBNB vomiting x few days. patient c/o pain radiating to back. Pt tried taking her prescribed medications without much relief prompting ED eval. She denies other complaints. Pt denies fever, chills, diarrhea, headache, dizziness, weakness, chest pain, SOB, back pain, LOC, trauma, urinary symptoms, cough, calf pain/swelling, recent travel, recent surgery.

## 2025-05-22 NOTE — ED PROVIDER NOTE - PHYSICAL EXAMINATION
general: well appearing, no distress  eyes: clear conjunctiva  cardiac: no murmurs, extrasystole, regular rate, regular rhythm  resp: clear throughout all lung fields, no respiratory distress  abdomen: no CVA tenderness, BS x 4, +epigastric tenderness, no distention, no rashes, no rebound or guarding  msk: pelvis stable, gait steady  skin: no rashes, swelling, bruising

## 2025-05-22 NOTE — ED PROVIDER NOTE - CARE PROVIDER_API CALL
Domo German  Gastroenterology  [address] [place] [id number]  Phone: [phone]  Fax: [phone]  Follow Up Time:

## 2025-05-22 NOTE — ED ADULT TRIAGE NOTE - HEIGHT IN INCHES
Smokeless tobacco: Never   Substance Use Topics    Alcohol use: Yes     Alcohol/week: 1.0 - 2.0 standard drink of alcohol     Types: 1 - 2 Glasses of wine per week                                Counseling given: Not Answered      Vital Signs (Current):   Vitals:    10/29/24 0619 10/29/24 0626   BP:  (!) 141/91   Pulse:  73   Resp:  16   Temp:  97.6 °F (36.4 °C)   TempSrc:  Temporal   SpO2:  99%   Weight: 94.4 kg (208 lb 1.6 oz)    Height: 1.638 m (5' 4.5\")                                               BP Readings from Last 3 Encounters:   10/29/24 (!) 141/91       NPO Status: Time of last liquid consumption: 0000                        Time of last solid consumption: 2300                        Date of last liquid consumption: 10/29/24                        Date of last solid food consumption: 10/28/24    BMI:   Wt Readings from Last 3 Encounters:   10/29/24 94.4 kg (208 lb 1.6 oz)   10/22/24 90.7 kg (200 lb)   12/27/23 93 kg (205 lb)     Body mass index is 35.17 kg/m².    CBC:   Lab Results   Component Value Date/Time    WBC 12.4 10/02/2024 09:25 AM    RBC 4.78 10/02/2024 09:25 AM    HGB 14.9 10/02/2024 09:25 AM    HCT 45.7 10/02/2024 09:25 AM    MCV 95.6 10/02/2024 09:25 AM    RDW 14.7 10/02/2024 09:25 AM     10/02/2024 09:25 AM       CMP:   Lab Results   Component Value Date/Time     10/02/2024 09:25 AM    K 4.0 10/02/2024 09:25 AM     10/02/2024 09:25 AM    CO2 31 10/02/2024 09:25 AM    BUN 13 10/02/2024 09:25 AM    CREATININE 0.85 10/02/2024 09:25 AM    LABGLOM 83 10/02/2024 09:25 AM    GLUCOSE 78 10/02/2024 09:25 AM    CALCIUM 10.3 10/02/2024 09:25 AM    BILITOT 0.7 10/02/2024 09:25 AM    ALKPHOS 154 10/02/2024 09:25 AM    AST 29 10/02/2024 09:25 AM    ALT 72 10/02/2024 09:25 AM       POC Tests: No results for input(s): \"POCGLU\", \"POCNA\", \"POCK\", \"POCCL\", \"POCBUN\", \"POCHEMO\", \"POCHCT\" in the last 72 hours.    Coags:   Lab Results   Component Value Date/Time    PROTIME 12.6 10/02/2024 09:25 
4

## 2025-05-22 NOTE — ED PROVIDER NOTE - PATIENT PORTAL LINK FT
You can access the FollowMyHealth Patient Portal offered by [facility] by registering at the following website: [email or web address]. By joining [facility]’s FollowMyHealth portal, you will also be able to view your health information using other applications (apps) compatible with our system.

## 2025-05-22 NOTE — ED PROVIDER NOTE - CLINICAL SUMMARY MEDICAL DECISION MAKING FREE TEXT BOX
Pt is a 49 yr old female who was seen and examined w/ the PA.  Pt with a PMH of GERD, erosive gastritis, pancreatitis, and cholecystectomy.  Pt is c/o N/V and abd pain x 4 days.  She is followed by GI.  On our exam, (+) midepigastric abd tenderness.  Pain medications and fluids given.  CT abd/pelvis obtained. Pt is a 49 yr old female who was seen and examined w/ the PA.  Pt with a PMH of GERD, erosive gastritis, pancreatitis, back pain (on methadone 20mg PO BID) and cholecystectomy.  Pt is c/o N/V and abd pain x 4 days.  She is followed by GI.  On our exam, (+) midepigastric abd tenderness.  Pain medications and fluids given.  CT abd/pelvis obtained - no abdominal pathology  Labs nondiagnostic    7:55 PM - Pt still with abd discomfort; will give PO Carafate suspension to see if provides pain relief

## 2025-05-23 ENCOUNTER — EMERGENCY (EMERGENCY)
Facility: HOSPITAL | Age: 50
LOS: 0 days | Discharge: HOME | End: 2025-05-23
Attending: EMERGENCY MEDICINE
Payer: MEDICAID

## 2025-05-23 VITALS
HEART RATE: 84 BPM | RESPIRATION RATE: 16 BRPM | OXYGEN SATURATION: 100 % | DIASTOLIC BLOOD PRESSURE: 76 MMHG | SYSTOLIC BLOOD PRESSURE: 106 MMHG

## 2025-05-23 VITALS — HEIGHT: 64 IN

## 2025-05-23 DIAGNOSIS — K21.9 GASTRO-ESOPHAGEAL REFLUX DISEASE WITHOUT ESOPHAGITIS: ICD-10-CM

## 2025-05-23 DIAGNOSIS — Z87.19 PERSONAL HISTORY OF OTHER DISEASES OF THE DIGESTIVE SYSTEM: ICD-10-CM

## 2025-05-23 DIAGNOSIS — Z91.012 ALLERGY TO EGGS: ICD-10-CM

## 2025-05-23 DIAGNOSIS — Z91.018 ALLERGY TO OTHER FOODS: ICD-10-CM

## 2025-05-23 DIAGNOSIS — Z88.6 ALLERGY STATUS TO ANALGESIC AGENT: ICD-10-CM

## 2025-05-23 DIAGNOSIS — Z98.890 OTHER SPECIFIED POSTPROCEDURAL STATES: Chronic | ICD-10-CM

## 2025-05-23 DIAGNOSIS — Z88.1 ALLERGY STATUS TO OTHER ANTIBIOTIC AGENTS: ICD-10-CM

## 2025-05-23 DIAGNOSIS — R10.13 EPIGASTRIC PAIN: ICD-10-CM

## 2025-05-23 DIAGNOSIS — K29.70 GASTRITIS, UNSPECIFIED, WITHOUT BLEEDING: ICD-10-CM

## 2025-05-23 DIAGNOSIS — Z90.49 ACQUIRED ABSENCE OF OTHER SPECIFIED PARTS OF DIGESTIVE TRACT: Chronic | ICD-10-CM

## 2025-05-23 PROCEDURE — 96375 TX/PRO/DX INJ NEW DRUG ADDON: CPT

## 2025-05-23 PROCEDURE — 99284 EMERGENCY DEPT VISIT MOD MDM: CPT

## 2025-05-23 PROCEDURE — 99223 1ST HOSP IP/OBS HIGH 75: CPT

## 2025-05-23 PROCEDURE — 96374 THER/PROPH/DIAG INJ IV PUSH: CPT

## 2025-05-23 PROCEDURE — 96376 TX/PRO/DX INJ SAME DRUG ADON: CPT

## 2025-05-23 PROCEDURE — 99284 EMERGENCY DEPT VISIT MOD MDM: CPT | Mod: 25

## 2025-05-23 RX ORDER — ONDANSETRON HCL/PF 4 MG/2 ML
8 VIAL (ML) INJECTION ONCE
Refills: 0 | Status: COMPLETED | OUTPATIENT
Start: 2025-05-23 | End: 2025-05-23

## 2025-05-23 RX ORDER — LIDOCAINE HCL/PF 10 MG/ML
130 VIAL (ML) INJECTION ONCE
Refills: 0 | Status: COMPLETED | OUTPATIENT
Start: 2025-05-23 | End: 2025-05-23

## 2025-05-23 RX ORDER — METOCLOPRAMIDE HCL 10 MG
10 TABLET ORAL ONCE
Refills: 0 | Status: COMPLETED | OUTPATIENT
Start: 2025-05-23 | End: 2025-05-23

## 2025-05-23 RX ORDER — HALOPERIDOL 10 MG/1
5 TABLET ORAL ONCE
Refills: 0 | Status: COMPLETED | OUTPATIENT
Start: 2025-05-23 | End: 2025-05-23

## 2025-05-23 RX ORDER — HALOPERIDOL 10 MG/1
5 TABLET ORAL ONCE
Refills: 0 | Status: DISCONTINUED | OUTPATIENT
Start: 2025-05-23 | End: 2025-05-23

## 2025-05-23 RX ORDER — SODIUM CHLORIDE 9 G/1000ML
1000 INJECTION, SOLUTION INTRAVENOUS ONCE
Refills: 0 | Status: COMPLETED | OUTPATIENT
Start: 2025-05-23 | End: 2025-05-23

## 2025-05-23 RX ADMIN — Medication 40 MILLIGRAM(S): at 12:03

## 2025-05-23 RX ADMIN — Medication 639 MILLIGRAM(S): at 12:48

## 2025-05-23 RX ADMIN — Medication 4 MILLIGRAM(S): at 14:38

## 2025-05-23 RX ADMIN — Medication 4 MILLIGRAM(S): at 13:23

## 2025-05-23 RX ADMIN — Medication 8 MILLIGRAM(S): at 15:05

## 2025-05-23 RX ADMIN — SODIUM CHLORIDE 1000 MILLILITER(S): 9 INJECTION, SOLUTION INTRAVENOUS at 12:04

## 2025-05-23 RX ADMIN — Medication 104 MILLIGRAM(S): at 12:03

## 2025-05-23 RX ADMIN — HALOPERIDOL 5 MILLIGRAM(S): 10 TABLET ORAL at 15:05

## 2025-05-23 NOTE — ED ADULT TRIAGE NOTE - CHIEF COMPLAINT QUOTE
"I have abdominal pain that goes into my back. I'm vomiting as well. I can't keep anything down. I was here yesterday for similar symptoms."

## 2025-05-23 NOTE — CONSULT NOTE ADULT - ASSESSMENT
49yFemale pmh GERD, cholecystectomy presents for chronic epigastric pain for years and her pain management doctor is on vacation. Patient follows with Dr. German outpatient and has EGD and EUS that has been unrevealing. Patient denies nausea, vomiting, hematemesis, melena, blood in stool, diarrhea, constipation, +chronic epigastric abdominal pain.    #chronic epigastric pain  EGD/EUS with Dr. German wnl  Rec  -outpatient followup with pain management doctor  -outpatient Follow up with our GI office located on [address] [place] [address], Phone number [phone] with Dr. German  -PPI   -continue carafate   -no acute GI intervention  -appreciate CT    #Ventral hernias  Rec  -outpatient surgical eval    Recall GI if needed  49yFemale pmh GERD, cholecystectomy presents for chronic epigastric pain for years and her pain management doctor is on vacation. Patient follows with Dr. German outpatient and has EGD and EUS that has been unrevealing. Patient denies nausea, vomiting, hematemesis, melena, blood in stool, diarrhea, constipation, +chronic epigastric abdominal pain.    #chronic epigastric pain  EGD/EUS with Dr. German wnl  Discussed with dr. German, patient will be scheduled for ERCP for possible papillary stenosis     Rec  -outpatient followup with pain management doctor  -Pain control  -outpatient Follow up with our GI office located on [address] [place] [address], Phone number [phone] with Dr. German  -PPI   -continue Carafate   -no acute GI intervention  -appreciate CT    #Ventral hernias  Rec  -outpatient surgical eval    Recall GI if needed

## 2025-05-23 NOTE — CONSULT NOTE ADULT - SUBJECTIVE AND OBJECTIVE BOX
Chief complaint/Reason for consult: chronic abdominal pain    HPI: 49yFemale pmh GERD, cholecystectomy presents for chronic epigastric pain for years and her pain management doctor is on vacation. Patient follows with Dr. German outpatient and has EGD and EUS that has been unrevealing. Patient denies nausea, vomiting, hematemesis, melena, blood in stool, diarrhea, constipation, +chronic epigastric abdominal pain.      PAST MEDICAL & SURGICAL HISTORY:   GERD (gastroesophageal reflux disease)      Migraine headache      Back pain      Anxiety      Kidney stones      Murmur  since age 20     6/18      Thyroid nodule      Hypothyroidism      Overactive bladder      History of surgery  hernia repair    x2      History of cholecystectomy      History of lithotripsy            Family history:  FAMILY HISTORY:  DM (diabetes mellitus) (Father)    Family history of heart disease (Father)      No GI cancers in first or second degree relatives    Social History: No smoking. No alcohol. No illegal drug use.    Allergies:   Cipro (Rash)  NSAIDs (Angioedema; Anaphylaxis; Hives)  eggs (Unknown)  Bananas (Unknown)  citrus (Unknown)  Intolerances      MEDICATIONS: Home Medications:  citalopram 40 mg oral tablet: 1 tab(s) orally once a day (in the morning) (25 Apr 2025 19:13)  levothyroxine 75 mcg (0.075 mg) oral tablet: 1 tab(s) orally once a day (25 Apr 2025 19:13)  methadone 10 mg oral tablet: 1 tab(s) orally 4 times a day (25 Apr 2025 19:13)  oxybutynin 5 mg oral tablet: 2 tab(s) orally once a day (25 Apr 2025 19:13)  rosuvastatin 10 mg oral tablet: 1 tab(s) orally once a day (25 Apr 2025 19:13)  Xanax 0.5 mg oral tablet: 1 tab(s) orally 2 times a day (25 Apr 2025 19:13)    REVIEW OF SYSTEMS  General:  No weight loss, fevers, or chills.  Eyes:  No reported pain or visual changes  ENT:  No sore throat or runny nose.  NECK: No stiffness   CV:  No chest pain or palpitations.  Resp:  No shortness of breath, cough, wheezing or hemoptysis  GI:  +chronic epigastric abdominal pain, no nausea, vomiting, dysphagia, diarrhea or constipation. No rectal bleeding, melena, or hematemesis.  Neuro:  No tingling, numbness       VITALS:   T(F): 97.5 (05-23-25 @ 11:34), Max: 98.2 (05-22-25 @ 19:15)  HR: 84 (05-23-25 @ 16:56) (70 - 105)  BP: 106/76 (05-23-25 @ 16:56) (106/76 - 149/117)  RR: 16 (05-23-25 @ 16:56) (16 - 18)  SpO2: 100% (05-23-25 @ 16:56) (96% - 100%)    PHYSICAL EXAM:  GENERAL: AAOx3, no acute distress.  HEAD:  Atraumatic, Normocephalic  EYES: conjunctiva and sclera clear  NECK: Supple, No thyromegaly   CHEST/LUNG: Clear to auscultation bilaterally; No wheeze, rhonchi, or rales  HEART: Regular rate and rhythm; normal S1, S2, No murmurs.  ABDOMEN: Soft, nontender, nondistended; Bowel sounds present  NEUROLOGY: No asterixis or tremor  SKIN: Intact, no jaundice          LABS:  05-22    138  |  102  |  12  ----------------------------<  87  4.2   |  22  |  0.8    Ca    9.9      22 May 2025 14:43  Mg     1.8     05-22    TPro  8.0  /  Alb  4.9  /  TBili  0.3  /  DBili  x   /  AST  18  /  ALT  17  /  AlkPhos  74  05-22                          13.5   7.44  )-----------( 250      ( 22 May 2025 14:43 )             42.4     LIVER FUNCTIONS - ( 22 May 2025 14:43 )  Alb: 4.9 g/dL / Pro: 8.0 g/dL / ALK PHOS: 74 U/L / ALT: 17 U/L / AST: 18 U/L / GGT: x               IMAGING:      < from: CT Abdomen and Pelvis w/ IV Cont (05.22.25 @ 19:05) >    ACC: [id number] EXAM:  CT ABDOMEN AND PELVIS IC   ORDERED BY: LAVERNE CRUZ     PROCEDURE DATE:  05/22/2025          INTERPRETATION:  CLINICAL INFORMATION: Epigastric pain    COMPARISON: 4/25/2025    CONTRAST/COMPLICATIONS:  IV Contrast: Omnipaque 350  90 cc administered   10 cc discarded  Oral Contrast: NONE  .    PROCEDURE:  CT of the Abdomen and Pelvis was performed.  Sagittal and coronal reformats were performed.    FINDINGS:    LOWER CHEST: The lung bases are clear. No pleural or pericardial   effusion. Nodule with associated metallic density in the right breast.    LIVER: The liver is normal in size with no evidence of solid mass. Small   hepatic cyst adjacent to the gallbladder fossa. The portal vein is   patent. The hepatic veins are opacified.  BILE DUCTS: Prominence of the intrahepatic and extrahepatic bile ducts   compatible with previous cholecystectomy.  GALLBLADDER: Post cholecystectomy  SPLEEN: Within normal limits.  PANCREAS: Minimal fatty infiltration  ADRENALS: Within normal limits.  KIDNEYS/URETERS: There is symmetric renal enhancement. No evidence of   hydronephrosis, calcified stones, or solid mass.    BLADDER: Unremarkable.  REPRODUCTIVE ORGANS: Unremarkable    BOWEL: No evidence of bowel obstruction, colitis, or inflammatory   process. Normal caliber appendix.  PERITONEUM/RETROPERITONEUM: No ascites. No free intraperitoneal air.  VESSELS: Normal caliber aorta.  LYMPH NODES: No abdominal or pelvic adenopathy.  BONES: Degenerative changes of the spine at L5-S1.  ABDOMINAL WALL: Small fat containing umbilical and supraumbilical   hernias. Small fat-containing bilateral inguinal hernias.      IMPRESSION:      No evidence of abdominal or pelvic mass or acute inflammatory process.    Ventral hernias as above      --- End of Report ---            KATALINA WALLACE MD; Attending Radiologist  This document has been electronically signed. May 22 2025  7:19PM    < end of copied text >

## 2025-05-23 NOTE — ED PROVIDER NOTE - ATTENDING APP SHARED VISIT CONTRIBUTION OF CARE
Pt returns to ED after neg workup for continued vomiting.  No blood in emesis or stool.  GI Dr. German recently decreased her Nexium dosage.    Exam: soft ND abdomen with epigastric tenderness, RRR, CTAB, cap refill <2s, mild distress  Plan: IVF, antacids, antiemetics, PO challenge

## 2025-05-23 NOTE — CONSULT NOTE ADULT - TIME BILLING
Reviewed all pertinent clinical information and reviewed all relevant imaging and diagnostic studies.  Counseled the patient /HCP about diagnostic testing and treatment plan. All questions were answered.  Discussed recommendations with the primary team and coordinated care. Time spent on this encounter excludes teaching time and separately reported services.

## 2025-05-23 NOTE — ED PROVIDER NOTE - PATIENT PORTAL LINK FT
You can access the FollowMyHealth Patient Portal offered by [facility] by registering at the following website: [email or web address]. By joining [employer]’s FollowMyHealth portal, you will also be able to view your health information using other applications (apps) compatible with our system.

## 2025-05-23 NOTE — ED PROVIDER NOTE - OBJECTIVE STATEMENT
Patient c/o epigastric abd pain N/V  no diarrhea  , Sx worsening past several days, H/o chronic gastritis, See in ED last night, labs and CT neg,  called PMD and told to come back to ED, no fever, no chest pain

## 2025-05-23 NOTE — ED PROVIDER NOTE - CARE PROVIDER_API CALL
Domo German  Gastroenterology  [address] [place] [id number]  Phone: [phone]  Fax: [phone]  Established Patient  Follow Up Time: Urgent

## 2025-06-20 ENCOUNTER — INPATIENT (INPATIENT)
Facility: HOSPITAL | Age: 50
LOS: 4 days | Discharge: HOME | DRG: 251 | End: 2025-06-25
Attending: STUDENT IN AN ORGANIZED HEALTH CARE EDUCATION/TRAINING PROGRAM | Admitting: FAMILY MEDICINE
Payer: MEDICAID

## 2025-06-20 ENCOUNTER — NON-APPOINTMENT (OUTPATIENT)
Age: 50
End: 2025-06-20

## 2025-06-20 VITALS
SYSTOLIC BLOOD PRESSURE: 97 MMHG | TEMPERATURE: 98 F | HEIGHT: 64 IN | WEIGHT: 186.95 LBS | DIASTOLIC BLOOD PRESSURE: 60 MMHG | OXYGEN SATURATION: 100 % | RESPIRATION RATE: 19 BRPM | HEART RATE: 89 BPM

## 2025-06-20 DIAGNOSIS — Z90.49 ACQUIRED ABSENCE OF OTHER SPECIFIED PARTS OF DIGESTIVE TRACT: Chronic | ICD-10-CM

## 2025-06-20 DIAGNOSIS — Z98.890 OTHER SPECIFIED POSTPROCEDURAL STATES: Chronic | ICD-10-CM

## 2025-06-20 DIAGNOSIS — R11.2 NAUSEA WITH VOMITING, UNSPECIFIED: ICD-10-CM

## 2025-06-20 LAB
ALBUMIN SERPL ELPH-MCNC: 4.9 G/DL — SIGNIFICANT CHANGE UP (ref 3.5–5.2)
ALBUMIN SERPL ELPH-MCNC: 5 G/DL — SIGNIFICANT CHANGE UP (ref 3.5–5.2)
ALP SERPL-CCNC: 69 U/L — SIGNIFICANT CHANGE UP (ref 30–115)
ALP SERPL-CCNC: 75 U/L — SIGNIFICANT CHANGE UP (ref 30–115)
ALT FLD-CCNC: 16 U/L — SIGNIFICANT CHANGE UP (ref 0–41)
ALT FLD-CCNC: 22 U/L — SIGNIFICANT CHANGE UP (ref 0–41)
ANION GAP SERPL CALC-SCNC: 13 MMOL/L — SIGNIFICANT CHANGE UP (ref 7–14)
ANION GAP SERPL CALC-SCNC: 19 MMOL/L — HIGH (ref 7–14)
APPEARANCE UR: CLEAR — SIGNIFICANT CHANGE UP
AST SERPL-CCNC: 18 U/L — SIGNIFICANT CHANGE UP (ref 0–41)
AST SERPL-CCNC: 63 U/L — HIGH (ref 0–41)
BASOPHILS # BLD AUTO: 0.01 K/UL — SIGNIFICANT CHANGE UP (ref 0–0.2)
BASOPHILS NFR BLD AUTO: 0.1 % — SIGNIFICANT CHANGE UP (ref 0–2)
BILIRUB DIRECT SERPL-MCNC: <0.2 MG/DL — SIGNIFICANT CHANGE UP (ref 0–0.3)
BILIRUB DIRECT SERPL-MCNC: <0.2 MG/DL — SIGNIFICANT CHANGE UP (ref 0–0.3)
BILIRUB INDIRECT FLD-MCNC: >0 MG/DL — LOW (ref 0.2–1.2)
BILIRUB INDIRECT FLD-MCNC: >0.2 MG/DL — SIGNIFICANT CHANGE UP (ref 0.2–1.2)
BILIRUB SERPL-MCNC: 0.2 MG/DL — SIGNIFICANT CHANGE UP (ref 0.2–1.2)
BILIRUB SERPL-MCNC: 0.4 MG/DL — SIGNIFICANT CHANGE UP (ref 0.2–1.2)
BILIRUB UR-MCNC: NEGATIVE — SIGNIFICANT CHANGE UP
BUN SERPL-MCNC: 15 MG/DL — SIGNIFICANT CHANGE UP (ref 10–20)
BUN SERPL-MCNC: 15 MG/DL — SIGNIFICANT CHANGE UP (ref 10–20)
CALCIUM SERPL-MCNC: 10.1 MG/DL — SIGNIFICANT CHANGE UP (ref 8.4–10.5)
CALCIUM SERPL-MCNC: 10.2 MG/DL — SIGNIFICANT CHANGE UP (ref 8.4–10.5)
CHLORIDE SERPL-SCNC: 101 MMOL/L — SIGNIFICANT CHANGE UP (ref 98–110)
CHLORIDE SERPL-SCNC: 103 MMOL/L — SIGNIFICANT CHANGE UP (ref 98–110)
CO2 SERPL-SCNC: 19 MMOL/L — SIGNIFICANT CHANGE UP (ref 17–32)
CO2 SERPL-SCNC: 22 MMOL/L — SIGNIFICANT CHANGE UP (ref 17–32)
COLOR SPEC: YELLOW — SIGNIFICANT CHANGE UP
CREAT SERPL-MCNC: 0.7 MG/DL — SIGNIFICANT CHANGE UP (ref 0.7–1.5)
CREAT SERPL-MCNC: 0.8 MG/DL — SIGNIFICANT CHANGE UP (ref 0.7–1.5)
DIFF PNL FLD: NEGATIVE — SIGNIFICANT CHANGE UP
EGFR: 107 ML/MIN/1.73M2 — SIGNIFICANT CHANGE UP
EGFR: 107 ML/MIN/1.73M2 — SIGNIFICANT CHANGE UP
EGFR: 91 ML/MIN/1.73M2 — SIGNIFICANT CHANGE UP
EGFR: 91 ML/MIN/1.73M2 — SIGNIFICANT CHANGE UP
EOSINOPHIL # BLD AUTO: 0 K/UL — SIGNIFICANT CHANGE UP (ref 0–0.5)
EOSINOPHIL NFR BLD AUTO: 0 % — SIGNIFICANT CHANGE UP (ref 0–6)
GLUCOSE SERPL-MCNC: 114 MG/DL — HIGH (ref 70–99)
GLUCOSE SERPL-MCNC: 121 MG/DL — HIGH (ref 70–99)
GLUCOSE UR QL: NEGATIVE MG/DL — SIGNIFICANT CHANGE UP
HCG SERPL QL: NEGATIVE — SIGNIFICANT CHANGE UP
HCT VFR BLD CALC: 44.4 % — SIGNIFICANT CHANGE UP (ref 34.5–45)
HGB BLD-MCNC: 14 G/DL — SIGNIFICANT CHANGE UP (ref 11.5–15.5)
IMM GRANULOCYTES # BLD AUTO: 0.02 K/UL — SIGNIFICANT CHANGE UP (ref 0–0.07)
IMM GRANULOCYTES NFR BLD AUTO: 0.3 % — SIGNIFICANT CHANGE UP (ref 0–0.9)
KETONES UR QL: NEGATIVE MG/DL — SIGNIFICANT CHANGE UP
LEUKOCYTE ESTERASE UR-ACNC: ABNORMAL
LIDOCAIN IGE QN: 44 U/L — SIGNIFICANT CHANGE UP (ref 7–60)
LIDOCAIN IGE QN: 56 U/L — SIGNIFICANT CHANGE UP (ref 7–60)
LYMPHOCYTES # BLD AUTO: 1.16 K/UL — SIGNIFICANT CHANGE UP (ref 1–3.3)
LYMPHOCYTES NFR BLD AUTO: 15.9 % — SIGNIFICANT CHANGE UP (ref 13–44)
MCHC RBC-ENTMCNC: 29.2 PG — SIGNIFICANT CHANGE UP (ref 27–34)
MCHC RBC-ENTMCNC: 31.5 G/DL — LOW (ref 32–36)
MCV RBC AUTO: 92.7 FL — SIGNIFICANT CHANGE UP (ref 80–100)
MONOCYTES # BLD AUTO: 0.5 K/UL — SIGNIFICANT CHANGE UP (ref 0–0.9)
MONOCYTES NFR BLD AUTO: 6.9 % — SIGNIFICANT CHANGE UP (ref 2–14)
NEUTROPHILS # BLD AUTO: 5.6 K/UL — SIGNIFICANT CHANGE UP (ref 1.8–7.4)
NEUTROPHILS NFR BLD AUTO: 76.8 % — SIGNIFICANT CHANGE UP (ref 43–77)
NITRITE UR-MCNC: NEGATIVE — SIGNIFICANT CHANGE UP
NRBC # BLD AUTO: 0 K/UL — SIGNIFICANT CHANGE UP (ref 0–0)
NRBC # FLD: 0 K/UL — SIGNIFICANT CHANGE UP (ref 0–0)
NRBC BLD AUTO-RTO: 0 /100 WBCS — SIGNIFICANT CHANGE UP (ref 0–0)
PH UR: 6 — SIGNIFICANT CHANGE UP (ref 5–8)
PLATELET # BLD AUTO: 253 K/UL — SIGNIFICANT CHANGE UP (ref 150–400)
PMV BLD: 10.3 FL — SIGNIFICANT CHANGE UP (ref 7–13)
POTASSIUM SERPL-MCNC: 4.8 MMOL/L — SIGNIFICANT CHANGE UP (ref 3.5–5)
POTASSIUM SERPL-MCNC: SIGNIFICANT CHANGE UP MMOL/L (ref 3.5–5)
POTASSIUM SERPL-SCNC: 4.8 MMOL/L — SIGNIFICANT CHANGE UP (ref 3.5–5)
POTASSIUM SERPL-SCNC: SIGNIFICANT CHANGE UP MMOL/L (ref 3.5–5)
PROT SERPL-MCNC: 7.9 G/DL — SIGNIFICANT CHANGE UP (ref 6–8)
PROT SERPL-MCNC: 8.8 G/DL — HIGH (ref 6–8)
PROT UR-MCNC: NEGATIVE MG/DL — SIGNIFICANT CHANGE UP
RBC # BLD: 4.79 M/UL — SIGNIFICANT CHANGE UP (ref 3.8–5.2)
RBC # FLD: 12.2 % — SIGNIFICANT CHANGE UP (ref 10.3–14.5)
SODIUM SERPL-SCNC: 136 MMOL/L — SIGNIFICANT CHANGE UP (ref 135–146)
SODIUM SERPL-SCNC: 141 MMOL/L — SIGNIFICANT CHANGE UP (ref 135–146)
SP GR SPEC: 1.02 — SIGNIFICANT CHANGE UP (ref 1–1.03)
UROBILINOGEN FLD QL: 1 MG/DL — SIGNIFICANT CHANGE UP (ref 0.2–1)
WBC # BLD: 7.29 K/UL — SIGNIFICANT CHANGE UP (ref 3.8–10.5)
WBC # FLD AUTO: 7.29 K/UL — SIGNIFICANT CHANGE UP (ref 3.8–10.5)

## 2025-06-20 PROCEDURE — 87522 HEPATITIS C REVRS TRNSCRPJ: CPT

## 2025-06-20 PROCEDURE — 93010 ELECTROCARDIOGRAM REPORT: CPT

## 2025-06-20 PROCEDURE — C1769: CPT

## 2025-06-20 PROCEDURE — 36415 COLL VENOUS BLD VENIPUNCTURE: CPT

## 2025-06-20 PROCEDURE — 93005 ELECTROCARDIOGRAM TRACING: CPT

## 2025-06-20 PROCEDURE — 85027 COMPLETE CBC AUTOMATED: CPT

## 2025-06-20 PROCEDURE — 82977 ASSAY OF GGT: CPT

## 2025-06-20 PROCEDURE — 80074 ACUTE HEPATITIS PANEL: CPT

## 2025-06-20 PROCEDURE — 74018 RADEX ABDOMEN 1 VIEW: CPT

## 2025-06-20 PROCEDURE — 85610 PROTHROMBIN TIME: CPT

## 2025-06-20 PROCEDURE — C1889: CPT

## 2025-06-20 PROCEDURE — 76705 ECHO EXAM OF ABDOMEN: CPT | Mod: 26

## 2025-06-20 PROCEDURE — C9399: CPT

## 2025-06-20 PROCEDURE — 83690 ASSAY OF LIPASE: CPT

## 2025-06-20 PROCEDURE — 85730 THROMBOPLASTIN TIME PARTIAL: CPT

## 2025-06-20 PROCEDURE — 99223 1ST HOSP IP/OBS HIGH 75: CPT

## 2025-06-20 PROCEDURE — 74177 CT ABD & PELVIS W/CONTRAST: CPT | Mod: 26

## 2025-06-20 PROCEDURE — 80053 COMPREHEN METABOLIC PANEL: CPT

## 2025-06-20 PROCEDURE — 76705 ECHO EXAM OF ABDOMEN: CPT

## 2025-06-20 PROCEDURE — 87521 HEPATITIS C PROBE&RVRS TRNSC: CPT

## 2025-06-20 PROCEDURE — 99233 SBSQ HOSP IP/OBS HIGH 50: CPT

## 2025-06-20 PROCEDURE — 99285 EMERGENCY DEPT VISIT HI MDM: CPT

## 2025-06-20 RX ORDER — ENOXAPARIN SODIUM 100 MG/ML
40 INJECTION SUBCUTANEOUS EVERY 24 HOURS
Refills: 0 | Status: DISCONTINUED | OUTPATIENT
Start: 2025-06-20 | End: 2025-06-25

## 2025-06-20 RX ORDER — ACETAMINOPHEN 500 MG/5ML
975 LIQUID (ML) ORAL ONCE
Refills: 0 | Status: COMPLETED | OUTPATIENT
Start: 2025-06-20 | End: 2025-06-20

## 2025-06-20 RX ORDER — METHADONE HCL 10 MG
10 TABLET ORAL
Refills: 0 | Status: DISCONTINUED | OUTPATIENT
Start: 2025-06-20 | End: 2025-06-25

## 2025-06-20 RX ORDER — LEVOTHYROXINE SODIUM 300 MCG
75 TABLET ORAL DAILY
Refills: 0 | Status: DISCONTINUED | OUTPATIENT
Start: 2025-06-20 | End: 2025-06-25

## 2025-06-20 RX ORDER — ROSUVASTATIN CALCIUM 5 MG/1
10 TABLET, FILM COATED ORAL AT BEDTIME
Refills: 0 | Status: DISCONTINUED | OUTPATIENT
Start: 2025-06-20 | End: 2025-06-25

## 2025-06-20 RX ORDER — METOCLOPRAMIDE HCL 10 MG
10 TABLET ORAL ONCE
Refills: 0 | Status: COMPLETED | OUTPATIENT
Start: 2025-06-20 | End: 2025-06-20

## 2025-06-20 RX ORDER — HYDROMORPHONE/SOD CHLOR,ISO/PF 2 MG/10 ML
0.5 SYRINGE (ML) INJECTION ONCE
Refills: 0 | Status: DISCONTINUED | OUTPATIENT
Start: 2025-06-20 | End: 2025-06-20

## 2025-06-20 RX ORDER — ONDANSETRON HCL/PF 4 MG/2 ML
4 VIAL (ML) INJECTION ONCE
Refills: 0 | Status: COMPLETED | OUTPATIENT
Start: 2025-06-20 | End: 2025-06-20

## 2025-06-20 RX ORDER — CITALOPRAM 20 MG/1
40 TABLET ORAL DAILY
Refills: 0 | Status: DISCONTINUED | OUTPATIENT
Start: 2025-06-20 | End: 2025-06-25

## 2025-06-20 RX ORDER — HYDROMORPHONE/SOD CHLOR,ISO/PF 2 MG/10 ML
0.5 SYRINGE (ML) INJECTION EVERY 6 HOURS
Refills: 0 | Status: DISCONTINUED | OUTPATIENT
Start: 2025-06-20 | End: 2025-06-22

## 2025-06-20 RX ORDER — OXYBUTYNIN CHLORIDE 5 MG/1
5 TABLET, FILM COATED, EXTENDED RELEASE ORAL
Refills: 0 | Status: DISCONTINUED | OUTPATIENT
Start: 2025-06-20 | End: 2025-06-25

## 2025-06-20 RX ORDER — SUCRALFATE 1 G
1 TABLET ORAL ONCE
Refills: 0 | Status: COMPLETED | OUTPATIENT
Start: 2025-06-20 | End: 2025-06-20

## 2025-06-20 RX ORDER — SUCRALFATE 1 G
1 TABLET ORAL
Refills: 0 | Status: DISCONTINUED | OUTPATIENT
Start: 2025-06-20 | End: 2025-06-25

## 2025-06-20 RX ORDER — ACETAMINOPHEN 500 MG/5ML
650 LIQUID (ML) ORAL EVERY 6 HOURS
Refills: 0 | Status: DISCONTINUED | OUTPATIENT
Start: 2025-06-20 | End: 2025-06-25

## 2025-06-20 RX ORDER — ONDANSETRON HCL/PF 4 MG/2 ML
4 VIAL (ML) INJECTION EVERY 8 HOURS
Refills: 0 | Status: DISCONTINUED | OUTPATIENT
Start: 2025-06-20 | End: 2025-06-25

## 2025-06-20 RX ORDER — OXYBUTYNIN CHLORIDE 5 MG/1
10 TABLET, FILM COATED, EXTENDED RELEASE ORAL DAILY
Refills: 0 | Status: DISCONTINUED | OUTPATIENT
Start: 2025-06-20 | End: 2025-06-20

## 2025-06-20 RX ADMIN — Medication 0.5 MILLIGRAM(S): at 18:11

## 2025-06-20 RX ADMIN — Medication 1 GRAM(S): at 12:42

## 2025-06-20 RX ADMIN — Medication 0.5 MILLIGRAM(S): at 18:23

## 2025-06-20 RX ADMIN — ENOXAPARIN SODIUM 40 MILLIGRAM(S): 100 INJECTION SUBCUTANEOUS at 21:03

## 2025-06-20 RX ADMIN — Medication 104 MILLIGRAM(S): at 12:09

## 2025-06-20 RX ADMIN — Medication 4 MILLIGRAM(S): at 21:03

## 2025-06-20 RX ADMIN — Medication 4 MILLIGRAM(S): at 12:42

## 2025-06-20 RX ADMIN — Medication 40 MILLIGRAM(S): at 18:11

## 2025-06-20 RX ADMIN — Medication 10 MILLIGRAM(S): at 23:33

## 2025-06-20 RX ADMIN — Medication 0.5 MILLIGRAM(S): at 20:54

## 2025-06-20 RX ADMIN — Medication 4 MILLIGRAM(S): at 10:38

## 2025-06-20 RX ADMIN — Medication 4 MILLIGRAM(S): at 09:49

## 2025-06-20 RX ADMIN — Medication 4 MILLIGRAM(S): at 14:45

## 2025-06-20 RX ADMIN — Medication 975 MILLIGRAM(S): at 09:49

## 2025-06-20 RX ADMIN — Medication 1 GRAM(S): at 18:11

## 2025-06-20 RX ADMIN — Medication 0.5 MILLIGRAM(S): at 20:34

## 2025-06-20 RX ADMIN — Medication 10 MILLIGRAM(S): at 10:38

## 2025-06-20 RX ADMIN — Medication 20 MILLIGRAM(S): at 09:49

## 2025-06-20 RX ADMIN — Medication 1000 MILLILITER(S): at 09:49

## 2025-06-20 RX ADMIN — ROSUVASTATIN CALCIUM 10 MILLIGRAM(S): 5 TABLET, FILM COATED ORAL at 21:03

## 2025-06-20 RX ADMIN — OXYBUTYNIN CHLORIDE 5 MILLIGRAM(S): 5 TABLET, FILM COATED, EXTENDED RELEASE ORAL at 18:11

## 2025-06-20 RX ADMIN — Medication 10 MILLIGRAM(S): at 18:11

## 2025-06-20 NOTE — H&P ADULT - NSHPLABSRESULTS_GEN_ALL_CORE
14.0   7.29  )-----------( 253      ( 20 Jun 2025 09:40 )             44.4     141  |  103  |  15  ----------------------------<  114[H]  4.8   |  19  |  0.7    Ca    10.1      20 Jun 2025 12:00    TPro  7.9  /  Alb  4.9  /  TBili  0.2  /  DBili  <0.2  /  AST  18  /  ALT  16  /  AlkPhos  75  06-20          Urinalysis Basic - ( 20 Jun 2025 12:00 )  Color: x / Appearance: x / SG: x / pH: x  Gluc: 114 mg/dL / Ketone: x  / Bili: x / Urobili: x   Blood: x / Protein: x / Nitrite: x   Leuk Esterase: x / RBC: x / WBC x   Sq Epi: x / Non Sq Epi: x / Bacteria: x    RADIOLOGY:  CT Abdomen and Pelvis w/ IV Cont (06.20.25 @ 11:40)     IMPRESSION:    No CT evidence for acute intra-abdominal or pelvic pathology.    ELLIOT LANDAU MD; Attending Radiologist  This document has been electronically signed. Jun 20 2025 11:54AM

## 2025-06-20 NOTE — PATIENT PROFILE ADULT - PATIENT'S PREFERRED PRONOUN
Patient: Harriett Romero  Procedure(s):  COLONOSCOPY  Anesthesia type: MAC    Patient location: Phase II Recovery  Last vitals:   Vitals Value Taken Time   BP 96/53 2/25/2020  9:30 AM   Temp 36.4  C (97.6  F) 2/25/2020  8:28 AM   Pulse 75 2/25/2020  9:31 AM   Resp 16 2/25/2020  9:30 AM   SpO2 97 % 2/25/2020  9:31 AM   Vitals shown include unvalidated device data.  Post vital signs: stable  Level of consciousness: awake and responds to simple questions  Post-anesthesia pain: pain controlled  Post-anesthesia nausea and vomiting: no  Pulmonary: unassisted, return to baseline  Cardiovascular: stable and blood pressure at baseline  Hydration: adequate  Anesthetic events: yes: aspiration and laryngospasm    QCDR Measures:  ASA# 11 - Anna-op Cardiac Arrest: ASA11B - Patient did NOT experience unanticipated cardiac arrest  ASA# 12 - Anna-op Mortality Rate: ASA12B - Patient did NOT die  ASA# 13 - PACU Re-Intubation Rate: ASA13B - Patient did NOT require a new airway mgmt  ASA# 10 - Composite Anes Safety: ASA10A - No serious adverse event    Additional Notes:  Small aspiration event in the OR w/ associated partial laryngospasm requiring jaw thrust to break but did NOT require positive pressure. In recovery her SpO2 was 98% on RA and otherwise hemodynamically stable despite having a persistent cough.    Her/She

## 2025-06-20 NOTE — CHART NOTE - NSCHARTNOTEFT_GEN_A_CORE
Patient says epigastric pain was exacerbated by ultrasound testing. Will order extra dose of 0.5mg Dilaudid

## 2025-06-20 NOTE — ED ADULT NURSE NOTE - NSFALLUNIVINTERV_ED_ALL_ED
Bed/Stretcher in lowest position, wheels locked, appropriate side rails in place/Call bell, personal items and telephone in reach/Instruct patient to call for assistance before getting out of bed/chair/stretcher/Non-slip footwear applied when patient is off stretcher/[place] to call system/Physically safe environment - no spills, clutter or unnecessary equipment/Purposeful proactive rounding/Room/bathroom lighting operational, light cord in reach

## 2025-06-20 NOTE — CONSULT NOTE ADULT - ASSESSMENT
49yFemale pmh GERD, cholecystectomy presents for chronic epigastric pain for years presents for n/v and chronic epigastric pain. Patient follows with Dr. German outpatient and has EGD and EUS that has been unrevealing. Patient denies  hematemesis, melena, blood in stool, diarrhea, constipation, +chronic epigastric abdominal pain. +n/v    #chronic epigastric pain  n/v--->resolved  EGD/EUS with Dr. German wn  Rec  -supportive care for n/v  -Check RUQ ultrasound  -Dr. German, has planned for ERCP for possible papillary stenosis it is planned for Tuesday 6/24/25, patient reports when her symptoms are under control she wants to go home to followup for her procedure Tuesday 6/24/25  -outpatient followup with pain management doctor  -Pain control  -outpatient Follow up with our GI office located on [address] [place] [address], Phone number [phone] with Dr. German  -PPI   -continue Carafate   -no acute GI intervention  -appreciate CT    #Ventral hernias  Rec  -outpatient surgical eval    #CRC screening   Rec  -patient scheduled for CRC screening Colonoscopy for this summer 2025 with her private GI      Recall GI if needed

## 2025-06-20 NOTE — PATIENT PROFILE ADULT - FALL HARM RISK - UNIVERSAL INTERVENTIONS
Bed in lowest position, wheels locked, appropriate side rails in place/Call bell, personal items and telephone in reach/Instruct patient to call for assistance before getting out of bed or chair/Non-slip footwear when patient is out of bed/[place] to call system/Physically safe environment - no spills, clutter or unnecessary equipment/Purposeful Proactive Rounding/Room/bathroom lighting operational, light cord in reach

## 2025-06-20 NOTE — ED PROVIDER NOTE - NSICDXPASTMEDICALHX_GEN_ALL_CORE_FT
PAST MEDICAL HISTORY:  Anxiety     Back pain     GERD (gastroesophageal reflux disease)     Hypothyroidism     Kidney stones     Migraine headache     Murmur since age 20     6/18    Overactive bladder     Thyroid nodule     
4 = No assist / stand by assistance

## 2025-06-20 NOTE — H&P ADULT - NSHPPHYSICALEXAM_GEN_ALL_CORE
VITALS:   T(C): 36.6 (06-20-25 @ 10:25), Max: 36.6 (06-20-25 @ 09:22)  HR: 88 (06-20-25 @ 10:25) (88 - 89)  BP: 128/80 (06-20-25 @ 10:25) (97/60 - 128/80)  RR: 18 (06-20-25 @ 10:25) (18 - 19)  SpO2: 100% (06-20-25 @ 10:25) (100% - 100%)    GENERAL: NAD, lying in bed comfortably  HEAD:  Atraumatic, Normocephalic  EYES: EOMI, PERRLA, conjunctiva and sclera clear  ENT: Moist mucous membranes  NECK: Supple, No JVD  CHEST/LUNG: Clear to auscultation bilaterally; No rales, rhonchi, wheezing, or rubs. Unlabored respirations  HEART: Regular rate and rhythm; No murmurs, rubs, or gallops  ABDOMEN: Bowel sounds present; Soft, Nontender, Nondistended  EXTREMITIES:  2+ Peripheral Pulses, brisk capillary refill. No clubbing, cyanosis, or edema  NERVOUS SYSTEM:  Alert & Oriented X3, speech clear. No deficits   MSK: FROM all 4 extremities, full and equal strength  SKIN: No rashes or lesions VITALS:   T(C): 36.6 (06-20-25 @ 10:25), Max: 36.6 (06-20-25 @ 09:22)  HR: 88 (06-20-25 @ 10:25) (88 - 89)  BP: 128/80 (06-20-25 @ 10:25) (97/60 - 128/80)  RR: 18 (06-20-25 @ 10:25) (18 - 19)  SpO2: 100% (06-20-25 @ 10:25) (100% - 100%)    GENERAL: NAD, lying in bed comfortably  HEAD:  Atraumatic, Normocephalic  EYES: EOMI, PERRLA, conjunctiva and sclera clear  ENT: Moist mucous membranes  NECK: Supple  CHEST/LUNG: Clear to auscultation bilaterally; No wheezing, or rubs. Unlabored respirations  HEART: Regular rate and rhythm; No murmurs, rubs, or gallops  ABDOMEN: + tender to epigastrium, + bowel sounds; Soft, Nondistended  EXTREMITIES:  No LE edema bilaterally  NERVOUS SYSTEM:  Alert & Oriented X3, speech clear  MSK: FROM all 4 extremities, full and equal strength  SKIN: No rashes or lesions

## 2025-06-20 NOTE — ED PROVIDER NOTE - CADM POA URETHRAL CATHETER
Opzelura Counseling:  I discussed with the patient the risks of Opzelura including but not limited to nasopharngitis, bronchitis, ear infection, eosinophila, hives, diarrhea, folliculitis, tonsillitis, and rhinorrhea.  Taken orally, this medication has been linked to serious infections; higher rate of mortality; malignancy and lymphoproliferative disorders; major adverse cardiovascular events; thrombosis; thrombocytopenia, anemia, and neutropenia; and lipid elevations. No

## 2025-06-20 NOTE — H&P ADULT - NS ATTEND AMEND GEN_ALL_CORE FT
I agree with the above with my editions    gastritis  worsening epigastric pain with nausea and vomiting   -took zofran at home with no improvement  -was schedule for ERCP with Dr German   -discussed case with GI regarding imaging, CT with no acute intraabdominal findings, recommend US RUQ and sx control  -I reviewed labs and image personally, no acute evidence of free air    rest as above I agree with the above with my editions    gastritis  worsening epigastric pain with nausea and vomiting   -took zofran at home with no improvement  -was schedule for ERCP with Dr German   -discussed case with GI regarding imaging, CT with no acute intraabdominal findings, recommend US RUQ and sx control  -I reviewed labs and image personally, no acute evidence of free air  -pt rquiring total of 12 mg of morphine in ER, will continue methadone standing for pt, dilaudid prn    rest as above

## 2025-06-20 NOTE — ED PROVIDER NOTE - CLINICAL SUMMARY MEDICAL DECISION MAKING FREE TEXT BOX
Patient's symptoms are refractory to ED treatment including narcotic analgesia.  Patient will be admitted for IV fluid, IV antiemetics, IV analgesia.  In my opinion, inpatient treatment is medically justifiable and appropriate.

## 2025-06-20 NOTE — ED PROVIDER NOTE - ATTENDING APP SHARED VISIT CONTRIBUTION OF CARE
Patient complains of nausea, vomiting, diarrhea, epigastric abdominal pain.  Symptoms have been refractory to outpatient treatment.  Vital signs noted.  Appears uncomfortable.  Positive tenderness in the epigastrium.  No rebound or guarding.  Patient given multiple doses of antiemetics and narcotic pain medicines without relief of symptoms.  Labs, CT reviewed.  Patient was admitted for intractable symptoms.

## 2025-06-20 NOTE — ED PROVIDER NOTE - OBJECTIVE STATEMENT
Patient is a 49-year-old female with past medical history of hypothyroidism, GERD, hiatal hernia, recurrent abdominal pain who presents for evaluation of epigastric pain with nausea and episode of vomiting.  She attempted to take ODT Zofran however immediately vomited.  She admits to continued pain radiating to the left side.  She denies any chest pain, shortness of breath, urinary complaints, fever, cough, sore throat.

## 2025-06-20 NOTE — H&P ADULT - HISTORY OF PRESENT ILLNESS
49-year-old female with PMH of hypothyroidism, GERD, hiatal hernia, recurrent abdominal pain who presents to ED c/o epigastric pain with nausea and vomiting. Pt reports NBNB vomiting, She tried Zofran at home with no improvement.  Pt does admit to chronic pain and reports multiple prior admissions, During last visit to ED in 5/25 she was evaluated by GI team and was supposed to be scheduled for ERCP with Dr. German,  Currently pt denies any chest pain, shortness of breath, urinary complaints, fever, cough, sore throat. 49-year-old female with PMH of hypothyroidism, OAB, GERD, hiatal hernia, chronic pain (on Methadone), gastritis who presents to ED c/o worsening epigastric pain with nausea and vomiting. Pt reports 10 episodes of NBNB vomiting since yesterday, She tried Zofran at home with no improvement. She also reports not being able to hold solids, Pt does admit to chronic pain and reports multiple prior admissions, During last visit to ED in 5/25 she was evaluated by GI team and was scheduled for ERCP on 6/24 with Dr. German,  Currently pt denies any chest pain, shortness of breath, urinary complaints, fever, cough, sore throat.

## 2025-06-20 NOTE — H&P ADULT - ASSESSMENT
49-year-old female with PMH of hypothyroidism, GERD, hiatal hernia, recurrent abdominal pain who presents to ED c/o epigastric pain with nausea and vomiting.    # Chronic epigastric pain w/ nausea and vomiting  - admit to medicine  - CT A/P demonstrated no CT evidence for acute intra-abdominal or pelvic pathology.  - EKG normal sinus rhythm  - continue anti-emetics  - pain control    # Hx hypothyroid  - continue synthroid    # Hx Gerd  - continue ppi 49-year-old female with PMH of hypothyroidism, OAB, GERD, hiatal hernia, chronic pain (on Methadone), chronic gastritis who presents to ED c/o worsening epigastric pain with nausea and vomiting.     # Chronic epigastric pain w/ nausea and vomiting  - admit to medicine  - CT A/P demonstrated no CT evidence for acute intra-abdominal or pelvic pathology.  - check EKG for qtc  - continue anti-emetics  - pain control  - GI consult   - continue PPI, Carafate  - clear diet    # Hx hypothyroid  - continue synthroid    # Hx Gerd  - continue ppi    # Chronic pain  - continue Methadone 10 mg po  q6h    # OAB  - continue oxybutynin      DVT/GI ppx 49-year-old female with PMH of hypothyroidism, OAB, GERD, hiatal hernia, chronic pain (on Methadone), chronic gastritis who presents to ED c/o worsening epigastric pain with nausea and vomiting.     # Chronic epigastric pain w/ nausea and vomiting  - admit to medicine  -has with Dr López ERCP on tuesday to check for papillary stenosis  - CT A/P demonstrated no CT evidence for acute intra-abdominal or pelvic pathology.  - check EKG for qtc  -obtain RUQ US  - continue anti-emetics  - pain control  - GI consult   - continue PPI, Carafate  - clear diet    # Hx hypothyroid  - continue synthroid    # Hx Gerd  - continue ppi    # Chronic pain  - continue Methadone 10 mg po  q6h    # OAB  - continue oxybutynin      DVT/GI ppx

## 2025-06-20 NOTE — CONSULT NOTE ADULT - SUBJECTIVE AND OBJECTIVE BOX
Chief complaint/Reason for consult: epigastric pain    HPI:  49-year-old female with PMH of hypothyroidism, OAB, GERD, hiatal hernia, chronic pain (on Methadone), gastritis who presents to ED c/o worsening epigastric pain with nausea and vomiting. Pt reports 10 episodes of NBNB vomiting since yesterday, She tried Zofran at home with no improvement. She also reports not being able to hold solids, Pt does admit to chronic pain and reports multiple prior admissions, During last visit to ED in 5/25 she was evaluated by GI team and was scheduled for ERCP on 6/24 with Dr. German,  Currently pt denies any chest pain, shortness of breath, urinary complaints, fever, cough, sore throat. (20 Jun 2025 15:36)    GI updates: 49yFemale pmh GERD, cholecystectomy presents for chronic epigastric pain for years presents for n/v and chronic epigastric pain. Patient follows with Dr. German outpatient and has EGD and EUS that has been unrevealing. Patient denies  hematemesis, melena, blood in stool, diarrhea, constipation, +chronic epigastric abdominal pain. +n/v      PAST MEDICAL & SURGICAL HISTORY:   GERD (gastroesophageal reflux disease)  Migraine headache  Back pain  Anxiety  Kidney stones  Murmur  since age 20     6/18  Thyroid nodule  Hypothyroidism  Overactive bladder  History of cholecystectomy  History of lithotripsy      Family history:  FAMILY HISTORY:  DM (diabetes mellitus) (Father)    Family history of heart disease (Father)      No GI cancers in first or second degree relatives    Social History: No smoking. No alcohol. No illegal drug use.    Allergies:   citrus (Unknown)  Cipro (Rash)  Bananas (Unknown)  NSAIDs (Angioedema; Anaphylaxis; Hives)  eggs (Unknown)  Intolerances    MEDICATIONS: Home Medications:  citalopram 40 mg oral tablet: 1 tab(s) orally once a day (in the morning) (20 Jun 2025 16:32)  levothyroxine 75 mcg (0.075 mg) oral tablet: 1 tab(s) orally once a day (20 Jun 2025 16:32)  methadone 10 mg oral tablet: 1 tab(s) orally 4 times a day (20 Jun 2025 16:32)  oxybutynin 5 mg oral tablet: 2 tab(s) orally once a day (20 Jun 2025 16:32)  rosuvastatin 10 mg oral tablet: 1 tab(s) orally once a day (20 Jun 2025 16:32)      MEDICATIONS  (STANDING):  chlorhexidine 2% Cloths 1 Application(s) Topical <User Schedule>  citalopram 40 milliGRAM(s) Oral daily  enoxaparin Injectable 40 milliGRAM(s) SubCutaneous every 24 hours  levothyroxine 75 MICROGram(s) Oral daily  methadone    Tablet 10 milliGRAM(s) Oral four times a day  oxybutynin 5 milliGRAM(s) Oral two times a day  pantoprazole  Injectable 40 milliGRAM(s) IV Push two times a day  rosuvastatin 10 milliGRAM(s) Oral at bedtime  sucralfate 1 Gram(s) Oral four times a day    MEDICATIONS  (PRN):  acetaminophen     Tablet .. 650 milliGRAM(s) Oral every 6 hours PRN Temp greater or equal to 38C (100.4F), Mild Pain (1 - 3)  HYDROmorphone  Injectable 0.5 milliGRAM(s) IV Push every 6 hours PRN Severe Pain (7 - 10)  ondansetron Injectable 4 milliGRAM(s) IV Push every 8 hours PRN Nausea and/or Vomiting        REVIEW OF SYSTEMS  General:  No weight loss, fevers, or chills.  Eyes:  No reported pain or visual changes  ENT:  No sore throat or runny nose.  NECK: No stiffness   CV:  No chest pain or palpitations.  Resp:  No shortness of breath, cough, wheezing or hemoptysis  GI:  +Chronic epigastric abdominal pain, +nausea, +vomiting, no dysphagia, diarrhea or constipation. No rectal bleeding, melena, or hematemesis.  Neuro:  No tingling, numbness       VITALS:   T(F): 97.9 (06-20-25 @ 10:25), Max: 97.9 (06-20-25 @ 10:25)  HR: 88 (06-20-25 @ 10:25) (88 - 89)  BP: 128/80 (06-20-25 @ 10:25) (97/60 - 128/80)  RR: 18 (06-20-25 @ 10:25) (18 - 19)  SpO2: 100% (06-20-25 @ 10:25) (100% - 100%)    PHYSICAL EXAM:  GENERAL: AAOx3, no acute distress.  HEAD:  Atraumatic, Normocephalic  EYES: conjunctiva and sclera clear  NECK: Supple, No thyromegaly   CHEST/LUNG: Clear to auscultation bilaterally; No wheeze, rhonchi, or rales  HEART: Regular rate and rhythm; normal S1, S2, No murmurs.  ABDOMEN: Soft, nontender, nondistended; Bowel sounds present  NEUROLOGY: No asterixis or tremor  SKIN: Intact, no jaundice          LABS:  06-20    141  |  103  |  15  ----------------------------<  114[H]  4.8   |  19  |  0.7    Ca    10.1      20 Jun 2025 12:00    TPro  7.9  /  Alb  4.9  /  TBili  0.2  /  DBili  <0.2  /  AST  18  /  ALT  16  /  AlkPhos  75  06-20                          14.0   7.29  )-----------( 253      ( 20 Jun 2025 09:40 )             44.4     LIVER FUNCTIONS - ( 20 Jun 2025 12:00 )  Alb: 4.9 g/dL / Pro: 7.9 g/dL / ALK PHOS: 75 U/L / ALT: 16 U/L / AST: 18 U/L / GGT: x               IMAGING:    < from: CT Abdomen and Pelvis w/ IV Cont (06.20.25 @ 11:40) >    ACC: [id number] EXAM:  CT ABDOMEN AND PELVIS IC   ORDERED BY: ALYSSA FREEMAN     PROCEDURE DATE:  06/20/2025          INTERPRETATION:  CLINICAL INFORMATION: Abdominal pain    COMPARISON: May 22, 2025    CONTRAST/COMPLICATIONS:  IV Contrast: Omnipaque 350  95 cc administered   5 cc discarded  Oral Contrast: NONE.    PROCEDURE:  CT of the Abdomen and Pelvis was performed.  Sagittal and coronal reformats were performed.    FINDINGS:  LOWER CHEST: Within normal limits.    LIVER: Unchanged hepatic cyst adjacent to gallbladder fossa.  BILE DUCTS: Prominent postcholecystectomy  GALLBLADDER: Cholecystectomy.  SPLEEN: Within normal limits.  PANCREAS: Within normal limits.  ADRENALS: Within normal limits.  KIDNEYS/URETERS: Symmetric renal enhancement without hydronephrosis   bilaterally.    BLADDER: Within normal limits.  REPRODUCTIVE ORGANS: Unchanged    BOWEL: No bowel obstruction. No evidence for appendicitis.  PERITONEUM/RETROPERITONEUM: Within normal limits.  VESSELS: Within normal limits.  LYMPH NODES: No lymphadenopathy.  ABDOMINAL WALL: Fat-containing umbilical and supraumbilical hernias,   unchanged.  BONES: No acute osseous abnormality.    IMPRESSION:    No CT evidence for acute intra-abdominal or pelvic pathology.        --- End of Report ---            ELLIOT LANDAU MD; Attending Radiologist  This document has been electronically signed. Jun 20 2025 11:54AM    < end of copied text >      < from: US Abdomen Upper Quadrant Right (04.26.25 @ 20:04) >  ACC: [id number] EXAM:  US ABDOMEN RT UPR QUADRANT   ORDERED BY: JEFFERSON SCHAFER     PROCEDURE DATE:  04/26/2025          INTERPRETATION:  CLINICAL INFORMATION: Transaminitis.    COMPARISON: 11/15/2024    TECHNIQUE: Sonography of the right upper quadrant.    FINDINGS:  Liver: Cyst in the right lobe measuring up to 1.8 cm.  Bile ducts: Normal caliber. Common bile duct measures 5 mm.  Gallbladder: Surgically absent.  Pancreas: Visualized portions are within normal limits.  Right kidney: 12.8 cm. No hydronephrosis.  Ascites: None.  IVC: Visualized portions are within normal limits.    IMPRESSION:  No acute pathology. Surgically absent gallbladder.        --- End of Report ---            ZEENAT FELICIANO MD; Attending Radiologist  This document hasbeen electronically signed. Apr 27 2025 12:14AM    < end of copied text >

## 2025-06-20 NOTE — ED PROVIDER NOTE - PROGRESS NOTE DETAILS
KA - Patient given multiple rounds of nausea and pain medication.  Patient still symptomatic.  Discussed plans for discharge or admission with the patient.  Shared decision making ultimately decided for admission for symptom management.  Patient has follow-up with Dr. German the beginning of next week.

## 2025-06-20 NOTE — ED PROVIDER NOTE - PHYSICAL EXAMINATION
As Follows:  CONST: Well appearing in NAD  EYES: PERRL, EOMI, Sclera and conjunctiva clear.   ENT: No nasal discharge. Oropharynx normal appearing, no erythema / exudates. Uvula midline. Airway intact.   CARD: No murmurs, rubs, or gallops; Normal rate and rhythm  RESP: BS Equal B/L, No wheezes, rhonchi or rales. No distress or accessory breathing  GI: Epigastric tenderness. Soft, non-distended. No cva tenderness.   SKIN: Warm, dry, no acute rashes. MMM  NEURO: Alert and Oriented, No focal deficits.

## 2025-06-21 LAB
ALBUMIN SERPL ELPH-MCNC: 4.7 G/DL — SIGNIFICANT CHANGE UP (ref 3.5–5.2)
ALP SERPL-CCNC: 164 U/L — HIGH (ref 30–115)
ALT FLD-CCNC: 452 U/L — HIGH (ref 0–41)
ANION GAP SERPL CALC-SCNC: 15 MMOL/L — HIGH (ref 7–14)
AST SERPL-CCNC: 601 U/L — HIGH (ref 0–41)
BILIRUB SERPL-MCNC: 0.4 MG/DL — SIGNIFICANT CHANGE UP (ref 0.2–1.2)
BUN SERPL-MCNC: 12 MG/DL — SIGNIFICANT CHANGE UP (ref 10–20)
CALCIUM SERPL-MCNC: 9.6 MG/DL — SIGNIFICANT CHANGE UP (ref 8.4–10.5)
CHLORIDE SERPL-SCNC: 102 MMOL/L — SIGNIFICANT CHANGE UP (ref 98–110)
CO2 SERPL-SCNC: 22 MMOL/L — SIGNIFICANT CHANGE UP (ref 17–32)
CREAT SERPL-MCNC: 0.6 MG/DL — LOW (ref 0.7–1.5)
EGFR: 111 ML/MIN/1.73M2 — SIGNIFICANT CHANGE UP
EGFR: 111 ML/MIN/1.73M2 — SIGNIFICANT CHANGE UP
GLUCOSE SERPL-MCNC: 84 MG/DL — SIGNIFICANT CHANGE UP (ref 70–99)
HCT VFR BLD CALC: 37.8 % — SIGNIFICANT CHANGE UP (ref 34.5–45)
HGB BLD-MCNC: 12.3 G/DL — SIGNIFICANT CHANGE UP (ref 11.5–15.5)
MCHC RBC-ENTMCNC: 29.8 PG — SIGNIFICANT CHANGE UP (ref 27–34)
MCHC RBC-ENTMCNC: 32.5 G/DL — SIGNIFICANT CHANGE UP (ref 32–36)
MCV RBC AUTO: 91.5 FL — SIGNIFICANT CHANGE UP (ref 80–100)
NRBC # BLD AUTO: 0 K/UL — SIGNIFICANT CHANGE UP (ref 0–0)
NRBC # FLD: 0 K/UL — SIGNIFICANT CHANGE UP (ref 0–0)
NRBC BLD AUTO-RTO: 0 /100 WBCS — SIGNIFICANT CHANGE UP (ref 0–0)
PLATELET # BLD AUTO: 201 K/UL — SIGNIFICANT CHANGE UP (ref 150–400)
PMV BLD: 11.2 FL — SIGNIFICANT CHANGE UP (ref 7–13)
POTASSIUM SERPL-MCNC: 4.4 MMOL/L — SIGNIFICANT CHANGE UP (ref 3.5–5)
POTASSIUM SERPL-SCNC: 4.4 MMOL/L — SIGNIFICANT CHANGE UP (ref 3.5–5)
PROT SERPL-MCNC: 7.2 G/DL — SIGNIFICANT CHANGE UP (ref 6–8)
RBC # BLD: 4.13 M/UL — SIGNIFICANT CHANGE UP (ref 3.8–5.2)
RBC # FLD: 12.5 % — SIGNIFICANT CHANGE UP (ref 10.3–14.5)
SODIUM SERPL-SCNC: 139 MMOL/L — SIGNIFICANT CHANGE UP (ref 135–146)
WBC # BLD: 4.87 K/UL — SIGNIFICANT CHANGE UP (ref 3.8–10.5)
WBC # FLD AUTO: 4.87 K/UL — SIGNIFICANT CHANGE UP (ref 3.8–10.5)

## 2025-06-21 PROCEDURE — 99232 SBSQ HOSP IP/OBS MODERATE 35: CPT

## 2025-06-21 RX ORDER — HYDROMORPHONE/SOD CHLOR,ISO/PF 2 MG/10 ML
0.25 SYRINGE (ML) INJECTION ONCE
Refills: 0 | Status: DISCONTINUED | OUTPATIENT
Start: 2025-06-21 | End: 2025-06-21

## 2025-06-21 RX ORDER — OXYCODONE HYDROCHLORIDE 30 MG/1
5 TABLET ORAL EVERY 6 HOURS
Refills: 0 | Status: DISCONTINUED | OUTPATIENT
Start: 2025-06-21 | End: 2025-06-22

## 2025-06-21 RX ADMIN — Medication 0.5 MILLIGRAM(S): at 21:19

## 2025-06-21 RX ADMIN — Medication 0.5 MILLIGRAM(S): at 07:06

## 2025-06-21 RX ADMIN — CITALOPRAM 40 MILLIGRAM(S): 20 TABLET ORAL at 11:56

## 2025-06-21 RX ADMIN — OXYCODONE HYDROCHLORIDE 5 MILLIGRAM(S): 30 TABLET ORAL at 12:50

## 2025-06-21 RX ADMIN — Medication 1 GRAM(S): at 17:26

## 2025-06-21 RX ADMIN — ROSUVASTATIN CALCIUM 10 MILLIGRAM(S): 5 TABLET, FILM COATED ORAL at 21:24

## 2025-06-21 RX ADMIN — Medication 1 APPLICATION(S): at 05:20

## 2025-06-21 RX ADMIN — Medication 10 MILLIGRAM(S): at 05:19

## 2025-06-21 RX ADMIN — Medication 75 MICROGRAM(S): at 05:19

## 2025-06-21 RX ADMIN — Medication 10 MILLIGRAM(S): at 11:56

## 2025-06-21 RX ADMIN — Medication 0.5 MILLIGRAM(S): at 00:16

## 2025-06-21 RX ADMIN — Medication 0.25 MILLIGRAM(S): at 12:26

## 2025-06-21 RX ADMIN — Medication 0.5 MILLIGRAM(S): at 13:40

## 2025-06-21 RX ADMIN — ENOXAPARIN SODIUM 40 MILLIGRAM(S): 100 INJECTION SUBCUTANEOUS at 21:24

## 2025-06-21 RX ADMIN — OXYBUTYNIN CHLORIDE 5 MILLIGRAM(S): 5 TABLET, FILM COATED, EXTENDED RELEASE ORAL at 05:20

## 2025-06-21 RX ADMIN — Medication 0.25 MILLIGRAM(S): at 06:08

## 2025-06-21 RX ADMIN — Medication 1 GRAM(S): at 01:25

## 2025-06-21 RX ADMIN — Medication 10 MILLIGRAM(S): at 17:25

## 2025-06-21 RX ADMIN — Medication 10 MILLIGRAM(S): at 23:51

## 2025-06-21 RX ADMIN — Medication 0.25 MILLIGRAM(S): at 11:57

## 2025-06-21 RX ADMIN — Medication 40 MILLIGRAM(S): at 17:25

## 2025-06-21 RX ADMIN — Medication 0.25 MILLIGRAM(S): at 17:35

## 2025-06-21 RX ADMIN — Medication 0.25 MILLIGRAM(S): at 18:46

## 2025-06-21 RX ADMIN — Medication 0.25 MILLIGRAM(S): at 04:56

## 2025-06-21 RX ADMIN — OXYBUTYNIN CHLORIDE 5 MILLIGRAM(S): 5 TABLET, FILM COATED, EXTENDED RELEASE ORAL at 17:25

## 2025-06-21 RX ADMIN — Medication 1 GRAM(S): at 23:51

## 2025-06-21 RX ADMIN — Medication 1 GRAM(S): at 05:19

## 2025-06-21 RX ADMIN — Medication 0.5 MILLIGRAM(S): at 20:41

## 2025-06-21 RX ADMIN — Medication 40 MILLIGRAM(S): at 05:20

## 2025-06-21 RX ADMIN — Medication 0.5 MILLIGRAM(S): at 00:31

## 2025-06-21 RX ADMIN — OXYCODONE HYDROCHLORIDE 5 MILLIGRAM(S): 30 TABLET ORAL at 16:56

## 2025-06-21 RX ADMIN — Medication 4 MILLIGRAM(S): at 05:30

## 2025-06-21 RX ADMIN — OXYCODONE HYDROCHLORIDE 5 MILLIGRAM(S): 30 TABLET ORAL at 23:45

## 2025-06-21 RX ADMIN — Medication 1 GRAM(S): at 13:15

## 2025-06-21 RX ADMIN — Medication 0.5 MILLIGRAM(S): at 16:56

## 2025-06-21 NOTE — PROGRESS NOTE ADULT - ASSESSMENT
RAMON WOODS 49y Female  MRN#: [id number]   CODE STATUS:_full_______      SUBJECTIVE  Patient is a 49y old Female who presents with a chief complaint of Currently admitted to medicine with the primary diagnosis of Intractable nausea and vomiting      Today is hospital day 1d, and this morning she is crying from abdominal pain this am , received Dilaudid several times   no vomiting           OBJECTIVE  PAST MEDICAL & SURGICAL HISTORY  GERD (gastroesophageal reflux disease)    Migraine headache    Back pain    Anxiety    Kidney stones    Murmur  since age 20     6/18    Thyroid nodule    Hypothyroidism    Overactive bladder    History of cholecystectomy    History of lithotripsy      ALLERGIES:  citrus (Unknown)  Cipro (Rash)  Bananas (Unknown)  NSAIDs (Angioedema; Anaphylaxis; Hives)  eggs (Unknown)    MEDICATIONS:  STANDING MEDICATIONS  chlorhexidine 2% Cloths 1 Application(s) Topical <User Schedule>  citalopram 40 milliGRAM(s) Oral daily  enoxaparin Injectable 40 milliGRAM(s) SubCutaneous every 24 hours  levothyroxine 75 MICROGram(s) Oral daily  methadone    Tablet 10 milliGRAM(s) Oral four times a day  oxybutynin 5 milliGRAM(s) Oral two times a day  pantoprazole  Injectable 40 milliGRAM(s) IV Push two times a day  rosuvastatin 10 milliGRAM(s) Oral at bedtime  sucralfate 1 Gram(s) Oral four times a day    PRN MEDICATIONS  acetaminophen     Tablet .. 650 milliGRAM(s) Oral every 6 hours PRN  HYDROmorphone  Injectable 0.5 milliGRAM(s) IV Push every 6 hours PRN  ondansetron Injectable 4 milliGRAM(s) IV Push every 8 hours PRN  oxyCODONE    IR 5 milliGRAM(s) Oral every 6 hours PRN      VITAL SIGNS: Last 24 Hours  T(C): 36.8 (21 Jun 2025 14:00), Max: 36.8 (21 Jun 2025 14:00)  T(F): 98.2 (21 Jun 2025 14:00), Max: 98.2 (21 Jun 2025 14:00)  HR: 74 (21 Jun 2025 14:00) (72 - 93)  BP: 133/83 (21 Jun 2025 14:00) (126/87 - 148/87)  BP(mean): --  RR: 18 (21 Jun 2025 14:00) (18 - 18)  SpO2: 97% (21 Jun 2025 14:00) (96% - 99%)    LABS:                        12.3   4.87  )-----------( 201      ( 21 Jun 2025 07:58 )             37.8     06-21    139  |  102  |  12  ----------------------------<  84  4.4   |  22  |  0.6[L]    Ca    9.6      21 Jun 2025 07:58    TPro  7.2  /  Alb  4.7  /  TBili  0.4  /  DBili  x   /  AST  601[H]  /  ALT  452[H]  /  AlkPhos  164[H]  06-21      Urinalysis Basic - ( 21 Jun 2025 07:58 )    Color: x / Appearance: x / SG: x / pH: x  Gluc: 84 mg/dL / Ketone: x  / Bili: x / Urobili: x   Blood: x / Protein: x / Nitrite: x   Leuk Esterase: x / RBC: x / WBC x   Sq Epi: x / Non Sq Epi: x / Bacteria: x            Urinalysis with Rflx Culture (collected 20 Jun 2025 10:30)            PHYSICAL EXAM:    GENERAL: NAD, well-developed, AAOx3, crying from pain   HEENT:  Atraumatic, Normocephalic. EOMI, PERRLA, conjunctiva and sclera clear, No JVD  PULMONARY: Clear to auscultation bilaterally; No wheeze  CARDIOVASCULAR: Regular rate and rhythm; No murmurs, rubs, or gallops  GASTROINTESTINAL: Soft,  Nondistended; Bowel sounds present, tender midepigastric region , mild rebound, no gaurding   MUSCULOSKELETAL:  2+ Peripheral Pulses, No clubbing, cyanosis, or edema  NEUROLOGY: non-focal  SKIN: No rashes or lesions        49-year-old female with PMH of hypothyroidism, OAB, GERD, hiatal hernia, chronic pain (on Methadone), chronic gastritis who presents to ED c/o worsening epigastric pain with nausea and vomiting.     # Chronic epigastric pain w/ nausea and vomiting  -has with Dr López ERCP on tuesday to check for papillary stenosis  - CT A/P demonstrated no CT evidence for acute intra-abdominal or pelvic pathology.  - check EKG for qtc  lipase wnl   - RUQ US, shows intrahepatic ductal dilatation   - continue anti-emetics  - pain control changed to oxycodone 5mg q6 and Dilaudid prn   lfts trending up and patient is requiring pain medication   cont on clears   will cont to monitor lfts and f/u GI recommendations   - GI consult   - continue PPI, Carafate  f/u hep panel     # Hx hypothyroid  - continue synthroid    # Hx Gerd  - continue ppi    # Chronic pain  - continue Methadone 10 mg po  q6h    # OAB  - continue oxybutynin      DVT/GI ppx    ---PB Handoff Note---    Pending/Follow up items (tests, labs, procedures,consults): Gi consult, f/u lfts     Indication for continued inpatient management:ivf, pain control requiring iv meds, nausea medication     Goals of Care note:     Family contact:  Dispo (home, SNF, etc):    Prepare for DC order [x] - updated TONYA is:   DC provider note prep:    -------------------------------Time spent-----------------------------------------------------------------------  Initial visit:             mins [ ] -- 55-74 mins [ ]  Subsequent visit: 50-79 mins[ ]    -- 35-49mins [ ]     --------------------------------# and complexity of problems---------------------------------------------  [ ] chronic illness with severe exacerbation , progression, treatment side effect  [ ] acute or chronic illness with threat to life or bodily funtion                         --------------------------------Complexity of data reviewed ----------------------------------------------  The Patients complexity of data reviewed  is Low [ ] Moderate [ x] High [ ] due to the following:   A:      Reviewed prior external records [ ]      Considering/ Ordered a unique test:  Labs [x ] Imaging [x ] Stress Test  [ ] Other: Specify [ ]      Reviewed each unique test result [x ]      Assessment requiring an independent historian [ ]  B:       Independent interpretation of:   X-Ray [x ] EKG [ ]  Other: Specify  [ ]  C:       Discussion of management of tests with clinician outside of my group [ ]    -------------------------------------Risk of Morbidity-------------------------------------------------------  The Patients risk of morbidity is Low [x ] Moderate [ ] High [ ] due to the following:   Mod:  Prescription Drug Management [ ]  Decision regarding elective or emergent: minor surgery [ ] major surgery [ ]  Decision regarding hospitalization or escalation of hospital-level care [ ]  SDOH: Hearing/Vision impaired [ ] Food insecurity [ ] Homelessness/unsafe condition [ ]   Financial strain [ ] un/underemployed [ ] Lack of Transport [ ]  High:  DNR or De-Escalation of care because of poor prognosis [ ]  Drug Therapy requiring intensive monitoring for toxicity [ ]  Parenteral controlled substance [ ]  ------------------------------------------------------------------------------------------------------------------

## 2025-06-21 NOTE — CHART NOTE - NSCHARTNOTEFT_GEN_A_CORE
pt complaining of pain at this time  currently on dilaudid prn   will order dilaudid x1 for breakthrough pain  monitor vss  monitor pt  attending aware

## 2025-06-22 LAB
ALBUMIN SERPL ELPH-MCNC: 4.6 G/DL — SIGNIFICANT CHANGE UP (ref 3.5–5.2)
ALP SERPL-CCNC: 237 U/L — HIGH (ref 30–115)
ALT FLD-CCNC: 343 U/L — HIGH (ref 0–41)
ANION GAP SERPL CALC-SCNC: 18 MMOL/L — HIGH (ref 7–14)
AST SERPL-CCNC: 269 U/L — HIGH (ref 0–41)
BILIRUB SERPL-MCNC: 0.3 MG/DL — SIGNIFICANT CHANGE UP (ref 0.2–1.2)
BUN SERPL-MCNC: 10 MG/DL — SIGNIFICANT CHANGE UP (ref 10–20)
CALCIUM SERPL-MCNC: 10.2 MG/DL — SIGNIFICANT CHANGE UP (ref 8.4–10.5)
CHLORIDE SERPL-SCNC: 101 MMOL/L — SIGNIFICANT CHANGE UP (ref 98–110)
CO2 SERPL-SCNC: 20 MMOL/L — SIGNIFICANT CHANGE UP (ref 17–32)
CREAT SERPL-MCNC: 0.6 MG/DL — LOW (ref 0.7–1.5)
EGFR: 111 ML/MIN/1.73M2 — SIGNIFICANT CHANGE UP
EGFR: 111 ML/MIN/1.73M2 — SIGNIFICANT CHANGE UP
GGT SERPL-CCNC: 557 U/L — HIGH (ref 1–40)
GLUCOSE SERPL-MCNC: 75 MG/DL — SIGNIFICANT CHANGE UP (ref 70–99)
HAV IGM SER-ACNC: SIGNIFICANT CHANGE UP
HBV CORE IGM SER-ACNC: SIGNIFICANT CHANGE UP
HBV SURFACE AG SER-ACNC: SIGNIFICANT CHANGE UP
HCT VFR BLD CALC: 41.8 % — SIGNIFICANT CHANGE UP (ref 34.5–45)
HCV AB S/CO SERPL IA: 0.86 S/CO — HIGH (ref 0–0.79)
HCV AB SERPL-IMP: ABNORMAL
HGB BLD-MCNC: 13.1 G/DL — SIGNIFICANT CHANGE UP (ref 11.5–15.5)
MCHC RBC-ENTMCNC: 29 PG — SIGNIFICANT CHANGE UP (ref 27–34)
MCHC RBC-ENTMCNC: 31.3 G/DL — LOW (ref 32–36)
MCV RBC AUTO: 92.5 FL — SIGNIFICANT CHANGE UP (ref 80–100)
NRBC # BLD AUTO: 0 K/UL — SIGNIFICANT CHANGE UP (ref 0–0)
NRBC # FLD: 0 K/UL — SIGNIFICANT CHANGE UP (ref 0–0)
NRBC BLD AUTO-RTO: 0 /100 WBCS — SIGNIFICANT CHANGE UP (ref 0–0)
PLATELET # BLD AUTO: 201 K/UL — SIGNIFICANT CHANGE UP (ref 150–400)
PMV BLD: 10.6 FL — SIGNIFICANT CHANGE UP (ref 7–13)
POTASSIUM SERPL-MCNC: 4.9 MMOL/L — SIGNIFICANT CHANGE UP (ref 3.5–5)
POTASSIUM SERPL-SCNC: 4.9 MMOL/L — SIGNIFICANT CHANGE UP (ref 3.5–5)
PROT SERPL-MCNC: 7.6 G/DL — SIGNIFICANT CHANGE UP (ref 6–8)
RBC # BLD: 4.52 M/UL — SIGNIFICANT CHANGE UP (ref 3.8–5.2)
RBC # FLD: 12.4 % — SIGNIFICANT CHANGE UP (ref 10.3–14.5)
SODIUM SERPL-SCNC: 139 MMOL/L — SIGNIFICANT CHANGE UP (ref 135–146)
WBC # BLD: 5.57 K/UL — SIGNIFICANT CHANGE UP (ref 3.8–10.5)
WBC # FLD AUTO: 5.57 K/UL — SIGNIFICANT CHANGE UP (ref 3.8–10.5)

## 2025-06-22 PROCEDURE — 99232 SBSQ HOSP IP/OBS MODERATE 35: CPT

## 2025-06-22 RX ORDER — HYDROMORPHONE/SOD CHLOR,ISO/PF 2 MG/10 ML
0.5 SYRINGE (ML) INJECTION EVERY 4 HOURS
Refills: 0 | Status: DISCONTINUED | OUTPATIENT
Start: 2025-06-22 | End: 2025-06-22

## 2025-06-22 RX ORDER — OXYCODONE HYDROCHLORIDE 30 MG/1
10 TABLET ORAL EVERY 6 HOURS
Refills: 0 | Status: DISCONTINUED | OUTPATIENT
Start: 2025-06-22 | End: 2025-06-22

## 2025-06-22 RX ORDER — HYDROMORPHONE/SOD CHLOR,ISO/PF 2 MG/10 ML
0.5 SYRINGE (ML) INJECTION EVERY 4 HOURS
Refills: 0 | Status: DISCONTINUED | OUTPATIENT
Start: 2025-06-22 | End: 2025-06-25

## 2025-06-22 RX ORDER — OXYCODONE HYDROCHLORIDE 30 MG/1
10 TABLET ORAL EVERY 6 HOURS
Refills: 0 | Status: DISCONTINUED | OUTPATIENT
Start: 2025-06-22 | End: 2025-06-25

## 2025-06-22 RX ADMIN — Medication 10 MILLIGRAM(S): at 05:14

## 2025-06-22 RX ADMIN — OXYCODONE HYDROCHLORIDE 10 MILLIGRAM(S): 30 TABLET ORAL at 18:56

## 2025-06-22 RX ADMIN — Medication 10 MILLIGRAM(S): at 23:42

## 2025-06-22 RX ADMIN — Medication 1 GRAM(S): at 05:14

## 2025-06-22 RX ADMIN — OXYCODONE HYDROCHLORIDE 5 MILLIGRAM(S): 30 TABLET ORAL at 08:18

## 2025-06-22 RX ADMIN — OXYCODONE HYDROCHLORIDE 10 MILLIGRAM(S): 30 TABLET ORAL at 16:35

## 2025-06-22 RX ADMIN — Medication 0.5 MILLIGRAM(S): at 19:53

## 2025-06-22 RX ADMIN — Medication 75 MICROGRAM(S): at 05:13

## 2025-06-22 RX ADMIN — Medication 0.5 MILLIGRAM(S): at 14:14

## 2025-06-22 RX ADMIN — OXYBUTYNIN CHLORIDE 5 MILLIGRAM(S): 5 TABLET, FILM COATED, EXTENDED RELEASE ORAL at 18:56

## 2025-06-22 RX ADMIN — Medication 0.5 MILLIGRAM(S): at 02:41

## 2025-06-22 RX ADMIN — Medication 40 MILLIGRAM(S): at 17:50

## 2025-06-22 RX ADMIN — CITALOPRAM 40 MILLIGRAM(S): 20 TABLET ORAL at 11:27

## 2025-06-22 RX ADMIN — OXYBUTYNIN CHLORIDE 5 MILLIGRAM(S): 5 TABLET, FILM COATED, EXTENDED RELEASE ORAL at 05:14

## 2025-06-22 RX ADMIN — Medication 1 GRAM(S): at 17:49

## 2025-06-22 RX ADMIN — ROSUVASTATIN CALCIUM 10 MILLIGRAM(S): 5 TABLET, FILM COATED ORAL at 22:38

## 2025-06-22 RX ADMIN — OXYCODONE HYDROCHLORIDE 10 MILLIGRAM(S): 30 TABLET ORAL at 12:39

## 2025-06-22 RX ADMIN — Medication 1 GRAM(S): at 23:42

## 2025-06-22 RX ADMIN — Medication 0.5 MILLIGRAM(S): at 20:15

## 2025-06-22 RX ADMIN — Medication 10 MILLIGRAM(S): at 17:49

## 2025-06-22 RX ADMIN — Medication 10 MILLIGRAM(S): at 11:26

## 2025-06-22 RX ADMIN — Medication 40 MILLIGRAM(S): at 05:13

## 2025-06-22 RX ADMIN — Medication 0.5 MILLIGRAM(S): at 09:08

## 2025-06-22 RX ADMIN — Medication 0.5 MILLIGRAM(S): at 03:19

## 2025-06-22 RX ADMIN — OXYCODONE HYDROCHLORIDE 5 MILLIGRAM(S): 30 TABLET ORAL at 07:35

## 2025-06-22 RX ADMIN — Medication 1 GRAM(S): at 11:27

## 2025-06-22 RX ADMIN — Medication 1 APPLICATION(S): at 05:14

## 2025-06-22 RX ADMIN — OXYCODONE HYDROCHLORIDE 10 MILLIGRAM(S): 30 TABLET ORAL at 23:33

## 2025-06-22 RX ADMIN — OXYCODONE HYDROCHLORIDE 10 MILLIGRAM(S): 30 TABLET ORAL at 16:32

## 2025-06-22 RX ADMIN — OXYCODONE HYDROCHLORIDE 10 MILLIGRAM(S): 30 TABLET ORAL at 22:39

## 2025-06-22 RX ADMIN — ENOXAPARIN SODIUM 40 MILLIGRAM(S): 100 INJECTION SUBCUTANEOUS at 22:39

## 2025-06-22 RX ADMIN — OXYCODONE HYDROCHLORIDE 5 MILLIGRAM(S): 30 TABLET ORAL at 01:19

## 2025-06-22 NOTE — DIETITIAN INITIAL EVALUATION ADULT - ORAL INTAKE PTA/DIET HISTORY
Pt reports a decreased appetite d/t nausea and vomiting for 4 days during which time pt consumed fluids only. Pt normally consumes 100% of 2 meals each day when well, endorses a diet of no spicy or fried foods, and limited carbonated drinks, reports allergies to citrus, bananas, and eggs. Usual body wt 103.6 kg 1 yr ago, current wt 80.8 kg, a 22% intentional wt loss in 1 yr. Pt has been taking Ozempic, but has stopped dosage the last 2-3 weeks to prepare for a medical procedure.

## 2025-06-22 NOTE — DIETITIAN INITIAL EVALUATION ADULT - EDUCATION DIETARY MODIFICATIONS
Encouraged good po intake of clear liquids and hydration with water./(1) partially meets; needs review/practice/verbalization

## 2025-06-22 NOTE — DIETITIAN INITIAL EVALUATION ADULT - NUTRITIONGOAL OUTCOME1
Patient will consume 75% or > of meals within 5-7  days.  Patient will have liver panel WNL within 5-7 days.

## 2025-06-22 NOTE — CHART NOTE - NSCHARTNOTEFT_GEN_A_CORE
Pain medications ordered as per discussion w/ Dr. Mcmahan   Dilaudid IV .5mg Q4 prn for breakthrough pain  Oxy IR 10mg Q6 for severe pain  Oxy IR 5mg Q6 for moderate pain Pain medications ordered as per discussion w/ Dr. Mcmahan   Dilaudid IV .5mg Q4 prn for severe pain  Oxy IR 10mg Q6 for moderate pain

## 2025-06-22 NOTE — DIETITIAN INITIAL EVALUATION ADULT - PERTINENT LABORATORY DATA
06-22    139  |  101  |  10  ----------------------------<  75  4.9   |  20  |  0.6[L]    Ca    10.2      22 Jun 2025 07:30    TPro  7.6  /  Alb  4.6  /  TBili  0.3  /  DBili  x   /  AST  269[H]  /  ALT  343[H]  /  AlkPhos  237[H]  06-22

## 2025-06-22 NOTE — DIETITIAN INITIAL EVALUATION ADULT - PERTINENT MEDS FT
MEDICATIONS  (STANDING):  chlorhexidine 2% Cloths 1 Application(s) Topical <User Schedule>  citalopram 40 milliGRAM(s) Oral daily  enoxaparin Injectable 40 milliGRAM(s) SubCutaneous every 24 hours  levothyroxine 75 MICROGram(s) Oral daily  methadone    Tablet 10 milliGRAM(s) Oral four times a day  oxybutynin 5 milliGRAM(s) Oral two times a day  pantoprazole  Injectable 40 milliGRAM(s) IV Push two times a day  rosuvastatin 10 milliGRAM(s) Oral at bedtime  sucralfate 1 Gram(s) Oral four times a day    MEDICATIONS  (PRN):  acetaminophen     Tablet .. 650 milliGRAM(s) Oral every 6 hours PRN Temp greater or equal to 38C (100.4F), Mild Pain (1 - 3)  HYDROmorphone  Injectable 0.5 milliGRAM(s) IV Push every 4 hours PRN Severe Pain (7 - 10)  ondansetron Injectable 4 milliGRAM(s) IV Push every 8 hours PRN Nausea and/or Vomiting  oxyCODONE    IR 10 milliGRAM(s) Oral every 6 hours PRN Moderate Pain (4 - 6)

## 2025-06-22 NOTE — DIETITIAN INITIAL EVALUATION ADULT - ADD RECOMMEND
Moderate Risk     Interventions: Coordination of care, meals, and nutritional supplements  Monitoring/Evaluation: Weights, labs, PO intake, nutrition-focused physical findings  1. Continue current diet - Upon diet advancement regular diet, if liver function is found to be compromised low fat diet & add MCT to diet regimen.  2.  Encourage PO intake & assist PRN

## 2025-06-22 NOTE — PROGRESS NOTE ADULT - ASSESSMENT
RAMON WOODS 49y Female  MRN#: [id number]   CODE STATUS:__full______      SUBJECTIVE  Patient is a 49y old Female who presents with a chief complaint of Nausea and vomiting     (22 Jun 2025 14:51)  Currently admitted to medicine with the primary diagnosis of Intractable nausea and vomiting      Today is hospital day 2d, and this morning she is c/o pain in the upper abdomen requiring iv Dilaudid she states the oxycodone 5 isnt working at all  on [employer]           OBJECTIVE  PAST MEDICAL & SURGICAL HISTORY  GERD (gastroesophageal reflux disease)    Migraine headache    Back pain    Anxiety    Kidney stones    Murmur  since age 20     6/18    Thyroid nodule    Hypothyroidism    Overactive bladder    History of cholecystectomy    History of lithotripsy      ALLERGIES:  citrus (Unknown)  Cipro (Rash)  Bananas (Unknown)  NSAIDs (Angioedema; Anaphylaxis; Hives)  eggs (Unknown)    MEDICATIONS:  STANDING MEDICATIONS  chlorhexidine 2% Cloths 1 Application(s) Topical <User Schedule>  citalopram 40 milliGRAM(s) Oral daily  enoxaparin Injectable 40 milliGRAM(s) SubCutaneous every 24 hours  levothyroxine 75 MICROGram(s) Oral daily  methadone    Tablet 10 milliGRAM(s) Oral four times a day  oxybutynin 5 milliGRAM(s) Oral two times a day  pantoprazole  Injectable 40 milliGRAM(s) IV Push two times a day  rosuvastatin 10 milliGRAM(s) Oral at bedtime  sucralfate 1 Gram(s) Oral four times a day    PRN MEDICATIONS  acetaminophen     Tablet .. 650 milliGRAM(s) Oral every 6 hours PRN  HYDROmorphone  Injectable 0.5 milliGRAM(s) IV Push every 4 hours PRN  ondansetron Injectable 4 milliGRAM(s) IV Push every 8 hours PRN  oxyCODONE    IR 10 milliGRAM(s) Oral every 6 hours PRN      VITAL SIGNS: Last 24 Hours  T(C): 36.6 (22 Jun 2025 14:57), Max: 36.7 (21 Jun 2025 21:39)  T(F): 97.9 (22 Jun 2025 14:57), Max: 98.1 (21 Jun 2025 21:39)  HR: 81 (22 Jun 2025 14:57) (74 - 81)  BP: 121/79 (22 Jun 2025 14:57) (109/74 - 125/86)  BP(mean): --  RR: 18 (22 Jun 2025 14:57) (18 - 18)  SpO2: 98% (22 Jun 2025 14:57) (95% - 100%)    LABS:                        13.1   5.57  )-----------( 201      ( 22 Jun 2025 07:30 )             41.8     06-22    139  |  101  |  10  ----------------------------<  75  4.9   |  20  |  0.6[L]    Ca    10.2      22 Jun 2025 07:30    TPro  7.6  /  Alb  4.6  /  TBili  0.3  /  DBili  x   /  AST  269[H]  /  ALT  343[H]  /  AlkPhos  237[H]  06-22      Urinalysis Basic - ( 22 Jun 2025 07:30 )    Color: x / Appearance: x / SG: x / pH: x  Gluc: 75 mg/dL / Ketone: x  / Bili: x / Urobili: x   Blood: x / Protein: x / Nitrite: x   Leuk Esterase: x / RBC: x / WBC x   Sq Epi: x / Non Sq Epi: x / Bacteria: x            Urinalysis with Rflx Culture (collected 20 Jun 2025 10:30)              PHYSICAL EXAM:    GENERAL: NAD, well-developed, AAOx3, crying from pain   HEENT:  Atraumatic, Normocephalic. EOMI, PERRLA, conjunctiva and sclera clear, No JVD  PULMONARY: Clear to auscultation bilaterally; No wheeze  CARDIOVASCULAR: Regular rate and rhythm; No murmurs, rubs, or gallops  GASTROINTESTINAL: Soft,  Nondistended; Bowel sounds present, tender midepigastric region , mild rebound, no gaurding   MUSCULOSKELETAL:  2+ Peripheral Pulses, No clubbing, cyanosis, or edema  NEUROLOGY: non-focal  SKIN: No rashes or lesions        49-year-old female with PMH of hypothyroidism, OAB, GERD, hiatal hernia, chronic pain (on Methadone), chronic gastritis who presents to ED c/o worsening epigastric pain with nausea and vomiting.     # Chronic epigastric pain w/ nausea and vomiting  -has with Dr López ERCP on tuesday to check for papillary stenosis  - CT A/P demonstrated no CT evidence for acute intra-abdominal or pelvic pathology.  - check EKG for qtc  lipase wnl   - RUQ US, shows intrahepatic ductal dilatation   - continue anti-emetics  - pain control changed to oxycodone 5mg q6 and Dilaudid prn   lfts trending up today to over 200s, GGT elevated 557, patient may have a cbd stone and patient is requiring pain medication   add oxycodone 5mg for pain, 10mg for moderate pain and dialudid prn for severe/breakthrough only as discussed with the patient   cont on clears   will cont to monitor lfts and f/u GI recommendations   - GI consulted and as per GI, no need to transfer north today, plan for scheduled ercp on tues as per her GI   - continue PPI, Carafate  f/u hep panel pending   no fever and wbc wnl, so will hold off antibiotics     # Hx hypothyroid  - continue synthroid    # Hx Gerd  - continue ppi    # Chronic pain  - continue Methadone 10 mg po  q6h    # OAB  - continue oxybutynin      DVT/GI ppx    ---PB Handoff Note---    Pending/Follow up items (tests, labs, procedures,consults): Gi consult, f/u lfts     Indication for continued inpatient management:ivf, pain control requiring iv meds, nausea medication   ercp tues     Goals of Care note:     Family contact:  Dispo (home, SNF, etc):    Prepare for DC order [x] - updated TONYA is:   DC provider note prep:    -------------------------------Time spent-----------------------------------------------------------------------  Initial visit:             mins [ ] -- 55-74 mins [ ]  Subsequent visit: 50-79 mins[ ]    -- 35-49mins [ ]     --------------------------------# and complexity of problems---------------------------------------------  [ ] chronic illness with severe exacerbation , progression, treatment side effect  [ ] acute or chronic illness with threat to life or bodily funtion                         --------------------------------Complexity of data reviewed ----------------------------------------------  The Patients complexity of data reviewed  is Low [ ] Moderate [ x] High [ ] due to the following:   A:      Reviewed prior external records [ ]      Considering/ Ordered a unique test:  Labs [x ] Imaging [x ] Stress Test  [ ] Other: Specify [ ]      Reviewed each unique test result [x ]      Assessment requiring an independent historian [ ]  B:       Independent interpretation of:   X-Ray [x ] EKG [ ]  Other: Specify  [ ]  C:       Discussion of management of tests with clinician outside of my group [ ]    -------------------------------------Risk of Morbidity-------------------------------------------------------  The Patients risk of morbidity is Low [x ] Moderate [ ] High [ ] due to the following:   Mod:  Prescription Drug Management [ ]  Decision regarding elective or emergent: minor surgery [ ] major surgery [ ]  Decision regarding hospitalization or escalation of hospital-level care [ ]  SDOH: Hearing/Vision impaired [ ] Food insecurity [ ] Homelessness/unsafe condition [ ]   Financial strain [ ] un/underemployed [ ] Lack of Transport [ ]  High:  DNR or De-Escalation of care because of poor prognosis [ ]  Drug Therapy requiring intensive monitoring for toxicity [ ]  Parenteral controlled substance [ ]  ------------------------------------------------------------------------------------------------------------------

## 2025-06-23 LAB
APTT BLD: 35.9 SEC — SIGNIFICANT CHANGE UP (ref 27–39.2)
INR BLD: 1.03 RATIO — SIGNIFICANT CHANGE UP (ref 0.65–1.3)
PROTHROM AB SERPL-ACNC: 12.2 SEC — SIGNIFICANT CHANGE UP (ref 9.95–12.87)

## 2025-06-23 PROCEDURE — 99233 SBSQ HOSP IP/OBS HIGH 50: CPT

## 2025-06-23 RX ORDER — ONDANSETRON HCL/PF 4 MG/2 ML
4 VIAL (ML) INJECTION EVERY 6 HOURS
Refills: 0 | Status: DISCONTINUED | OUTPATIENT
Start: 2025-06-23 | End: 2025-06-23

## 2025-06-23 RX ORDER — HYDROMORPHONE/SOD CHLOR,ISO/PF 2 MG/10 ML
1 SYRINGE (ML) INJECTION ONCE
Refills: 0 | Status: DISCONTINUED | OUTPATIENT
Start: 2025-06-23 | End: 2025-06-23

## 2025-06-23 RX ORDER — OXYCODONE HYDROCHLORIDE 30 MG/1
10 TABLET ORAL ONCE
Refills: 0 | Status: DISCONTINUED | OUTPATIENT
Start: 2025-06-23 | End: 2025-06-23

## 2025-06-23 RX ORDER — METOCLOPRAMIDE HCL 10 MG
5 TABLET ORAL EVERY 8 HOURS
Refills: 0 | Status: DISCONTINUED | OUTPATIENT
Start: 2025-06-23 | End: 2025-06-25

## 2025-06-23 RX ORDER — KETOROLAC TROMETHAMINE 30 MG/ML
30 INJECTION, SOLUTION INTRAMUSCULAR; INTRAVENOUS ONCE
Refills: 0 | Status: DISCONTINUED | OUTPATIENT
Start: 2025-06-23 | End: 2025-06-23

## 2025-06-23 RX ADMIN — Medication 0.5 MILLIGRAM(S): at 16:48

## 2025-06-23 RX ADMIN — Medication 0.5 MILLIGRAM(S): at 09:00

## 2025-06-23 RX ADMIN — Medication 10 MILLIGRAM(S): at 11:40

## 2025-06-23 RX ADMIN — Medication 4 MILLIGRAM(S): at 11:32

## 2025-06-23 RX ADMIN — Medication 1 GRAM(S): at 05:19

## 2025-06-23 RX ADMIN — Medication 0.5 MILLIGRAM(S): at 04:14

## 2025-06-23 RX ADMIN — OXYBUTYNIN CHLORIDE 5 MILLIGRAM(S): 5 TABLET, FILM COATED, EXTENDED RELEASE ORAL at 17:10

## 2025-06-23 RX ADMIN — OXYCODONE HYDROCHLORIDE 10 MILLIGRAM(S): 30 TABLET ORAL at 16:10

## 2025-06-23 RX ADMIN — Medication 0.5 MILLIGRAM(S): at 13:02

## 2025-06-23 RX ADMIN — OXYBUTYNIN CHLORIDE 5 MILLIGRAM(S): 5 TABLET, FILM COATED, EXTENDED RELEASE ORAL at 05:19

## 2025-06-23 RX ADMIN — ROSUVASTATIN CALCIUM 10 MILLIGRAM(S): 5 TABLET, FILM COATED ORAL at 21:52

## 2025-06-23 RX ADMIN — Medication 1 GRAM(S): at 11:38

## 2025-06-23 RX ADMIN — Medication 4 MILLIGRAM(S): at 03:14

## 2025-06-23 RX ADMIN — Medication 0.5 MILLIGRAM(S): at 01:03

## 2025-06-23 RX ADMIN — Medication 0.5 MILLIGRAM(S): at 21:01

## 2025-06-23 RX ADMIN — Medication 10 MILLIGRAM(S): at 23:46

## 2025-06-23 RX ADMIN — Medication 0.5 MILLIGRAM(S): at 20:37

## 2025-06-23 RX ADMIN — Medication 0.5 MILLIGRAM(S): at 04:02

## 2025-06-23 RX ADMIN — Medication 1 GRAM(S): at 17:11

## 2025-06-23 RX ADMIN — Medication 650 MILLIGRAM(S): at 03:12

## 2025-06-23 RX ADMIN — Medication 0.5 MILLIGRAM(S): at 12:42

## 2025-06-23 RX ADMIN — Medication 1 MILLIGRAM(S): at 14:15

## 2025-06-23 RX ADMIN — Medication 40 MILLIGRAM(S): at 17:14

## 2025-06-23 RX ADMIN — Medication 5 MILLIGRAM(S): at 17:09

## 2025-06-23 RX ADMIN — OXYCODONE HYDROCHLORIDE 10 MILLIGRAM(S): 30 TABLET ORAL at 05:53

## 2025-06-23 RX ADMIN — ENOXAPARIN SODIUM 40 MILLIGRAM(S): 100 INJECTION SUBCUTANEOUS at 21:52

## 2025-06-23 RX ADMIN — Medication 0.5 MILLIGRAM(S): at 08:39

## 2025-06-23 RX ADMIN — OXYCODONE HYDROCHLORIDE 10 MILLIGRAM(S): 30 TABLET ORAL at 06:15

## 2025-06-23 RX ADMIN — Medication 10 MILLIGRAM(S): at 17:14

## 2025-06-23 RX ADMIN — Medication 40 MILLIGRAM(S): at 05:19

## 2025-06-23 RX ADMIN — OXYCODONE HYDROCHLORIDE 10 MILLIGRAM(S): 30 TABLET ORAL at 21:30

## 2025-06-23 RX ADMIN — Medication 650 MILLIGRAM(S): at 04:14

## 2025-06-23 RX ADMIN — Medication 1 MILLIGRAM(S): at 13:45

## 2025-06-23 RX ADMIN — Medication 75 MICROGRAM(S): at 05:18

## 2025-06-23 RX ADMIN — Medication 10 MILLIGRAM(S): at 05:18

## 2025-06-23 RX ADMIN — CITALOPRAM 40 MILLIGRAM(S): 20 TABLET ORAL at 11:37

## 2025-06-23 RX ADMIN — Medication 75 MILLILITER(S): at 21:56

## 2025-06-23 RX ADMIN — Medication 0.5 MILLIGRAM(S): at 00:01

## 2025-06-23 RX ADMIN — OXYCODONE HYDROCHLORIDE 10 MILLIGRAM(S): 30 TABLET ORAL at 21:52

## 2025-06-23 NOTE — PROGRESS NOTE ADULT - ASSESSMENT
49yFemale pmh GERD, cholecystectomy presents for chronic epigastric pain for years presents for n/v and chronic epigastric pain. Patient follows with Dr. German outpatient and has EGD and EUS that has been unrevealing. Patient denies  hematemesis, melena, blood in stool, diarrhea, constipation, +chronic epigastric abdominal pain. +n/v    #chronic epigastric pain  n/v--->resolved  EGD/EUS with Dr. German wn  Rec  -supportive care for n/v  -ERCP tomorrow will arrange transport for procedure roundtrip to keep ERCP for tomorrow   -Pain control  -outpatient Follow up with our GI office located on [address] [place] [address], Phone number [phone] with Dr. German  -PPI   -continue Carafate   -no acute GI intervention  -appreciate CT    #Ventral hernias  Rec  -outpatient surgical eval    #CRC screening   Rec  -patient scheduled for CRC screening Colonoscopy for this summer 2025 with her private GI 49yFemale pmh GERD, cholecystectomy presents for chronic epigastric pain for years presents for n/v and chronic epigastric pain. Patient follows with Dr. German outpatient and has EGD and EUS that has been unrevealing. Patient denies  hematemesis, melena, blood in stool, diarrhea, constipation, +chronic epigastric abdominal pain. +n/v    #chronic epigastric pain  n/v--->resolved  EGD/EUS with Dr. German wn  Rec  -supportive care for n/v  -ERCP tomorrow will arrange transport for procedure roundtrip to keep ERCP for tomorrow, spoke with team to arrange 8A.M. pickup, procedure for 10A.M. patient to be at [facility] for 9A.M.  -npo aftermidnight  -Pain control  -outpatient Follow up with our GI office located on [address] [place] [address], Phone number [phone] with Dr. German  -PPI   -continue Carafate   -no acute GI intervention  -appreciate CT    #Ventral hernias  Rec  -outpatient surgical eval    #CRC screening   Rec  -patient scheduled for CRC screening Colonoscopy for this summer 2025 with her private GI

## 2025-06-23 NOTE — CHART NOTE - NSCHARTNOTEFT_GEN_A_CORE
Pt with continued nausea and pain.     Pt followed by GI and scheduled for ERCP procedure tomorrow.    Abdomen - mild epigastric pain    Pt has done well on dilaudid and zofran.     Will add on Zofran PRN    Dilauded 1 x extra dose. Ptt already on PRN dosing,.

## 2025-06-23 NOTE — PROGRESS NOTE ADULT - ASSESSMENT
RAMON WOODS 49y Female  MRN#: [id number]   CODE STATUS:____full____      SUBJECTIVE  Patient is a 49y old Female who presents with a chief complaint of Nausea and vomiting     (22 Jun 2025 14:51)  Currently admitted to medicine with the primary diagnosis of Intractable nausea and vomiting      Today is hospital day 3d, and this morning she is feeling alot of pain in the midepigastric region radiating to her chest this am. Worse today and she is not tolerating liquids. Associated with nausea   the patient is refusing to take po pain meds due to nausea           OBJECTIVE  PAST MEDICAL & SURGICAL HISTORY  GERD (gastroesophageal reflux disease)    Migraine headache    Back pain    Anxiety    Kidney stones    Murmur  since age 20     6/18    Thyroid nodule    Hypothyroidism    Overactive bladder    History of cholecystectomy    History of lithotripsy      ALLERGIES:  citrus (Unknown)  Cipro (Rash)  Bananas (Unknown)  NSAIDs (Angioedema; Anaphylaxis; Hives)  eggs (Unknown)    MEDICATIONS:  STANDING MEDICATIONS  chlorhexidine 2% Cloths 1 Application(s) Topical <User Schedule>  citalopram 40 milliGRAM(s) Oral daily  enoxaparin Injectable 40 milliGRAM(s) SubCutaneous every 24 hours  levothyroxine 75 MICROGram(s) Oral daily  methadone    Tablet 10 milliGRAM(s) Oral four times a day  oxybutynin 5 milliGRAM(s) Oral two times a day  pantoprazole  Injectable 40 milliGRAM(s) IV Push two times a day  rosuvastatin 10 milliGRAM(s) Oral at bedtime  sodium chloride 0.9%. 1000 milliLiter(s) IV Continuous <Continuous>  sucralfate 1 Gram(s) Oral four times a day    PRN MEDICATIONS  acetaminophen     Tablet .. 650 milliGRAM(s) Oral every 6 hours PRN  HYDROmorphone  Injectable 0.5 milliGRAM(s) IV Push every 4 hours PRN  metoclopramide Injectable 5 milliGRAM(s) IV Push every 8 hours PRN  ondansetron Injectable 4 milliGRAM(s) IV Push every 8 hours PRN  oxyCODONE    IR 10 milliGRAM(s) Oral every 6 hours PRN      VITAL SIGNS: Last 24 Hours  T(C): 36.4 (23 Jun 2025 13:30), Max: 37 (22 Jun 2025 21:00)  T(F): 97.5 (23 Jun 2025 13:30), Max: 98.6 (22 Jun 2025 21:00)  HR: 89 (23 Jun 2025 13:30) (77 - 89)  BP: 105/70 (23 Jun 2025 13:30) (102/72 - 121/83)  BP(mean): --  RR: 18 (23 Jun 2025 13:30) (18 - 18)  SpO2: 99% (23 Jun 2025 13:30) (95% - 99%)    LABS:                        13.1   5.57  )-----------( 201      ( 22 Jun 2025 07:30 )             41.8     06-22    139  |  101  |  10  ----------------------------<  75  4.9   |  20  |  0.6[L]    Ca    10.2      22 Jun 2025 07:30    TPro  7.6  /  Alb  4.6  /  TBili  0.3  /  DBili  x   /  AST  269[H]  /  ALT  343[H]  /  AlkPhos  237[H]  06-22    PT/INR - ( 23 Jun 2025 12:25 )   PT: 12.20 sec;   INR: 1.03 ratio         PTT - ( 23 Jun 2025 12:25 )  PTT:35.9 sec  Urinalysis Basic - ( 22 Jun 2025 07:30 )    Color: x / Appearance: x / SG: x / pH: x  Gluc: 75 mg/dL / Ketone: x  / Bili: x / Urobili: x   Blood: x / Protein: x / Nitrite: x   Leuk Esterase: x / RBC: x / WBC x   Sq Epi: x / Non Sq Epi: x / Bacteria: x      PHYSICAL EXAM:  GENERAL: NAD, well-developed, AAOx3, crying from pain   HEENT:  Atraumatic, Normocephalic. EOMI, PERRLA, conjunctiva and sclera clear, No JVD  PULMONARY: Clear to auscultation bilaterally; No wheeze  CARDIOVASCULAR: Regular rate and rhythm; No murmurs, rubs, or gallops  GASTROINTESTINAL: Soft,  Nondistended; Bowel sounds present, tender midepigastric region , mild rebound, no gaurding   MUSCULOSKELETAL:  2+ Peripheral Pulses, No clubbing, cyanosis, or edema  NEUROLOGY: non-focal  SKIN: No rashes or lesions      49-year-old female with PMH of hypothyroidism, OAB, GERD, hiatal hernia, chronic pain (on Methadone), chronic gastritis who presents to ED c/o worsening epigastric pain with nausea and vomiting.     # Chronic epigastric pain w/ nausea and vomiting  -has with Dr López ERCP on tuesday to check for papillary stenosis  - CT A/P demonstrated no CT evidence for acute intra-abdominal or pelvic pathology.  lipase wnl   - RUQ US, shows intrahepatic ductal dilatation   - continue anti-emetics  - pain control changed to oxycodone 5mg q6 and Dilaudid prn   lfts trending up today to over 200s, GGT elevated 557, patient may have a cbd stone and patient is requiring pain medication   add oxycodone 5mg for pain, 10mg for moderate pain and Dilaudid prn for severe/breakthrough  as discussed with the patient , reuired extra prn dose of Dilaudid today   not tolerating clears and due to worsening abdominal pain , NPO ordered  will cont to monitor lfts and f/u GI recommendations   - GI consulted and as per GI, no need to go  north  for scheduled ercp on tues morning  as per her GI   - continue PPI, Carafate   hep panel reviewed, hep C appears to equivocal will order viral load and discuss with GI   no fever and wbc wnl, so will hold off antibiotics     # Hx hypothyroid  - continue synthroid    # Hx Gerd  - continue ppi    # Chronic pain  - continue Methadone 10 mg po  q6h    # OAB  - continue oxybutynin      DVT/GI ppx    ---PB Handoff Note---    Pending/Follow up items (tests, labs, procedures, consults): LFTS, hepatitis C viral pcr   awaiting ercp    Indication for continued inpatient management: ivf, pain control requiring iv meds, nausea medication   ercp tues , ivf   plan for ercp may be delayed tomorrow due to power outage at [place]     Goals of Care note:     Family contact:  Dispo (home, SNF, etc):    Prepare for DC order [x] - updated TONYA is:   DC provider note prep:    -------------------------------Time spent-----------------------------------------------------------------------  Initial visit:             mins [ ] -- 55-74 mins [ ]  Subsequent visit: 50-79 mins[x ]    -- 35-49mins [ ]     --------------------------------# and complexity of problems---------------------------------------------  [ ] chronic illness with severe exacerbation , progression, treatment side effect  [ ] acute or chronic illness with threat to life or bodily function                         --------------------------------Complexity of data reviewed ----------------------------------------------  The Patients complexity of data reviewed  is Low [ ] Moderate [ x] High [ ] due to the following:   A:      Reviewed prior external records [ ]      Considering/ Ordered a unique test:  Labs [x ] Imaging [x ] Stress Test  [ ] Other: Specify [ ]      Reviewed each unique test result [x ]      Assessment requiring an independent historian [ ]  B:       Independent interpretation of:   X-Ray [x ] EKG [ ]  Other: Specify  [ ]  C:       Discussion of management of tests with clinician outside of my group [ ]    -------------------------------------Risk of Morbidity-------------------------------------------------------  The Patients risk of morbidity is Low [x ] Moderate [ ] High [ ] due to the following:   Mod:  Prescription Drug Management [ ]  Decision regarding elective or emergent: minor surgery [ ] major surgery [ ]  Decision regarding hospitalization or escalation of hospital-level care [ ]  SDOH: Hearing/Vision impaired [ ] Food insecurity [ ] Homelessness/unsafe condition [ ]   Financial strain [ ] un/underemployed [ ] Lack of Transport [ ]  High:  DNR or De-Escalation of care because of poor prognosis [ ]  Drug Therapy requiring intensive monitoring for toxicity [ ]  Parenteral controlled substance [ ]  ------------------------------------------------------------------------------------------------------------

## 2025-06-24 ENCOUNTER — TRANSCRIPTION ENCOUNTER (OUTPATIENT)
Age: 50
End: 2025-06-24

## 2025-06-24 LAB
ALBUMIN SERPL ELPH-MCNC: 4.6 G/DL — SIGNIFICANT CHANGE UP (ref 3.5–5.2)
ALP SERPL-CCNC: 159 U/L — HIGH (ref 30–115)
ALT FLD-CCNC: 134 U/L — HIGH (ref 0–41)
ANION GAP SERPL CALC-SCNC: 15 MMOL/L — HIGH (ref 7–14)
AST SERPL-CCNC: 39 U/L — SIGNIFICANT CHANGE UP (ref 0–41)
BILIRUB SERPL-MCNC: 0.3 MG/DL — SIGNIFICANT CHANGE UP (ref 0.2–1.2)
BUN SERPL-MCNC: 9 MG/DL — LOW (ref 10–20)
CALCIUM SERPL-MCNC: 9.7 MG/DL — SIGNIFICANT CHANGE UP (ref 8.4–10.5)
CHLORIDE SERPL-SCNC: 101 MMOL/L — SIGNIFICANT CHANGE UP (ref 98–110)
CO2 SERPL-SCNC: 21 MMOL/L — SIGNIFICANT CHANGE UP (ref 17–32)
CREAT SERPL-MCNC: 0.7 MG/DL — SIGNIFICANT CHANGE UP (ref 0.7–1.5)
EGFR: 107 ML/MIN/1.73M2 — SIGNIFICANT CHANGE UP
EGFR: 107 ML/MIN/1.73M2 — SIGNIFICANT CHANGE UP
GLUCOSE SERPL-MCNC: 89 MG/DL — SIGNIFICANT CHANGE UP (ref 70–99)
HCT VFR BLD CALC: 37.6 % — SIGNIFICANT CHANGE UP (ref 34.5–45)
HGB BLD-MCNC: 12 G/DL — SIGNIFICANT CHANGE UP (ref 11.5–15.5)
LIDOCAIN IGE QN: 49 U/L — SIGNIFICANT CHANGE UP (ref 7–60)
MCHC RBC-ENTMCNC: 29.3 PG — SIGNIFICANT CHANGE UP (ref 27–34)
MCHC RBC-ENTMCNC: 31.9 G/DL — LOW (ref 32–36)
MCV RBC AUTO: 91.9 FL — SIGNIFICANT CHANGE UP (ref 80–100)
NRBC # BLD AUTO: 0 K/UL — SIGNIFICANT CHANGE UP (ref 0–0)
NRBC # FLD: 0 K/UL — SIGNIFICANT CHANGE UP (ref 0–0)
NRBC BLD AUTO-RTO: 0 /100 WBCS — SIGNIFICANT CHANGE UP (ref 0–0)
PLATELET # BLD AUTO: 210 K/UL — SIGNIFICANT CHANGE UP (ref 150–400)
PMV BLD: 10.3 FL — SIGNIFICANT CHANGE UP (ref 7–13)
POTASSIUM SERPL-MCNC: 4.3 MMOL/L — SIGNIFICANT CHANGE UP (ref 3.5–5)
POTASSIUM SERPL-SCNC: 4.3 MMOL/L — SIGNIFICANT CHANGE UP (ref 3.5–5)
PROT SERPL-MCNC: 7.2 G/DL — SIGNIFICANT CHANGE UP (ref 6–8)
RBC # BLD: 4.09 M/UL — SIGNIFICANT CHANGE UP (ref 3.8–5.2)
RBC # FLD: 12.3 % — SIGNIFICANT CHANGE UP (ref 10.3–14.5)
SODIUM SERPL-SCNC: 137 MMOL/L — SIGNIFICANT CHANGE UP (ref 135–146)
WBC # BLD: 5.52 K/UL — SIGNIFICANT CHANGE UP (ref 3.8–10.5)
WBC # FLD AUTO: 5.52 K/UL — SIGNIFICANT CHANGE UP (ref 3.8–10.5)

## 2025-06-24 PROCEDURE — 99232 SBSQ HOSP IP/OBS MODERATE 35: CPT

## 2025-06-24 PROCEDURE — 43262 ENDO CHOLANGIOPANCREATOGRAPH: CPT | Mod: XU

## 2025-06-24 PROCEDURE — 43264 ERCP REMOVE DUCT CALCULI: CPT

## 2025-06-24 PROCEDURE — 74328 X-RAY BILE DUCT ENDOSCOPY: CPT | Mod: 26

## 2025-06-24 RX ORDER — ACETAMINOPHEN 500 MG/5ML
975 LIQUID (ML) ORAL ONCE
Refills: 0 | Status: COMPLETED | OUTPATIENT
Start: 2025-06-24 | End: 2025-06-24

## 2025-06-24 RX ADMIN — OXYCODONE HYDROCHLORIDE 10 MILLIGRAM(S): 30 TABLET ORAL at 05:01

## 2025-06-24 RX ADMIN — Medication 40 MILLIGRAM(S): at 20:17

## 2025-06-24 RX ADMIN — ROSUVASTATIN CALCIUM 10 MILLIGRAM(S): 5 TABLET, FILM COATED ORAL at 22:29

## 2025-06-24 RX ADMIN — Medication 1 GRAM(S): at 20:18

## 2025-06-24 RX ADMIN — Medication 10 MILLIGRAM(S): at 23:48

## 2025-06-24 RX ADMIN — Medication 1 GRAM(S): at 11:40

## 2025-06-24 RX ADMIN — Medication 0.5 MILLIGRAM(S): at 01:47

## 2025-06-24 RX ADMIN — Medication 0.5 MILLIGRAM(S): at 20:14

## 2025-06-24 RX ADMIN — OXYCODONE HYDROCHLORIDE 10 MILLIGRAM(S): 30 TABLET ORAL at 11:39

## 2025-06-24 RX ADMIN — OXYBUTYNIN CHLORIDE 5 MILLIGRAM(S): 5 TABLET, FILM COATED, EXTENDED RELEASE ORAL at 20:18

## 2025-06-24 RX ADMIN — Medication 0.5 MILLIGRAM(S): at 10:13

## 2025-06-24 RX ADMIN — Medication 0.5 MILLIGRAM(S): at 09:37

## 2025-06-24 RX ADMIN — Medication 0.5 MILLIGRAM(S): at 05:25

## 2025-06-24 RX ADMIN — Medication 75 MICROGRAM(S): at 05:00

## 2025-06-24 RX ADMIN — Medication 0.5 MILLIGRAM(S): at 13:30

## 2025-06-24 RX ADMIN — ENOXAPARIN SODIUM 40 MILLIGRAM(S): 100 INJECTION SUBCUTANEOUS at 22:28

## 2025-06-24 RX ADMIN — Medication 975 MILLIGRAM(S): at 18:42

## 2025-06-24 RX ADMIN — OXYCODONE HYDROCHLORIDE 10 MILLIGRAM(S): 30 TABLET ORAL at 05:24

## 2025-06-24 RX ADMIN — Medication 10 MILLIGRAM(S): at 20:16

## 2025-06-24 RX ADMIN — Medication 0.5 MILLIGRAM(S): at 21:23

## 2025-06-24 RX ADMIN — Medication 1 GRAM(S): at 23:48

## 2025-06-24 RX ADMIN — CITALOPRAM 40 MILLIGRAM(S): 20 TABLET ORAL at 11:40

## 2025-06-24 RX ADMIN — Medication 4 MILLIGRAM(S): at 11:39

## 2025-06-24 RX ADMIN — Medication 10 MILLIGRAM(S): at 11:40

## 2025-06-24 RX ADMIN — OXYCODONE HYDROCHLORIDE 10 MILLIGRAM(S): 30 TABLET ORAL at 12:30

## 2025-06-24 RX ADMIN — OXYBUTYNIN CHLORIDE 5 MILLIGRAM(S): 5 TABLET, FILM COATED, EXTENDED RELEASE ORAL at 05:00

## 2025-06-24 RX ADMIN — Medication 0.5 MILLIGRAM(S): at 01:24

## 2025-06-24 RX ADMIN — OXYCODONE HYDROCHLORIDE 10 MILLIGRAM(S): 30 TABLET ORAL at 23:48

## 2025-06-24 RX ADMIN — Medication 1 GRAM(S): at 05:00

## 2025-06-24 RX ADMIN — Medication 10 MILLIGRAM(S): at 05:01

## 2025-06-24 RX ADMIN — Medication 40 MILLIGRAM(S): at 05:01

## 2025-06-24 NOTE — CHART NOTE - NSCHARTNOTEFT_GEN_A_CORE
PACU ANESTHESIA ADMISSION NOTE      Procedure: ERCP  Post op diagnosis:      ____  Intubated  TV:______       Rate: ______      FiO2: ______    _X___  Patent Airway    _X___  Full return of protective reflexes    ____  Full recovery from anesthesia / back to baseline status    Vitals:  T97.7F  HR:  92  BP: 164/91  RR: 15  SpO2: 97%    Mental Status:  _X___ Awake   _____ Alert   _____ Drowsy   _____ Sedated    Nausea/Vomiting:  __X__ NO  ______Yes,   See Post - Op Orders          Pain Scale (0-10):  _____    Treatment: ____ None    __X__ See Post - Op/PCA Orders    Post - Operative Fluids:   ____ Oral   _X___ See Post - Op Orders    Plan: Discharge:   ____Home       ___X__Floor     _____Critical Care    _____  Other:_________________    Comments: No anesthetic related complications noted.

## 2025-06-24 NOTE — PROGRESS NOTE ADULT - ASSESSMENT
49yFemale pmh GERD, cholecystectomy presents for chronic epigastric pain for years presents for n/v and chronic epigastric pain. Patient follows with Dr. German outpatient and has EGD and EUS that has been unrevealing. Patient denies  hematemesis, melena, blood in stool, diarrhea, constipation, +chronic epigastric abdominal pain. +n/v    #chronic epigastric pain  n/v--->resolved  EGD/EUS with Dr. German wnl  #altered LFTs  Rec  -supportive care for n/v  -ERCP replanning in effect, keep npo for now power just restored to [facility] endoscopy team  -npo aftermidnight  -Pain control  -outpatient Follow up with our GI office located on [address] [place] [address], Phone number [phone] with Dr. German  -PPI   -continue Carafate   -no acute GI intervention  -appreciate CT    #Ventral hernias  Rec  -outpatient surgical eval    #CRC screening   Rec  -patient scheduled for CRC screening Colonoscopy for this summer 2025 with her private GI     49yFemale pmh GERD, cholecystectomy presents for chronic epigastric pain for years presents for n/v and chronic epigastric pain. Patient follows with Dr. German outpatient and has EGD and EUS that has been unrevealing. Patient denies  hematemesis, melena, blood in stool, diarrhea, constipation, +chronic epigastric abdominal pain. +n/v    #chronic epigastric pain  n/v--->resolved  EGD/EUS with Dr. German wnl  #altered LFTs  Rec  -supportive care for n/v  -ERCP replanning in effect, keep npo for now power just restored to [facility] endoscopy team, ERCP today transport arranged   -npo aftermidnight  -Pain control  -outpatient Follow up with our GI office located on [address] [place] [address], Phone number [phone] with Dr. German  -PPI   -continue Carafate   -no acute GI intervention  -appreciate CT    #Ventral hernias  Rec  -outpatient surgical eval    #CRC screening   Rec  -patient scheduled for CRC screening Colonoscopy for this summer 2025 with her private GI     49yFemale pmh GERD, cholecystectomy presents for chronic epigastric pain for years presents for n/v and chronic epigastric pain. Patient follows with Dr. German outpatient and has EGD and EUS that has been unrevealing. Patient denies  hematemesis, melena, blood in stool, diarrhea, constipation, +chronic epigastric abdominal pain. +n/v    #chronic epigastric pain  n/v--->resolved  EGD/EUS with Dr. German wnl  #altered LFTs  Rec  -supportive care for n/v  -ERCP replanning in effect, keep npo for now power just restored to [facility] endoscopy team, ERCP today transport arranged   -npo aftermidnight  -Pain control  -outpatient Follow up with our GI office located on [address] [place] [address], Phone number [phone] with Dr. German  -PPI   -continue Carafate   -no acute GI intervention  -appreciate CT    #hepatitis C reflex antibody positive   Rec  -Hepatitis DNA PCR and quantitative blood workup pending   - Follow up with our GI office located on [address] [place] [address], Phone number [phone] with Dr. German    #Ventral hernias  Rec  -outpatient surgical eval    #CRC screening   Rec  -patient scheduled for CRC screening Colonoscopy for this summer 2025 with her private GI  - Follow up with our GI office located on [address] [place] [address], Phone number [phone] with Dr. German

## 2025-06-25 ENCOUNTER — TRANSCRIPTION ENCOUNTER (OUTPATIENT)
Age: 50
End: 2025-06-25

## 2025-06-25 VITALS
HEART RATE: 99 BPM | SYSTOLIC BLOOD PRESSURE: 116 MMHG | DIASTOLIC BLOOD PRESSURE: 77 MMHG | OXYGEN SATURATION: 100 % | TEMPERATURE: 98 F | RESPIRATION RATE: 16 BRPM

## 2025-06-25 LAB
ALBUMIN SERPL ELPH-MCNC: 4.9 G/DL — SIGNIFICANT CHANGE UP (ref 3.5–5.2)
ALP SERPL-CCNC: 138 U/L — HIGH (ref 30–115)
ALT FLD-CCNC: 97 U/L — HIGH (ref 0–41)
ANION GAP SERPL CALC-SCNC: 18 MMOL/L — HIGH (ref 7–14)
AST SERPL-CCNC: 23 U/L — SIGNIFICANT CHANGE UP (ref 0–41)
BILIRUB SERPL-MCNC: 0.3 MG/DL — SIGNIFICANT CHANGE UP (ref 0.2–1.2)
BUN SERPL-MCNC: 9 MG/DL — LOW (ref 10–20)
CALCIUM SERPL-MCNC: 10.1 MG/DL — SIGNIFICANT CHANGE UP (ref 8.4–10.5)
CHLORIDE SERPL-SCNC: 98 MMOL/L — SIGNIFICANT CHANGE UP (ref 98–110)
CO2 SERPL-SCNC: 21 MMOL/L — SIGNIFICANT CHANGE UP (ref 17–32)
CREAT SERPL-MCNC: 0.7 MG/DL — SIGNIFICANT CHANGE UP (ref 0.7–1.5)
EGFR: 107 ML/MIN/1.73M2 — SIGNIFICANT CHANGE UP
EGFR: 107 ML/MIN/1.73M2 — SIGNIFICANT CHANGE UP
GLUCOSE SERPL-MCNC: 80 MG/DL — SIGNIFICANT CHANGE UP (ref 70–99)
HCV RNA FLD QL NAA+PROBE: SIGNIFICANT CHANGE UP
HCV RNA SPEC NAA+PROBE-LOG IU: SIGNIFICANT CHANGE UP IU/ML
HCV RNA SPEC NAA+PROBE-LOG IU: SIGNIFICANT CHANGE UP LOGIU/ML
HCV RNA SPEC QL PROBE+SIG AMP: SIGNIFICANT CHANGE UP
POTASSIUM SERPL-MCNC: 4.5 MMOL/L — SIGNIFICANT CHANGE UP (ref 3.5–5)
POTASSIUM SERPL-SCNC: 4.5 MMOL/L — SIGNIFICANT CHANGE UP (ref 3.5–5)
PROT SERPL-MCNC: 7.5 G/DL — SIGNIFICANT CHANGE UP (ref 6–8)
SODIUM SERPL-SCNC: 137 MMOL/L — SIGNIFICANT CHANGE UP (ref 135–146)

## 2025-06-25 PROCEDURE — 99239 HOSP IP/OBS DSCHRG MGMT >30: CPT

## 2025-06-25 PROCEDURE — 99233 SBSQ HOSP IP/OBS HIGH 50: CPT

## 2025-06-25 RX ADMIN — OXYCODONE HYDROCHLORIDE 10 MILLIGRAM(S): 30 TABLET ORAL at 00:52

## 2025-06-25 RX ADMIN — Medication 10 MILLIGRAM(S): at 05:21

## 2025-06-25 RX ADMIN — Medication 10 MILLIGRAM(S): at 11:14

## 2025-06-25 RX ADMIN — OXYBUTYNIN CHLORIDE 5 MILLIGRAM(S): 5 TABLET, FILM COATED, EXTENDED RELEASE ORAL at 05:20

## 2025-06-25 RX ADMIN — Medication 1 GRAM(S): at 05:20

## 2025-06-25 RX ADMIN — Medication 0.5 MILLIGRAM(S): at 02:00

## 2025-06-25 RX ADMIN — Medication 40 MILLIGRAM(S): at 05:21

## 2025-06-25 RX ADMIN — Medication 0.5 MILLIGRAM(S): at 13:48

## 2025-06-25 RX ADMIN — CITALOPRAM 40 MILLIGRAM(S): 20 TABLET ORAL at 11:15

## 2025-06-25 RX ADMIN — Medication 75 MICROGRAM(S): at 05:20

## 2025-06-25 RX ADMIN — Medication 1 GRAM(S): at 11:14

## 2025-06-25 RX ADMIN — Medication 0.5 MILLIGRAM(S): at 09:44

## 2025-06-25 RX ADMIN — Medication 0.5 MILLIGRAM(S): at 05:19

## 2025-06-25 RX ADMIN — Medication 0.5 MILLIGRAM(S): at 06:04

## 2025-06-25 RX ADMIN — Medication 0.5 MILLIGRAM(S): at 00:57

## 2025-06-25 NOTE — DISCHARGE NOTE PROVIDER - NSDCMRMEDTOKEN_GEN_ALL_CORE_FT
citalopram 40 mg oral tablet: 1 tab(s) orally once a day (in the morning)  esomeprazole 40 mg oral delayed release capsule: 1 cap(s) orally 2 times a day  levothyroxine 75 mcg (0.075 mg) oral tablet: 1 tab(s) orally once a day  methadone 10 mg oral tablet: 1 tab(s) orally 4 times a day  oxybutynin 5 mg oral tablet: 2 tab(s) orally once a day  rosuvastatin 10 mg oral tablet: 1 tab(s) orally once a day

## 2025-06-25 NOTE — PROGRESS NOTE ADULT - ASSESSMENT
49yFemale pmh GERD, cholecystectomy presents for chronic epigastric pain for years presents for n/v and chronic epigastric pain. Patient follows with Dr. German outpatient and has EGD and EUS that has been unrevealing. Patient denies  hematemesis, melena, blood in stool, diarrhea, constipation, +chronic epigastric abdominal pain. +n/v    #chronic epigastric pain  n/v--->resolved  EGD/EUS with Dr. German wnl  #altered LFTs  Rec  s/p ERCP  Summary:  -ERCP:?Successful ERCP with biliary cannulation and sphincterotomy, followed  removal of sludge and an unremarkable final occlusioncholangiogram.    Rec  -No post-procedural complications including bleeding and pancreatitis noted  -Advance diet as tolerated   -Monitor liver enzymes.  -outpatient Follow up with our GI office located on [address] [place] [address], Phone number [phone] with Dr. German  -PPI   -continue Carafate   -no acute GI intervention  -appreciate CT    #hepatitis C reflex antibody positive   Rec  -Hepatitis DNA PCR and quantitative blood workup pending   - Follow up with our GI office located on [address] [place] [address], Phone number [phone] with Dr. German    #Ventral hernias  Rec  -outpatient surgical eval    #CRC screening   Rec  -patient scheduled for CRC screening Colonoscopy for this summer 2025 with her private GI  - Follow up with our GI office located on [address] [place] [address], Phone number [phone] with Dr. German      Recall GI if needed

## 2025-06-25 NOTE — DISCHARGE NOTE PROVIDER - HOSPITAL COURSE
# Chronic epigastric pain w/ nausea and vomiting  -has with Dr López ERCP on tuesday to check for papillary stenosis  - CT A/P demonstrated no CT evidence for acute intra-abdominal or pelvic pathology.  lipase wnl   - RUQ US, shows intrahepatic ductal dilatation   - continue anti-emetics PRN   - pain control changed to oxycodone 5mg q6 and Dilaudid prn   lfts trending consistantly trending down    ercp today WNL   - GI recs appreciated, patient tolerated PO w/o issue   - continue PPI, Carafate   hep panel reviewed, hep C appears to equivocal will order viral load and discuss with GI   no fever and wbc wnl, so will hold off antibiotics     # Hx hypothyroid  - continue synthroid    # Hx Gerd  - continue ppi    # Chronic pain  - continue Methadone 10 mg po  q6h    # OAB  - continue oxybutynin

## 2025-06-25 NOTE — DISCHARGE NOTE NURSING/CASE MANAGEMENT/SOCIAL WORK - NSDCPEFALRISK_GEN_ALL_CORE
For information on Fall & Injury Prevention, visit: [email or web address] OR  [email or web address] OR  https://www.cdc.gov/steadi/patient.html

## 2025-06-25 NOTE — DISCHARGE NOTE PROVIDER - CARE PROVIDER_API CALL
Domo German  Gastroenterology  [address] [place] [id number]  Phone: [phone]  Fax: [phone]  Established Patient  Follow Up Time: 2 weeks

## 2025-06-25 NOTE — DISCHARGE NOTE NURSING/CASE MANAGEMENT/SOCIAL WORK - PATIENT PORTAL LINK FT
You can access the FollowMyHealth Patient Portal offered by [facility] by registering at the following website: [email or web address]. By joining [facility]’s FollowMyHealth portal, you will also be able to view your health information using other applications (apps) compatible with our system.

## 2025-06-25 NOTE — DISCHARGE NOTE PROVIDER - NSDCCPCAREPLAN_GEN_ALL_CORE_FT
PRINCIPAL DISCHARGE DIAGNOSIS  Diagnosis: Intractable nausea and vomiting  Assessment and Plan of Treatment: # Chronic epigastric pain w/ nausea and vomiting  -has with Dr López ERCP on tuesday to check for papillary stenosis  - CT A/P demonstrated no CT evidence for acute intra-abdominal or pelvic pathology.  lipase within normal limits   - RUQ US, shows intrahepatic ductal dilatation   - continue anti-emetics PRN   - ercp today WNL   - GI recs appreciated, patient tolerated PO w/o issue   - continue PPI, Carafate  -hep panel reviewed, hep C appears to equivocal   no fever and wbc within normal limits, so will hold off antibiotic     PRINCIPAL DISCHARGE DIAGNOSIS  Diagnosis: Intractable nausea and vomiting  Assessment and Plan of Treatment: # Chronic epigastric pain w/ nausea and vomiting  -has with Dr López ERCP on tuesday to check for papillary stenosis  - CT A/P demonstrated no CT evidence for acute intra-abdominal or pelvic pathology.  lipase within normal limits   - RUQ US, shows intrahepatic ductal dilatation   - continue anti-emetics PRN   - ercp today WNL   - GI recs appreciated, patient tolerated PO w/o issue   - continue PPI, Carafate  -hep panel reviewed, hep C appears to equivocal   no fever and wbc within normal limits, so will hold off antibiotic  - Follow up with our GI office located on [address] [place] [address], Phone number [phone] with Dr. German

## 2025-06-25 NOTE — DISCHARGE NOTE PROVIDER - ATTENDING DISCHARGE PHYSICAL EXAMINATION:
GENERAL: NAD, sitting up in bed eating   HEAD:  Atraumatic, Normocephalic  EYES: PERRLA, conjunctiva and sclera clear  ENT: Moist mucous membranes  NECK: Supple, No JVD  CHEST/LUNG: Clear to auscultation bilaterally; No rales, rhonchi, wheezing, or rubs. Unlabored respirations  HEART: Regular rate and rhythm; No murmurs, rubs, or gallops  ABDOMEN: BSx4; Soft, nontender, nondistended  EXTREMITIES:  No clubbing, cyanosis, or edema  NERVOUS SYSTEM:  A&Ox3, no focal deficits   SKIN: No rashes or lesions

## 2025-06-25 NOTE — DISCHARGE NOTE PROVIDER - NSDCFUSCHEDAPPT_GEN_ALL_CORE_FT
Domo German  [facility]  GASTRO Doc Off [id number]  Scheduled Appointment: 08/15/2025

## 2025-06-25 NOTE — PROGRESS NOTE ADULT - SUBJECTIVE AND OBJECTIVE BOX
49yFemale  Being followed for abdominal pain  Interval history: Patient denies nausea, vomiting, hematemesis, melena, blood in stool, diarrhea, constipation, +intermittent epigastric abdominal pain      PAST MEDICAL & SURGICAL HISTORY:   GERD (gastroesophageal reflux disease)      Migraine headache      Back pain      Anxiety      Kidney stones      Murmur  since age 20     6/18      Thyroid nodule      Hypothyroidism      Overactive bladder      History of cholecystectomy      History of lithotripsy                Social History: No smoking. No alcohol. No illegal drug use.          MEDICATIONS  (STANDING):  chlorhexidine 2% Cloths 1 Application(s) Topical <User Schedule>  citalopram 40 milliGRAM(s) Oral daily  enoxaparin Injectable 40 milliGRAM(s) SubCutaneous every 24 hours  levothyroxine 75 MICROGram(s) Oral daily  methadone    Tablet 10 milliGRAM(s) Oral four times a day  oxybutynin 5 milliGRAM(s) Oral two times a day  pantoprazole  Injectable 40 milliGRAM(s) IV Push two times a day  rosuvastatin 10 milliGRAM(s) Oral at bedtime  sodium chloride 0.9%. 1000 milliLiter(s) (75 mL/Hr) IV Continuous <Continuous>  sucralfate 1 Gram(s) Oral four times a day    MEDICATIONS  (PRN):  acetaminophen     Tablet .. 650 milliGRAM(s) Oral every 6 hours PRN Temp greater or equal to 38C (100.4F), Mild Pain (1 - 3)  HYDROmorphone  Injectable 0.5 milliGRAM(s) IV Push every 4 hours PRN Severe Pain (7 - 10)  metoclopramide Injectable 5 milliGRAM(s) IV Push every 8 hours PRN n/v  ondansetron Injectable 4 milliGRAM(s) IV Push every 8 hours PRN Nausea and/or Vomiting  oxyCODONE    IR 10 milliGRAM(s) Oral every 6 hours PRN Moderate Pain (4 - 6)      Allergies:   citrus (Unknown)  Cipro (Rash)  Bananas (Unknown)  NSAIDs (Angioedema; Anaphylaxis; Hives)  eggs (Unknown)  Intolerances          REVIEW OF SYSTEMS:  General:  No weight loss, fevers, or chills.  Eyes:  No reported pain or visual changes  ENT:  No sore throat or runny nose.  NECK: No stiffness   CV:  No chest pain or palpitations.  Resp:  No shortness of breath, cough  GI:  +intermittent epigastric abdominal pain, no nausea, vomiting, dysphagia, diarrhea or constipation. No rectal bleeding, melena, or hematemesis.  Neuro:  No tingling, numbness         VITAL SIGNS:   T(F): 98 (06-24-25 @ 04:44), Max: 98.6 (06-23-25 @ 20:29)  HR: 68 (06-24-25 @ 04:44) (68 - 89)  BP: 111/73 (06-24-25 @ 04:44) (105/70 - 123/84)  RR: 18 (06-24-25 @ 04:44) (18 - 18)  SpO2: 96% (06-24-25 @ 04:44) (95% - 99%)    PHYSICAL EXAM:  GENERAL: AAOx3, no acute distress.  HEAD:  Atraumatic, Normocephalic  EYES: conjunctiva and sclera clear  NECK: Supple, no thyromegaly  CHEST/LUNG: Clear to auscultation bilaterally; No wheeze, rhonchi, or rales  HEART: Regular rate and rhythm; normal S1, S2, No murmurs.  ABDOMEN: Soft, nontender, nondistended; Bowel sounds present  NEUROLOGY: No asterixis or tremor.   SKIN: Intact, no jaundice            LABS:                        12.0   5.52  )-----------( 210      ( 24 Jun 2025 08:43 )             37.6     06-24    137  |  101  |  9[L]  ----------------------------<  89  4.3   |  21  |  0.7    Ca    9.7      24 Jun 2025 08:43    TPro  7.2  /  Alb  4.6  /  TBili  0.3  /  DBili  x   /  AST  39  /  ALT  134[H]  /  AlkPhos  159[H]  06-24    LIVER FUNCTIONS - ( 24 Jun 2025 08:43 )  Alb: 4.6 g/dL / Pro: 7.2 g/dL / ALK PHOS: 159 U/L / ALT: 134 U/L / AST: 39 U/L / GGT: x           PT/INR - ( 23 Jun 2025 12:25 )   PT: 12.20 sec;   INR: 1.03 ratio         PTT - ( 23 Jun 2025 12:25 )  PTT:35.9 sec    IMAGING:    < from: CT Abdomen and Pelvis w/ IV Cont (06.20.25 @ 11:40) >    ACC: [id number] EXAM:  CT ABDOMEN AND PELVIS IC   ORDERED BY: ALYSSA FREEMAN     PROCEDURE DATE:  06/20/2025          INTERPRETATION:  CLINICAL INFORMATION: Abdominal pain    COMPARISON: May 22, 2025    CONTRAST/COMPLICATIONS:  IV Contrast: Omnipaque 350  95 cc administered   5 cc discarded  Oral Contrast: NONE.    PROCEDURE:  CT of the Abdomen and Pelvis was performed.  Sagittal and coronal reformats were performed.    FINDINGS:  LOWER CHEST: Within normal limits.    LIVER: Unchanged hepatic cyst adjacent to gallbladder fossa.  BILE DUCTS: Prominent postcholecystectomy  GALLBLADDER: Cholecystectomy.  SPLEEN: Within normal limits.  PANCREAS: Within normal limits.  ADRENALS: Within normal limits.  KIDNEYS/URETERS: Symmetric renal enhancement without hydronephrosis   bilaterally.    BLADDER: Within normal limits.  REPRODUCTIVE ORGANS: Unchanged    BOWEL: No bowel obstruction. No evidence for appendicitis.  PERITONEUM/RETROPERITONEUM: Within normal limits.  VESSELS: Within normal limits.  LYMPH NODES: No lymphadenopathy.  ABDOMINAL WALL: Fat-containing umbilical and supraumbilical hernias,   unchanged.  BONES: No acute osseous abnormality.    IMPRESSION:    No CT evidence for acute intra-abdominal or pelvic pathology.        --- End of Report ---            ELLIOT LANDAU MD; Attending Radiologist  This document has been electronically signed. Jun 20 2025 11:54AM    < end of copied text >    < from: US Abdomen Upper Quadrant Right (06.20.25 @ 19:43) >    ACC: [id number] EXAM:  US ABDOMEN RT UPR QUADRANT   ORDERED BY: IVETTE NATHAN     PROCEDURE DATE:  06/20/2025          INTERPRETATION:  CLINICAL INFORMATION: Vomiting    COMPARISON: CT abdomen and pelvis from June 20, 2025    TECHNIQUE: Sonography of the right upper quadrant.    FINDINGS:  Liver: Hepatic cyst measuring 1.4 x 1.4 x 1.4 cm  Bile ducts: Cholecystectomy. Common bile duct measures 7 mm.  Gallbladder: Within normal limits.  Pancreas: Visualized portions are within normal limits.  Right kidney: 12.5 cm. No hydronephrosis.  Ascites: None.  IVC: Visualized portions are within normal limits.    IMPRESSION:  Mild intrahepatic biliary duct dilatation which can be seen in the   postcholecystectomy state.    --- End of Report ---            SHEY LY MD; Attending Radiologist  This document has been electronically signed. Jun 20 2025 10:34PM    < end of copied text >      
49yFemale  Being followed for altered LFTs  Interval history: Patient denies nausea, vomiting, hematemesis, melena, blood in stool, diarrhea, constipation, abdominal pain. Patient tolerating diet without complaints, s/p ERCP without any pain.      PAST MEDICAL & SURGICAL HISTORY:  GERD (gastroesophageal reflux disease)      Migraine headache      Back pain      Anxiety      Kidney stones      Murmur  since age 20     /18      Thyroid nodule      Hypothyroidism      Overactive bladder      History of cholecystectomy      History of lithotripsy                Social History: No smoking. No alcohol. No illegal drug use.          MEDICATIONS:  MEDICATIONS  (STANDING):  chlorhexidine 2% Cloths 1 Application(s) Topical <User Schedule>  citalopram 40 milliGRAM(s) Oral daily  enoxaparin Injectable 40 milliGRAM(s) SubCutaneous every 24 hours  levothyroxine 75 MICROGram(s) Oral daily  methadone    Tablet 10 milliGRAM(s) Oral four times a day  oxybutynin 5 milliGRAM(s) Oral two times a day  pantoprazole  Injectable 40 milliGRAM(s) IV Push two times a day  rosuvastatin 10 milliGRAM(s) Oral at bedtime  sodium chloride 0.9%. 1000 milliLiter(s) (75 mL/Hr) IV Continuous <Continuous>  sucralfate 1 Gram(s) Oral four times a day    MEDICATIONS  (PRN):  acetaminophen     Tablet .. 650 milliGRAM(s) Oral every 6 hours PRN Temp greater or equal to 38C (100.4F), Mild Pain (1 - 3)  HYDROmorphone  Injectable 0.5 milliGRAM(s) IV Push every 4 hours PRN Severe Pain (7 - 10)  metoclopramide Injectable 5 milliGRAM(s) IV Push every 8 hours PRN n/v  ondansetron Injectable 4 milliGRAM(s) IV Push every 8 hours PRN Nausea and/or Vomiting  oxyCODONE    IR 10 milliGRAM(s) Oral every 6 hours PRN Moderate Pain (4 - 6)      Allergies:      REVIEW OF SYSTEMS:  General:  No weight loss, fevers, or chills.  Eyes:  No reported pain or visual changes  ENT:  No sore throat or runny nose.  NECK: No stiffness   CV:  No chest pain or palpitations.  Resp:  No shortness of breath, cough  GI:  No abdominal pain, nausea, vomiting, dysphagia, diarrhea or constipation. No rectal bleeding, melena, or hematemesis.  Neuro:  No tingling, numbness         VITAL SIGNS:   T(F): 97.1 (25 @ 05:03), Max: 98.6 (25 @ 12:05)  HR: 91 (25 @ 05:03) (68 - 91)  BP: 104/73 (25 @ 05:03) (102/67 - 164/91)  RR: 17 (25 @ 05:03) (15 - 18)  SpO2: 94% (25 @ 05:03) (94% - 98%)    PHYSICAL EXAM:  GENERAL: AAOx3, no acute distress.  HEAD:  Atraumatic, Normocephalic  EYES: conjunctiva and sclera clear  NECK: Supple, no thyromegaly  CHEST/LUNG: Clear to auscultation bilaterally; No wheeze, rhonchi, or rales  HEART: Regular rate and rhythm; normal S1, S2, No murmurs.  ABDOMEN: Soft, nontender, nondistended; Bowel sounds present  NEUROLOGY: No asterixis or tremor.   SKIN: Intact, no jaundice            LABS:                        12.0   5.52  )-----------( 210      ( 2025 08:43 )             37.6         137  |  98  |  9[L]  ----------------------------<  80  4.5   |  21  |  0.7    Ca    10.1      2025 08:19    TPro  7.5  /  Alb  4.9  /  TBili  0.3  /  DBili  x   /  AST  23  /  ALT  97[H]  /  AlkPhos  138[H]  -    LIVER FUNCTIONS - ( 2025 08:19 )  Alb: 4.9 g/dL / Pro: 7.5 g/dL / ALK PHOS: 138 U/L / ALT: 97 U/L / AST: 23 U/L / GGT: x           PT/INR - ( 2025 12:25 )   PT: 12.20 sec;   INR: 1.03 ratio         PTT - ( 2025 12:25 )  PTT:35.9 sec    IMAGING:    < from: ERCP (25 @ 10:00) >  ERCP Report    Date: 2025 10:00 AM    Patient Name: RAMON WOODS    MRN: [id number]    Account Number:    [id number]    Gender: Female     (age): 1975 (49)    Instrument(s):    TJF- Q190V ([id number])([id number])    Attending/Fellow:    MD Satya Anglin MD        Procedure Room #:    [place] [address]    Referring Physician:    ED PHYSICIAN UNASSIGNED        Nurse(s):    Sahara Pham    History of Present Illness:    H&P was previously reviewed.    Administered Medications:    As per anesthesiology record    Lactated Ringer IV    Indications:    Epigastric pain: 789.06 - R10.13    Procedure:    The procedure, indications, preparation and potential complications were  explained to the patient, who indicated understanding andsigned the  corresponding consent forms. IV general anesthesia was administered by  anesthesiologist. Continuous pulse oximetry and blood pressure monitoring were  used throughout the procedure. Supplemental oxygen was used. Patient was placed  in prone position. The duodenoscope was introduced through mouth and advanced  under direct visualization until ampulla was reached. Patient tolerance to the  procedure was excellent. The procedure was not difficult. Blood loss was none.    Limitations/Complications:    There were no apparent limitations or complications    ERCP Findings:     radiograph was taken.  Right upper quadrant clips suggestive of previous  cholecystectomy were noted. Limited views of the esophagus, stomach and duodenum  were unremarkable. The major papilla was noted in the second portion of duodenum  and appeared normal. The major papilla was cannulated, using wire-guided  cannulation, using a sphincterotome preloaded with a 0.025inch x 450 cm  guidewire, the common bile duct was cannulated, the wire was seen in the CBD,  cholangiogram was performed confirming location. The CBD was dilated but  otherwise unremarkable. Sphincterotomy was performed without bleeding.  Sludge  was removed from the bile duct using an 8.5 mm biliary stone extraction balloon.  The duct was then swept multiple times with 8.5 mm and 11 mm balloons and was  irrigated with 40 ml of sterile water. An occlusion cholangiogram was then  performed opacifying the bile duct without any filling defects. The pancreatic  duct was not cannulated or injected in this exam. The scope was withdrawn and  procedure terminated. Post-procedure xray did not show air under the  diaphragm.    Fluoroscopic Interpretation:    I supervised the acquisition and interpretation of the fluoroscopic images at  the biliary tree. The quality of the images was good.        Summary:    ERCP:?Successful ERCP with biliary cannulation and sphincterotomy, followed  removal of sludge and an unremarkable final occlusioncholangiogram.    Plan:    Clear liquid diet today.    Monitor for post-procedural complications including bleeding and pancreatitis.    Advance diet as tolerated tomorrow if no post ERCP complications noted.    Monitor liver enzymes.    GI will continue to follow along.    Return to floor for further management    Attending Participation:    I was present and participated during the entire procedure, including non-key  portions.    Domo German MD    Version 1, Electronically signed on 2025 6:25:08 PM by MD Satya Anglin MD    < end of copied text >        
49yFemale  Being followed for chronic epigastric pain  Interval history: Patient denies nausea, vomiting, hematemesis, melena, blood in stool, diarrhea, constipation, +epigastric abdominal pain.       PAST MEDICAL & SURGICAL HISTORY:   GERD (gastroesophageal reflux disease)  Migraine headache  Back pain  Anxiety  Kidney stones  Murmur  since age 20     6/18  Thyroid nodule  Hypothyroidism  Overactive bladder  History of cholecystectomy  History of lithotripsy                Social History: No smoking. No alcohol. No illegal drug use.        MEDICATIONS  (STANDING):  chlorhexidine 2% Cloths 1 Application(s) Topical <User Schedule>  citalopram 40 milliGRAM(s) Oral daily  enoxaparin Injectable 40 milliGRAM(s) SubCutaneous every 24 hours  HYDROmorphone   Solution 1 milliGRAM(s) Oral once  levothyroxine 75 MICROGram(s) Oral daily  methadone    Tablet 10 milliGRAM(s) Oral four times a day  oxybutynin 5 milliGRAM(s) Oral two times a day  pantoprazole  Injectable 40 milliGRAM(s) IV Push two times a day  rosuvastatin 10 milliGRAM(s) Oral at bedtime  sucralfate 1 Gram(s) Oral four times a day    MEDICATIONS  (PRN):  acetaminophen     Tablet .. 650 milliGRAM(s) Oral every 6 hours PRN Temp greater or equal to 38C (100.4F), Mild Pain (1 - 3)  HYDROmorphone  Injectable 0.5 milliGRAM(s) IV Push every 4 hours PRN Severe Pain (7 - 10)  ondansetron Injectable 4 milliGRAM(s) IV Push every 8 hours PRN Nausea and/or Vomiting  ondansetron Injectable 4 milliGRAM(s) IV Push every 6 hours PRN Nausea and/or Vomiting  oxyCODONE    IR 10 milliGRAM(s) Oral every 6 hours PRN Moderate Pain (4 - 6)      Allergies:   citrus (Unknown)  Cipro (Rash)  Bananas (Unknown)  NSAIDs (Angioedema; Anaphylaxis; Hives)  eggs (Unknown)          REVIEW OF SYSTEMS:  General:  No weight loss, fevers, or chills.  Eyes:  No reported pain or visual changes  ENT:  No sore throat or runny nose.  NECK: No stiffness   CV:  No chest pain or palpitations.  Resp:  No shortness of breath, cough  GI:  +chronic epigastric abdominal pain, no nausea, vomiting, dysphagia, diarrhea or constipation. No rectal bleeding, melena, or hematemesis.  Neuro:  No tingling, numbness         VITAL SIGNS:   T(F): 98.2 (06-23-25 @ 05:15), Max: 98.6 (06-22-25 @ 21:00)  HR: 85 (06-23-25 @ 05:15) (77 - 85)  BP: 102/72 (06-23-25 @ 05:15) (102/72 - 121/83)  RR: 18 (06-23-25 @ 05:15) (18 - 18)  SpO2: 98% (06-23-25 @ 05:15) (95% - 98%)    PHYSICAL EXAM:  GENERAL: AAOx3, no acute distress.  HEAD:  Atraumatic, Normocephalic  EYES: conjunctiva and sclera clear  NECK: Supple, no thyromegaly  CHEST/LUNG: Clear to auscultation bilaterally; No wheeze, rhonchi, or rales  HEART: Regular rate and rhythm; normal S1, S2, No murmurs.  ABDOMEN: Soft, +epigastric tenderness, nondistended; Bowel sounds present  NEUROLOGY: No asterixis or tremor.   SKIN: Intact, no jaundice            LABS:                        13.1   5.57  )-----------( 201      ( 22 Jun 2025 07:30 )             41.8     06-22    139  |  101  |  10  ----------------------------<  75  4.9   |  20  |  0.6[L]    Ca    10.2      22 Jun 2025 07:30    TPro  7.6  /  Alb  4.6  /  TBili  0.3  /  DBili  x   /  AST  269[H]  /  ALT  343[H]  /  AlkPhos  237[H]  06-22    LIVER FUNCTIONS - ( 22 Jun 2025 07:30 )  Alb: 4.6 g/dL / Pro: 7.6 g/dL / ALK PHOS: 237 U/L / ALT: 343 U/L / AST: 269 U/L / GGT: 557 U/L           IMAGING:    < from: CT Abdomen and Pelvis w/ IV Cont (06.20.25 @ 11:40) >    ACC: [id number] EXAM:  CT ABDOMEN AND PELVIS IC   ORDERED BY: ALYSSA FREEMAN     PROCEDURE DATE:  06/20/2025          INTERPRETATION:  CLINICAL INFORMATION: Abdominal pain    COMPARISON: May 22, 2025    CONTRAST/COMPLICATIONS:  IV Contrast: Omnipaque 350  95 cc administered   5 cc discarded  Oral Contrast: NONE.    PROCEDURE:  CT of the Abdomen and Pelvis was performed.  Sagittal and coronal reformats were performed.    FINDINGS:  LOWER CHEST: Within normal limits.    LIVER: Unchanged hepatic cyst adjacent to gallbladder fossa.  BILE DUCTS: Prominent postcholecystectomy  GALLBLADDER: Cholecystectomy.  SPLEEN: Within normal limits.  PANCREAS: Within normal limits.  ADRENALS: Within normal limits.  KIDNEYS/URETERS: Symmetric renal enhancement without hydronephrosis   bilaterally.    BLADDER: Within normal limits.  REPRODUCTIVE ORGANS: Unchanged    BOWEL: No bowel obstruction. No evidence for appendicitis.  PERITONEUM/RETROPERITONEUM: Within normal limits.  VESSELS: Within normal limits.  LYMPH NODES: No lymphadenopathy.  ABDOMINAL WALL: Fat-containing umbilical and supraumbilical hernias,   unchanged.  BONES: No acute osseous abnormality.    IMPRESSION:    No CT evidence for acute intra-abdominal or pelvic pathology.        --- End of Report ---            ELLIOT LANDAU MD; Attending Radiologist  This document has been electronically signed. Jun 20 2025 11:54AM    < end of copied text >      < from: US Abdomen Upper Quadrant Right (06.20.25 @ 19:43) >    ACC: [id number] EXAM:  US ABDOMEN RT UPR QUADRANT   ORDERED BY: IVETTE NATHAN     PROCEDURE DATE:  06/20/2025          INTERPRETATION:  CLINICAL INFORMATION: Vomiting    COMPARISON: CT abdomen and pelvis from June 20, 2025    TECHNIQUE: Sonography of the right upper quadrant.    FINDINGS:  Liver: Hepatic cyst measuring 1.4 x 1.4 x 1.4 cm  Bile ducts: Cholecystectomy. Common bile duct measures 7 mm.  Gallbladder: Within normal limits.  Pancreas: Visualized portions are within normal limits.  Right kidney: 12.5 cm. No hydronephrosis.  Ascites: None.  IVC: Visualized portions are within normal limits.    IMPRESSION:  Mild intrahepatic biliary duct dilatation which can be seen in the   postcholecystectomy state.    --- End of Report ---            SHEY LY MD; Attending Radiologist  This document has been electronically signed. Jun 20 2025 10:34PM    < end of copied text >

## 2025-06-25 NOTE — DISCHARGE NOTE NURSING/CASE MANAGEMENT/SOCIAL WORK - FINANCIAL ASSISTANCE
[facility] provides services at a reduced cost to those who are determined to be eligible through [facility]’s financial assistance program. Information regarding [facility]’s financial assistance program can be found by going to [email or web address] or by calling [phone].

## 2025-06-26 NOTE — CDI QUERY NOTE - NSCDIOTHERTXTBX_GEN_ALL_CORE_HH
Clinical documentation and/or evidence of the patient’s presentation, evaluation, and medical management, as evidenced below, may support a diagnosis that is not documented in the medical record.  In order to ensure accurate coding and accuracy of the clinical record, the documentation in this patient’s medical record requires additional clarification.      If you think the supporting documentation and/or clinical evidence supports a more specific diagnosis, please include more specific documentation of a diagnosis associated with these findings in your progress note and/or discharge summary.    	  Please clarify if the patient was found to have one of the following:      •	Methadone maintenance therapy associated with opioid dependence  •	Other (specify)        Supporting documentation and/or clinical evidence:     6/20 H&P Adult: Home medications: Methadone 10mg orally 4 times a day … will continue methadone standing for pt … Chronic pain (on Methadone)    6/25 DN Provider: Chronic pain – continue Methadone 10mg po Q6H    Orders:   6/20-6/25: Methadone 10mg PO QID  6/25 DN Provider note: Discharge medications: Methadone 10mg PO QID

## 2025-07-02 ENCOUNTER — RX RENEWAL (OUTPATIENT)
Age: 50
End: 2025-07-02

## 2025-07-10 DIAGNOSIS — K29.50 UNSPECIFIED CHRONIC GASTRITIS WITHOUT BLEEDING: ICD-10-CM

## 2025-07-10 DIAGNOSIS — Z88.1 ALLERGY STATUS TO OTHER ANTIBIOTIC AGENTS: ICD-10-CM

## 2025-07-10 DIAGNOSIS — G89.29 OTHER CHRONIC PAIN: ICD-10-CM

## 2025-07-10 DIAGNOSIS — E03.9 HYPOTHYROIDISM, UNSPECIFIED: ICD-10-CM

## 2025-07-10 DIAGNOSIS — K21.9 GASTRO-ESOPHAGEAL REFLUX DISEASE WITHOUT ESOPHAGITIS: ICD-10-CM

## 2025-07-10 DIAGNOSIS — Z79.891 LONG TERM (CURRENT) USE OF OPIATE ANALGESIC: ICD-10-CM

## 2025-07-10 DIAGNOSIS — Z91.012 ALLERGY TO EGGS: ICD-10-CM

## 2025-07-10 DIAGNOSIS — K83.8 OTHER SPECIFIED DISEASES OF BILIARY TRACT: ICD-10-CM

## 2025-07-10 DIAGNOSIS — N32.81 OVERACTIVE BLADDER: ICD-10-CM

## 2025-07-10 DIAGNOSIS — R11.2 NAUSEA WITH VOMITING, UNSPECIFIED: ICD-10-CM

## 2025-07-10 DIAGNOSIS — K44.9 DIAPHRAGMATIC HERNIA WITHOUT OBSTRUCTION OR GANGRENE: ICD-10-CM

## 2025-07-10 DIAGNOSIS — R10.13 EPIGASTRIC PAIN: ICD-10-CM

## 2025-07-10 DIAGNOSIS — Z88.6 ALLERGY STATUS TO ANALGESIC AGENT: ICD-10-CM

## 2025-07-10 DIAGNOSIS — Z91.018 ALLERGY TO OTHER FOODS: ICD-10-CM

## 2025-07-10 DIAGNOSIS — Z79.890 HORMONE REPLACEMENT THERAPY: ICD-10-CM

## 2025-07-10 DIAGNOSIS — K43.9 VENTRAL HERNIA WITHOUT OBSTRUCTION OR GANGRENE: ICD-10-CM

## 2025-07-19 ENCOUNTER — INPATIENT (INPATIENT)
Facility: HOSPITAL | Age: 50
LOS: 1 days | Discharge: HOME | DRG: 463 | End: 2025-07-21
Attending: STUDENT IN AN ORGANIZED HEALTH CARE EDUCATION/TRAINING PROGRAM | Admitting: HOSPITALIST
Payer: MEDICAID

## 2025-07-19 VITALS
TEMPERATURE: 98 F | WEIGHT: 182.98 LBS | DIASTOLIC BLOOD PRESSURE: 82 MMHG | HEIGHT: 64 IN | SYSTOLIC BLOOD PRESSURE: 123 MMHG | HEART RATE: 89 BPM | RESPIRATION RATE: 18 BRPM | OXYGEN SATURATION: 99 %

## 2025-07-19 DIAGNOSIS — Z98.890 OTHER SPECIFIED POSTPROCEDURAL STATES: Chronic | ICD-10-CM

## 2025-07-19 DIAGNOSIS — N39.0 URINARY TRACT INFECTION, SITE NOT SPECIFIED: ICD-10-CM

## 2025-07-19 DIAGNOSIS — Z90.49 ACQUIRED ABSENCE OF OTHER SPECIFIED PARTS OF DIGESTIVE TRACT: Chronic | ICD-10-CM

## 2025-07-19 LAB
ALBUMIN SERPL ELPH-MCNC: 5.2 G/DL — SIGNIFICANT CHANGE UP (ref 3.5–5.2)
ALP SERPL-CCNC: 92 U/L — SIGNIFICANT CHANGE UP (ref 30–115)
ALT FLD-CCNC: 27 U/L — SIGNIFICANT CHANGE UP (ref 0–41)
ANION GAP SERPL CALC-SCNC: 15 MMOL/L — HIGH (ref 7–14)
APPEARANCE UR: ABNORMAL
AST SERPL-CCNC: 32 U/L — SIGNIFICANT CHANGE UP (ref 0–41)
BASOPHILS # BLD AUTO: 0.01 K/UL — SIGNIFICANT CHANGE UP (ref 0–0.2)
BASOPHILS NFR BLD AUTO: 0.2 % — SIGNIFICANT CHANGE UP (ref 0–2)
BILIRUB SERPL-MCNC: 0.3 MG/DL — SIGNIFICANT CHANGE UP (ref 0.2–1.2)
BILIRUB UR-MCNC: NEGATIVE — SIGNIFICANT CHANGE UP
BUN SERPL-MCNC: 10 MG/DL — SIGNIFICANT CHANGE UP (ref 10–20)
CALCIUM SERPL-MCNC: 11 MG/DL — HIGH (ref 8.4–10.5)
CHLORIDE SERPL-SCNC: 101 MMOL/L — SIGNIFICANT CHANGE UP (ref 98–110)
CO2 SERPL-SCNC: 23 MMOL/L — SIGNIFICANT CHANGE UP (ref 17–32)
COLOR SPEC: YELLOW — SIGNIFICANT CHANGE UP
CREAT SERPL-MCNC: 0.6 MG/DL — LOW (ref 0.7–1.5)
DIFF PNL FLD: NEGATIVE — SIGNIFICANT CHANGE UP
EGFR: 111 ML/MIN/1.73M2 — SIGNIFICANT CHANGE UP
EGFR: 111 ML/MIN/1.73M2 — SIGNIFICANT CHANGE UP
EOSINOPHIL # BLD AUTO: 0 K/UL — SIGNIFICANT CHANGE UP (ref 0–0.5)
EOSINOPHIL NFR BLD AUTO: 0 % — SIGNIFICANT CHANGE UP (ref 0–6)
GLUCOSE SERPL-MCNC: 97 MG/DL — SIGNIFICANT CHANGE UP (ref 70–99)
GLUCOSE UR QL: NEGATIVE MG/DL — SIGNIFICANT CHANGE UP
HCG SERPL QL: ABNORMAL
HCG SERPL-ACNC: 10.1 MIU/ML — HIGH
HCT VFR BLD CALC: 44.1 % — SIGNIFICANT CHANGE UP (ref 34.5–45)
HGB BLD-MCNC: 14.2 G/DL — SIGNIFICANT CHANGE UP (ref 11.5–15.5)
IMM GRANULOCYTES # BLD AUTO: 0.03 K/UL — SIGNIFICANT CHANGE UP (ref 0–0.07)
IMM GRANULOCYTES NFR BLD AUTO: 0.5 % — SIGNIFICANT CHANGE UP (ref 0–0.9)
KETONES UR QL: NEGATIVE MG/DL — SIGNIFICANT CHANGE UP
LACTATE SERPL-SCNC: 1.9 MMOL/L — SIGNIFICANT CHANGE UP (ref 0.7–2)
LEUKOCYTE ESTERASE UR-ACNC: ABNORMAL
LYMPHOCYTES # BLD AUTO: 1.49 K/UL — SIGNIFICANT CHANGE UP (ref 1–3.3)
LYMPHOCYTES NFR BLD AUTO: 23.7 % — SIGNIFICANT CHANGE UP (ref 13–44)
MCHC RBC-ENTMCNC: 29.2 PG — SIGNIFICANT CHANGE UP (ref 27–34)
MCHC RBC-ENTMCNC: 32.2 G/DL — SIGNIFICANT CHANGE UP (ref 32–36)
MCV RBC AUTO: 90.6 FL — SIGNIFICANT CHANGE UP (ref 80–100)
MONOCYTES # BLD AUTO: 0.45 K/UL — SIGNIFICANT CHANGE UP (ref 0–0.9)
MONOCYTES NFR BLD AUTO: 7.2 % — SIGNIFICANT CHANGE UP (ref 2–14)
NEUTROPHILS # BLD AUTO: 4.31 K/UL — SIGNIFICANT CHANGE UP (ref 1.8–7.4)
NEUTROPHILS NFR BLD AUTO: 68.4 % — SIGNIFICANT CHANGE UP (ref 43–77)
NITRITE UR-MCNC: NEGATIVE — SIGNIFICANT CHANGE UP
NRBC # BLD AUTO: 0 K/UL — SIGNIFICANT CHANGE UP (ref 0–0)
NRBC # FLD: 0 K/UL — SIGNIFICANT CHANGE UP (ref 0–0)
NRBC BLD AUTO-RTO: 0 /100 WBCS — SIGNIFICANT CHANGE UP (ref 0–0)
PH UR: 6.5 — SIGNIFICANT CHANGE UP (ref 5–8)
PLATELET # BLD AUTO: 255 K/UL — SIGNIFICANT CHANGE UP (ref 150–400)
PMV BLD: 10.3 FL — SIGNIFICANT CHANGE UP (ref 7–13)
POTASSIUM SERPL-MCNC: 5.2 MMOL/L — HIGH (ref 3.5–5)
POTASSIUM SERPL-SCNC: 5.2 MMOL/L — HIGH (ref 3.5–5)
PROT SERPL-MCNC: 8.6 G/DL — HIGH (ref 6–8)
PROT UR-MCNC: NEGATIVE MG/DL — SIGNIFICANT CHANGE UP
RBC # BLD: 4.87 M/UL — SIGNIFICANT CHANGE UP (ref 3.8–5.2)
RBC # FLD: 12.3 % — SIGNIFICANT CHANGE UP (ref 10.3–14.5)
SODIUM SERPL-SCNC: 139 MMOL/L — SIGNIFICANT CHANGE UP (ref 135–146)
SP GR SPEC: 1.01 — SIGNIFICANT CHANGE UP (ref 1–1.03)
UROBILINOGEN FLD QL: 0.2 MG/DL — SIGNIFICANT CHANGE UP (ref 0.2–1)
WBC # BLD: 6.29 K/UL — SIGNIFICANT CHANGE UP (ref 3.8–10.5)
WBC # FLD AUTO: 6.29 K/UL — SIGNIFICANT CHANGE UP (ref 3.8–10.5)

## 2025-07-19 PROCEDURE — 87040 BLOOD CULTURE FOR BACTERIA: CPT

## 2025-07-19 PROCEDURE — 80346 BENZODIAZEPINES1-12: CPT

## 2025-07-19 PROCEDURE — 83735 ASSAY OF MAGNESIUM: CPT

## 2025-07-19 PROCEDURE — 74177 CT ABD & PELVIS W/CONTRAST: CPT | Mod: 26

## 2025-07-19 PROCEDURE — 76770 US EXAM ABDO BACK WALL COMP: CPT | Mod: 26

## 2025-07-19 PROCEDURE — 80358 DRUG SCREENING METHADONE: CPT

## 2025-07-19 PROCEDURE — 99223 1ST HOSP IP/OBS HIGH 75: CPT

## 2025-07-19 PROCEDURE — 99285 EMERGENCY DEPT VISIT HI MDM: CPT

## 2025-07-19 PROCEDURE — 80053 COMPREHEN METABOLIC PANEL: CPT

## 2025-07-19 PROCEDURE — 80307 DRUG TEST PRSMV CHEM ANLYZR: CPT

## 2025-07-19 PROCEDURE — 80354 DRUG SCREENING FENTANYL: CPT

## 2025-07-19 PROCEDURE — 85025 COMPLETE CBC W/AUTO DIFF WBC: CPT

## 2025-07-19 PROCEDURE — 36415 COLL VENOUS BLD VENIPUNCTURE: CPT

## 2025-07-19 PROCEDURE — 83605 ASSAY OF LACTIC ACID: CPT

## 2025-07-19 PROCEDURE — 80361 OPIATES 1 OR MORE: CPT

## 2025-07-19 RX ORDER — MAGNESIUM, ALUMINUM HYDROXIDE 200-200 MG
30 TABLET,CHEWABLE ORAL EVERY 4 HOURS
Refills: 0 | Status: DISCONTINUED | OUTPATIENT
Start: 2025-07-19 | End: 2025-07-21

## 2025-07-19 RX ORDER — ONDANSETRON HCL/PF 4 MG/2 ML
4 VIAL (ML) INJECTION EVERY 8 HOURS
Refills: 0 | Status: DISCONTINUED | OUTPATIENT
Start: 2025-07-19 | End: 2025-07-19

## 2025-07-19 RX ORDER — OXYBUTYNIN CHLORIDE 5 MG/1
5 TABLET, FILM COATED, EXTENDED RELEASE ORAL
Refills: 0 | Status: DISCONTINUED | OUTPATIENT
Start: 2025-07-19 | End: 2025-07-21

## 2025-07-19 RX ORDER — ALPRAZOLAM 0.5 MG
0.5 TABLET, EXTENDED RELEASE 24 HR ORAL
Refills: 0 | Status: DISCONTINUED | OUTPATIENT
Start: 2025-07-19 | End: 2025-07-21

## 2025-07-19 RX ORDER — CEFTRIAXONE 500 MG/1
2000 INJECTION, POWDER, FOR SOLUTION INTRAMUSCULAR; INTRAVENOUS ONCE
Refills: 0 | Status: COMPLETED | OUTPATIENT
Start: 2025-07-19 | End: 2025-07-19

## 2025-07-19 RX ORDER — HYDROMORPHONE/SOD CHLOR,ISO/PF 2 MG/10 ML
1 SYRINGE (ML) INJECTION ONCE
Refills: 0 | Status: DISCONTINUED | OUTPATIENT
Start: 2025-07-19 | End: 2025-07-19

## 2025-07-19 RX ORDER — MELATONIN 5 MG
3 TABLET ORAL AT BEDTIME
Refills: 0 | Status: DISCONTINUED | OUTPATIENT
Start: 2025-07-19 | End: 2025-07-21

## 2025-07-19 RX ORDER — ACETAMINOPHEN 500 MG/5ML
650 LIQUID (ML) ORAL EVERY 6 HOURS
Refills: 0 | Status: DISCONTINUED | OUTPATIENT
Start: 2025-07-19 | End: 2025-07-20

## 2025-07-19 RX ORDER — SODIUM CHLORIDE 9 G/1000ML
1000 INJECTION, SOLUTION INTRAVENOUS ONCE
Refills: 0 | Status: COMPLETED | OUTPATIENT
Start: 2025-07-19 | End: 2025-07-19

## 2025-07-19 RX ORDER — POLYETHYLENE GLYCOL 3350 17 G/17G
17 POWDER, FOR SOLUTION ORAL
Refills: 0 | Status: DISCONTINUED | OUTPATIENT
Start: 2025-07-19 | End: 2025-07-21

## 2025-07-19 RX ORDER — OXYBUTYNIN CHLORIDE 5 MG/1
10 TABLET, FILM COATED, EXTENDED RELEASE ORAL DAILY
Refills: 0 | Status: DISCONTINUED | OUTPATIENT
Start: 2025-07-19 | End: 2025-07-19

## 2025-07-19 RX ORDER — CITALOPRAM 20 MG/1
40 TABLET ORAL DAILY
Refills: 0 | Status: DISCONTINUED | OUTPATIENT
Start: 2025-07-19 | End: 2025-07-21

## 2025-07-19 RX ORDER — ONDANSETRON HCL/PF 4 MG/2 ML
4 VIAL (ML) INJECTION EVERY 6 HOURS
Refills: 0 | Status: DISCONTINUED | OUTPATIENT
Start: 2025-07-19 | End: 2025-07-21

## 2025-07-19 RX ORDER — CEFTRIAXONE 500 MG/1
2000 INJECTION, POWDER, FOR SOLUTION INTRAMUSCULAR; INTRAVENOUS EVERY 24 HOURS
Refills: 0 | Status: DISCONTINUED | OUTPATIENT
Start: 2025-07-19 | End: 2025-07-21

## 2025-07-19 RX ORDER — ROSUVASTATIN CALCIUM 20 MG/1
10 TABLET, FILM COATED ORAL AT BEDTIME
Refills: 0 | Status: DISCONTINUED | OUTPATIENT
Start: 2025-07-19 | End: 2025-07-21

## 2025-07-19 RX ORDER — SODIUM CHLORIDE 9 G/1000ML
1000 INJECTION, SOLUTION INTRAVENOUS
Refills: 0 | Status: DISCONTINUED | OUTPATIENT
Start: 2025-07-19 | End: 2025-07-20

## 2025-07-19 RX ORDER — ALPRAZOLAM 0.5 MG
1 TABLET, EXTENDED RELEASE 24 HR ORAL
Refills: 0 | DISCHARGE

## 2025-07-19 RX ORDER — METHADONE HCL 10 MG
10 TABLET ORAL
Refills: 0 | Status: DISCONTINUED | OUTPATIENT
Start: 2025-07-19 | End: 2025-07-21

## 2025-07-19 RX ORDER — PROCHLORPERAZINE 25 MG
10 SUPPOSITORY, RECTAL RECTAL EVERY 6 HOURS
Refills: 0 | Status: DISCONTINUED | OUTPATIENT
Start: 2025-07-19 | End: 2025-07-21

## 2025-07-19 RX ORDER — ONDANSETRON HCL/PF 4 MG/2 ML
4 VIAL (ML) INJECTION ONCE
Refills: 0 | Status: COMPLETED | OUTPATIENT
Start: 2025-07-19 | End: 2025-07-19

## 2025-07-19 RX ORDER — LEVOTHYROXINE SODIUM 300 MCG
75 TABLET ORAL DAILY
Refills: 0 | Status: DISCONTINUED | OUTPATIENT
Start: 2025-07-19 | End: 2025-07-21

## 2025-07-19 RX ORDER — ONDANSETRON HCL/PF 4 MG/2 ML
4 VIAL (ML) INJECTION ONCE
Refills: 0 | Status: DISCONTINUED | OUTPATIENT
Start: 2025-07-19 | End: 2025-07-19

## 2025-07-19 RX ORDER — SENNA 187 MG
2 TABLET ORAL AT BEDTIME
Refills: 0 | Status: DISCONTINUED | OUTPATIENT
Start: 2025-07-19 | End: 2025-07-21

## 2025-07-19 RX ADMIN — CITALOPRAM 40 MILLIGRAM(S): 20 TABLET ORAL at 18:58

## 2025-07-19 RX ADMIN — Medication 1 APPLICATION(S): at 17:04

## 2025-07-19 RX ADMIN — Medication 650 MILLIGRAM(S): at 20:19

## 2025-07-19 RX ADMIN — Medication 4 MILLIGRAM(S): at 10:44

## 2025-07-19 RX ADMIN — SODIUM CHLORIDE 1000 MILLILITER(S): 9 INJECTION, SOLUTION INTRAVENOUS at 10:44

## 2025-07-19 RX ADMIN — Medication 4 MILLIGRAM(S): at 21:15

## 2025-07-19 RX ADMIN — Medication 1 MILLIGRAM(S): at 15:04

## 2025-07-19 RX ADMIN — CEFTRIAXONE 100 MILLIGRAM(S): 500 INJECTION, POWDER, FOR SOLUTION INTRAMUSCULAR; INTRAVENOUS at 15:05

## 2025-07-19 RX ADMIN — Medication 4 MILLIGRAM(S): at 17:11

## 2025-07-19 RX ADMIN — ROSUVASTATIN CALCIUM 10 MILLIGRAM(S): 20 TABLET, FILM COATED ORAL at 21:10

## 2025-07-19 RX ADMIN — Medication 4 MILLIGRAM(S): at 10:52

## 2025-07-19 RX ADMIN — Medication 10 MILLIGRAM(S): at 23:35

## 2025-07-19 RX ADMIN — Medication 4 MILLIGRAM(S): at 17:03

## 2025-07-19 RX ADMIN — Medication 2 TABLET(S): at 21:10

## 2025-07-19 RX ADMIN — Medication 650 MILLIGRAM(S): at 21:19

## 2025-07-19 RX ADMIN — Medication 10 MILLIGRAM(S): at 19:01

## 2025-07-19 RX ADMIN — Medication 8 MILLIGRAM(S): at 12:50

## 2025-07-19 RX ADMIN — OXYBUTYNIN CHLORIDE 5 MILLIGRAM(S): 5 TABLET, FILM COATED, EXTENDED RELEASE ORAL at 17:06

## 2025-07-19 RX ADMIN — Medication 4 MILLIGRAM(S): at 21:10

## 2025-07-19 RX ADMIN — SODIUM CHLORIDE 100 MILLILITER(S): 9 INJECTION, SOLUTION INTRAVENOUS at 17:00

## 2025-07-20 LAB
ALBUMIN SERPL ELPH-MCNC: 4.3 G/DL — SIGNIFICANT CHANGE UP (ref 3.5–5.2)
ALP SERPL-CCNC: 74 U/L — SIGNIFICANT CHANGE UP (ref 30–115)
ALT FLD-CCNC: 20 U/L — SIGNIFICANT CHANGE UP (ref 0–41)
ANION GAP SERPL CALC-SCNC: 14 MMOL/L — SIGNIFICANT CHANGE UP (ref 7–14)
AST SERPL-CCNC: 19 U/L — SIGNIFICANT CHANGE UP (ref 0–41)
BASOPHILS # BLD AUTO: 0.01 K/UL — SIGNIFICANT CHANGE UP (ref 0–0.2)
BASOPHILS NFR BLD AUTO: 0.2 % — SIGNIFICANT CHANGE UP (ref 0–2)
BILIRUB SERPL-MCNC: 0.3 MG/DL — SIGNIFICANT CHANGE UP (ref 0.2–1.2)
BUN SERPL-MCNC: 13 MG/DL — SIGNIFICANT CHANGE UP (ref 10–20)
CALCIUM SERPL-MCNC: 9.7 MG/DL — SIGNIFICANT CHANGE UP (ref 8.4–10.5)
CHLORIDE SERPL-SCNC: 101 MMOL/L — SIGNIFICANT CHANGE UP (ref 98–110)
CO2 SERPL-SCNC: 23 MMOL/L — SIGNIFICANT CHANGE UP (ref 17–32)
CREAT SERPL-MCNC: 0.6 MG/DL — LOW (ref 0.7–1.5)
EGFR: 111 ML/MIN/1.73M2 — SIGNIFICANT CHANGE UP
EGFR: 111 ML/MIN/1.73M2 — SIGNIFICANT CHANGE UP
EOSINOPHIL # BLD AUTO: 0.01 K/UL — SIGNIFICANT CHANGE UP (ref 0–0.5)
EOSINOPHIL NFR BLD AUTO: 0.2 % — SIGNIFICANT CHANGE UP (ref 0–6)
GLUCOSE SERPL-MCNC: 115 MG/DL — HIGH (ref 70–99)
HCT VFR BLD CALC: 38.8 % — SIGNIFICANT CHANGE UP (ref 34.5–45)
HGB BLD-MCNC: 12.6 G/DL — SIGNIFICANT CHANGE UP (ref 11.5–15.5)
IMM GRANULOCYTES # BLD AUTO: 0.04 K/UL — SIGNIFICANT CHANGE UP (ref 0–0.07)
IMM GRANULOCYTES NFR BLD AUTO: 0.6 % — SIGNIFICANT CHANGE UP (ref 0–0.9)
LYMPHOCYTES # BLD AUTO: 1.72 K/UL — SIGNIFICANT CHANGE UP (ref 1–3.3)
LYMPHOCYTES NFR BLD AUTO: 26 % — SIGNIFICANT CHANGE UP (ref 13–44)
MAGNESIUM SERPL-MCNC: 1.8 MG/DL — SIGNIFICANT CHANGE UP (ref 1.8–2.4)
MCHC RBC-ENTMCNC: 29.4 PG — SIGNIFICANT CHANGE UP (ref 27–34)
MCHC RBC-ENTMCNC: 32.5 G/DL — SIGNIFICANT CHANGE UP (ref 32–36)
MCV RBC AUTO: 90.7 FL — SIGNIFICANT CHANGE UP (ref 80–100)
MONOCYTES # BLD AUTO: 0.53 K/UL — SIGNIFICANT CHANGE UP (ref 0–0.9)
MONOCYTES NFR BLD AUTO: 8 % — SIGNIFICANT CHANGE UP (ref 2–14)
NEUTROPHILS # BLD AUTO: 4.31 K/UL — SIGNIFICANT CHANGE UP (ref 1.8–7.4)
NEUTROPHILS NFR BLD AUTO: 65 % — SIGNIFICANT CHANGE UP (ref 43–77)
NRBC # BLD AUTO: 0 K/UL — SIGNIFICANT CHANGE UP (ref 0–0)
NRBC # FLD: 0 K/UL — SIGNIFICANT CHANGE UP (ref 0–0)
NRBC BLD AUTO-RTO: 0 /100 WBCS — SIGNIFICANT CHANGE UP (ref 0–0)
PLATELET # BLD AUTO: 202 K/UL — SIGNIFICANT CHANGE UP (ref 150–400)
PMV BLD: 10.6 FL — SIGNIFICANT CHANGE UP (ref 7–13)
POTASSIUM SERPL-MCNC: 4.4 MMOL/L — SIGNIFICANT CHANGE UP (ref 3.5–5)
POTASSIUM SERPL-SCNC: 4.4 MMOL/L — SIGNIFICANT CHANGE UP (ref 3.5–5)
PROT SERPL-MCNC: 6.8 G/DL — SIGNIFICANT CHANGE UP (ref 6–8)
RBC # BLD: 4.28 M/UL — SIGNIFICANT CHANGE UP (ref 3.8–5.2)
RBC # FLD: 12.7 % — SIGNIFICANT CHANGE UP (ref 10.3–14.5)
SODIUM SERPL-SCNC: 138 MMOL/L — SIGNIFICANT CHANGE UP (ref 135–146)
WBC # BLD: 6.62 K/UL — SIGNIFICANT CHANGE UP (ref 3.8–10.5)
WBC # FLD AUTO: 6.62 K/UL — SIGNIFICANT CHANGE UP (ref 3.8–10.5)

## 2025-07-20 PROCEDURE — 99232 SBSQ HOSP IP/OBS MODERATE 35: CPT

## 2025-07-20 RX ORDER — ACETAMINOPHEN 500 MG/5ML
650 LIQUID (ML) ORAL EVERY 8 HOURS
Refills: 0 | Status: DISCONTINUED | OUTPATIENT
Start: 2025-07-20 | End: 2025-07-21

## 2025-07-20 RX ORDER — TAMSULOSIN HYDROCHLORIDE 0.4 MG/1
0.4 CAPSULE ORAL AT BEDTIME
Refills: 0 | Status: DISCONTINUED | OUTPATIENT
Start: 2025-07-21 | End: 2025-07-21

## 2025-07-20 RX ORDER — LIDOCAINE HYDROCHLORIDE 20 MG/ML
1 JELLY TOPICAL EVERY 24 HOURS
Refills: 0 | Status: DISCONTINUED | OUTPATIENT
Start: 2025-07-20 | End: 2025-07-21

## 2025-07-20 RX ORDER — GABAPENTIN 400 MG/1
300 CAPSULE ORAL THREE TIMES A DAY
Refills: 0 | Status: DISCONTINUED | OUTPATIENT
Start: 2025-07-20 | End: 2025-07-21

## 2025-07-20 RX ORDER — TAMSULOSIN HYDROCHLORIDE 0.4 MG/1
0.4 CAPSULE ORAL ONCE
Refills: 0 | Status: COMPLETED | OUTPATIENT
Start: 2025-07-20 | End: 2025-07-20

## 2025-07-20 RX ORDER — ENOXAPARIN SODIUM 100 MG/ML
40 INJECTION SUBCUTANEOUS EVERY 24 HOURS
Refills: 0 | Status: DISCONTINUED | OUTPATIENT
Start: 2025-07-20 | End: 2025-07-21

## 2025-07-20 RX ORDER — GABAPENTIN 400 MG/1
300 CAPSULE ORAL THREE TIMES A DAY
Refills: 0 | Status: DISCONTINUED | OUTPATIENT
Start: 2025-07-20 | End: 2025-07-20

## 2025-07-20 RX ADMIN — Medication 4 MILLIGRAM(S): at 14:02

## 2025-07-20 RX ADMIN — OXYBUTYNIN CHLORIDE 5 MILLIGRAM(S): 5 TABLET, FILM COATED, EXTENDED RELEASE ORAL at 05:07

## 2025-07-20 RX ADMIN — Medication 10 MILLIGRAM(S): at 17:21

## 2025-07-20 RX ADMIN — Medication 4 MILLIGRAM(S): at 18:02

## 2025-07-20 RX ADMIN — GABAPENTIN 300 MILLIGRAM(S): 400 CAPSULE ORAL at 16:28

## 2025-07-20 RX ADMIN — Medication 4 MILLIGRAM(S): at 09:18

## 2025-07-20 RX ADMIN — POLYETHYLENE GLYCOL 3350 17 GRAM(S): 17 POWDER, FOR SOLUTION ORAL at 05:08

## 2025-07-20 RX ADMIN — Medication 1 APPLICATION(S): at 12:33

## 2025-07-20 RX ADMIN — Medication 4 MILLIGRAM(S): at 21:42

## 2025-07-20 RX ADMIN — Medication 75 MICROGRAM(S): at 05:07

## 2025-07-20 RX ADMIN — Medication 650 MILLIGRAM(S): at 13:37

## 2025-07-20 RX ADMIN — Medication 40 MILLIGRAM(S): at 05:11

## 2025-07-20 RX ADMIN — Medication 4 MILLIGRAM(S): at 13:32

## 2025-07-20 RX ADMIN — GABAPENTIN 300 MILLIGRAM(S): 400 CAPSULE ORAL at 23:19

## 2025-07-20 RX ADMIN — Medication 10 MILLIGRAM(S): at 12:32

## 2025-07-20 RX ADMIN — Medication 4 MILLIGRAM(S): at 21:50

## 2025-07-20 RX ADMIN — ROSUVASTATIN CALCIUM 10 MILLIGRAM(S): 20 TABLET, FILM COATED ORAL at 21:42

## 2025-07-20 RX ADMIN — Medication 650 MILLIGRAM(S): at 14:07

## 2025-07-20 RX ADMIN — TAMSULOSIN HYDROCHLORIDE 0.4 MILLIGRAM(S): 0.4 CAPSULE ORAL at 12:55

## 2025-07-20 RX ADMIN — Medication 650 MILLIGRAM(S): at 21:42

## 2025-07-20 RX ADMIN — Medication 4 MILLIGRAM(S): at 05:20

## 2025-07-20 RX ADMIN — Medication 4 MILLIGRAM(S): at 22:43

## 2025-07-20 RX ADMIN — Medication 2 TABLET(S): at 21:42

## 2025-07-20 RX ADMIN — CEFTRIAXONE 100 MILLIGRAM(S): 500 INJECTION, POWDER, FOR SOLUTION INTRAMUSCULAR; INTRAVENOUS at 17:22

## 2025-07-20 RX ADMIN — Medication 4 MILLIGRAM(S): at 01:16

## 2025-07-20 RX ADMIN — Medication 4 MILLIGRAM(S): at 05:15

## 2025-07-20 RX ADMIN — Medication 4 MILLIGRAM(S): at 01:26

## 2025-07-20 RX ADMIN — Medication 650 MILLIGRAM(S): at 22:42

## 2025-07-20 RX ADMIN — OXYBUTYNIN CHLORIDE 5 MILLIGRAM(S): 5 TABLET, FILM COATED, EXTENDED RELEASE ORAL at 17:22

## 2025-07-20 RX ADMIN — Medication 4 MILLIGRAM(S): at 00:00

## 2025-07-20 RX ADMIN — Medication 10 MILLIGRAM(S): at 05:07

## 2025-07-20 RX ADMIN — Medication 4 MILLIGRAM(S): at 17:32

## 2025-07-20 RX ADMIN — CITALOPRAM 40 MILLIGRAM(S): 20 TABLET ORAL at 12:33

## 2025-07-20 RX ADMIN — Medication 10 MILLIGRAM(S): at 23:33

## 2025-07-20 RX ADMIN — LIDOCAINE HYDROCHLORIDE 1 PATCH: 20 JELLY TOPICAL at 23:37

## 2025-07-21 ENCOUNTER — TRANSCRIPTION ENCOUNTER (OUTPATIENT)
Age: 50
End: 2025-07-21

## 2025-07-21 VITALS
OXYGEN SATURATION: 92 % | SYSTOLIC BLOOD PRESSURE: 102 MMHG | DIASTOLIC BLOOD PRESSURE: 69 MMHG | HEART RATE: 85 BPM | RESPIRATION RATE: 18 BRPM | TEMPERATURE: 97 F

## 2025-07-21 LAB
AMPHET UR-MCNC: NEGATIVE — SIGNIFICANT CHANGE UP
BARBITURATES UR SCN-MCNC: NEGATIVE — SIGNIFICANT CHANGE UP
BENZODIAZ UR-MCNC: POSITIVE
COCAINE METAB.OTHER UR-MCNC: NEGATIVE — SIGNIFICANT CHANGE UP
DRUG SCREEN 1, URINE RESULT: SIGNIFICANT CHANGE UP
FENTANYL UR QL: NEGATIVE — SIGNIFICANT CHANGE UP
METHADONE UR-MCNC: POSITIVE
OPIATES UR-MCNC: POSITIVE
OXYCODONE UR-MCNC: NEGATIVE — SIGNIFICANT CHANGE UP
PCP UR-MCNC: NEGATIVE — SIGNIFICANT CHANGE UP
PROPOXYPHENE QUALITATIVE URINE RESULT: NEGATIVE — SIGNIFICANT CHANGE UP
THC UR QL: NEGATIVE — SIGNIFICANT CHANGE UP

## 2025-07-21 PROCEDURE — 99239 HOSP IP/OBS DSCHRG MGMT >30: CPT

## 2025-07-21 RX ORDER — ONDANSETRON HCL/PF 4 MG/2 ML
1 VIAL (ML) INJECTION
Qty: 2 | Refills: 0
Start: 2025-07-21 | End: 2025-07-25

## 2025-07-21 RX ORDER — CEFPODOXIME PROXETIL 200 MG/1
1 TABLET, FILM COATED ORAL
Qty: 8 | Refills: 0
Start: 2025-07-21 | End: 2025-07-24

## 2025-07-21 RX ORDER — CEFPODOXIME PROXETIL 200 MG/1
1 TABLET, FILM COATED ORAL
Qty: 10 | Refills: 0
Start: 2025-07-21 | End: 2025-07-25

## 2025-07-21 RX ORDER — SENNA 187 MG
2 TABLET ORAL
Qty: 60 | Refills: 0
Start: 2025-07-21 | End: 2025-08-19

## 2025-07-21 RX ORDER — GABAPENTIN 400 MG/1
1 CAPSULE ORAL
Qty: 21 | Refills: 0
Start: 2025-07-21 | End: 2025-07-27

## 2025-07-21 RX ORDER — LIDOCAINE HYDROCHLORIDE 20 MG/ML
1 JELLY TOPICAL
Qty: 2 | Refills: 0
Start: 2025-07-21 | End: 2025-07-30

## 2025-07-21 RX ADMIN — POLYETHYLENE GLYCOL 3350 17 GRAM(S): 17 POWDER, FOR SOLUTION ORAL at 05:36

## 2025-07-21 RX ADMIN — Medication 4 MILLIGRAM(S): at 02:00

## 2025-07-21 RX ADMIN — Medication 4 MILLIGRAM(S): at 01:52

## 2025-07-21 RX ADMIN — Medication 75 MICROGRAM(S): at 05:35

## 2025-07-21 RX ADMIN — LIDOCAINE HYDROCHLORIDE 1 PATCH: 20 JELLY TOPICAL at 06:38

## 2025-07-21 RX ADMIN — Medication 650 MILLIGRAM(S): at 05:35

## 2025-07-21 RX ADMIN — Medication 4 MILLIGRAM(S): at 10:06

## 2025-07-21 RX ADMIN — Medication 650 MILLIGRAM(S): at 06:35

## 2025-07-21 RX ADMIN — Medication 1 APPLICATION(S): at 11:12

## 2025-07-21 RX ADMIN — Medication 10 MILLIGRAM(S): at 11:12

## 2025-07-21 RX ADMIN — Medication 4 MILLIGRAM(S): at 06:04

## 2025-07-21 RX ADMIN — Medication 40 MILLIGRAM(S): at 06:06

## 2025-07-21 RX ADMIN — GABAPENTIN 300 MILLIGRAM(S): 400 CAPSULE ORAL at 05:35

## 2025-07-21 RX ADMIN — OXYBUTYNIN CHLORIDE 5 MILLIGRAM(S): 5 TABLET, FILM COATED, EXTENDED RELEASE ORAL at 05:35

## 2025-07-21 RX ADMIN — Medication 10 MILLIGRAM(S): at 05:35

## 2025-07-21 RX ADMIN — Medication 4 MILLIGRAM(S): at 06:10

## 2025-07-21 RX ADMIN — CITALOPRAM 40 MILLIGRAM(S): 20 TABLET ORAL at 11:12

## 2025-07-21 RX ADMIN — ENOXAPARIN SODIUM 40 MILLIGRAM(S): 100 INJECTION SUBCUTANEOUS at 05:36

## 2025-07-24 LAB
1OH-MIDAZOLAM UR CFM-MCNC: NEGATIVE NG/ML — SIGNIFICANT CHANGE UP
6MAM UR CFM-MCNC: NEGATIVE NG/ML — SIGNIFICANT CHANGE UP
7AMINOCLONAZEPAM UR CFM-MCNC: NEGATIVE NG/ML — SIGNIFICANT CHANGE UP
ALPHA-HYDROXYALPRAZOLAM, UR RESULT: 267 NG/ML — HIGH
ALPRAZ UR CFM-MCNC: NEGATIVE NG/ML — SIGNIFICANT CHANGE UP
BENZODIAZ UR QL SCN: POSITIVE
CLONAZEPAM UR CFM-MCNC: NEGATIVE NG/ML — SIGNIFICANT CHANGE UP
CODEINE UR CFM-MCNC: NEGATIVE NG/ML — SIGNIFICANT CHANGE UP
CULTURE RESULTS: SIGNIFICANT CHANGE UP
CULTURE RESULTS: SIGNIFICANT CHANGE UP
DIAZEPAM UR CFM-MCNC: NEGATIVE NG/ML — SIGNIFICANT CHANGE UP
DIHYDROCODONE RESULT: NEGATIVE NG/ML — SIGNIFICANT CHANGE UP
EDDP UR QL CFM: 1297 NG/ML — HIGH
EDDP, UR RESULT: 1297 NG/ML — HIGH
FLUNITRAZEPAM UR CFM-MCNC: NEGATIVE NG/ML — SIGNIFICANT CHANGE UP
FLURAZEPAM UR CFM-MCNC: NEGATIVE NG/ML — SIGNIFICANT CHANGE UP
HYDROCODONE UR QL CFM: NEGATIVE NG/ML — SIGNIFICANT CHANGE UP
HYDROMORPHONE UR QL CFM: 48 NG/ML — HIGH
LORAZEPAM UR CFM-MCNC: NEGATIVE NG/ML — SIGNIFICANT CHANGE UP
MEPERIDINE RESULT: NEGATIVE NG/ML — SIGNIFICANT CHANGE UP
METHADONE UR CFM-MCNC: POSITIVE
MIDAZOLAM UR CFM-MCNC: NEGATIVE NG/ML — SIGNIFICANT CHANGE UP
MORPHINE UR QL CFM: 9874 NG/ML — HIGH
NALOXONE RESULT: NEGATIVE NG/ML — SIGNIFICANT CHANGE UP
NALOXONE UR CFM-MCNC: NEGATIVE NG/ML — SIGNIFICANT CHANGE UP
NALTREXONE RESULT: NEGATIVE NG/ML — SIGNIFICANT CHANGE UP
NORDIAZEPAM, UR RESULT: NEGATIVE NG/ML — SIGNIFICANT CHANGE UP
OPIATES UR QL CFM: POSITIVE
OXAZEPAM UR QL SCN: NEGATIVE NG/ML — SIGNIFICANT CHANGE UP
SPECIMEN SOURCE: SIGNIFICANT CHANGE UP
SPECIMEN SOURCE: SIGNIFICANT CHANGE UP
TAPENTADOL RESULT: NEGATIVE NG/ML — SIGNIFICANT CHANGE UP
TEMAZEPAM UR CFM-MCNC: NEGATIVE NG/ML — SIGNIFICANT CHANGE UP

## 2025-07-27 DIAGNOSIS — R79.89 OTHER SPECIFIED ABNORMAL FINDINGS OF BLOOD CHEMISTRY: ICD-10-CM

## 2025-07-27 DIAGNOSIS — K21.9 GASTRO-ESOPHAGEAL REFLUX DISEASE WITHOUT ESOPHAGITIS: ICD-10-CM

## 2025-07-27 DIAGNOSIS — Z79.890 HORMONE REPLACEMENT THERAPY: ICD-10-CM

## 2025-07-27 DIAGNOSIS — N12 TUBULO-INTERSTITIAL NEPHRITIS, NOT SPECIFIED AS ACUTE OR CHRONIC: ICD-10-CM

## 2025-07-27 DIAGNOSIS — Z88.6 ALLERGY STATUS TO ANALGESIC AGENT: ICD-10-CM

## 2025-07-27 DIAGNOSIS — G89.29 OTHER CHRONIC PAIN: ICD-10-CM

## 2025-07-27 DIAGNOSIS — N32.81 OVERACTIVE BLADDER: ICD-10-CM

## 2025-07-27 DIAGNOSIS — F41.9 ANXIETY DISORDER, UNSPECIFIED: ICD-10-CM

## 2025-07-27 DIAGNOSIS — E87.5 HYPERKALEMIA: ICD-10-CM

## 2025-07-27 DIAGNOSIS — E78.5 HYPERLIPIDEMIA, UNSPECIFIED: ICD-10-CM

## 2025-07-27 DIAGNOSIS — R11.2 NAUSEA WITH VOMITING, UNSPECIFIED: ICD-10-CM

## 2025-07-27 DIAGNOSIS — Z91.012 ALLERGY TO EGGS: ICD-10-CM

## 2025-07-27 DIAGNOSIS — E03.9 HYPOTHYROIDISM, UNSPECIFIED: ICD-10-CM

## 2025-07-27 DIAGNOSIS — R82.81 PYURIA: ICD-10-CM

## 2025-07-27 DIAGNOSIS — Z91.018 ALLERGY TO OTHER FOODS: ICD-10-CM

## 2025-07-27 DIAGNOSIS — E28.310 SYMPTOMATIC PREMATURE MENOPAUSE: ICD-10-CM

## 2025-08-12 ENCOUNTER — RX RENEWAL (OUTPATIENT)
Age: 50
End: 2025-08-12

## 2025-08-14 ENCOUNTER — APPOINTMENT (OUTPATIENT)
Dept: GASTROENTEROLOGY | Facility: CLINIC | Age: 50
End: 2025-08-14
Payer: MEDICAID

## 2025-08-14 DIAGNOSIS — R10.9 UNSPECIFIED ABDOMINAL PAIN: ICD-10-CM

## 2025-08-14 DIAGNOSIS — K83.8 OTHER SPECIFIED DISEASES OF BILIARY TRACT: ICD-10-CM

## 2025-08-14 DIAGNOSIS — Z12.11 ENCOUNTER FOR SCREENING FOR MALIGNANT NEOPLASM OF COLON: ICD-10-CM

## 2025-08-14 DIAGNOSIS — K85.90 ACUTE PANCREATITIS WITHOUT NECROSIS OR INFECTION, UNSPECIFIED: ICD-10-CM

## 2025-08-14 DIAGNOSIS — R79.89 OTHER SPECIFIED ABNORMAL FINDINGS OF BLOOD CHEMISTRY: ICD-10-CM

## 2025-08-14 PROCEDURE — 99213 OFFICE O/P EST LOW 20 MIN: CPT | Mod: 95

## 2025-08-14 RX ORDER — SODIUM SULFATE, POTASSIUM SULFATE AND MAGNESIUM SULFATE 1.6; 3.13; 17.5 G/177ML; G/177ML; G/177ML
17.5-3.13-1.6 SOLUTION ORAL
Qty: 1 | Refills: 0 | Status: ACTIVE | COMMUNITY
Start: 2025-08-14 | End: 1900-01-01

## 2025-08-15 PROBLEM — E78.5 HYPERLIPIDEMIA, UNSPECIFIED: Chronic | Status: ACTIVE | Noted: 2025-07-19

## 2025-08-17 ENCOUNTER — EMERGENCY (EMERGENCY)
Facility: HOSPITAL | Age: 50
LOS: 0 days | Discharge: HOME | End: 2025-08-17
Attending: STUDENT IN AN ORGANIZED HEALTH CARE EDUCATION/TRAINING PROGRAM
Payer: MEDICAID

## 2025-08-17 VITALS
HEART RATE: 106 BPM | RESPIRATION RATE: 18 BRPM | SYSTOLIC BLOOD PRESSURE: 115 MMHG | DIASTOLIC BLOOD PRESSURE: 82 MMHG | TEMPERATURE: 98 F | OXYGEN SATURATION: 100 % | HEIGHT: 64 IN | WEIGHT: 179.9 LBS

## 2025-08-17 DIAGNOSIS — D30.3 BENIGN NEOPLASM OF BLADDER: ICD-10-CM

## 2025-08-17 DIAGNOSIS — Z98.890 OTHER SPECIFIED POSTPROCEDURAL STATES: Chronic | ICD-10-CM

## 2025-08-17 DIAGNOSIS — R11.0 NAUSEA: ICD-10-CM

## 2025-08-17 DIAGNOSIS — R10.9 UNSPECIFIED ABDOMINAL PAIN: ICD-10-CM

## 2025-08-17 DIAGNOSIS — Z88.1 ALLERGY STATUS TO OTHER ANTIBIOTIC AGENTS: ICD-10-CM

## 2025-08-17 DIAGNOSIS — Z98.890 OTHER SPECIFIED POSTPROCEDURAL STATES: ICD-10-CM

## 2025-08-17 DIAGNOSIS — R30.0 DYSURIA: ICD-10-CM

## 2025-08-17 DIAGNOSIS — Z96.0 PRESENCE OF UROGENITAL IMPLANTS: ICD-10-CM

## 2025-08-17 DIAGNOSIS — Z87.442 PERSONAL HISTORY OF URINARY CALCULI: ICD-10-CM

## 2025-08-17 DIAGNOSIS — R68.83 CHILLS (WITHOUT FEVER): ICD-10-CM

## 2025-08-17 DIAGNOSIS — Z90.49 ACQUIRED ABSENCE OF OTHER SPECIFIED PARTS OF DIGESTIVE TRACT: Chronic | ICD-10-CM

## 2025-08-17 DIAGNOSIS — Z91.012 ALLERGY TO EGGS: ICD-10-CM

## 2025-08-17 DIAGNOSIS — Z88.6 ALLERGY STATUS TO ANALGESIC AGENT: ICD-10-CM

## 2025-08-17 DIAGNOSIS — G89.29 OTHER CHRONIC PAIN: ICD-10-CM

## 2025-08-17 DIAGNOSIS — Z91.018 ALLERGY TO OTHER FOODS: ICD-10-CM

## 2025-08-17 LAB
ALBUMIN SERPL ELPH-MCNC: 5.1 G/DL — SIGNIFICANT CHANGE UP (ref 3.5–5.2)
ALP SERPL-CCNC: 92 U/L — SIGNIFICANT CHANGE UP (ref 30–115)
ALT FLD-CCNC: 15 U/L — SIGNIFICANT CHANGE UP (ref 0–41)
ANION GAP SERPL CALC-SCNC: 16 MMOL/L — HIGH (ref 7–14)
APPEARANCE UR: ABNORMAL
APPEARANCE UR: ABNORMAL
AST SERPL-CCNC: 23 U/L — SIGNIFICANT CHANGE UP (ref 0–41)
BASOPHILS # BLD AUTO: 0 K/UL — SIGNIFICANT CHANGE UP (ref 0–0.2)
BASOPHILS NFR BLD AUTO: 0 % — SIGNIFICANT CHANGE UP (ref 0–2)
BILIRUB SERPL-MCNC: 0.4 MG/DL — SIGNIFICANT CHANGE UP (ref 0.2–1.2)
BILIRUB UR-MCNC: NEGATIVE — SIGNIFICANT CHANGE UP
BILIRUB UR-MCNC: NEGATIVE — SIGNIFICANT CHANGE UP
BUN SERPL-MCNC: 11 MG/DL — SIGNIFICANT CHANGE UP (ref 10–20)
CALCIUM SERPL-MCNC: 10.3 MG/DL — SIGNIFICANT CHANGE UP (ref 8.4–10.5)
CHLORIDE SERPL-SCNC: 100 MMOL/L — SIGNIFICANT CHANGE UP (ref 98–110)
CO2 SERPL-SCNC: 22 MMOL/L — SIGNIFICANT CHANGE UP (ref 17–32)
COLOR SPEC: YELLOW — SIGNIFICANT CHANGE UP
COLOR SPEC: YELLOW — SIGNIFICANT CHANGE UP
CREAT SERPL-MCNC: 0.8 MG/DL — SIGNIFICANT CHANGE UP (ref 0.7–1.5)
DIFF PNL FLD: NEGATIVE — SIGNIFICANT CHANGE UP
DIFF PNL FLD: NEGATIVE — SIGNIFICANT CHANGE UP
EGFR: 91 ML/MIN/1.73M2 — SIGNIFICANT CHANGE UP
EGFR: 91 ML/MIN/1.73M2 — SIGNIFICANT CHANGE UP
EOSINOPHIL # BLD AUTO: 0.01 K/UL — SIGNIFICANT CHANGE UP (ref 0–0.5)
EOSINOPHIL NFR BLD AUTO: 0.1 % — SIGNIFICANT CHANGE UP (ref 0–6)
GLUCOSE SERPL-MCNC: 100 MG/DL — HIGH (ref 70–99)
GLUCOSE UR QL: NEGATIVE MG/DL — SIGNIFICANT CHANGE UP
GLUCOSE UR QL: NEGATIVE MG/DL — SIGNIFICANT CHANGE UP
HCG SERPL QL: ABNORMAL
HCG SERPL-ACNC: 7.3 MIU/ML — HIGH
HCT VFR BLD CALC: 41.5 % — SIGNIFICANT CHANGE UP (ref 34.5–45)
HGB BLD-MCNC: 14 G/DL — SIGNIFICANT CHANGE UP (ref 11.5–15.5)
IMM GRANULOCYTES # BLD AUTO: 0.02 K/UL — SIGNIFICANT CHANGE UP (ref 0–0.07)
IMM GRANULOCYTES NFR BLD AUTO: 0.3 % — SIGNIFICANT CHANGE UP (ref 0–0.9)
KETONES UR QL: NEGATIVE MG/DL — SIGNIFICANT CHANGE UP
KETONES UR QL: NEGATIVE MG/DL — SIGNIFICANT CHANGE UP
LEUKOCYTE ESTERASE UR-ACNC: ABNORMAL
LEUKOCYTE ESTERASE UR-ACNC: ABNORMAL
LIDOCAIN IGE QN: 45 U/L — SIGNIFICANT CHANGE UP (ref 7–60)
LYMPHOCYTES # BLD AUTO: 1.57 K/UL — SIGNIFICANT CHANGE UP (ref 1–3.3)
LYMPHOCYTES NFR BLD AUTO: 22.2 % — SIGNIFICANT CHANGE UP (ref 13–44)
MCHC RBC-ENTMCNC: 30.4 PG — SIGNIFICANT CHANGE UP (ref 27–34)
MCHC RBC-ENTMCNC: 33.7 G/DL — SIGNIFICANT CHANGE UP (ref 32–36)
MCV RBC AUTO: 90 FL — SIGNIFICANT CHANGE UP (ref 80–100)
MONOCYTES # BLD AUTO: 0.51 K/UL — SIGNIFICANT CHANGE UP (ref 0–0.9)
MONOCYTES NFR BLD AUTO: 7.2 % — SIGNIFICANT CHANGE UP (ref 2–14)
NEUTROPHILS # BLD AUTO: 4.96 K/UL — SIGNIFICANT CHANGE UP (ref 1.8–7.4)
NEUTROPHILS NFR BLD AUTO: 70.2 % — SIGNIFICANT CHANGE UP (ref 43–77)
NITRITE UR-MCNC: NEGATIVE — SIGNIFICANT CHANGE UP
NITRITE UR-MCNC: NEGATIVE — SIGNIFICANT CHANGE UP
NRBC # BLD AUTO: 0 K/UL — SIGNIFICANT CHANGE UP (ref 0–0)
NRBC # FLD: 0 K/UL — SIGNIFICANT CHANGE UP (ref 0–0)
NRBC BLD AUTO-RTO: 0 /100 WBCS — SIGNIFICANT CHANGE UP (ref 0–0)
PH UR: 7.5 — SIGNIFICANT CHANGE UP (ref 5–8)
PH UR: 7.5 — SIGNIFICANT CHANGE UP (ref 5–8)
PLATELET # BLD AUTO: 255 K/UL — SIGNIFICANT CHANGE UP (ref 150–400)
PMV BLD: 10.3 FL — SIGNIFICANT CHANGE UP (ref 7–13)
POTASSIUM SERPL-MCNC: 4.6 MMOL/L — SIGNIFICANT CHANGE UP (ref 3.5–5)
POTASSIUM SERPL-SCNC: 4.6 MMOL/L — SIGNIFICANT CHANGE UP (ref 3.5–5)
PROT SERPL-MCNC: 8.3 G/DL — HIGH (ref 6–8)
PROT UR-MCNC: NEGATIVE MG/DL — SIGNIFICANT CHANGE UP
PROT UR-MCNC: SIGNIFICANT CHANGE UP MG/DL
RBC # BLD: 4.61 M/UL — SIGNIFICANT CHANGE UP (ref 3.8–5.2)
RBC # FLD: 12.9 % — SIGNIFICANT CHANGE UP (ref 10.3–14.5)
SODIUM SERPL-SCNC: 138 MMOL/L — SIGNIFICANT CHANGE UP (ref 135–146)
SP GR SPEC: 1.02 — SIGNIFICANT CHANGE UP (ref 1–1.03)
SP GR SPEC: 1.02 — SIGNIFICANT CHANGE UP (ref 1–1.03)
UROBILINOGEN FLD QL: 1 MG/DL — SIGNIFICANT CHANGE UP (ref 0.2–1)
UROBILINOGEN FLD QL: 1 MG/DL — SIGNIFICANT CHANGE UP (ref 0.2–1)
WBC # BLD: 7.07 K/UL — SIGNIFICANT CHANGE UP (ref 3.8–10.5)
WBC # FLD AUTO: 7.07 K/UL — SIGNIFICANT CHANGE UP (ref 3.8–10.5)

## 2025-08-17 PROCEDURE — 99285 EMERGENCY DEPT VISIT HI MDM: CPT | Mod: 25

## 2025-08-17 PROCEDURE — 93005 ELECTROCARDIOGRAM TRACING: CPT

## 2025-08-17 PROCEDURE — 87086 URINE CULTURE/COLONY COUNT: CPT

## 2025-08-17 PROCEDURE — 93010 ELECTROCARDIOGRAM REPORT: CPT

## 2025-08-17 PROCEDURE — 96366 THER/PROPH/DIAG IV INF ADDON: CPT

## 2025-08-17 PROCEDURE — 83690 ASSAY OF LIPASE: CPT

## 2025-08-17 PROCEDURE — 84703 CHORIONIC GONADOTROPIN ASSAY: CPT

## 2025-08-17 PROCEDURE — 96365 THER/PROPH/DIAG IV INF INIT: CPT | Mod: XU

## 2025-08-17 PROCEDURE — 87077 CULTURE AEROBIC IDENTIFY: CPT

## 2025-08-17 PROCEDURE — 96361 HYDRATE IV INFUSION ADD-ON: CPT

## 2025-08-17 PROCEDURE — 74177 CT ABD & PELVIS W/CONTRAST: CPT | Mod: 26

## 2025-08-17 PROCEDURE — 81001 URINALYSIS AUTO W/SCOPE: CPT

## 2025-08-17 PROCEDURE — 85025 COMPLETE CBC W/AUTO DIFF WBC: CPT

## 2025-08-17 PROCEDURE — 96376 TX/PRO/DX INJ SAME DRUG ADON: CPT

## 2025-08-17 PROCEDURE — 84702 CHORIONIC GONADOTROPIN TEST: CPT

## 2025-08-17 PROCEDURE — 96375 TX/PRO/DX INJ NEW DRUG ADDON: CPT

## 2025-08-17 PROCEDURE — 74177 CT ABD & PELVIS W/CONTRAST: CPT

## 2025-08-17 PROCEDURE — 99285 EMERGENCY DEPT VISIT HI MDM: CPT

## 2025-08-17 PROCEDURE — 36415 COLL VENOUS BLD VENIPUNCTURE: CPT

## 2025-08-17 PROCEDURE — 80053 COMPREHEN METABOLIC PANEL: CPT

## 2025-08-17 RX ORDER — HYDROMORPHONE/SOD CHLOR,ISO/PF 2 MG/10 ML
0.5 SYRINGE (ML) INJECTION ONCE
Refills: 0 | Status: DISCONTINUED | OUTPATIENT
Start: 2025-08-17 | End: 2025-08-17

## 2025-08-17 RX ORDER — METOCLOPRAMIDE HCL 10 MG
10 TABLET ORAL ONCE
Refills: 0 | Status: COMPLETED | OUTPATIENT
Start: 2025-08-17 | End: 2025-08-17

## 2025-08-17 RX ORDER — CEFTRIAXONE 500 MG/1
2000 INJECTION, POWDER, FOR SOLUTION INTRAMUSCULAR; INTRAVENOUS ONCE
Refills: 0 | Status: COMPLETED | OUTPATIENT
Start: 2025-08-17 | End: 2025-08-17

## 2025-08-17 RX ORDER — ONDANSETRON HCL/PF 4 MG/2 ML
4 VIAL (ML) INJECTION ONCE
Refills: 0 | Status: COMPLETED | OUTPATIENT
Start: 2025-08-17 | End: 2025-08-17

## 2025-08-17 RX ORDER — HYDROMORPHONE/SOD CHLOR,ISO/PF 2 MG/10 ML
1 SYRINGE (ML) INJECTION ONCE
Refills: 0 | Status: DISCONTINUED | OUTPATIENT
Start: 2025-08-17 | End: 2025-08-17

## 2025-08-17 RX ADMIN — Medication 20 MILLIGRAM(S): at 17:14

## 2025-08-17 RX ADMIN — Medication 1000 MILLILITER(S): at 17:14

## 2025-08-17 RX ADMIN — Medication 1 MILLIGRAM(S): at 19:16

## 2025-08-17 RX ADMIN — Medication 4 MILLIGRAM(S): at 19:16

## 2025-08-17 RX ADMIN — CEFTRIAXONE 100 MILLIGRAM(S): 500 INJECTION, POWDER, FOR SOLUTION INTRAMUSCULAR; INTRAVENOUS at 19:26

## 2025-08-17 RX ADMIN — Medication 4 MILLIGRAM(S): at 14:18

## 2025-08-17 RX ADMIN — Medication 1000 MILLILITER(S): at 12:33

## 2025-08-17 RX ADMIN — Medication 4 MILLIGRAM(S): at 12:31

## 2025-08-17 RX ADMIN — Medication 1000 MILLILITER(S): at 19:20

## 2025-08-17 RX ADMIN — Medication 104 MILLIGRAM(S): at 17:13

## 2025-08-17 RX ADMIN — Medication 4 MILLIGRAM(S): at 12:29

## 2025-08-17 RX ADMIN — Medication 0.5 MILLIGRAM(S): at 19:26

## 2025-08-17 RX ADMIN — Medication 10 MILLIGRAM(S): at 19:16

## 2025-08-17 RX ADMIN — Medication 1 MILLIGRAM(S): at 17:14

## 2025-08-18 ENCOUNTER — INPATIENT (INPATIENT)
Facility: HOSPITAL | Age: 50
LOS: 0 days | Discharge: HOME | DRG: 249 | End: 2025-08-19
Attending: STUDENT IN AN ORGANIZED HEALTH CARE EDUCATION/TRAINING PROGRAM | Admitting: HOSPITALIST
Payer: MEDICAID

## 2025-08-18 VITALS
DIASTOLIC BLOOD PRESSURE: 100 MMHG | SYSTOLIC BLOOD PRESSURE: 142 MMHG | OXYGEN SATURATION: 100 % | RESPIRATION RATE: 17 BRPM | HEART RATE: 86 BPM | TEMPERATURE: 98 F

## 2025-08-18 DIAGNOSIS — Z98.890 OTHER SPECIFIED POSTPROCEDURAL STATES: Chronic | ICD-10-CM

## 2025-08-18 DIAGNOSIS — Z90.49 ACQUIRED ABSENCE OF OTHER SPECIFIED PARTS OF DIGESTIVE TRACT: Chronic | ICD-10-CM

## 2025-08-18 DIAGNOSIS — R11.2 NAUSEA WITH VOMITING, UNSPECIFIED: ICD-10-CM

## 2025-08-18 LAB
ALBUMIN SERPL ELPH-MCNC: 4.9 G/DL — SIGNIFICANT CHANGE UP (ref 3.5–5.2)
ALP SERPL-CCNC: 83 U/L — SIGNIFICANT CHANGE UP (ref 30–115)
ALT FLD-CCNC: 14 U/L — SIGNIFICANT CHANGE UP (ref 0–41)
ANION GAP SERPL CALC-SCNC: 17 MMOL/L — HIGH (ref 7–14)
AST SERPL-CCNC: 16 U/L — SIGNIFICANT CHANGE UP (ref 0–41)
BASOPHILS # BLD AUTO: 0 K/UL — SIGNIFICANT CHANGE UP (ref 0–0.2)
BASOPHILS NFR BLD AUTO: 0 % — SIGNIFICANT CHANGE UP (ref 0–2)
BILIRUB SERPL-MCNC: 0.3 MG/DL — SIGNIFICANT CHANGE UP (ref 0.2–1.2)
BUN SERPL-MCNC: 9 MG/DL — LOW (ref 10–20)
CALCIUM SERPL-MCNC: 10 MG/DL — SIGNIFICANT CHANGE UP (ref 8.4–10.5)
CHLORIDE SERPL-SCNC: 103 MMOL/L — SIGNIFICANT CHANGE UP (ref 98–110)
CO2 SERPL-SCNC: 22 MMOL/L — SIGNIFICANT CHANGE UP (ref 17–32)
CREAT SERPL-MCNC: 0.7 MG/DL — SIGNIFICANT CHANGE UP (ref 0.7–1.5)
CRP SERPL-MCNC: <3 MG/L — SIGNIFICANT CHANGE UP
D DIMER BLD IA.RAPID-MCNC: 331 NG/ML DDU — HIGH
D DIMER BLD IA.RAPID-MCNC: 344 NG/ML DDU — HIGH
EGFR: 107 ML/MIN/1.73M2 — SIGNIFICANT CHANGE UP
EGFR: 107 ML/MIN/1.73M2 — SIGNIFICANT CHANGE UP
EOSINOPHIL # BLD AUTO: 0 K/UL — SIGNIFICANT CHANGE UP (ref 0–0.5)
EOSINOPHIL NFR BLD AUTO: 0 % — SIGNIFICANT CHANGE UP (ref 0–6)
ERYTHROCYTE [SEDIMENTATION RATE] IN BLOOD: 20 MM/HR — SIGNIFICANT CHANGE UP (ref 0–20)
GLUCOSE SERPL-MCNC: 106 MG/DL — HIGH (ref 70–99)
HCT VFR BLD CALC: 40.3 % — SIGNIFICANT CHANGE UP (ref 34.5–45)
HGB BLD-MCNC: 13.3 G/DL — SIGNIFICANT CHANGE UP (ref 11.5–15.5)
IMM GRANULOCYTES # BLD AUTO: 0.04 K/UL — SIGNIFICANT CHANGE UP (ref 0–0.07)
IMM GRANULOCYTES NFR BLD AUTO: 0.6 % — SIGNIFICANT CHANGE UP (ref 0–0.9)
LACTATE SERPL-SCNC: 0.9 MMOL/L — SIGNIFICANT CHANGE UP (ref 0.7–2)
LYMPHOCYTES # BLD AUTO: 1.06 K/UL — SIGNIFICANT CHANGE UP (ref 1–3.3)
LYMPHOCYTES NFR BLD AUTO: 15.6 % — SIGNIFICANT CHANGE UP (ref 13–44)
MCHC RBC-ENTMCNC: 29.8 PG — SIGNIFICANT CHANGE UP (ref 27–34)
MCHC RBC-ENTMCNC: 33 G/DL — SIGNIFICANT CHANGE UP (ref 32–36)
MCV RBC AUTO: 90.4 FL — SIGNIFICANT CHANGE UP (ref 80–100)
MONOCYTES # BLD AUTO: 0.47 K/UL — SIGNIFICANT CHANGE UP (ref 0–0.9)
MONOCYTES NFR BLD AUTO: 6.9 % — SIGNIFICANT CHANGE UP (ref 2–14)
NEUTROPHILS # BLD AUTO: 5.22 K/UL — SIGNIFICANT CHANGE UP (ref 1.8–7.4)
NEUTROPHILS NFR BLD AUTO: 76.9 % — SIGNIFICANT CHANGE UP (ref 43–77)
NRBC # BLD AUTO: 0 K/UL — SIGNIFICANT CHANGE UP (ref 0–0)
NRBC # FLD: 0 K/UL — SIGNIFICANT CHANGE UP (ref 0–0)
NRBC BLD AUTO-RTO: 0 /100 WBCS — SIGNIFICANT CHANGE UP (ref 0–0)
PLATELET # BLD AUTO: 226 K/UL — SIGNIFICANT CHANGE UP (ref 150–400)
PMV BLD: 10.1 FL — SIGNIFICANT CHANGE UP (ref 7–13)
POTASSIUM SERPL-MCNC: 4.5 MMOL/L — SIGNIFICANT CHANGE UP (ref 3.5–5)
POTASSIUM SERPL-SCNC: 4.5 MMOL/L — SIGNIFICANT CHANGE UP (ref 3.5–5)
PROT SERPL-MCNC: 7.9 G/DL — SIGNIFICANT CHANGE UP (ref 6–8)
RBC # BLD: 4.46 M/UL — SIGNIFICANT CHANGE UP (ref 3.8–5.2)
RBC # FLD: 12.7 % — SIGNIFICANT CHANGE UP (ref 10.3–14.5)
SODIUM SERPL-SCNC: 142 MMOL/L — SIGNIFICANT CHANGE UP (ref 135–146)
WBC # BLD: 6.79 K/UL — SIGNIFICANT CHANGE UP (ref 3.8–10.5)
WBC # FLD AUTO: 6.79 K/UL — SIGNIFICANT CHANGE UP (ref 3.8–10.5)

## 2025-08-18 PROCEDURE — 86780 TREPONEMA PALLIDUM: CPT

## 2025-08-18 PROCEDURE — 82787 IGG 1 2 3 OR 4 EACH: CPT

## 2025-08-18 PROCEDURE — 84439 ASSAY OF FREE THYROXINE: CPT

## 2025-08-18 PROCEDURE — 99285 EMERGENCY DEPT VISIT HI MDM: CPT

## 2025-08-18 PROCEDURE — 82785 ASSAY OF IGE: CPT

## 2025-08-18 PROCEDURE — 87086 URINE CULTURE/COLONY COUNT: CPT

## 2025-08-18 PROCEDURE — 36415 COLL VENOUS BLD VENIPUNCTURE: CPT

## 2025-08-18 PROCEDURE — 81001 URINALYSIS AUTO W/SCOPE: CPT

## 2025-08-18 PROCEDURE — 80361 OPIATES 1 OR MORE: CPT

## 2025-08-18 PROCEDURE — 85379 FIBRIN DEGRADATION QUANT: CPT

## 2025-08-18 PROCEDURE — 85652 RBC SED RATE AUTOMATED: CPT

## 2025-08-18 PROCEDURE — 86038 ANTINUCLEAR ANTIBODIES: CPT

## 2025-08-18 PROCEDURE — 86140 C-REACTIVE PROTEIN: CPT

## 2025-08-18 PROCEDURE — 74018 RADEX ABDOMEN 1 VIEW: CPT

## 2025-08-18 PROCEDURE — 84443 ASSAY THYROID STIM HORMONE: CPT

## 2025-08-18 PROCEDURE — 83605 ASSAY OF LACTIC ACID: CPT

## 2025-08-18 PROCEDURE — 80354 DRUG SCREENING FENTANYL: CPT

## 2025-08-18 PROCEDURE — 87040 BLOOD CULTURE FOR BACTERIA: CPT

## 2025-08-18 PROCEDURE — 87389 HIV-1 AG W/HIV-1&-2 AB AG IA: CPT

## 2025-08-18 PROCEDURE — 87591 N.GONORRHOEAE DNA AMP PROB: CPT

## 2025-08-18 PROCEDURE — 81025 URINE PREGNANCY TEST: CPT

## 2025-08-18 PROCEDURE — 99233 SBSQ HOSP IP/OBS HIGH 50: CPT

## 2025-08-18 PROCEDURE — 76775 US EXAM ABDO BACK WALL LIM: CPT

## 2025-08-18 PROCEDURE — 86332 IMMUNE COMPLEX ASSAY: CPT

## 2025-08-18 PROCEDURE — 87491 CHLMYD TRACH DNA AMP PROBE: CPT

## 2025-08-18 PROCEDURE — 86161 COMPLEMENT/FUNCTION ACTIVITY: CPT

## 2025-08-18 PROCEDURE — 71275 CT ANGIOGRAPHY CHEST: CPT | Mod: 26

## 2025-08-18 PROCEDURE — 80345 DRUG SCREENING BARBITURATES: CPT

## 2025-08-18 PROCEDURE — 85303 CLOT INHIBIT PROT C ACTIVITY: CPT

## 2025-08-18 PROCEDURE — 80048 BASIC METABOLIC PNL TOTAL CA: CPT

## 2025-08-18 PROCEDURE — 82784 ASSAY IGA/IGD/IGG/IGM EACH: CPT

## 2025-08-18 PROCEDURE — 76775 US EXAM ABDO BACK WALL LIM: CPT | Mod: 26

## 2025-08-18 PROCEDURE — 86160 COMPLEMENT ANTIGEN: CPT

## 2025-08-18 PROCEDURE — 80307 DRUG TEST PRSMV CHEM ANLYZR: CPT

## 2025-08-18 PROCEDURE — 85025 COMPLETE CBC W/AUTO DIFF WBC: CPT

## 2025-08-18 RX ORDER — ROSUVASTATIN CALCIUM 20 MG/1
10 TABLET, FILM COATED ORAL AT BEDTIME
Refills: 0 | Status: DISCONTINUED | OUTPATIENT
Start: 2025-08-18 | End: 2025-08-19

## 2025-08-18 RX ORDER — OXYBUTYNIN CHLORIDE 5 MG/1
5 TABLET, FILM COATED, EXTENDED RELEASE ORAL
Refills: 0 | Status: DISCONTINUED | OUTPATIENT
Start: 2025-08-18 | End: 2025-08-19

## 2025-08-18 RX ORDER — METHADONE HCL 10 MG
20 TABLET ORAL
Refills: 0 | Status: DISCONTINUED | OUTPATIENT
Start: 2025-08-18 | End: 2025-08-19

## 2025-08-18 RX ORDER — PROCHLORPERAZINE 25 MG
10 SUPPOSITORY, RECTAL RECTAL EVERY 6 HOURS
Refills: 0 | Status: DISCONTINUED | OUTPATIENT
Start: 2025-08-18 | End: 2025-08-18

## 2025-08-18 RX ORDER — ONDANSETRON HCL/PF 4 MG/2 ML
4 VIAL (ML) INJECTION EVERY 6 HOURS
Refills: 0 | Status: DISCONTINUED | OUTPATIENT
Start: 2025-08-18 | End: 2025-08-18

## 2025-08-18 RX ORDER — METOCLOPRAMIDE HCL 10 MG
10 TABLET ORAL ONCE
Refills: 0 | Status: COMPLETED | OUTPATIENT
Start: 2025-08-18 | End: 2025-08-18

## 2025-08-18 RX ORDER — PROCHLORPERAZINE 25 MG
10 SUPPOSITORY, RECTAL RECTAL EVERY 6 HOURS
Refills: 0 | Status: DISCONTINUED | OUTPATIENT
Start: 2025-08-18 | End: 2025-08-19

## 2025-08-18 RX ORDER — CEFTRIAXONE 500 MG/1
2000 INJECTION, POWDER, FOR SOLUTION INTRAMUSCULAR; INTRAVENOUS EVERY 24 HOURS
Refills: 0 | Status: DISCONTINUED | OUTPATIENT
Start: 2025-08-18 | End: 2025-08-18

## 2025-08-18 RX ORDER — LIDOCAINE HYDROCHLORIDE 20 MG/ML
1 JELLY TOPICAL DAILY
Refills: 0 | Status: DISCONTINUED | OUTPATIENT
Start: 2025-08-18 | End: 2025-08-19

## 2025-08-18 RX ORDER — CITALOPRAM 20 MG/1
40 TABLET ORAL DAILY
Refills: 0 | Status: DISCONTINUED | OUTPATIENT
Start: 2025-08-18 | End: 2025-08-19

## 2025-08-18 RX ORDER — ENOXAPARIN SODIUM 100 MG/ML
40 INJECTION SUBCUTANEOUS EVERY 24 HOURS
Refills: 0 | Status: DISCONTINUED | OUTPATIENT
Start: 2025-08-18 | End: 2025-08-19

## 2025-08-18 RX ORDER — ONDANSETRON HCL/PF 4 MG/2 ML
4 VIAL (ML) INJECTION EVERY 6 HOURS
Refills: 0 | Status: DISCONTINUED | OUTPATIENT
Start: 2025-08-18 | End: 2025-08-19

## 2025-08-18 RX ORDER — ONDANSETRON HCL/PF 4 MG/2 ML
4 VIAL (ML) INJECTION EVERY 8 HOURS
Refills: 0 | Status: DISCONTINUED | OUTPATIENT
Start: 2025-08-18 | End: 2025-08-18

## 2025-08-18 RX ORDER — LEVOTHYROXINE SODIUM 300 MCG
75 TABLET ORAL DAILY
Refills: 0 | Status: DISCONTINUED | OUTPATIENT
Start: 2025-08-19 | End: 2025-08-19

## 2025-08-18 RX ORDER — ALPRAZOLAM 0.5 MG
0.5 TABLET, EXTENDED RELEASE 24 HR ORAL
Refills: 0 | Status: DISCONTINUED | OUTPATIENT
Start: 2025-08-18 | End: 2025-08-19

## 2025-08-18 RX ORDER — ONDANSETRON HCL/PF 4 MG/2 ML
4 VIAL (ML) INJECTION ONCE
Refills: 0 | Status: COMPLETED | OUTPATIENT
Start: 2025-08-18 | End: 2025-08-18

## 2025-08-18 RX ORDER — SODIUM CHLORIDE 9 G/1000ML
1000 INJECTION, SOLUTION INTRAVENOUS ONCE
Refills: 0 | Status: COMPLETED | OUTPATIENT
Start: 2025-08-18 | End: 2025-08-18

## 2025-08-18 RX ORDER — POLYETHYLENE GLYCOL 3350 17 G/17G
17 POWDER, FOR SOLUTION ORAL
Refills: 0 | Status: DISCONTINUED | OUTPATIENT
Start: 2025-08-18 | End: 2025-08-19

## 2025-08-18 RX ORDER — SENNA 187 MG
2 TABLET ORAL AT BEDTIME
Refills: 0 | Status: DISCONTINUED | OUTPATIENT
Start: 2025-08-18 | End: 2025-08-19

## 2025-08-18 RX ADMIN — Medication 2 MILLIGRAM(S): at 21:34

## 2025-08-18 RX ADMIN — Medication 4 MILLIGRAM(S): at 14:43

## 2025-08-18 RX ADMIN — Medication 4 MILLIGRAM(S): at 12:23

## 2025-08-18 RX ADMIN — Medication 4 MILLIGRAM(S): at 18:31

## 2025-08-18 RX ADMIN — Medication 0.1 MILLIGRAM(S): at 12:23

## 2025-08-18 RX ADMIN — Medication 10 MILLIGRAM(S): at 21:23

## 2025-08-18 RX ADMIN — Medication 20 MILLIGRAM(S): at 19:09

## 2025-08-18 RX ADMIN — LIDOCAINE HYDROCHLORIDE 1 PATCH: 20 JELLY TOPICAL at 21:37

## 2025-08-18 RX ADMIN — Medication 2 TABLET(S): at 21:33

## 2025-08-18 RX ADMIN — Medication 10 MILLIGRAM(S): at 09:37

## 2025-08-18 RX ADMIN — Medication 2 MILLIGRAM(S): at 09:35

## 2025-08-18 RX ADMIN — Medication 40 MILLIGRAM(S): at 21:33

## 2025-08-18 RX ADMIN — Medication 2 MILLIGRAM(S): at 22:04

## 2025-08-18 RX ADMIN — SODIUM CHLORIDE 1000 MILLILITER(S): 9 INJECTION, SOLUTION INTRAVENOUS at 09:36

## 2025-08-18 RX ADMIN — Medication 4 MILLIGRAM(S): at 11:04

## 2025-08-18 RX ADMIN — ROSUVASTATIN CALCIUM 10 MILLIGRAM(S): 20 TABLET, FILM COATED ORAL at 21:33

## 2025-08-19 ENCOUNTER — TRANSCRIPTION ENCOUNTER (OUTPATIENT)
Age: 50
End: 2025-08-19

## 2025-08-19 VITALS
RESPIRATION RATE: 18 BRPM | SYSTOLIC BLOOD PRESSURE: 97 MMHG | DIASTOLIC BLOOD PRESSURE: 68 MMHG | TEMPERATURE: 99 F | OXYGEN SATURATION: 96 % | HEART RATE: 84 BPM

## 2025-08-19 LAB
AMPHET UR-MCNC: NEGATIVE — SIGNIFICANT CHANGE UP
ANION GAP SERPL CALC-SCNC: 14 MMOL/L — SIGNIFICANT CHANGE UP (ref 7–14)
APPEARANCE UR: ABNORMAL
BARBITURATES UR SCN-MCNC: POSITIVE
BASOPHILS # BLD AUTO: 0 K/UL — SIGNIFICANT CHANGE UP (ref 0–0.2)
BASOPHILS NFR BLD AUTO: 0 % — SIGNIFICANT CHANGE UP (ref 0–2)
BENZODIAZ UR-MCNC: NEGATIVE — SIGNIFICANT CHANGE UP
BILIRUB UR-MCNC: NEGATIVE — SIGNIFICANT CHANGE UP
BUN SERPL-MCNC: 7 MG/DL — LOW (ref 10–20)
CALCIUM SERPL-MCNC: 10 MG/DL — SIGNIFICANT CHANGE UP (ref 8.4–10.5)
CHLORIDE SERPL-SCNC: 102 MMOL/L — SIGNIFICANT CHANGE UP (ref 98–110)
CO2 SERPL-SCNC: 24 MMOL/L — SIGNIFICANT CHANGE UP (ref 17–32)
COCAINE METAB.OTHER UR-MCNC: NEGATIVE — SIGNIFICANT CHANGE UP
COLOR SPEC: YELLOW — SIGNIFICANT CHANGE UP
CREAT SERPL-MCNC: 0.7 MG/DL — SIGNIFICANT CHANGE UP (ref 0.7–1.5)
DIFF PNL FLD: NEGATIVE — SIGNIFICANT CHANGE UP
DRUG SCREEN 1, URINE RESULT: SIGNIFICANT CHANGE UP
EGFR: 107 ML/MIN/1.73M2 — SIGNIFICANT CHANGE UP
EGFR: 107 ML/MIN/1.73M2 — SIGNIFICANT CHANGE UP
EOSINOPHIL # BLD AUTO: 0.01 K/UL — SIGNIFICANT CHANGE UP (ref 0–0.5)
EOSINOPHIL NFR BLD AUTO: 0.2 % — SIGNIFICANT CHANGE UP (ref 0–6)
ERYTHROCYTE [SEDIMENTATION RATE] IN BLOOD: 27 MM/HR — HIGH (ref 0–20)
FENTANYL UR QL: NEGATIVE — SIGNIFICANT CHANGE UP
GLUCOSE SERPL-MCNC: 102 MG/DL — HIGH (ref 70–99)
GLUCOSE UR QL: NEGATIVE MG/DL — SIGNIFICANT CHANGE UP
HCG UR QL: NEGATIVE — SIGNIFICANT CHANGE UP
HCT VFR BLD CALC: 37.3 % — SIGNIFICANT CHANGE UP (ref 34.5–45)
HGB BLD-MCNC: 11.9 G/DL — SIGNIFICANT CHANGE UP (ref 11.5–15.5)
IMM GRANULOCYTES # BLD AUTO: 0.03 K/UL — SIGNIFICANT CHANGE UP (ref 0–0.07)
IMM GRANULOCYTES NFR BLD AUTO: 0.5 % — SIGNIFICANT CHANGE UP (ref 0–0.9)
KETONES UR QL: NEGATIVE MG/DL — SIGNIFICANT CHANGE UP
LEUKOCYTE ESTERASE UR-ACNC: ABNORMAL
LYMPHOCYTES # BLD AUTO: 1.43 K/UL — SIGNIFICANT CHANGE UP (ref 1–3.3)
LYMPHOCYTES NFR BLD AUTO: 23.9 % — SIGNIFICANT CHANGE UP (ref 13–44)
MCHC RBC-ENTMCNC: 29.1 PG — SIGNIFICANT CHANGE UP (ref 27–34)
MCHC RBC-ENTMCNC: 31.9 G/DL — LOW (ref 32–36)
MCV RBC AUTO: 91.2 FL — SIGNIFICANT CHANGE UP (ref 80–100)
METHADONE UR-MCNC: NEGATIVE — SIGNIFICANT CHANGE UP
MONOCYTES # BLD AUTO: 0.55 K/UL — SIGNIFICANT CHANGE UP (ref 0–0.9)
MONOCYTES NFR BLD AUTO: 9.2 % — SIGNIFICANT CHANGE UP (ref 2–14)
NEUTROPHILS # BLD AUTO: 3.97 K/UL — SIGNIFICANT CHANGE UP (ref 1.8–7.4)
NEUTROPHILS NFR BLD AUTO: 66.2 % — SIGNIFICANT CHANGE UP (ref 43–77)
NITRITE UR-MCNC: NEGATIVE — SIGNIFICANT CHANGE UP
NRBC # BLD AUTO: 0 K/UL — SIGNIFICANT CHANGE UP (ref 0–0)
NRBC # FLD: 0 K/UL — SIGNIFICANT CHANGE UP (ref 0–0)
NRBC BLD AUTO-RTO: 0 /100 WBCS — SIGNIFICANT CHANGE UP (ref 0–0)
OPIATES UR-MCNC: POSITIVE
OXYCODONE UR-MCNC: NEGATIVE — SIGNIFICANT CHANGE UP
PCP UR-MCNC: NEGATIVE — SIGNIFICANT CHANGE UP
PH UR: 7 — SIGNIFICANT CHANGE UP (ref 5–8)
PLATELET # BLD AUTO: 209 K/UL — SIGNIFICANT CHANGE UP (ref 150–400)
PMV BLD: 10.6 FL — SIGNIFICANT CHANGE UP (ref 7–13)
POTASSIUM SERPL-MCNC: 4 MMOL/L — SIGNIFICANT CHANGE UP (ref 3.5–5)
POTASSIUM SERPL-SCNC: 4 MMOL/L — SIGNIFICANT CHANGE UP (ref 3.5–5)
PROPOXYPHENE QUALITATIVE URINE RESULT: NEGATIVE — SIGNIFICANT CHANGE UP
PROT UR-MCNC: 30 MG/DL
RBC # BLD: 4.09 M/UL — SIGNIFICANT CHANGE UP (ref 3.8–5.2)
RBC # FLD: 12.8 % — SIGNIFICANT CHANGE UP (ref 10.3–14.5)
SODIUM SERPL-SCNC: 140 MMOL/L — SIGNIFICANT CHANGE UP (ref 135–146)
SP GR SPEC: 1.02 — SIGNIFICANT CHANGE UP (ref 1–1.03)
T4 FREE SERPL-MCNC: 0.9 NG/DL — SIGNIFICANT CHANGE UP (ref 0.9–1.8)
THC UR QL: NEGATIVE — SIGNIFICANT CHANGE UP
TSH SERPL-MCNC: 1.04 UIU/ML — SIGNIFICANT CHANGE UP (ref 0.27–4.2)
UROBILINOGEN FLD QL: 0.2 MG/DL — SIGNIFICANT CHANGE UP (ref 0.2–1)
WBC # BLD: 5.99 K/UL — SIGNIFICANT CHANGE UP (ref 3.8–10.5)
WBC # FLD AUTO: 5.99 K/UL — SIGNIFICANT CHANGE UP (ref 3.8–10.5)

## 2025-08-19 PROCEDURE — 99239 HOSP IP/OBS DSCHRG MGMT >30: CPT

## 2025-08-19 PROCEDURE — 99223 1ST HOSP IP/OBS HIGH 75: CPT

## 2025-08-19 PROCEDURE — 74018 RADEX ABDOMEN 1 VIEW: CPT | Mod: 26

## 2025-08-19 RX ORDER — PROCHLORPERAZINE 25 MG
1 SUPPOSITORY, RECTAL RECTAL
Qty: 21 | Refills: 0
Start: 2025-08-19 | End: 2025-08-25

## 2025-08-19 RX ORDER — PROCHLORPERAZINE 25 MG
1 SUPPOSITORY, RECTAL RECTAL
Qty: 56 | Refills: 0
Start: 2025-08-19 | End: 2025-09-01

## 2025-08-19 RX ORDER — ONDANSETRON HCL/PF 4 MG/2 ML
1 VIAL (ML) INJECTION
Qty: 4 | Refills: 0
Start: 2025-08-19 | End: 2025-09-01

## 2025-08-19 RX ORDER — LIDOCAINE HYDROCHLORIDE 20 MG/ML
1 JELLY TOPICAL
Qty: 5 | Refills: 0
Start: 2025-08-19 | End: 2025-09-01

## 2025-08-19 RX ORDER — SENNA 187 MG
2 TABLET ORAL
Qty: 14 | Refills: 0
Start: 2025-08-19 | End: 2025-08-25

## 2025-08-19 RX ORDER — ACETAMINOPHEN 500 MG/5ML
650 LIQUID (ML) ORAL EVERY 6 HOURS
Refills: 0 | Status: DISCONTINUED | OUTPATIENT
Start: 2025-08-19 | End: 2025-08-19

## 2025-08-19 RX ORDER — TIZANIDINE 4 MG/1
2 TABLET ORAL
Qty: 24 | Refills: 0
Start: 2025-08-19 | End: 2025-08-21

## 2025-08-19 RX ORDER — OXYCODONE HYDROCHLORIDE 30 MG/1
10 TABLET ORAL ONCE
Refills: 0 | Status: DISCONTINUED | OUTPATIENT
Start: 2025-08-19 | End: 2025-08-19

## 2025-08-19 RX ADMIN — Medication 4 MILLIGRAM(S): at 06:58

## 2025-08-19 RX ADMIN — Medication 40 MILLIGRAM(S): at 12:12

## 2025-08-19 RX ADMIN — OXYCODONE HYDROCHLORIDE 10 MILLIGRAM(S): 30 TABLET ORAL at 14:45

## 2025-08-19 RX ADMIN — CITALOPRAM 40 MILLIGRAM(S): 20 TABLET ORAL at 11:01

## 2025-08-19 RX ADMIN — Medication 4 MILLIGRAM(S): at 07:52

## 2025-08-19 RX ADMIN — Medication 20 MILLIGRAM(S): at 05:00

## 2025-08-19 RX ADMIN — Medication 4 MILLIGRAM(S): at 00:53

## 2025-08-19 RX ADMIN — Medication 4 MILLIGRAM(S): at 01:23

## 2025-08-19 RX ADMIN — OXYBUTYNIN CHLORIDE 5 MILLIGRAM(S): 5 TABLET, FILM COATED, EXTENDED RELEASE ORAL at 05:00

## 2025-08-19 RX ADMIN — Medication 1 APPLICATION(S): at 05:00

## 2025-08-19 RX ADMIN — Medication 10 MILLIGRAM(S): at 10:58

## 2025-08-19 RX ADMIN — Medication 75 MICROGRAM(S): at 05:00

## 2025-08-19 RX ADMIN — LIDOCAINE HYDROCHLORIDE 1 PATCH: 20 JELLY TOPICAL at 10:59

## 2025-08-20 LAB
C TRACH RRNA SPEC QL NAA+PROBE: SIGNIFICANT CHANGE UP
C4 SERPL-MCNC: 20 MG/DL — SIGNIFICANT CHANGE UP (ref 13–39)
CULTURE RESULTS: SIGNIFICANT CHANGE UP
HIV 1+2 AB+HIV1 P24 AG SERPL QL IA: SIGNIFICANT CHANGE UP
IGA FLD-MCNC: 194 MG/DL — SIGNIFICANT CHANGE UP (ref 84–499)
IGE SERPL-ACNC: 7 KU/L — SIGNIFICANT CHANGE UP
IGG FLD-MCNC: 883 MG/DL — SIGNIFICANT CHANGE UP (ref 610–1660)
IGG1 SER-MCNC: 464 MG/DL — SIGNIFICANT CHANGE UP (ref 240–1118)
IGG2 SER-MCNC: 364 MG/DL — SIGNIFICANT CHANGE UP (ref 124–549)
IGG3 SER-MCNC: 89.4 MG/DL — SIGNIFICANT CHANGE UP (ref 15–102)
IGG4 SER-MCNC: 5.4 MG/DL — SIGNIFICANT CHANGE UP (ref 1–123)
N GONORRHOEA RRNA SPEC QL NAA+PROBE: SIGNIFICANT CHANGE UP
PROT C ACT/NOR PPP: 188 % — HIGH (ref 65–129)
SPECIMEN SOURCE: SIGNIFICANT CHANGE UP
T PALLIDUM AB TITR SER: NEGATIVE — SIGNIFICANT CHANGE UP

## 2025-08-21 LAB
ANA TITR SER: NEGATIVE — SIGNIFICANT CHANGE UP
C1INH FUNCTIONAL/C1INH TOTAL MFR SERPL: 34 MG/DL — SIGNIFICANT CHANGE UP (ref 21–39)

## 2025-08-22 LAB
AMOBARBITAL UR QL SCN: NEGATIVE NG/ML — SIGNIFICANT CHANGE UP
BARBITURATES UR QL SCN: POSITIVE
BUTALBITAL UR QL SCN: 835 NG/ML — HIGH
C1INH FUNCTIONAL FLD-MCNC: 103 — SIGNIFICANT CHANGE UP
IC SERPL C1Q BIND-ACNC: 2.9 UG EQ/ML — SIGNIFICANT CHANGE UP
PHENOBARB UR CFM-MCNC: 3923 NG/ML — HIGH
PHENOBARBITAL, UR RESULT: 3923 NG/ML — HIGH
SECOBARBITAL UR QL SCN: NEGATIVE NG/ML — SIGNIFICANT CHANGE UP

## 2025-08-24 LAB
CULTURE RESULTS: SIGNIFICANT CHANGE UP
SPECIMEN SOURCE: SIGNIFICANT CHANGE UP

## 2025-08-26 LAB
6MAM UR CFM-MCNC: NEGATIVE NG/ML — SIGNIFICANT CHANGE UP
CODEINE UR CFM-MCNC: NEGATIVE NG/ML — SIGNIFICANT CHANGE UP
DIHYDROCODONE RESULT: NEGATIVE NG/ML — SIGNIFICANT CHANGE UP
DRUG SCREEN, SERUM: SIGNIFICANT CHANGE UP
HYDROCODONE UR QL CFM: NEGATIVE NG/ML — SIGNIFICANT CHANGE UP
HYDROMORPHONE UR QL CFM: 71 NG/ML — HIGH
MEPERIDINE RESULT: NEGATIVE NG/ML — SIGNIFICANT CHANGE UP
MORPHINE UR QL CFM: SIGNIFICANT CHANGE UP NG/ML
NALOXONE RESULT: NEGATIVE NG/ML — SIGNIFICANT CHANGE UP
NALOXONE UR CFM-MCNC: NEGATIVE NG/ML — SIGNIFICANT CHANGE UP
NALTREXONE RESULT: NEGATIVE NG/ML — SIGNIFICANT CHANGE UP
OPIATES UR QL CFM: POSITIVE
TAPENTADOL RESULT: NEGATIVE NG/ML — SIGNIFICANT CHANGE UP

## 2025-08-28 LAB — C4B BP SERPL-MCNC: 113 % — SIGNIFICANT CHANGE UP

## 2025-08-29 DIAGNOSIS — Z91.012 ALLERGY TO EGGS: ICD-10-CM

## 2025-08-29 DIAGNOSIS — E04.1 NONTOXIC SINGLE THYROID NODULE: ICD-10-CM

## 2025-08-29 DIAGNOSIS — Z91.018 ALLERGY TO OTHER FOODS: ICD-10-CM

## 2025-08-29 DIAGNOSIS — G89.29 OTHER CHRONIC PAIN: ICD-10-CM

## 2025-08-29 DIAGNOSIS — F11.93 OPIOID USE, UNSPECIFIED WITH WITHDRAWAL: ICD-10-CM

## 2025-08-29 DIAGNOSIS — Z87.442 PERSONAL HISTORY OF URINARY CALCULI: ICD-10-CM
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
## 2025-08-29 DIAGNOSIS — N32.81 OVERACTIVE BLADDER: ICD-10-CM

## 2025-08-29 DIAGNOSIS — T78.3XXA ANGIONEUROTIC EDEMA, INITIAL ENCOUNTER: ICD-10-CM

## 2025-08-29 DIAGNOSIS — E03.9 HYPOTHYROIDISM, UNSPECIFIED: ICD-10-CM
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
## 2025-08-29 DIAGNOSIS — Z88.6 ALLERGY STATUS TO ANALGESIC AGENT: ICD-10-CM

## 2025-08-29 DIAGNOSIS — K21.9 GASTRO-ESOPHAGEAL REFLUX DISEASE WITHOUT ESOPHAGITIS: ICD-10-CM

## 2025-08-29 DIAGNOSIS — Z88.1 ALLERGY STATUS TO OTHER ANTIBIOTIC AGENTS: ICD-10-CM

## 2025-08-29 DIAGNOSIS — R11.2 NAUSEA WITH VOMITING, UNSPECIFIED: ICD-10-CM

## 2025-08-29 DIAGNOSIS — Z79.890 HORMONE REPLACEMENT THERAPY: ICD-10-CM

## 2025-08-29 DIAGNOSIS — Z87.898 PERSONAL HISTORY OF OTHER SPECIFIED CONDITIONS: ICD-10-CM

## 2025-08-29 DIAGNOSIS — E78.5 HYPERLIPIDEMIA, UNSPECIFIED: ICD-10-CM

## 2025-08-29 DIAGNOSIS — L50.8 OTHER URTICARIA: ICD-10-CM

## 2025-08-29 DIAGNOSIS — G43.909 MIGRAINE, UNSPECIFIED, NOT INTRACTABLE, WITHOUT STATUS MIGRAINOSUS: ICD-10-CM

## 2025-09-19 ENCOUNTER — TRANSCRIPTION ENCOUNTER (OUTPATIENT)
Age: 50
End: 2025-09-19